# Patient Record
Sex: MALE | Race: WHITE | NOT HISPANIC OR LATINO | Employment: OTHER | URBAN - METROPOLITAN AREA
[De-identification: names, ages, dates, MRNs, and addresses within clinical notes are randomized per-mention and may not be internally consistent; named-entity substitution may affect disease eponyms.]

---

## 2017-01-03 ENCOUNTER — HOME CARE/HOSPICE - HEALTHEAST (OUTPATIENT)
Dept: HOME HEALTH SERVICES | Facility: HOME HEALTH | Age: 82
End: 2017-01-03

## 2017-01-04 ENCOUNTER — AMBULATORY - HEALTHEAST (OUTPATIENT)
Dept: FAMILY MEDICINE | Facility: CLINIC | Age: 82
End: 2017-01-04

## 2017-01-04 ENCOUNTER — COMMUNICATION - HEALTHEAST (OUTPATIENT)
Dept: FAMILY MEDICINE | Facility: CLINIC | Age: 82
End: 2017-01-04

## 2017-01-04 DIAGNOSIS — I48.0 PAROXYSMAL ATRIAL FIBRILLATION (H): ICD-10-CM

## 2017-01-05 ENCOUNTER — HOME CARE/HOSPICE - HEALTHEAST (OUTPATIENT)
Dept: HOME HEALTH SERVICES | Facility: HOME HEALTH | Age: 82
End: 2017-01-05

## 2017-01-10 ENCOUNTER — HOME CARE/HOSPICE - HEALTHEAST (OUTPATIENT)
Dept: HOME HEALTH SERVICES | Facility: HOME HEALTH | Age: 82
End: 2017-01-10

## 2017-01-12 ENCOUNTER — HOME CARE/HOSPICE - HEALTHEAST (OUTPATIENT)
Dept: HOME HEALTH SERVICES | Facility: HOME HEALTH | Age: 82
End: 2017-01-12

## 2017-01-20 ENCOUNTER — HOME CARE/HOSPICE - HEALTHEAST (OUTPATIENT)
Dept: HOME HEALTH SERVICES | Facility: HOME HEALTH | Age: 82
End: 2017-01-20

## 2017-01-20 ENCOUNTER — OFFICE VISIT - HEALTHEAST (OUTPATIENT)
Dept: CARDIOLOGY | Facility: CLINIC | Age: 82
End: 2017-01-20

## 2017-01-20 ENCOUNTER — COMMUNICATION - HEALTHEAST (OUTPATIENT)
Dept: TELEHEALTH | Facility: CLINIC | Age: 82
End: 2017-01-20

## 2017-01-20 DIAGNOSIS — I44.7 LBBB (LEFT BUNDLE BRANCH BLOCK): ICD-10-CM

## 2017-01-20 DIAGNOSIS — I44.30 AVB (ATRIOVENTRICULAR BLOCK): ICD-10-CM

## 2017-01-20 DIAGNOSIS — I10 ESSENTIAL HYPERTENSION WITH GOAL BLOOD PRESSURE LESS THAN 140/90: ICD-10-CM

## 2017-01-20 DIAGNOSIS — I48.0 PAROXYSMAL ATRIAL FIBRILLATION (H): ICD-10-CM

## 2017-01-20 DIAGNOSIS — I25.10 ATHEROSCLEROSIS OF NATIVE CORONARY ARTERY OF NATIVE HEART WITHOUT ANGINA PECTORIS: ICD-10-CM

## 2017-01-20 ASSESSMENT — MIFFLIN-ST. JEOR: SCORE: 1345.98

## 2017-02-07 ENCOUNTER — COMMUNICATION - HEALTHEAST (OUTPATIENT)
Dept: FAMILY MEDICINE | Facility: CLINIC | Age: 82
End: 2017-02-07

## 2017-02-07 DIAGNOSIS — I48.0 PAROXYSMAL ATRIAL FIBRILLATION (H): ICD-10-CM

## 2017-02-21 ENCOUNTER — AMBULATORY - HEALTHEAST (OUTPATIENT)
Dept: CARDIOLOGY | Facility: CLINIC | Age: 82
End: 2017-02-21

## 2017-02-21 ENCOUNTER — AMBULATORY - HEALTHEAST (OUTPATIENT)
Dept: FAMILY MEDICINE | Facility: CLINIC | Age: 82
End: 2017-02-21

## 2017-02-21 DIAGNOSIS — Z95.0 CARDIAC PACEMAKER IN SITU: ICD-10-CM

## 2017-02-21 ASSESSMENT — MIFFLIN-ST. JEOR: SCORE: 1343.2

## 2017-03-29 ENCOUNTER — COMMUNICATION - HEALTHEAST (OUTPATIENT)
Dept: FAMILY MEDICINE | Facility: CLINIC | Age: 82
End: 2017-03-29

## 2017-03-29 DIAGNOSIS — I48.0 PAROXYSMAL ATRIAL FIBRILLATION (H): ICD-10-CM

## 2017-05-23 ENCOUNTER — AMBULATORY - HEALTHEAST (OUTPATIENT)
Dept: CARDIOLOGY | Facility: CLINIC | Age: 82
End: 2017-05-23

## 2017-05-23 DIAGNOSIS — Z95.0 PACEMAKER: ICD-10-CM

## 2017-05-23 LAB — HCC DEVICE COMMENTS: NORMAL

## 2017-05-24 ENCOUNTER — AMBULATORY - HEALTHEAST (OUTPATIENT)
Dept: CARDIOLOGY | Facility: CLINIC | Age: 82
End: 2017-05-24

## 2017-06-05 ENCOUNTER — OFFICE VISIT (OUTPATIENT)
Dept: UROLOGY | Facility: CLINIC | Age: 82
End: 2017-06-05
Payer: MEDICARE

## 2017-06-05 ENCOUNTER — RECORDS - HEALTHEAST (OUTPATIENT)
Dept: ADMINISTRATIVE | Facility: OTHER | Age: 82
End: 2017-06-05

## 2017-06-05 VITALS
SYSTOLIC BLOOD PRESSURE: 130 MMHG | WEIGHT: 160 LBS | HEART RATE: 70 BPM | DIASTOLIC BLOOD PRESSURE: 80 MMHG | BODY MASS INDEX: 23.7 KG/M2 | HEIGHT: 69 IN

## 2017-06-05 DIAGNOSIS — C61 PROSTATE CANCER (H): Primary | ICD-10-CM

## 2017-06-05 LAB — PSA SERPL-MCNC: 1.9 NG/ML (ref 0–4)

## 2017-06-05 PROCEDURE — 84153 ASSAY OF PSA TOTAL: CPT | Performed by: UROLOGY

## 2017-06-05 PROCEDURE — 36415 COLL VENOUS BLD VENIPUNCTURE: CPT | Performed by: UROLOGY

## 2017-06-05 PROCEDURE — 99213 OFFICE O/P EST LOW 20 MIN: CPT | Performed by: UROLOGY

## 2017-06-05 RX ORDER — SOTALOL HYDROCHLORIDE 80 MG/1
TABLET ORAL
Refills: 1 | COMMUNITY
Start: 2017-05-20 | End: 2021-01-01

## 2017-06-05 ASSESSMENT — PAIN SCALES - GENERAL: PAINLEVEL: NO PAIN (0)

## 2017-06-05 NOTE — LETTER
6/5/2017       RE: Seng Deshpande  [address] [place] [address]     Dear Colleague,    Thank you for referring your patient, Seng Deshpande, to the [facility] [place] at [facility]. Please see a copy of my visit note below.    History: This very pleasant 89-year-old gentleman returns for PSA and reviewed today.We recall, that he was diagnosed with low volume low-grade prostate cancer after a transurethral resection of prostate over 20 years ago and has since then been on active surveillance.  2 years ago, while on Avodart his PSA had risen to 8.7.  He was given 1 Eligard injection at that time and Avodart was discontinued.  One year ago the PSA was 4.4 and 6 months ago he did risen to 11.4.  We did a T3 MRI of the prostate at that time which showed no worse than PIRADS 3,We were concerned, despite this quite encouraging MRI report about the PSA velocity and therefore repeated an Eligard 45 injection at that time.The PSA today is 1.9.He is voiding well.He has recently had a pacemaker implanted.    Past Medical History:   Diagnosis Date     Mumps      Prostate infection        Social History     Social History     Marital status:      Spouse name: N/A     Number of children: N/A     Years of education: N/A     Social History Main Topics     Smoking status: Never Smoker     Smokeless tobacco: None     Alcohol use No     Drug use: No     Sexual activity: Not Asked     Other Topics Concern     None     Social History Narrative       Past Surgical History:   Procedure Laterality Date     CYSTOSCOPY       PENIS SURGERY       PROSTATE SURGERY         No family history on file.      Current Outpatient Prescriptions:      sotalol (BETAPACE) 80 MG tablet, , Disp: , Rfl: 1     ascorbic acid (VITAMIN C) 1000 MG TABS, Take 1,000 mg by mouth, Disp: , Rfl:      aspirin 81 MG chewable tablet, Take 81 mg by mouth, Disp: , Rfl:      quinapril  "(ACCUPRIL) 20 MG tablet, Take 20 mg by mouth, Disp: , Rfl:      nitroglycerin (NITROSTAT) 0.4 MG SL tablet, Place 0.4 mg under the tongue, Disp: , Rfl:      Multiple Vitamins-Minerals (CENTRUM SILVER) per tablet, Take 1 tablet by mouth, Disp: , Rfl:      Glucosamine HCl (SM GLUCOSAMINE HCL) 1500 MG TABS, Take 1,500 mg by mouth, Disp: , Rfl:      clindamycin (CLEOCIN) 300 MG capsule, Take 600 mg by mouth, Disp: , Rfl:      cholecalciferol (VITAMIN D-1000 MAX ST) 1000 UNITS TABS, Take 1,000 Units by mouth, Disp: , Rfl:      Oyster Shell Calcium (CVS OYSTER SHELL CALCIUM) 500 MG tablet, Take 1 tablet by mouth, Disp: , Rfl:      atorvastatin (LIPITOR) 40 MG tablet, Take 40 mg by mouth, Disp: , Rfl:     10 point ROS of systems including Constitutional, Eyes, Respiratory, Cardiovascular, Gastroenterology, Genitourinary, Integumentary, Muscularskeletal, Psychiatric were all negative except for pertinent positives noted in my HPI.    Examination:   /80  Pulse 70  Ht 1.753 m (5' 9\")  Wt 72.6 kg (160 lb)  BMI 23.63 kg/m2  General Impression: Very pleasant gentleman in no acute distress, well-oriented in time place and person  Mental Status: Normal.HEENT.  There is no evidence of jaundice and mucous membranes are normal  Skin: Skin is normal to examination  Respiratory System: Respiratory cycle is normal  Lymph Nodes: Not examined  Back/Flank Tenderness: Not examined  Cardiovascular System: Not examined  Abdominal Examination: Not examined  Extremities: There is no significant peripheral edema  Genitial: Not examined  Rectal Examination: Not examined  Neurologic System: There are no focal abnormal clinical neurological signs and central, or peripheral nervous systems    Impression: We decided to defer any further Eligard for the time being, in view of the very significant positive reaction to the last injection, from a biochemical point review.I will plan to repeat the PSA in 6 months along with a physical " "examination at that time.  We will determine if further Eligard needs to be considered based on those results.    Plan.6 months for PSA and examination.    \"This dictation was performed with voice recognition software and may contain errors,  omissions and inadvertent word substitu      Again, thank you for allowing me to participate in the care of your patient.      Sincerely,    Kodi Carmen MD      "

## 2017-06-05 NOTE — NURSING NOTE
Chief Complaint   Patient presents with     Psa Screening     Patient is here for PSA      Sanford Cotter LPN 3:56 PM June 5, 2017

## 2017-06-05 NOTE — PROGRESS NOTES
History: This very pleasant 89-year-old gentleman returns for PSA and reviewed today.We recall, that he was diagnosed with low volume low-grade prostate cancer after a transurethral resection of prostate over 20 years ago and has since then been on active surveillance.  2 years ago, while on Avodart his PSA had risen to 8.7.  He was given 1 Eligard injection at that time and Avodart was discontinued.  One year ago the PSA was 4.4 and 6 months ago he did risen to 11.4.  We did a T3 MRI of the prostate at that time which showed no worse than PIRADS 3,We were concerned, despite this quite encouraging MRI report about the PSA velocity and therefore repeated an Eligard 45 injection at that time.The PSA today is 1.9.He is voiding well.He has recently had a pacemaker implanted.    Past Medical History:   Diagnosis Date     Mumps      Prostate infection        Social History     Social History     Marital status:      Spouse name: N/A     Number of children: N/A     Years of education: N/A     Social History Main Topics     Smoking status: Never Smoker     Smokeless tobacco: None     Alcohol use No     Drug use: No     Sexual activity: Not Asked     Other Topics Concern     None     Social History Narrative       Past Surgical History:   Procedure Laterality Date     CYSTOSCOPY       PENIS SURGERY       PROSTATE SURGERY         No family history on file.      Current Outpatient Prescriptions:      sotalol (BETAPACE) 80 MG tablet, , Disp: , Rfl: 1     ascorbic acid (VITAMIN C) 1000 MG TABS, Take 1,000 mg by mouth, Disp: , Rfl:      aspirin 81 MG chewable tablet, Take 81 mg by mouth, Disp: , Rfl:      quinapril (ACCUPRIL) 20 MG tablet, Take 20 mg by mouth, Disp: , Rfl:      nitroglycerin (NITROSTAT) 0.4 MG SL tablet, Place 0.4 mg under the tongue, Disp: , Rfl:      Multiple Vitamins-Minerals (CENTRUM SILVER) per tablet, Take 1 tablet by mouth, Disp: , Rfl:      Glucosamine HCl (SM GLUCOSAMINE HCL) 1500 MG TABS, Take  "1,500 mg by mouth, Disp: , Rfl:      clindamycin (CLEOCIN) 300 MG capsule, Take 600 mg by mouth, Disp: , Rfl:      cholecalciferol (VITAMIN D-1000 MAX ST) 1000 UNITS TABS, Take 1,000 Units by mouth, Disp: , Rfl:      Oyster Shell Calcium (CVS OYSTER SHELL CALCIUM) 500 MG tablet, Take 1 tablet by mouth, Disp: , Rfl:      atorvastatin (LIPITOR) 40 MG tablet, Take 40 mg by mouth, Disp: , Rfl:     10 point ROS of systems including Constitutional, Eyes, Respiratory, Cardiovascular, Gastroenterology, Genitourinary, Integumentary, Muscularskeletal, Psychiatric were all negative except for pertinent positives noted in my HPI.    Examination:   /80  Pulse 70  Ht 1.753 m (5' 9\")  Wt 72.6 kg (160 lb)  BMI 23.63 kg/m2  General Impression: Very pleasant gentleman in no acute distress, well-oriented in time place and person  Mental Status: Normal.HEENT.  There is no evidence of jaundice and mucous membranes are normal  Skin: Skin is normal to examination  Respiratory System: Respiratory cycle is normal  Lymph Nodes: Not examined  Back/Flank Tenderness: Not examined  Cardiovascular System: Not examined  Abdominal Examination: Not examined  Extremities: There is no significant peripheral edema  Genitial: Not examined  Rectal Examination: Not examined  Neurologic System: There are no focal abnormal clinical neurological signs and central, or peripheral nervous systems    Impression: We decided to defer any further Eligard for the time being, in view of the very significant positive reaction to the last injection, from a biochemical point review.I will plan to repeat the PSA in 6 months along with a physical examination at that time.  We will determine if further Eligard needs to be considered based on those results.    Plan.6 months for PSA and examination.    \"This dictation was performed with voice recognition software and may contain errors,  omissions and inadvertent word substitu    "

## 2017-06-05 NOTE — MR AVS SNAPSHOT
"              After Visit Summary   6/5/2017    Seng Deshpande    MRN: [id number]           Patient Information     Date Of Birth          5/2/1926        Visit Information        Provider Department      6/5/2017 3:40 PM Kodi Carmen MD [facility] [place]        Today's Diagnoses     Prostate cancer (H)    -  1       Follow-ups after your visit        Follow-up notes from your care team     Return in about 6 months (around 12/5/2017) for PSA, Physical Exam.      Your next 10 appointments already scheduled     Nov 08, 2017 11:00 AM CST   Return Visit with Kodi Carmen MD   [facility] ([facility])    [address]  [address]  [facility] [place] [facility] [address] [phone]              Who to contact     If you have questions or need follow up information about today's clinic visit or your schedule please contact [facility] directly at [phone].  Normal or non-critical lab and imaging results will be communicated to you by [email or web address], letter or phone within 4 business days after the clinic has received the results. If you do not hear from us within 7 days, please contact the clinic through JustBookt or phone. If you have a critical or abnormal lab result, we will notify you by phone as soon as possible.  Submit refill requests through Tengion or call your pharmacy and they will forward the refill request to us. Please allow 3 business days for your refill to be completed.          Additional Information About Your Visit        [email or web address] Information     Tengion lets you send messages to your doctor, view your test results, renew your prescriptions, schedule appointments and more. To sign up, go to [email or web address] . Click on \"Log in\" on the left side of the screen, which will take you to the Welcome page. Then click on \"Sign up Now\" on the right side of the page.     You will " "be asked to enter the access code listed below, as well as some personal information. Please follow the directions to create your username and password.     Your access code is: [email or web address]  Expires: 9/3/2017  4:10 PM     Your access code will  in 90 days. If you need help or a new code, please call your [facility] clinic or [phone].        Care EveryWhere ID     This is your Care EveryWhere ID. This could be used by other organizations to access your [facility] medical records  [id number]        Your Vitals Were     Pulse Height BMI (Body Mass Index)             70 1.753 m (5' 9\") 23.63 kg/m2          Blood Pressure from Last 3 Encounters:   17 130/80   16 122/72    Weight from Last 3 Encounters:   17 72.6 kg (160 lb)   16 77.1 kg (170 lb)              We Performed the Following     PSA Diag Urologic Phys          Today's Medication Changes          These changes are accurate as of: 17  4:10 PM.  If you have any questions, ask your nurse or doctor.               Stop taking these medicines if you haven't already. Please contact your care team if you have questions.     ELIQUIS 5 MG tablet   Generic drug:  apixaban ANTICOAGULANT   Stopped by:  Kodi Carmen MD           metoprolol 100 MG 24 hr tablet   Commonly known as:  TOPROL-XL   Stopped by:  Kodi Carmen MD                    Primary Care Provider Office Phone # Fax #    Taras Avila [phone]       [place] [id number] [facility] [address]        Thank you!     Thank you for choosing [facility] [place]  for your care. Our goal is always to provide you with excellent care. Hearing back from our patients is one way we can continue to improve our services. Please take a few minutes to complete the written survey that you may receive in the mail after your visit with us. Thank you!             Your Updated Medication List " - Protect others around you: Learn how to safely use, store and throw away your medicines at www.disposemymeds.org.          This list is accurate as of: 6/5/17  4:10 PM.  Always use your most recent med list.                   Brand Name Dispense Instructions for use    ascorbic acid 1000 MG Tabs    vitamin C     Take 1,000 mg by mouth       aspirin 81 MG chewable tablet      Take 81 mg by mouth       atorvastatin 40 MG tablet    LIPITOR     Take 40 mg by mouth       CENTRUM SILVER per tablet      Take 1 tablet by mouth       clindamycin 300 MG capsule    CLEOCIN     Take 600 mg by mouth       CVS OYSTER SHELL CALCIUM 500 MG tablet   Generic drug:  Oyster Shell Calcium      Take 1 tablet by mouth       nitroglycerin 0.4 MG sublingual tablet    NITROSTAT     Place 0.4 mg under the tongue       quinapril 20 MG tablet    ACCUPRIL     Take 20 mg by mouth       SM GLUCOSAMINE HCL 1500 MG Tabs   Generic drug:  Glucosamine HCl      Take 1,500 mg by mouth       sotalol 80 MG tablet    BETAPACE         VITAMIN D-1000 MAX ST 1000 UNITS Tabs   Generic drug:  cholecalciferol      Take 1,000 Units by mouth

## 2017-07-08 ENCOUNTER — COMMUNICATION - HEALTHEAST (OUTPATIENT)
Dept: FAMILY MEDICINE | Facility: CLINIC | Age: 82
End: 2017-07-08

## 2017-07-08 DIAGNOSIS — I48.0 PAROXYSMAL ATRIAL FIBRILLATION (H): ICD-10-CM

## 2017-08-24 ENCOUNTER — COMMUNICATION - HEALTHEAST (OUTPATIENT)
Dept: ADMINISTRATIVE | Facility: CLINIC | Age: 82
End: 2017-08-24

## 2017-08-29 ENCOUNTER — AMBULATORY - HEALTHEAST (OUTPATIENT)
Dept: CARDIOLOGY | Facility: CLINIC | Age: 82
End: 2017-08-29

## 2017-08-29 ENCOUNTER — COMMUNICATION - HEALTHEAST (OUTPATIENT)
Dept: CARDIOLOGY | Facility: CLINIC | Age: 82
End: 2017-08-29

## 2017-08-29 DIAGNOSIS — Z95.0 PACEMAKER: ICD-10-CM

## 2017-08-29 LAB — HCC DEVICE COMMENTS: NORMAL

## 2017-09-25 ENCOUNTER — OFFICE VISIT - HEALTHEAST (OUTPATIENT)
Dept: GERIATRICS | Facility: CLINIC | Age: 82
End: 2017-09-25

## 2017-09-25 ENCOUNTER — AMBULATORY - HEALTHEAST (OUTPATIENT)
Dept: ADMINISTRATIVE | Facility: CLINIC | Age: 82
End: 2017-09-25

## 2017-09-25 DIAGNOSIS — S06.9XAA TBI (TRAUMATIC BRAIN INJURY) (H): ICD-10-CM

## 2017-09-25 DIAGNOSIS — I48.0 PAROXYSMAL ATRIAL FIBRILLATION (H): ICD-10-CM

## 2017-09-25 DIAGNOSIS — T07.XXXA MULTIPLE FRACTURES: ICD-10-CM

## 2017-09-25 DIAGNOSIS — S70.10XA ILIOPSOAS MUSCLE HEMATOMA: ICD-10-CM

## 2017-09-25 DIAGNOSIS — S62.509A THUMB FRACTURE: ICD-10-CM

## 2017-09-25 DIAGNOSIS — S62.601A: ICD-10-CM

## 2017-09-28 ENCOUNTER — OFFICE VISIT - HEALTHEAST (OUTPATIENT)
Dept: GERIATRICS | Facility: CLINIC | Age: 82
End: 2017-09-28

## 2017-09-28 ENCOUNTER — RECORDS - HEALTHEAST (OUTPATIENT)
Dept: ADMINISTRATIVE | Facility: OTHER | Age: 82
End: 2017-09-28

## 2017-09-28 DIAGNOSIS — S32.009A LUMBAR VERTEBRAL FRACTURE (H): ICD-10-CM

## 2017-09-28 DIAGNOSIS — D50.0 BLOOD LOSS ANEMIA: ICD-10-CM

## 2017-09-28 DIAGNOSIS — S06.9X1S TBI (TRAUMATIC BRAIN INJURY), WITH LOSS OF CONSCIOUSNESS OF 30 MINUTES OR LESS, SEQUELA: ICD-10-CM

## 2017-09-28 DIAGNOSIS — T07.XXXA MULTIPLE CONTUSIONS: ICD-10-CM

## 2017-09-29 ENCOUNTER — AMBULATORY - HEALTHEAST (OUTPATIENT)
Dept: GERIATRICS | Facility: CLINIC | Age: 82
End: 2017-09-29

## 2017-09-29 ENCOUNTER — COMMUNICATION - HEALTHEAST (OUTPATIENT)
Dept: FAMILY MEDICINE | Facility: CLINIC | Age: 82
End: 2017-09-29

## 2017-09-30 ASSESSMENT — MIFFLIN-ST. JEOR: SCORE: 1416.9

## 2017-10-02 ENCOUNTER — OFFICE VISIT - HEALTHEAST (OUTPATIENT)
Dept: GERIATRICS | Facility: CLINIC | Age: 82
End: 2017-10-02

## 2017-10-02 DIAGNOSIS — S06.9XAA TBI (TRAUMATIC BRAIN INJURY) (H): ICD-10-CM

## 2017-10-02 DIAGNOSIS — I48.0 PAROXYSMAL ATRIAL FIBRILLATION (H): ICD-10-CM

## 2017-10-02 DIAGNOSIS — D50.0 BLOOD LOSS ANEMIA: ICD-10-CM

## 2017-10-02 DIAGNOSIS — S62.509A THUMB FRACTURE: ICD-10-CM

## 2017-10-02 DIAGNOSIS — T07.XXXA MULTIPLE FRACTURES: ICD-10-CM

## 2017-10-05 ENCOUNTER — OFFICE VISIT - HEALTHEAST (OUTPATIENT)
Dept: GERIATRICS | Facility: CLINIC | Age: 82
End: 2017-10-05

## 2017-10-05 DIAGNOSIS — T07.XXXA MULTIPLE CONTUSIONS: ICD-10-CM

## 2017-10-09 ENCOUNTER — OFFICE VISIT - HEALTHEAST (OUTPATIENT)
Dept: GERIATRICS | Facility: CLINIC | Age: 82
End: 2017-10-09

## 2017-10-09 DIAGNOSIS — T07.XXXA MULTIPLE FRACTURES: ICD-10-CM

## 2017-10-09 DIAGNOSIS — D50.0 BLOOD LOSS ANEMIA: ICD-10-CM

## 2017-10-09 DIAGNOSIS — T07.XXXA MULTIPLE CONTUSIONS: ICD-10-CM

## 2017-10-12 ENCOUNTER — OFFICE VISIT - HEALTHEAST (OUTPATIENT)
Dept: GERIATRICS | Facility: CLINIC | Age: 82
End: 2017-10-12

## 2017-10-12 DIAGNOSIS — Z95.0 S/P PLACEMENT OF CARDIAC PACEMAKER: ICD-10-CM

## 2017-10-12 DIAGNOSIS — S32.009A LUMBAR VERTEBRAL FRACTURE (H): ICD-10-CM

## 2017-10-12 DIAGNOSIS — K59.00 CONSTIPATION: ICD-10-CM

## 2017-10-12 DIAGNOSIS — E78.5 DYSLIPIDEMIA: ICD-10-CM

## 2017-10-12 DIAGNOSIS — I25.10 CAD (CORONARY ARTERY DISEASE): ICD-10-CM

## 2017-10-12 DIAGNOSIS — I48.0 PAROXYSMAL ATRIAL FIBRILLATION (H): ICD-10-CM

## 2017-10-16 ENCOUNTER — OFFICE VISIT - HEALTHEAST (OUTPATIENT)
Dept: GERIATRICS | Facility: CLINIC | Age: 82
End: 2017-10-16

## 2017-10-16 DIAGNOSIS — T07.XXXA MULTIPLE CONTUSIONS: ICD-10-CM

## 2017-10-16 DIAGNOSIS — R11.0 NAUSEA: ICD-10-CM

## 2017-10-16 DIAGNOSIS — S32.009D CLOSED FRACTURE OF LUMBAR VERTEBRA WITH ROUTINE HEALING, UNSPECIFIED FRACTURE MORPHOLOGY, UNSPECIFIED LUMBAR VERTEBRAL LEVEL, SUBSEQUENT ENCOUNTER: ICD-10-CM

## 2017-10-19 ENCOUNTER — OFFICE VISIT - HEALTHEAST (OUTPATIENT)
Dept: GERIATRICS | Facility: CLINIC | Age: 82
End: 2017-10-19

## 2017-10-19 DIAGNOSIS — S32.009D CLOSED FRACTURE OF LUMBAR VERTEBRA WITH ROUTINE HEALING, UNSPECIFIED FRACTURE MORPHOLOGY, UNSPECIFIED LUMBAR VERTEBRAL LEVEL, SUBSEQUENT ENCOUNTER: ICD-10-CM

## 2017-10-19 DIAGNOSIS — I10 ESSENTIAL HYPERTENSION WITH GOAL BLOOD PRESSURE LESS THAN 140/90: ICD-10-CM

## 2017-10-19 DIAGNOSIS — D50.0 BLOOD LOSS ANEMIA: ICD-10-CM

## 2017-10-19 DIAGNOSIS — I25.10 CAD (CORONARY ARTERY DISEASE): ICD-10-CM

## 2017-10-19 DIAGNOSIS — I48.0 PAROXYSMAL ATRIAL FIBRILLATION (H): ICD-10-CM

## 2017-10-23 ENCOUNTER — OFFICE VISIT - HEALTHEAST (OUTPATIENT)
Dept: GERIATRICS | Facility: CLINIC | Age: 82
End: 2017-10-23

## 2017-10-23 DIAGNOSIS — R53.1 WEAKNESS: ICD-10-CM

## 2017-10-23 DIAGNOSIS — D50.0 BLOOD LOSS ANEMIA: ICD-10-CM

## 2017-10-23 DIAGNOSIS — R42 LIGHTHEADEDNESS: ICD-10-CM

## 2017-10-26 ENCOUNTER — OFFICE VISIT - HEALTHEAST (OUTPATIENT)
Dept: GERIATRICS | Facility: CLINIC | Age: 82
End: 2017-10-26

## 2017-10-26 DIAGNOSIS — I10 ESSENTIAL HYPERTENSION WITH GOAL BLOOD PRESSURE LESS THAN 140/90: ICD-10-CM

## 2017-10-26 DIAGNOSIS — S32.009D CLOSED FRACTURE OF LUMBAR VERTEBRA WITH ROUTINE HEALING, UNSPECIFIED FRACTURE MORPHOLOGY, UNSPECIFIED LUMBAR VERTEBRAL LEVEL, SUBSEQUENT ENCOUNTER: ICD-10-CM

## 2017-10-26 DIAGNOSIS — Z95.0 S/P PLACEMENT OF CARDIAC PACEMAKER: ICD-10-CM

## 2017-10-26 DIAGNOSIS — C61 MALIGNANT NEOPLASM OF PROSTATE (H): ICD-10-CM

## 2017-10-26 DIAGNOSIS — D50.0 BLOOD LOSS ANEMIA: ICD-10-CM

## 2017-10-26 DIAGNOSIS — I25.10 CAD (CORONARY ARTERY DISEASE): ICD-10-CM

## 2017-10-26 DIAGNOSIS — T07.XXXA MULTIPLE CONTUSIONS: ICD-10-CM

## 2017-10-26 DIAGNOSIS — I48.0 PAROXYSMAL ATRIAL FIBRILLATION (H): ICD-10-CM

## 2017-10-30 ENCOUNTER — OFFICE VISIT - HEALTHEAST (OUTPATIENT)
Dept: GERIATRICS | Facility: CLINIC | Age: 82
End: 2017-10-30

## 2017-10-30 DIAGNOSIS — S32.009D CLOSED FRACTURE OF LUMBAR VERTEBRA WITH ROUTINE HEALING, UNSPECIFIED FRACTURE MORPHOLOGY, UNSPECIFIED LUMBAR VERTEBRAL LEVEL, SUBSEQUENT ENCOUNTER: ICD-10-CM

## 2017-10-30 DIAGNOSIS — B37.0 THRUSH, ORAL: ICD-10-CM

## 2017-10-30 DIAGNOSIS — I10 ESSENTIAL HYPERTENSION WITH GOAL BLOOD PRESSURE LESS THAN 140/90: ICD-10-CM

## 2017-10-30 DIAGNOSIS — C61 MALIGNANT NEOPLASM OF PROSTATE (H): ICD-10-CM

## 2017-10-30 DIAGNOSIS — Z95.0 S/P PLACEMENT OF CARDIAC PACEMAKER: ICD-10-CM

## 2017-10-30 DIAGNOSIS — I25.10 CAD (CORONARY ARTERY DISEASE): ICD-10-CM

## 2017-10-30 DIAGNOSIS — I48.0 PAROXYSMAL ATRIAL FIBRILLATION (H): ICD-10-CM

## 2017-11-02 ENCOUNTER — OFFICE VISIT - HEALTHEAST (OUTPATIENT)
Dept: GERIATRICS | Facility: CLINIC | Age: 82
End: 2017-11-02

## 2017-11-02 ENCOUNTER — RECORDS - HEALTHEAST (OUTPATIENT)
Dept: ADMINISTRATIVE | Facility: OTHER | Age: 82
End: 2017-11-02

## 2017-11-02 DIAGNOSIS — D50.0 BLOOD LOSS ANEMIA: ICD-10-CM

## 2017-11-02 DIAGNOSIS — R53.1 WEAKNESS: ICD-10-CM

## 2017-11-02 DIAGNOSIS — S32.009D CLOSED FRACTURE OF LUMBAR VERTEBRA WITH ROUTINE HEALING, UNSPECIFIED FRACTURE MORPHOLOGY, UNSPECIFIED LUMBAR VERTEBRAL LEVEL, SUBSEQUENT ENCOUNTER: ICD-10-CM

## 2017-11-02 DIAGNOSIS — T07.XXXA MULTIPLE CONTUSIONS: ICD-10-CM

## 2017-11-03 ENCOUNTER — HOME CARE/HOSPICE - HEALTHEAST (OUTPATIENT)
Dept: HOME HEALTH SERVICES | Facility: HOME HEALTH | Age: 82
End: 2017-11-03

## 2017-11-06 ENCOUNTER — OFFICE VISIT - HEALTHEAST (OUTPATIENT)
Dept: GERIATRICS | Facility: CLINIC | Age: 82
End: 2017-11-06

## 2017-11-06 DIAGNOSIS — Z95.0 S/P PLACEMENT OF CARDIAC PACEMAKER: ICD-10-CM

## 2017-11-06 DIAGNOSIS — I25.10 CAD (CORONARY ARTERY DISEASE): ICD-10-CM

## 2017-11-06 DIAGNOSIS — R53.1 WEAKNESS: ICD-10-CM

## 2017-11-06 DIAGNOSIS — T07.XXXA MULTIPLE CONTUSIONS: ICD-10-CM

## 2017-11-06 DIAGNOSIS — B37.0 THRUSH, ORAL: ICD-10-CM

## 2017-11-06 DIAGNOSIS — C61 MALIGNANT NEOPLASM OF PROSTATE (H): ICD-10-CM

## 2017-11-06 DIAGNOSIS — I21.4 NSTEMI (NON-ST ELEVATED MYOCARDIAL INFARCTION) (H): ICD-10-CM

## 2017-11-06 DIAGNOSIS — G45.9 TIA ON MEDICATION: ICD-10-CM

## 2017-11-06 DIAGNOSIS — S32.009D CLOSED FRACTURE OF LUMBAR VERTEBRA WITH ROUTINE HEALING, UNSPECIFIED FRACTURE MORPHOLOGY, UNSPECIFIED LUMBAR VERTEBRAL LEVEL, SUBSEQUENT ENCOUNTER: ICD-10-CM

## 2017-11-06 DIAGNOSIS — D50.0 BLOOD LOSS ANEMIA: ICD-10-CM

## 2017-11-06 DIAGNOSIS — I48.0 PAROXYSMAL ATRIAL FIBRILLATION (H): ICD-10-CM

## 2017-11-08 ENCOUNTER — COMMUNICATION - HEALTHEAST (OUTPATIENT)
Dept: HOME HEALTH SERVICES | Facility: HOME HEALTH | Age: 82
End: 2017-11-08

## 2017-11-08 ENCOUNTER — RECORDS - HEALTHEAST (OUTPATIENT)
Dept: ADMINISTRATIVE | Facility: OTHER | Age: 82
End: 2017-11-08

## 2017-11-09 ENCOUNTER — AMBULATORY - HEALTHEAST (OUTPATIENT)
Dept: CARDIOLOGY | Facility: CLINIC | Age: 82
End: 2017-11-09

## 2017-11-09 ENCOUNTER — AMBULATORY - HEALTHEAST (OUTPATIENT)
Dept: GERIATRICS | Facility: CLINIC | Age: 82
End: 2017-11-09

## 2017-11-10 ENCOUNTER — OFFICE VISIT - HEALTHEAST (OUTPATIENT)
Dept: FAMILY MEDICINE | Facility: CLINIC | Age: 82
End: 2017-11-10

## 2017-11-10 DIAGNOSIS — S32.009A LUMBAR VERTEBRAL FRACTURE (H): ICD-10-CM

## 2017-11-10 DIAGNOSIS — S32.10XA SACRAL FRACTURE (H): ICD-10-CM

## 2017-11-10 DIAGNOSIS — I95.9 HYPOTENSION: ICD-10-CM

## 2017-11-10 DIAGNOSIS — D64.9 ANEMIA: ICD-10-CM

## 2017-11-10 DIAGNOSIS — E78.5 DYSLIPIDEMIA: ICD-10-CM

## 2017-11-10 DIAGNOSIS — I48.91 ATRIAL FIBRILLATION, UNSPECIFIED TYPE (H): ICD-10-CM

## 2017-11-11 ENCOUNTER — HOME CARE/HOSPICE - HEALTHEAST (OUTPATIENT)
Dept: HOME HEALTH SERVICES | Facility: HOME HEALTH | Age: 82
End: 2017-11-11

## 2017-11-12 ENCOUNTER — HOME CARE/HOSPICE - HEALTHEAST (OUTPATIENT)
Dept: HOME HEALTH SERVICES | Facility: HOME HEALTH | Age: 82
End: 2017-11-12

## 2017-11-13 ENCOUNTER — COMMUNICATION - HEALTHEAST (OUTPATIENT)
Dept: GERIATRICS | Facility: CLINIC | Age: 82
End: 2017-11-13

## 2017-11-13 ENCOUNTER — COMMUNICATION - HEALTHEAST (OUTPATIENT)
Dept: FAMILY MEDICINE | Facility: CLINIC | Age: 82
End: 2017-11-13

## 2017-11-13 ENCOUNTER — COMMUNICATION - HEALTHEAST (OUTPATIENT)
Dept: HOME HEALTH SERVICES | Facility: HOME HEALTH | Age: 82
End: 2017-11-13

## 2017-11-13 ENCOUNTER — HOME CARE/HOSPICE - HEALTHEAST (OUTPATIENT)
Dept: HOME HEALTH SERVICES | Facility: HOME HEALTH | Age: 82
End: 2017-11-13

## 2017-11-14 ENCOUNTER — COMMUNICATION - HEALTHEAST (OUTPATIENT)
Dept: FAMILY MEDICINE | Facility: CLINIC | Age: 82
End: 2017-11-14

## 2017-11-14 ENCOUNTER — HOME CARE/HOSPICE - HEALTHEAST (OUTPATIENT)
Dept: HOME HEALTH SERVICES | Facility: HOME HEALTH | Age: 82
End: 2017-11-14

## 2017-11-16 ENCOUNTER — HOME CARE/HOSPICE - HEALTHEAST (OUTPATIENT)
Dept: HOME HEALTH SERVICES | Facility: HOME HEALTH | Age: 82
End: 2017-11-16

## 2017-11-16 ENCOUNTER — COMMUNICATION - HEALTHEAST (OUTPATIENT)
Dept: FAMILY MEDICINE | Facility: CLINIC | Age: 82
End: 2017-11-16

## 2017-11-16 DIAGNOSIS — E78.5 DYSLIPIDEMIA: ICD-10-CM

## 2017-11-17 ENCOUNTER — HOME CARE/HOSPICE - HEALTHEAST (OUTPATIENT)
Dept: HOME HEALTH SERVICES | Facility: HOME HEALTH | Age: 82
End: 2017-11-17

## 2017-11-20 ENCOUNTER — HOME CARE/HOSPICE - HEALTHEAST (OUTPATIENT)
Dept: HOME HEALTH SERVICES | Facility: HOME HEALTH | Age: 82
End: 2017-11-20

## 2017-11-20 ENCOUNTER — COMMUNICATION - HEALTHEAST (OUTPATIENT)
Dept: OTHER | Facility: CLINIC | Age: 82
End: 2017-11-20

## 2017-11-21 ENCOUNTER — HOME CARE/HOSPICE - HEALTHEAST (OUTPATIENT)
Dept: HOME HEALTH SERVICES | Facility: HOME HEALTH | Age: 82
End: 2017-11-21

## 2017-11-22 ENCOUNTER — HOME CARE/HOSPICE - HEALTHEAST (OUTPATIENT)
Dept: HOME HEALTH SERVICES | Facility: HOME HEALTH | Age: 82
End: 2017-11-22

## 2017-11-23 ENCOUNTER — HOME CARE/HOSPICE - HEALTHEAST (OUTPATIENT)
Dept: HOME HEALTH SERVICES | Facility: HOME HEALTH | Age: 82
End: 2017-11-23

## 2017-11-24 ENCOUNTER — HOME CARE/HOSPICE - HEALTHEAST (OUTPATIENT)
Dept: HOME HEALTH SERVICES | Facility: HOME HEALTH | Age: 82
End: 2017-11-24

## 2017-11-25 ENCOUNTER — COMMUNICATION - HEALTHEAST (OUTPATIENT)
Dept: FAMILY MEDICINE | Facility: CLINIC | Age: 82
End: 2017-11-25

## 2017-11-25 DIAGNOSIS — I48.0 PAROXYSMAL ATRIAL FIBRILLATION (H): ICD-10-CM

## 2017-11-27 ENCOUNTER — HOME CARE/HOSPICE - HEALTHEAST (OUTPATIENT)
Dept: HOME HEALTH SERVICES | Facility: HOME HEALTH | Age: 82
End: 2017-11-27

## 2017-11-27 ENCOUNTER — COMMUNICATION - HEALTHEAST (OUTPATIENT)
Dept: FAMILY MEDICINE | Facility: CLINIC | Age: 82
End: 2017-11-27

## 2017-11-27 DIAGNOSIS — I48.0 PAROXYSMAL ATRIAL FIBRILLATION (H): ICD-10-CM

## 2017-11-28 ENCOUNTER — HOME CARE/HOSPICE - HEALTHEAST (OUTPATIENT)
Dept: HOME HEALTH SERVICES | Facility: HOME HEALTH | Age: 82
End: 2017-11-28

## 2017-12-01 ENCOUNTER — HOME CARE/HOSPICE - HEALTHEAST (OUTPATIENT)
Dept: HOME HEALTH SERVICES | Facility: HOME HEALTH | Age: 82
End: 2017-12-01

## 2017-12-04 ENCOUNTER — HOME CARE/HOSPICE - HEALTHEAST (OUTPATIENT)
Dept: HOME HEALTH SERVICES | Facility: HOME HEALTH | Age: 82
End: 2017-12-04

## 2017-12-05 ENCOUNTER — AMBULATORY - HEALTHEAST (OUTPATIENT)
Dept: CARDIOLOGY | Facility: CLINIC | Age: 82
End: 2017-12-05

## 2017-12-05 ENCOUNTER — COMMUNICATION - HEALTHEAST (OUTPATIENT)
Dept: CARDIOLOGY | Facility: CLINIC | Age: 82
End: 2017-12-05

## 2017-12-05 DIAGNOSIS — Z95.0 PACEMAKER: ICD-10-CM

## 2017-12-05 LAB — HCC DEVICE COMMENTS: NORMAL

## 2017-12-06 ENCOUNTER — COMMUNICATION - HEALTHEAST (OUTPATIENT)
Dept: CARDIOLOGY | Facility: CLINIC | Age: 82
End: 2017-12-06

## 2017-12-06 DIAGNOSIS — I48.0 PAROXYSMAL ATRIAL FIBRILLATION (H): ICD-10-CM

## 2017-12-22 ENCOUNTER — COMMUNICATION - HEALTHEAST (OUTPATIENT)
Dept: FAMILY MEDICINE | Facility: CLINIC | Age: 82
End: 2017-12-22

## 2017-12-22 DIAGNOSIS — I48.0 PAROXYSMAL ATRIAL FIBRILLATION (H): ICD-10-CM

## 2017-12-26 ENCOUNTER — OFFICE VISIT - HEALTHEAST (OUTPATIENT)
Dept: CARDIOLOGY | Facility: CLINIC | Age: 82
End: 2017-12-26

## 2017-12-26 DIAGNOSIS — I25.10 ATHEROSCLEROSIS OF NATIVE CORONARY ARTERY OF NATIVE HEART WITHOUT ANGINA PECTORIS: ICD-10-CM

## 2017-12-26 DIAGNOSIS — I48.0 PAROXYSMAL ATRIAL FIBRILLATION (H): ICD-10-CM

## 2017-12-26 DIAGNOSIS — I44.7 LBBB (LEFT BUNDLE BRANCH BLOCK): ICD-10-CM

## 2017-12-26 DIAGNOSIS — Z95.0 CARDIAC PACEMAKER IN SITU: ICD-10-CM

## 2017-12-26 DIAGNOSIS — I10 ESSENTIAL HYPERTENSION: ICD-10-CM

## 2017-12-26 DIAGNOSIS — I48.19 PERSISTENT ATRIAL FIBRILLATION (H): ICD-10-CM

## 2017-12-26 ASSESSMENT — MIFFLIN-ST. JEOR: SCORE: 1389.69

## 2018-03-19 ENCOUNTER — COMMUNICATION - HEALTHEAST (OUTPATIENT)
Dept: FAMILY MEDICINE | Facility: CLINIC | Age: 83
End: 2018-03-19

## 2018-03-19 DIAGNOSIS — I48.0 PAROXYSMAL ATRIAL FIBRILLATION (H): ICD-10-CM

## 2018-03-30 ENCOUNTER — RECORDS - HEALTHEAST (OUTPATIENT)
Dept: ADMINISTRATIVE | Facility: OTHER | Age: 83
End: 2018-03-30

## 2018-03-30 ENCOUNTER — TRANSFERRED RECORDS (OUTPATIENT)
Dept: HEALTH INFORMATION MANAGEMENT | Facility: CLINIC | Age: 83
End: 2018-03-30

## 2018-04-05 ENCOUNTER — TRANSFERRED RECORDS (OUTPATIENT)
Dept: HEALTH INFORMATION MANAGEMENT | Facility: CLINIC | Age: 83
End: 2018-04-05

## 2018-04-26 ENCOUNTER — MEDICAL CORRESPONDENCE (OUTPATIENT)
Dept: HEALTH INFORMATION MANAGEMENT | Facility: CLINIC | Age: 83
End: 2018-04-26

## 2018-04-26 ENCOUNTER — RECORDS - HEALTHEAST (OUTPATIENT)
Dept: ADMINISTRATIVE | Facility: OTHER | Age: 83
End: 2018-04-26

## 2018-05-02 ENCOUNTER — OFFICE VISIT - HEALTHEAST (OUTPATIENT)
Dept: FAMILY MEDICINE | Facility: CLINIC | Age: 83
End: 2018-05-02

## 2018-05-02 DIAGNOSIS — S32.000A COMPRESSION FRACTURE OF LUMBAR VERTEBRA (H): ICD-10-CM

## 2018-05-02 DIAGNOSIS — E78.2 MIXED HYPERLIPIDEMIA: ICD-10-CM

## 2018-05-02 DIAGNOSIS — Z85.46 HISTORY OF PROSTATE CANCER: ICD-10-CM

## 2018-05-02 DIAGNOSIS — M54.9 BACK PAIN: ICD-10-CM

## 2018-05-02 DIAGNOSIS — E03.9 HYPOTHYROIDISM: ICD-10-CM

## 2018-05-02 DIAGNOSIS — I48.91 ATRIAL FIBRILLATION (H): ICD-10-CM

## 2018-05-16 ENCOUNTER — AMBULATORY - HEALTHEAST (OUTPATIENT)
Dept: LAB | Facility: CLINIC | Age: 83
End: 2018-05-16

## 2018-05-16 DIAGNOSIS — E03.9 HYPOTHYROIDISM: ICD-10-CM

## 2018-05-16 LAB
T4 FREE SERPL-MCNC: 1 NG/DL (ref 0.7–1.8)
TSH SERPL DL<=0.005 MIU/L-ACNC: 6.41 UIU/ML (ref 0.3–5)

## 2018-05-18 ENCOUNTER — COMMUNICATION - HEALTHEAST (OUTPATIENT)
Dept: FAMILY MEDICINE | Facility: CLINIC | Age: 83
End: 2018-05-18

## 2018-05-21 ENCOUNTER — OFFICE VISIT - HEALTHEAST (OUTPATIENT)
Dept: CARDIOLOGY | Facility: CLINIC | Age: 83
End: 2018-05-21

## 2018-05-21 ENCOUNTER — AMBULATORY - HEALTHEAST (OUTPATIENT)
Dept: CARDIOLOGY | Facility: CLINIC | Age: 83
End: 2018-05-21

## 2018-05-21 DIAGNOSIS — E78.5 DYSLIPIDEMIA: ICD-10-CM

## 2018-05-21 DIAGNOSIS — Z95.0 CARDIAC PACEMAKER IN SITU: ICD-10-CM

## 2018-05-21 DIAGNOSIS — I25.10 ATHEROSCLEROSIS OF NATIVE CORONARY ARTERY OF NATIVE HEART WITHOUT ANGINA PECTORIS: ICD-10-CM

## 2018-05-21 DIAGNOSIS — I48.0 PAROXYSMAL ATRIAL FIBRILLATION (H): ICD-10-CM

## 2018-05-21 DIAGNOSIS — Z51.81 ENCOUNTER FOR MONITORING SOTALOL THERAPY: ICD-10-CM

## 2018-05-21 DIAGNOSIS — I44.7 LBBB (LEFT BUNDLE BRANCH BLOCK): ICD-10-CM

## 2018-05-21 DIAGNOSIS — Z79.899 ENCOUNTER FOR MONITORING SOTALOL THERAPY: ICD-10-CM

## 2018-05-21 LAB
HCC DEVICE COMMENTS: NORMAL
HCC DEVICE IMPLANTING PROVIDER: NORMAL
HCC DEVICE MANUFACTURE: NORMAL
HCC DEVICE MODEL: NORMAL
HCC DEVICE SERIAL NUMBER: NORMAL
HCC DEVICE TYPE: NORMAL

## 2018-05-21 ASSESSMENT — MIFFLIN-ST. JEOR: SCORE: 1416.9

## 2018-05-23 ENCOUNTER — COMMUNICATION - HEALTHEAST (OUTPATIENT)
Dept: FAMILY MEDICINE | Facility: CLINIC | Age: 83
End: 2018-05-23

## 2018-05-23 LAB
ATRIAL RATE - MUSE: 62 BPM
DIASTOLIC BLOOD PRESSURE - MUSE: NORMAL MMHG
INTERPRETATION ECG - MUSE: NORMAL
P AXIS - MUSE: NORMAL DEGREES
PR INTERVAL - MUSE: NORMAL MS
QRS DURATION - MUSE: 150 MS
QT - MUSE: 452 MS
QTC - MUSE: 473 MS
R AXIS - MUSE: -31 DEGREES
SYSTOLIC BLOOD PRESSURE - MUSE: NORMAL MMHG
T AXIS - MUSE: 108 DEGREES
VENTRICULAR RATE- MUSE: 66 BPM

## 2018-05-24 ENCOUNTER — COMMUNICATION - HEALTHEAST (OUTPATIENT)
Dept: CARDIOLOGY | Facility: CLINIC | Age: 83
End: 2018-05-24

## 2018-05-24 ENCOUNTER — AMBULATORY - HEALTHEAST (OUTPATIENT)
Dept: CARDIOLOGY | Facility: CLINIC | Age: 83
End: 2018-05-24

## 2018-05-25 ENCOUNTER — COMMUNICATION - HEALTHEAST (OUTPATIENT)
Dept: FAMILY MEDICINE | Facility: CLINIC | Age: 83
End: 2018-05-25

## 2018-05-25 ENCOUNTER — TRANSFERRED RECORDS (OUTPATIENT)
Dept: HEALTH INFORMATION MANAGEMENT | Facility: CLINIC | Age: 83
End: 2018-05-25

## 2018-05-29 ENCOUNTER — AMBULATORY - HEALTHEAST (OUTPATIENT)
Dept: CARDIOLOGY | Facility: CLINIC | Age: 83
End: 2018-05-29

## 2018-05-29 ENCOUNTER — RECORDS - HEALTHEAST (OUTPATIENT)
Dept: ADMINISTRATIVE | Facility: OTHER | Age: 83
End: 2018-05-29

## 2018-05-31 ENCOUNTER — RECORDS - HEALTHEAST (OUTPATIENT)
Dept: ADMINISTRATIVE | Facility: OTHER | Age: 83
End: 2018-05-31

## 2018-06-07 DIAGNOSIS — C61 PROSTATE CANCER (H): Primary | ICD-10-CM

## 2018-06-11 ENCOUNTER — RECORDS - HEALTHEAST (OUTPATIENT)
Dept: ADMINISTRATIVE | Facility: OTHER | Age: 83
End: 2018-06-11

## 2018-06-11 ENCOUNTER — OFFICE VISIT (OUTPATIENT)
Dept: UROLOGY | Facility: CLINIC | Age: 83
End: 2018-06-11
Payer: MEDICARE

## 2018-06-11 VITALS
WEIGHT: 174 LBS | HEART RATE: 68 BPM | HEIGHT: 67 IN | DIASTOLIC BLOOD PRESSURE: 88 MMHG | OXYGEN SATURATION: 96 % | SYSTOLIC BLOOD PRESSURE: 142 MMHG | BODY MASS INDEX: 27.31 KG/M2

## 2018-06-11 DIAGNOSIS — C61 PROSTATE CANCER (H): ICD-10-CM

## 2018-06-11 LAB — PSA SERPL-MCNC: 3.1 NG/ML (ref 0–4)

## 2018-06-11 PROCEDURE — 99213 OFFICE O/P EST LOW 20 MIN: CPT | Performed by: UROLOGY

## 2018-06-11 PROCEDURE — 84153 ASSAY OF PSA TOTAL: CPT | Performed by: UROLOGY

## 2018-06-11 PROCEDURE — 36415 COLL VENOUS BLD VENIPUNCTURE: CPT | Performed by: UROLOGY

## 2018-06-11 RX ORDER — INFLUENZA A VIRUS A/VICTORIA/4897/2022 IVR-238 (H1N1) ANTIGEN (FORMALDEHYDE INACTIVATED), INFLUENZA A VIRUS A/CALIFORNIA/122/2022 SAN-022 (H3N2) ANTIGEN (FORMALDEHYDE INACTIVATED), AND INFLUENZA B VIRUS B/MICHIGAN/01/2021 ANTIGEN (FORMALDEHYDE INACTIVATED) 60; 60; 60 UG/.5ML; UG/.5ML; UG/.5ML
INJECTION, SUSPENSION INTRAMUSCULAR
Refills: 0 | COMMUNITY
Start: 2017-09-05 | End: 2021-01-01

## 2018-06-11 RX ORDER — APIXABAN 5 MG/1
TABLET, FILM COATED ORAL
COMMUNITY
Start: 2018-03-19 | End: 2021-01-01

## 2018-06-11 RX ORDER — POLYETHYLENE GLYCOL 3350 17 G/17G
17 POWDER, FOR SOLUTION ORAL DAILY PRN
COMMUNITY

## 2018-06-11 RX ORDER — SENNOSIDES A AND B 8.6 MG/1
2 TABLET, FILM COATED ORAL
COMMUNITY
End: 2022-01-01

## 2018-06-11 RX ORDER — LEVOTHYROXINE SODIUM 50 UG/1
TABLET ORAL
COMMUNITY
Start: 2018-04-05 | End: 2021-01-01

## 2018-06-11 ASSESSMENT — PAIN SCALES - GENERAL: PAINLEVEL: SEVERE PAIN (6)
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
## 2018-06-11 NOTE — NURSING NOTE
Chief Complaint   Patient presents with     Clinic Care Coordination - Follow-up     Pt here for same day follow-up     Sierra Mendoza CMA

## 2018-06-11 NOTE — MR AVS SNAPSHOT
"              After Visit Summary   6/11/2018    Seng Deshpande    MRN: [id number]           Patient Information     Date Of Birth          5/2/1926        Visit Information        Provider Department      6/11/2018 11:10 AM Kodi Carmen MD [facility]rology [facility]        Today's Diagnoses     Prostate cancer (H)           Follow-ups after your visit        Follow-up notes from your care team     Return in about 6 months (around 12/11/2018) for Physical Exam, PSA.      Your next 10 appointments already scheduled     Nov 12, 2018 11:00 AM CST   Return Visit with Kodi Carmen MD   [facility] ([facility] [place])    [address]  [address]  [facility] [address] [phone]              Who to contact     If you have questions or need follow up information about today's clinic visit or your schedule please contact [facility]OLOG[facility] directly at [phone].  Normal or non-critical lab and imaging results will be communicated to you by [employer], letter or phone within 4 business days after the clinic has received the results. If you do not hear from us within 7 days, please contact the clinic through [employer] or phone. If you have a critical or abnormal lab result, we will notify you by phone as soon as possible.  Submit refill requests through [employer] or call your pharmacy and they will forward the refill request to us. Please allow 3 business days for your refill to be completed.          Additional Information About Your Visit        [facility] [employer] Information     [employer] lets you send messages to your doctor, view your test results, renew your prescriptions, schedule appointments and more. To sign up, go to [email or web address] . Click on \"Log in\" on the left side of the screen, which will take you to the Welcome page. Then click on \"Sign up Now\" on the right side of the page.     You will " "be asked to enter the access code listed below, as well as some personal information. Please follow the directions to create your username and password.     Your access code is: [email or web address]  Expires: 2018 11:56 AM     Your access code will  in 90 days. If you need help or a new code, please call your [place] clinic or [phone].        Care EveryWhere ID     This is your Care EveryWhere ID. This could be used by other organizations to access your [place] medical records  [id number]        Your Vitals Were     Pulse Height Pulse Oximetry BMI (Body Mass Index)          68 1.702 m (5' 7\") 96% 27.25 kg/m2         Blood Pressure from Last 3 Encounters:   18 142/88   17 130/80   16 122/72    Weight from Last 3 Encounters:   18 78.9 kg (174 lb)   17 72.6 kg (160 lb)   16 77.1 kg (170 lb)              We Performed the Following     PSA Diag Urologic Phys        Primary Care Provider Office Phone # Fax #    Taras CHRISTIANSON [place] [phone]       [address] [address]        Equal Access to Services     ANDREA BEAVERS : Hadii aad ku hadasho Soomaali, waaxda luqadaha, qaybta kaalmada adeegyada, waxay idiin hayshyannn brian godoy. So [facility] [phone].    ATENCIÓN: Si habla español, tiene a ambrose disposición servicios gratuitos de asistencia lingüística. Llame al [phone].    We comply with applicable federal civil rights laws and [place] laws. We do not discriminate on the basis of race, color, national origin, age, disability, sex, sexual orientation, or gender identity.            Thank you!     Thank you for choosing [facility] [place]  for your care. Our goal is always to provide you with excellent care. Hearing back from our patients is one way we can continue to improve our services. Please take a few minutes to complete the written survey that you may receive in " the mail after your visit with us. Thank you!             Your Updated Medication List - Protect others around you: Learn how to safely use, store and throw away your medicines at www.disposemymeds.org.          This list is accurate as of 6/11/18 11:56 AM.  Always use your most recent med list.                   Brand Name Dispense Instructions for use Diagnosis    ascorbic acid 1000 MG Tabs    vitamin C     Take 1,000 mg by mouth        aspirin 81 MG chewable tablet      Take 81 mg by mouth        atorvastatin 40 MG tablet    LIPITOR     Take 40 mg by mouth        Calcium carb-Vitamin D 500 mg [place]-200 units 500-200 MG-UNIT per tablet    OSCAL with D;Oyster Shell Calcium          CENTRUM SILVER per tablet      Take 1 tablet by mouth        clindamycin 300 MG capsule    CLEOCIN     Take 600 mg by mouth        CVS OYSTER SHELL CALCIUM 500 MG tablet   Generic drug:  oyster shell calcium      Take 1 tablet by mouth        ELIQUIS 5 MG tablet   Generic drug:  apixaban ANTICOAGULANT           FLUZONE HIGH-DOSE 0.5 ML injection   Generic drug:  influenza Vac Split High-Dose      ADM 0.5ML IM UTD        GLUCOSAMINE CHONDROIT-COLLAGEN PO           levothyroxine 50 MCG tablet    SYNTHROID/LEVOTHROID          nitroGLYcerin 0.4 MG sublingual tablet    NITROSTAT     Place 0.4 mg under the tongue        PAIN RELIEVER EXTRA STRENGTH 500 MG tablet   Generic drug:  acetaminophen           polyethylene glycol Packet    MIRALAX/GLYCOLAX     Take 17 g by mouth        quinapril 20 MG tablet    ACCUPRIL     Take 20 mg by mouth        senna 8.6 MG tablet    SENOKOT     Take 2 tablets by mouth        SM GLUCOSAMINE HCL 1500 MG Tabs   Generic drug:  Glucosamine HCl      Take 1,500 mg by mouth        sotalol 80 MG tablet    BETAPACE          VITAMIN D-1000 MAX ST 1000 units Tabs   Generic drug:  cholecalciferol      Take 1,000 Units by mouth

## 2018-06-12 ENCOUNTER — COMMUNICATION - HEALTHEAST (OUTPATIENT)
Dept: FAMILY MEDICINE | Facility: CLINIC | Age: 83
End: 2018-06-12

## 2018-06-12 DIAGNOSIS — I48.0 PAROXYSMAL ATRIAL FIBRILLATION (H): ICD-10-CM

## 2018-06-13 ENCOUNTER — COMMUNICATION - HEALTHEAST (OUTPATIENT)
Dept: FAMILY MEDICINE | Facility: CLINIC | Age: 83
End: 2018-06-13

## 2018-06-13 DIAGNOSIS — I48.0 PAROXYSMAL ATRIAL FIBRILLATION (H): ICD-10-CM

## 2018-08-22 ENCOUNTER — AMBULATORY - HEALTHEAST (OUTPATIENT)
Dept: CARDIOLOGY | Facility: CLINIC | Age: 83
End: 2018-08-22

## 2018-08-22 DIAGNOSIS — Z95.0 PACEMAKER: ICD-10-CM

## 2018-10-25 ENCOUNTER — OFFICE VISIT - HEALTHEAST (OUTPATIENT)
Dept: FAMILY MEDICINE | Facility: CLINIC | Age: 83
End: 2018-10-25

## 2018-10-25 DIAGNOSIS — I48.0 PAROXYSMAL ATRIAL FIBRILLATION (H): ICD-10-CM

## 2018-10-25 DIAGNOSIS — E03.9 HYPOTHYROIDISM: ICD-10-CM

## 2018-10-25 DIAGNOSIS — E78.5 DYSLIPIDEMIA: ICD-10-CM

## 2018-10-25 DIAGNOSIS — S06.9XAA TBI (TRAUMATIC BRAIN INJURY) (H): ICD-10-CM

## 2018-10-25 DIAGNOSIS — Z00.00 HEALTH CARE MAINTENANCE: ICD-10-CM

## 2018-10-25 DIAGNOSIS — Z00.00 ROUTINE GENERAL MEDICAL EXAMINATION AT A HEALTH CARE FACILITY: ICD-10-CM

## 2018-10-25 LAB
ALBUMIN SERPL-MCNC: 3.8 G/DL (ref 3.5–5)
ALP SERPL-CCNC: 92 U/L (ref 45–120)
ALT SERPL W P-5'-P-CCNC: 20 U/L (ref 0–45)
ANION GAP SERPL CALCULATED.3IONS-SCNC: 8 MMOL/L (ref 5–18)
AST SERPL W P-5'-P-CCNC: 25 U/L (ref 0–40)
BILIRUB SERPL-MCNC: 0.8 MG/DL (ref 0–1)
BUN SERPL-MCNC: 20 MG/DL (ref 8–28)
CALCIUM SERPL-MCNC: 9.7 MG/DL (ref 8.5–10.5)
CHLORIDE BLD-SCNC: 106 MMOL/L (ref 98–107)
CHOLEST SERPL-MCNC: 133 MG/DL
CO2 SERPL-SCNC: 26 MMOL/L (ref 22–31)
CREAT SERPL-MCNC: 0.78 MG/DL (ref 0.7–1.3)
ERYTHROCYTE [DISTWIDTH] IN BLOOD BY AUTOMATED COUNT: 13.4 % (ref 11–14.5)
FASTING STATUS PATIENT QL REPORTED: YES
GFR SERPL CREATININE-BSD FRML MDRD: >60 ML/MIN/1.73M2
GLUCOSE BLD-MCNC: 103 MG/DL (ref 70–125)
HCT VFR BLD AUTO: 34 % (ref 40–54)
HDLC SERPL-MCNC: 48 MG/DL
HGB BLD-MCNC: 11.8 G/DL (ref 14–18)
LDLC SERPL CALC-MCNC: 70 MG/DL
MCH RBC QN AUTO: 34 PG (ref 27–34)
MCHC RBC AUTO-ENTMCNC: 34.6 G/DL (ref 32–36)
MCV RBC AUTO: 98 FL (ref 80–100)
PLATELET # BLD AUTO: 239 THOU/UL (ref 140–440)
PMV BLD AUTO: 6.8 FL (ref 7–10)
POTASSIUM BLD-SCNC: 5 MMOL/L (ref 3.5–5)
PROT SERPL-MCNC: 7.1 G/DL (ref 6–8)
RBC # BLD AUTO: 3.46 MILL/UL (ref 4.4–6.2)
SODIUM SERPL-SCNC: 140 MMOL/L (ref 136–145)
T4 FREE SERPL-MCNC: 1.1 NG/DL (ref 0.7–1.8)
TRIGL SERPL-MCNC: 74 MG/DL
TSH SERPL DL<=0.005 MIU/L-ACNC: 4.11 UIU/ML (ref 0.3–5)
WBC: 5.1 THOU/UL (ref 4–11)

## 2018-10-25 ASSESSMENT — MIFFLIN-ST. JEOR: SCORE: 1389.03

## 2018-10-30 ENCOUNTER — COMMUNICATION - HEALTHEAST (OUTPATIENT)
Dept: FAMILY MEDICINE | Facility: CLINIC | Age: 83
End: 2018-10-30

## 2018-11-04 ENCOUNTER — COMMUNICATION - HEALTHEAST (OUTPATIENT)
Dept: FAMILY MEDICINE | Facility: CLINIC | Age: 83
End: 2018-11-04

## 2018-11-04 DIAGNOSIS — E78.5 DYSLIPIDEMIA: ICD-10-CM

## 2018-11-06 DIAGNOSIS — C61 PROSTATE CANCER (H): Primary | ICD-10-CM

## 2018-11-12 ENCOUNTER — OFFICE VISIT (OUTPATIENT)
Dept: UROLOGY | Facility: CLINIC | Age: 83
End: 2018-11-12
Payer: MEDICARE

## 2018-11-12 ENCOUNTER — RECORDS - HEALTHEAST (OUTPATIENT)
Dept: ADMINISTRATIVE | Facility: OTHER | Age: 83
End: 2018-11-12

## 2018-11-12 VITALS
WEIGHT: 174 LBS | HEIGHT: 67 IN | DIASTOLIC BLOOD PRESSURE: 78 MMHG | BODY MASS INDEX: 27.31 KG/M2 | SYSTOLIC BLOOD PRESSURE: 142 MMHG

## 2018-11-12 DIAGNOSIS — C61 PROSTATE CANCER (H): Primary | ICD-10-CM

## 2018-11-12 LAB
ALBUMIN UR-MCNC: NEGATIVE MG/DL
APPEARANCE UR: CLEAR
BILIRUB UR QL STRIP: NEGATIVE
COLOR UR AUTO: YELLOW
GLUCOSE UR STRIP-MCNC: NEGATIVE MG/DL
HGB UR QL STRIP: NEGATIVE
KETONES UR STRIP-MCNC: NEGATIVE MG/DL
LEUKOCYTE ESTERASE UR QL STRIP: NEGATIVE
NITRATE UR QL: NEGATIVE
PH UR STRIP: 5.5 PH (ref 5–7)
PSA SERPL-MCNC: 14.5 NG/ML (ref 0–4)
SOURCE: NORMAL
SP GR UR STRIP: 1.01 (ref 1–1.03)
UROBILINOGEN UR STRIP-ACNC: 0.2 EU/DL (ref 0.2–1)

## 2018-11-12 PROCEDURE — 84153 ASSAY OF PSA TOTAL: CPT | Performed by: UROLOGY

## 2018-11-12 PROCEDURE — 96402 CHEMO HORMON ANTINEOPL SQ/IM: CPT | Performed by: UROLOGY

## 2018-11-12 PROCEDURE — 99213 OFFICE O/P EST LOW 20 MIN: CPT | Mod: 25 | Performed by: UROLOGY

## 2018-11-12 PROCEDURE — 81003 URINALYSIS AUTO W/O SCOPE: CPT | Performed by: UROLOGY

## 2018-11-12 PROCEDURE — 36415 COLL VENOUS BLD VENIPUNCTURE: CPT | Performed by: UROLOGY

## 2018-11-12 ASSESSMENT — PAIN SCALES - GENERAL: PAINLEVEL: NO PAIN (0)

## 2018-11-12 NOTE — NURSING NOTE
The following medication was given:     MEDICATION: Eligard 45 mg  ROUTE: SQ  SITE: RLQ - Abd.  DOSE: 45 m  LOT #: [id number]  :  Jose Carlos  EXPIRATION DATE:  06/2020  NDC#: 12690-389-54   Tania Ruth MA

## 2018-11-12 NOTE — LETTER
11/12/2018       RE: Seng Deshpande  [address] [place] [address]     Dear Colleague,    Thank you for referring your patient, Seng Deshpande, to the [facility] [place] at [facility]. Please see a copy of my visit note below.    This very pleasant 89-year-old gentleman returns today.  He was diagnosed with a low-volume, low-grade prostate cancer after transurethral resection of the prostate 20 years ago and has since been on active surveillance.  He has been on Avodart but his PSA then joanna to 8.7, he was given 1 Eligard injection at that time and the Avodart subsequently discontinued.  24 months ago the PSA joanna to 11.4 and a further Eligard 45 injection was given at that time, he has not had another injection since, 6 months ago the PSA joanna to 1.9 up to 3.1 and we continue to just observe this but today it is gone up to 14.5.  Past Medical History:   Diagnosis Date     Mumps      Prostate infection      Past Surgical History:   Procedure Laterality Date     CYSTOSCOPY       PENIS SURGERY       PROSTATE SURGERY         Current Outpatient Prescriptions:      Ascorbic Acid (VITAMIN C PO), Take 1,000 mg by mouth, Disp: , Rfl:      ascorbic acid (VITAMIN C) 1000 MG TABS, Take 1,000 mg by mouth, Disp: , Rfl:      aspirin 81 MG chewable tablet, Take 81 mg by mouth, Disp: , Rfl:      atorvastatin (LIPITOR) 40 MG tablet, Take 40 mg by mouth, Disp: , Rfl:      Calcium-Magnesium-Vitamin D (CALCIUM 500 PO), Take 500 mg by mouth, Disp: , Rfl:      cholecalciferol (VITAMIN D-1000 MAX ST) 1000 UNITS TABS, Take 1,000 Units by mouth, Disp: , Rfl:      ELIQUIS 5 MG tablet, , Disp: , Rfl:      Glucosamine HCl (SM GLUCOSAMINE HCL) 1500 MG TABS, Take 1,500 mg by mouth, Disp: , Rfl:      levothyroxine (SYNTHROID/LEVOTHROID) 50 MCG tablet, , Disp: , Rfl:      Multiple Vitamins-Minerals (CENTRUM SILVER) per tablet, Take 1 tablet by mouth, Disp: , Rfl:       "quinapril (ACCUPRIL) 20 MG tablet, Take 20 mg by mouth, Disp: , Rfl:      senna (SENOKOT) 8.6 MG tablet, Take 2 tablets by mouth, Disp: , Rfl:      sotalol (BETAPACE) 80 MG tablet, , Disp: , Rfl: 1     clindamycin (CLEOCIN) 300 MG capsule, Take 600 mg by mouth, Disp: , Rfl:      FLUZONE HIGH-DOSE 0.5 ML injection, ADM 0.5ML IM UTD, Disp: , Rfl: 0     Glucos-Chondroit-Collag-Hyal (GLUCOSAMINE CHONDROIT-COLLAGEN PO), , Disp: , Rfl:      nitroglycerin (NITROSTAT) 0.4 MG SL tablet, Place 0.4 mg under the tongue, Disp: , Rfl:      Oyster Shell Calcium (CVS OYSTER SHELL CALCIUM) 500 MG tablet, Take 1 tablet by mouth, Disp: , Rfl:      PAIN RELIEVER EXTRA STRENGTH 500 MG tablet, , Disp: , Rfl: 1     polyethylene glycol (MIRALAX/GLYCOLAX) Packet, Take 17 g by mouth, Disp: , Rfl:      /78  Ht 1.702 m (5' 7\")  Wt 78.9 kg (174 lb)  BMI 27.25 kg/m2     Very pleasant elderly gentleman in no acute distress, well oriented in time place and person.    Mental status.  Normal.  HEENT.  There is no clinical evidence of jaundice and the mucous membranes are normal.  Skin.  Skin is otherwise normal to examination.  Respiratory system.  The respiratory cycle normal.  Cardiovascular system.  There is no significant peripheral edema.  Extremities unremarkable.  There are no focal abnormal clinical neurological signs in the central, or peripheral nervous systems.    Discussion.  The PSA has risen quite sharply over the last 6 months and therefore I would like to repeat Eligard 45 today.  I do not think we need to consider any other treatment at this time.  I will repeat see him again in 6 months for PSA and examination and a determination with a further Eligard is needed at that time.    Plan.  PSA and examination in 6 months.  He will be given an Eligard 45 today    Time.  515minutes.  Greater than 50% of discussion consultation    \"This dictation was performed with voice recognition software and may contain errors,  omissions and " "inadvertent word substitution.\"    Again, thank you for allowing me to participate in the care of your patient.      Sincerely,    Kodi Carmen MD      "

## 2018-11-12 NOTE — PROGRESS NOTES
This very pleasant 89-year-old gentleman returns today.  He was diagnosed with a low-volume, low-grade prostate cancer after transurethral resection of the prostate 20 years ago and has since been on active surveillance.  He has been on Avodart but his PSA then joanna to 8.7, he was given 1 Eligard injection at that time and the Avodart subsequently discontinued.  24 months ago the PSA joanna to 11.4 and a further Eligard 45 injection was given at that time, he has not had another injection since, 6 months ago the PSA joanna to 1.9 up to 3.1 and we continue to just observe this but today it is gone up to 14.5.  Past Medical History:   Diagnosis Date     Mumps      Prostate infection      Past Surgical History:   Procedure Laterality Date     CYSTOSCOPY       PENIS SURGERY       PROSTATE SURGERY         Current Outpatient Prescriptions:      Ascorbic Acid (VITAMIN C PO), Take 1,000 mg by mouth, Disp: , Rfl:      ascorbic acid (VITAMIN C) 1000 MG TABS, Take 1,000 mg by mouth, Disp: , Rfl:      aspirin 81 MG chewable tablet, Take 81 mg by mouth, Disp: , Rfl:      atorvastatin (LIPITOR) 40 MG tablet, Take 40 mg by mouth, Disp: , Rfl:      Calcium-Magnesium-Vitamin D (CALCIUM 500 PO), Take 500 mg by mouth, Disp: , Rfl:      cholecalciferol (VITAMIN D-1000 MAX ST) 1000 UNITS TABS, Take 1,000 Units by mouth, Disp: , Rfl:      ELIQUIS 5 MG tablet, , Disp: , Rfl:      Glucosamine HCl (SM GLUCOSAMINE HCL) 1500 MG TABS, Take 1,500 mg by mouth, Disp: , Rfl:      levothyroxine (SYNTHROID/LEVOTHROID) 50 MCG tablet, , Disp: , Rfl:      Multiple Vitamins-Minerals (CENTRUM SILVER) per tablet, Take 1 tablet by mouth, Disp: , Rfl:      quinapril (ACCUPRIL) 20 MG tablet, Take 20 mg by mouth, Disp: , Rfl:      senna (SENOKOT) 8.6 MG tablet, Take 2 tablets by mouth, Disp: , Rfl:      sotalol (BETAPACE) 80 MG tablet, , Disp: , Rfl: 1     clindamycin (CLEOCIN) 300 MG capsule, Take 600 mg by mouth, Disp: , Rfl:      FLUZONE HIGH-DOSE 0.5 ML  "injection, ADM 0.5ML IM UTD, Disp: , Rfl: 0     Glucos-Chondroit-Collag-Hyal (GLUCOSAMINE CHONDROIT-COLLAGEN PO), , Disp: , Rfl:      nitroglycerin (NITROSTAT) 0.4 MG SL tablet, Place 0.4 mg under the tongue, Disp: , Rfl:      Oyster Shell Calcium (CVS OYSTER SHELL CALCIUM) 500 MG tablet, Take 1 tablet by mouth, Disp: , Rfl:      PAIN RELIEVER EXTRA STRENGTH 500 MG tablet, , Disp: , Rfl: 1     polyethylene glycol (MIRALAX/GLYCOLAX) Packet, Take 17 g by mouth, Disp: , Rfl:       ROS: 10 point ROS neg other than the symptoms noted above in the HPI.    /78  Ht 1.702 m (5' 7\")  Wt 78.9 kg (174 lb)  BMI 27.25 kg/m2     Very pleasant elderly gentleman in no acute distress, well oriented in time place and person.    Mental status.  Normal.  HEENT.  There is no clinical evidence of jaundice and the mucous membranes are normal.  Skin.  Skin is otherwise normal to examination.  Respiratory system.  The respiratory cycle normal.  Cardiovascular system.  There is no significant peripheral edema.  Extremities unremarkable.  There are no focal abnormal clinical neurological signs in the central, or peripheral nervous systems.    Discussion.  The PSA has risen quite sharply over the last 6 months and therefore I would like to repeat Eligard 45 today.  I do not think we need to consider any other treatment at this time.  I will repeat see him again in 6 months for PSA and examination and a determination with a further Eligard is needed at that time.    Plan.  PSA and examination in 6 months.  He will be given an Eligard 45 today    Time.  515minutes.  Greater than 50% of discussion consultation    \"This dictation was performed with voice recognition software and may contain errors,  omissions and inadvertent word substitution.\"  "

## 2018-11-12 NOTE — MR AVS SNAPSHOT
"              After Visit Summary   11/12/2018    Seng Deshpande    MRN: [id number]           Patient Information     Date Of Birth          5/2/1926        Visit Information        Provider Department      11/12/2018 11:00 AM Kodi Carmen MD [facility] [place]        Today's Diagnoses     Prostate cancer (H)    -  1       Follow-ups after your visit        Follow-up notes from your care team     Return in about 6 months (around 5/12/2019) for PSA, Physical Exam.      Your next 10 appointments already scheduled     May 13, 2019 11:00 AM CDT   (Arrive by 10:45 AM)   Return Visit with Kodi Carmen MD   [facility] ([facility] [place])    [address]  [address]  [facility] [address] [phone]              Who to contact     If you have questions or need follow up information about today's clinic visit or your schedule please contact [facility] directly at [phone].  Normal or non-critical lab and imaging results will be communicated to you by UMicIthart, letter or phone within 4 business days after the clinic has received the results. If you do not hear from us within 7 days, please contact the clinic through UMicIthart or phone. If you have a critical or abnormal lab result, we will notify you by phone as soon as possible.  Submit refill requests through Fileboard or call your pharmacy and they will forward the refill request to us. Please allow 3 business days for your refill to be completed.          Additional Information About Your Visit        UMicItharMaytech Information     Fileboard lets you send messages to your doctor, view your test results, renew your prescriptions, schedule appointments and more. To sign up, go to [email or web address] . Click on \"Log in\" on the left side of the screen, which will take you to the Welcome page. Then click on \"Sign up Now\" on the right " "side of the page.     You will be asked to enter the access code listed below, as well as some personal information. Please follow the directions to create your username and password.     Your access code is: [email or web address]  Expires: 2/10/2019 11:53 AM     Your access code will  in 90 days. If you need help or a new code, please call your [place] clinic or [phone].        Care EveryWhere ID     This is your Care EveryWhere ID. This could be used by other organizations to access your [place] medical records  [id number]        Your Vitals Were     Height BMI (Body Mass Index)                1.702 m (5' 7\") 27.25 kg/m2           Blood Pressure from Last 3 Encounters:   18 142/78   18 142/88   17 130/80    Weight from Last 3 Encounters:   18 78.9 kg (174 lb)   18 78.9 kg (174 lb)   17 72.6 kg (160 lb)              We Performed the Following     PSA Diag Urologic Phys [[id number]]     UA without Microscopic        Primary Care Provider Office Phone # Fax #    Taras Avila [phone]       [address] [address]        Equal Access to Services     ANDREA BEAVERS : Hadii aad ku hadasho Soomaali, waaxda luqadaha, qaybta kaalmada adeegyada, waxay idiin hayaan brian art . So [facility] [phone].    ATENCIÓN: Si habla español, tiene a ambrose disposición servicios gratuitos de asistencia lingüística. Llame al [phone].    We comply with applicable federal civil rights laws and [place] laws. We do not discriminate on the basis of race, color, national origin, age, disability, sex, sexual orientation, or gender identity.            Thank you!     Thank you for choosing [facility] [place]  for your care. Our goal is always to provide you with excellent care. Hearing back from our patients is one way we can continue to improve our services. Please take a few minutes to " complete the written survey that you may receive in the mail after your visit with us. Thank you!             Your Updated Medication List - Protect others around you: Learn how to safely use, store and throw away your medicines at www.disposemymeds.org.          This list is accurate as of 11/12/18 11:53 AM.  Always use your most recent med list.                   Brand Name Dispense Instructions for use Diagnosis    ascorbic acid 1000 MG Tabs    vitamin C     Take 1,000 mg by mouth        aspirin 81 MG chewable tablet      Take 81 mg by mouth        atorvastatin 40 MG tablet    LIPITOR     Take 40 mg by mouth        CALCIUM 500 PO      Take 500 mg by mouth        CENTRUM SILVER per tablet      Take 1 tablet by mouth        clindamycin 300 MG capsule    CLEOCIN     Take 600 mg by mouth        [employer] OYSTER SHELL CALCIUM 500 MG tablet   Generic drug:  calcium carbonate 500 mg (elemental)      Take 1 tablet by mouth        ELIQUIS 5 MG tablet   Generic drug:  apixaban ANTICOAGULANT           FLUZONE HIGH-DOSE 0.5 ML injection   Generic drug:  influenza Vac Split High-Dose      ADM 0.5ML IM UTD        GLUCOSAMINE CHONDROIT-COLLAGEN PO           levothyroxine 50 MCG tablet    SYNTHROID/LEVOTHROID          nitroGLYcerin 0.4 MG sublingual tablet    NITROSTAT     Place 0.4 mg under the tongue        PAIN RELIEVER EXTRA STRENGTH 500 MG tablet   Generic drug:  acetaminophen           polyethylene glycol Packet    MIRALAX/GLYCOLAX     Take 17 g by mouth        quinapril 20 MG tablet    ACCUPRIL     Take 20 mg by mouth        senna 8.6 MG tablet    SENOKOT     Take 2 tablets by mouth        SM GLUCOSAMINE HCL 1500 MG Tabs   Generic drug:  Glucosamine HCl      Take 1,500 mg by mouth        sotalol 80 MG tablet    BETAPACE          VITAMIN C PO      Take 1,000 mg by mouth        VITAMIN D-1000 MAX ST 1000 units Tabs   Generic drug:  cholecalciferol      Take 1,000 Units by mouth

## 2018-11-16 ENCOUNTER — OFFICE VISIT - HEALTHEAST (OUTPATIENT)
Dept: CARDIOLOGY | Facility: CLINIC | Age: 83
End: 2018-11-16

## 2018-11-16 ENCOUNTER — AMBULATORY - HEALTHEAST (OUTPATIENT)
Dept: CARDIOLOGY | Facility: CLINIC | Age: 83
End: 2018-11-16

## 2018-11-16 DIAGNOSIS — Z51.81 ENCOUNTER FOR MONITORING SOTALOL THERAPY: ICD-10-CM

## 2018-11-16 DIAGNOSIS — E78.5 DYSLIPIDEMIA: ICD-10-CM

## 2018-11-16 DIAGNOSIS — I48.0 PAROXYSMAL ATRIAL FIBRILLATION (H): ICD-10-CM

## 2018-11-16 DIAGNOSIS — I25.10 ATHEROSCLEROSIS OF NATIVE CORONARY ARTERY OF NATIVE HEART WITHOUT ANGINA PECTORIS: ICD-10-CM

## 2018-11-16 DIAGNOSIS — Z87.898 HISTORY OF SYNCOPE: ICD-10-CM

## 2018-11-16 DIAGNOSIS — Z79.899 ENCOUNTER FOR MONITORING SOTALOL THERAPY: ICD-10-CM

## 2018-11-16 DIAGNOSIS — I44.7 LBBB (LEFT BUNDLE BRANCH BLOCK): ICD-10-CM

## 2018-11-16 DIAGNOSIS — Z95.0 CARDIAC PACEMAKER IN SITU: ICD-10-CM

## 2018-11-16 LAB
ATRIAL RATE - MUSE: 69 BPM
DIASTOLIC BLOOD PRESSURE - MUSE: NORMAL MMHG
HCC DEVICE COMMENTS: NORMAL
HCC DEVICE IMPLANTING PROVIDER: NORMAL
HCC DEVICE MANUFACTURE: NORMAL
HCC DEVICE MODEL: NORMAL
HCC DEVICE SERIAL NUMBER: NORMAL
HCC DEVICE TYPE: NORMAL
INTERPRETATION ECG - MUSE: NORMAL
P AXIS - MUSE: NORMAL DEGREES
PR INTERVAL - MUSE: 256 MS
QRS DURATION - MUSE: 148 MS
QT - MUSE: 444 MS
QTC - MUSE: 475 MS
R AXIS - MUSE: -33 DEGREES
SYSTOLIC BLOOD PRESSURE - MUSE: NORMAL MMHG
T AXIS - MUSE: 119 DEGREES
VENTRICULAR RATE- MUSE: 69 BPM

## 2018-11-16 ASSESSMENT — MIFFLIN-ST. JEOR: SCORE: 1398.19

## 2018-11-19 ENCOUNTER — COMMUNICATION - HEALTHEAST (OUTPATIENT)
Dept: CARDIOLOGY | Facility: CLINIC | Age: 83
End: 2018-11-19

## 2018-11-21 ENCOUNTER — COMMUNICATION - HEALTHEAST (OUTPATIENT)
Dept: FAMILY MEDICINE | Facility: CLINIC | Age: 83
End: 2018-11-21

## 2018-11-23 ENCOUNTER — COMMUNICATION - HEALTHEAST (OUTPATIENT)
Dept: FAMILY MEDICINE | Facility: CLINIC | Age: 83
End: 2018-11-23

## 2018-11-23 DIAGNOSIS — I48.0 PAROXYSMAL ATRIAL FIBRILLATION (H): ICD-10-CM

## 2018-12-17 ENCOUNTER — COMMUNICATION - HEALTHEAST (OUTPATIENT)
Dept: CARDIOLOGY | Facility: CLINIC | Age: 83
End: 2018-12-17

## 2018-12-17 DIAGNOSIS — I48.0 PAROXYSMAL ATRIAL FIBRILLATION (H): ICD-10-CM

## 2019-02-13 ENCOUNTER — AMBULATORY - HEALTHEAST (OUTPATIENT)
Dept: CARDIOLOGY | Facility: CLINIC | Age: 84
End: 2019-02-13

## 2019-02-13 ENCOUNTER — TRANSFERRED RECORDS (OUTPATIENT)
Dept: HEALTH INFORMATION MANAGEMENT | Facility: CLINIC | Age: 84
End: 2019-02-13

## 2019-02-13 DIAGNOSIS — Z95.0 CARDIAC PACEMAKER IN SITU: ICD-10-CM

## 2019-03-05 ENCOUNTER — OFFICE VISIT (OUTPATIENT)
Dept: URBAN - METROPOLITAN AREA CLINIC 62 | Facility: CLINIC | Age: 84
End: 2019-03-05
Payer: MEDICARE

## 2019-03-05 DIAGNOSIS — H04.123 DRY EYE SYNDROME OF BILATERAL LACRIMAL GLANDS: Primary | ICD-10-CM

## 2019-03-05 DIAGNOSIS — Z96.1 PRESENCE OF PSEUDOPHAKIA: ICD-10-CM

## 2019-03-05 PROCEDURE — 92014 COMPRE OPH EXAM EST PT 1/>: CPT | Performed by: OPTOMETRIST

## 2019-03-05 ASSESSMENT — INTRAOCULAR PRESSURE
OD: 14
OS: 16

## 2019-03-05 NOTE — IMPRESSION/PLAN
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 Plan: Discussed diagnosis in detail with patient. No treatment is required at this time. Will continue to observe condition and or symptoms. Call if [address] worsens. Recommend AREDS 2 vitamins.

## 2019-04-16 ENCOUNTER — COMMUNICATION - HEALTHEAST (OUTPATIENT)
Dept: ADMINISTRATIVE | Facility: CLINIC | Age: 84
End: 2019-04-16

## 2019-04-18 ENCOUNTER — TRANSFERRED RECORDS (OUTPATIENT)
Dept: HEALTH INFORMATION MANAGEMENT | Facility: CLINIC | Age: 84
End: 2019-04-18

## 2019-05-13 DIAGNOSIS — C61 PROSTATE CANCER (H): Primary | ICD-10-CM

## 2019-05-15 ENCOUNTER — AMBULATORY - HEALTHEAST (OUTPATIENT)
Dept: CARDIOLOGY | Facility: CLINIC | Age: 84
End: 2019-05-15

## 2019-05-15 DIAGNOSIS — Z95.0 CARDIAC PACEMAKER IN SITU: ICD-10-CM

## 2019-06-07 ENCOUNTER — RECORDS - HEALTHEAST (OUTPATIENT)
Dept: ADMINISTRATIVE | Facility: OTHER | Age: 84
End: 2019-06-07

## 2019-06-07 ENCOUNTER — OFFICE VISIT - HEALTHEAST (OUTPATIENT)
Dept: CARDIOLOGY | Facility: CLINIC | Age: 84
End: 2019-06-07

## 2019-06-07 DIAGNOSIS — I48.0 PAROXYSMAL ATRIAL FIBRILLATION (H): ICD-10-CM

## 2019-06-07 DIAGNOSIS — I25.10 ATHEROSCLEROSIS OF NATIVE CORONARY ARTERY OF NATIVE HEART WITHOUT ANGINA PECTORIS: ICD-10-CM

## 2019-06-07 DIAGNOSIS — I44.7 LBBB (LEFT BUNDLE BRANCH BLOCK): ICD-10-CM

## 2019-06-07 DIAGNOSIS — I10 ESSENTIAL HYPERTENSION: ICD-10-CM

## 2019-06-07 ASSESSMENT — MIFFLIN-ST. JEOR: SCORE: 1398.19

## 2019-07-01 ENCOUNTER — OFFICE VISIT (OUTPATIENT)
Dept: UROLOGY | Facility: CLINIC | Age: 84
End: 2019-07-01
Payer: MEDICARE

## 2019-07-01 VITALS
HEART RATE: 70 BPM | BODY MASS INDEX: 23.8 KG/M2 | HEIGHT: 71 IN | WEIGHT: 170 LBS | OXYGEN SATURATION: 98 % | SYSTOLIC BLOOD PRESSURE: 140 MMHG | DIASTOLIC BLOOD PRESSURE: 70 MMHG

## 2019-07-01 DIAGNOSIS — C61 PROSTATE CANCER (H): ICD-10-CM

## 2019-07-01 LAB — PSA SERPL-MCNC: 4 NG/ML (ref 0–4)

## 2019-07-01 PROCEDURE — 36415 COLL VENOUS BLD VENIPUNCTURE: CPT | Performed by: UROLOGY

## 2019-07-01 PROCEDURE — 99213 OFFICE O/P EST LOW 20 MIN: CPT | Performed by: UROLOGY

## 2019-07-01 PROCEDURE — 84153 ASSAY OF PSA TOTAL: CPT | Performed by: UROLOGY

## 2019-07-01 ASSESSMENT — PAIN SCALES - GENERAL: PAINLEVEL: NO PAIN (0)

## 2019-07-01 ASSESSMENT — MIFFLIN-ST. JEOR: SCORE: 1438.24
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
## 2019-07-05 ENCOUNTER — MEDICAL CORRESPONDENCE (OUTPATIENT)
Dept: HEALTH INFORMATION MANAGEMENT | Facility: CLINIC | Age: 84
End: 2019-07-05

## 2019-07-16 ENCOUNTER — COMMUNICATION - HEALTHEAST (OUTPATIENT)
Dept: CARDIOLOGY | Facility: CLINIC | Age: 84
End: 2019-07-16

## 2019-07-16 DIAGNOSIS — I48.0 PAROXYSMAL ATRIAL FIBRILLATION (H): ICD-10-CM

## 2019-08-02 ENCOUNTER — COMMUNICATION - HEALTHEAST (OUTPATIENT)
Dept: FAMILY MEDICINE | Facility: CLINIC | Age: 84
End: 2019-08-02

## 2019-08-02 DIAGNOSIS — E03.9 HYPOTHYROIDISM: ICD-10-CM

## 2019-08-19 ENCOUNTER — AMBULATORY - HEALTHEAST (OUTPATIENT)
Dept: CARDIOLOGY | Facility: CLINIC | Age: 84
End: 2019-08-19

## 2019-08-19 DIAGNOSIS — Z95.0 CARDIAC PACEMAKER IN SITU: ICD-10-CM

## 2019-10-11 ENCOUNTER — COMMUNICATION - HEALTHEAST (OUTPATIENT)
Dept: FAMILY MEDICINE | Facility: CLINIC | Age: 84
End: 2019-10-11

## 2019-10-11 DIAGNOSIS — I48.0 PAROXYSMAL ATRIAL FIBRILLATION (H): ICD-10-CM

## 2019-10-11 DIAGNOSIS — E78.5 DYSLIPIDEMIA: ICD-10-CM

## 2019-10-13 ENCOUNTER — COMMUNICATION - HEALTHEAST (OUTPATIENT)
Dept: FAMILY MEDICINE | Facility: CLINIC | Age: 84
End: 2019-10-13

## 2019-10-13 DIAGNOSIS — I48.0 PAROXYSMAL ATRIAL FIBRILLATION (H): ICD-10-CM

## 2019-11-01 ENCOUNTER — OFFICE VISIT - HEALTHEAST (OUTPATIENT)
Dept: FAMILY MEDICINE | Facility: CLINIC | Age: 84
End: 2019-11-01

## 2019-11-01 DIAGNOSIS — I48.0 PAROXYSMAL ATRIAL FIBRILLATION (H): ICD-10-CM

## 2019-11-01 DIAGNOSIS — S06.9X9D TRAUMATIC BRAIN INJURY WITH LOSS OF CONSCIOUSNESS, SUBSEQUENT ENCOUNTER: ICD-10-CM

## 2019-11-01 DIAGNOSIS — I25.10 ATHEROSCLEROSIS OF NATIVE CORONARY ARTERY OF NATIVE HEART WITHOUT ANGINA PECTORIS: ICD-10-CM

## 2019-11-01 DIAGNOSIS — C61 PROSTATE CANCER (H): Primary | ICD-10-CM

## 2019-11-01 DIAGNOSIS — C61 MALIGNANT NEOPLASM OF PROSTATE (H): ICD-10-CM

## 2019-11-01 DIAGNOSIS — E78.5 DYSLIPIDEMIA: ICD-10-CM

## 2019-11-01 DIAGNOSIS — Z00.00 HEALTH CARE MAINTENANCE: ICD-10-CM

## 2019-11-01 DIAGNOSIS — E03.9 HYPOTHYROIDISM, UNSPECIFIED TYPE: ICD-10-CM

## 2019-11-01 LAB
ALBUMIN SERPL-MCNC: 3.8 G/DL (ref 3.5–5)
ALP SERPL-CCNC: 80 U/L (ref 45–120)
ALT SERPL W P-5'-P-CCNC: 15 U/L (ref 0–45)
ANION GAP SERPL CALCULATED.3IONS-SCNC: 7 MMOL/L (ref 5–18)
AST SERPL W P-5'-P-CCNC: 25 U/L (ref 0–40)
BILIRUB SERPL-MCNC: 0.7 MG/DL (ref 0–1)
BUN SERPL-MCNC: 18 MG/DL (ref 8–28)
CALCIUM SERPL-MCNC: 9.4 MG/DL (ref 8.5–10.5)
CHLORIDE BLD-SCNC: 109 MMOL/L (ref 98–107)
CHOLEST SERPL-MCNC: 135 MG/DL
CO2 SERPL-SCNC: 25 MMOL/L (ref 22–31)
CREAT SERPL-MCNC: 0.81 MG/DL (ref 0.7–1.3)
ERYTHROCYTE [DISTWIDTH] IN BLOOD BY AUTOMATED COUNT: 13 % (ref 11–14.5)
FASTING STATUS PATIENT QL REPORTED: NORMAL
GFR SERPL CREATININE-BSD FRML MDRD: >60 ML/MIN/1.73M2
GLUCOSE BLD-MCNC: 97 MG/DL (ref 70–125)
HCT VFR BLD AUTO: 34.1 % (ref 40–54)
HDLC SERPL-MCNC: 48 MG/DL
HGB BLD-MCNC: 11.5 G/DL (ref 14–18)
LDLC SERPL CALC-MCNC: 71 MG/DL
MCH RBC QN AUTO: 33.7 PG (ref 27–34)
MCHC RBC AUTO-ENTMCNC: 33.7 G/DL (ref 32–36)
MCV RBC AUTO: 100 FL (ref 80–100)
PLATELET # BLD AUTO: 196 THOU/UL (ref 140–440)
PMV BLD AUTO: 6.8 FL (ref 7–10)
POTASSIUM BLD-SCNC: 4.3 MMOL/L (ref 3.5–5)
PROT SERPL-MCNC: 7.1 G/DL (ref 6–8)
RBC # BLD AUTO: 3.4 MILL/UL (ref 4.4–6.2)
SODIUM SERPL-SCNC: 141 MMOL/L (ref 136–145)
TRIGL SERPL-MCNC: 81 MG/DL
TSH SERPL DL<=0.005 MIU/L-ACNC: 1.98 UIU/ML (ref 0.3–5)
WBC: 5.2 THOU/UL (ref 4–11)

## 2019-11-01 ASSESSMENT — MIFFLIN-ST. JEOR: SCORE: 1374.73

## 2019-11-04 ENCOUNTER — COMMUNICATION - HEALTHEAST (OUTPATIENT)
Dept: FAMILY MEDICINE | Facility: CLINIC | Age: 84
End: 2019-11-04

## 2019-11-12 ENCOUNTER — RECORDS - HEALTHEAST (OUTPATIENT)
Dept: ADMINISTRATIVE | Facility: OTHER | Age: 84
End: 2019-11-12

## 2019-11-12 ENCOUNTER — OFFICE VISIT (OUTPATIENT)
Dept: UROLOGY | Facility: CLINIC | Age: 84
End: 2019-11-12
Payer: MEDICARE

## 2019-11-12 VITALS
HEART RATE: 62 BPM | DIASTOLIC BLOOD PRESSURE: 78 MMHG | WEIGHT: 169 LBS | BODY MASS INDEX: 27.16 KG/M2 | HEIGHT: 66 IN | OXYGEN SATURATION: 95 % | SYSTOLIC BLOOD PRESSURE: 150 MMHG

## 2019-11-12 DIAGNOSIS — C61 PROSTATE CANCER (H): ICD-10-CM

## 2019-11-12 LAB — PSA SERPL-MCNC: 6.5 NG/ML (ref 0–4)

## 2019-11-12 PROCEDURE — 84153 ASSAY OF PSA TOTAL: CPT | Performed by: UROLOGY

## 2019-11-12 PROCEDURE — 99213 OFFICE O/P EST LOW 20 MIN: CPT | Performed by: UROLOGY

## 2019-11-12 PROCEDURE — 36415 COLL VENOUS BLD VENIPUNCTURE: CPT | Performed by: UROLOGY

## 2019-11-12 ASSESSMENT — MIFFLIN-ST. JEOR: SCORE: 1354.33

## 2019-11-12 ASSESSMENT — PAIN SCALES - GENERAL: PAINLEVEL: NO PAIN (0)

## 2019-11-12 NOTE — PROGRESS NOTES
History: This very pleasant 89-year-old gentleman is seen in follow-up consultation today  He was found to have low-volume, low-grade prostate cancer after transurethral resection of the prostate 21 years ago and was initially on active surveillance.  He had been on Avodart but his PSA joanna to 8.7 and we decided therefore to give him one 6-month leuprolide injection and then discontinue the Avodart.  30 months ago the PSA was 11.4 and we gave a second leuprolide, 6-month injection and then 2 years later had risen to 14.5 which was 6 months ago we gave an additional leuprolide 6-month injection.  The PSA was 4.3 in April of this year and on July 1 it is 4.0.  Today, over 6 months later, the PSA is 6.5.  The patient has no other major complaints at the present time and his general health is very satisfactory  Past Medical History:   Diagnosis Date     Mumps      Prostate infection        Social History     Socioeconomic History     Marital status:      Spouse name: None     Number of children: None     Years of education: None     Highest education level: None   Occupational History     None   Social Needs     Financial resource strain: None     Food insecurity:     Worry: None     Inability: None     Transportation needs:     Medical: None     Non-medical: None   Tobacco Use     Smoking status: Never Smoker     Smokeless tobacco: Never Used   Substance and Sexual Activity     Alcohol use: No     Drug use: No     Sexual activity: None   Lifestyle     Physical activity:     Days per week: None     Minutes per session: None     Stress: None   Relationships     Social connections:     Talks on phone: None     Gets together: None     Attends [employer] service: None     Active member of club or organization: None     Attends meetings of clubs or organizations: None     Relationship status: None     Intimate partner violence:     Fear of current or ex partner: None     Emotionally abused: None     Physically abused:  None     Forced sexual activity: None   Other Topics Concern     Parent/sibling w/ CABG, MI or angioplasty before 65F 55M? Not Asked   Social History Narrative     None       Past Surgical History:   Procedure Laterality Date     CYSTOSCOPY       PENIS SURGERY       PROSTATE SURGERY         History reviewed. No pertinent family history.      Current Outpatient Medications:      Ascorbic Acid (VITAMIN C PO), Take 1,000 mg by mouth, Disp: , Rfl:      ascorbic acid (VITAMIN C) 1000 MG TABS, Take 1,000 mg by mouth, Disp: , Rfl:      aspirin 81 MG chewable tablet, Take 81 mg by mouth, Disp: , Rfl:      atorvastatin (LIPITOR) 40 MG tablet, Take 40 mg by mouth, Disp: , Rfl:      Calcium-Magnesium-Vitamin D (CALCIUM 500 PO), Take 500 mg by mouth, Disp: , Rfl:      cholecalciferol (VITAMIN D-1000 MAX ST) 1000 UNITS TABS, Take 1,000 Units by mouth, Disp: , Rfl:      ELIQUIS 5 MG tablet, , Disp: , Rfl:      Glucos-Chondroit-Collag-Hyal (GLUCOSAMINE CHONDROIT-COLLAGEN PO), , Disp: , Rfl:      Glucosamine HCl (SM GLUCOSAMINE HCL) 1500 MG TABS, Take 1,500 mg by mouth, Disp: , Rfl:      levothyroxine (SYNTHROID/LEVOTHROID) 50 MCG tablet, , Disp: , Rfl:      Multiple Vitamins-Minerals (CENTRUM SILVER) per tablet, Take 1 tablet by mouth, Disp: , Rfl:      Oyster Shell Calcium (CVS OYSTER SHELL CALCIUM) 500 MG tablet, Take 1 tablet by mouth, Disp: , Rfl:      polyethylene glycol (MIRALAX/GLYCOLAX) Packet, Take 17 g by mouth, Disp: , Rfl:      quinapril (ACCUPRIL) 20 MG tablet, Take 20 mg by mouth, Disp: , Rfl:      senna (SENOKOT) 8.6 MG tablet, Take 2 tablets by mouth, Disp: , Rfl:      sotalol (BETAPACE) 80 MG tablet, , Disp: , Rfl: 1     clindamycin (CLEOCIN) 300 MG capsule, Take 600 mg by mouth, Disp: , Rfl:      FLUZONE HIGH-DOSE 0.5 ML injection, ADM 0.5ML IM UTD, Disp: , Rfl: 0     nitroglycerin (NITROSTAT) 0.4 MG SL tablet, Place 0.4 mg under the tongue, Disp: , Rfl:      PAIN RELIEVER EXTRA STRENGTH 500 MG tablet, , Disp: ,  "Rfl: 1    10 point ROS of systems including Constitutional, Eyes, Respiratory, Cardiovascular, Gastroenterology, Genitourinary, Integumentary, Muscularskeletal, Psychiatric and Neurologic were all negative except for pertinent positives noted in my HPI.    Examination:   BP (!) 150/78   Pulse 62   Ht 1.676 m (5' 6\")   Wt 76.7 kg (169 lb)   SpO2 95%   BMI 27.28 kg/m    General Impression: Very pleasant patient in no acute distress, well-oriented in time place and person and quite conversational  Mental Status: Normal  HEENT: Extraocular movements intact.  No clinical evidence of jaundice on examination of eyes.  Mucous membranes are unremarkable  Skin: Warm.  No other abnormalities  Respiratory System: Unlabored on room air.  Respiratory cycle normal  Lymph Nodes: Negative  Back/Flank Tenderness: There is mild thoracic kyphosis  Cardiovascular System: No significant peripheral pitting edema  Abdominal Examination: Not examined  Extremities: Extremities otherwise unremarkable  Genitial: Not examined  Rectal Examination: Not examined  Neurologic System: There are no significant acute abnormal neurological signs in the central or peripheral nervous systems    Impression: The situation is stable the PSA has risen a little but is still well below the level 1 year ago when it was over 14.  At that time, we had given him a leuprolide injection.  I did discuss the situation with him in detail today.  I do not see a reason to give a further leuprolide injection at the present time.  We will therefore repeat the PSA and examination in 6 months and make a decision at that time based on the PSA level if he needs a further leuprolide injection.  I did go over the entire situation with the patient in detail today.  I answered all his questions    Plan: 6 months for PSA and examination    Time: 15 minutes with greater than 50% in discussion and consultation    \"This dictation was performed with voice recognition software and " "may contain errors,  omissions and inadvertent word substitution.\"      "

## 2019-11-12 NOTE — LETTER
11/12/2019       RE: Seng Deshpande  [address] [place] [address]     Dear Colleague,    Thank you for referring your patient, Seng Deshpande, to the [facility] [place] at [facility]. Please see a copy of my visit note below.    History: This very pleasant 89-year-old gentleman is seen in follow-up consultation today  He was found to have low-volume, low-grade prostate cancer after transurethral resection of the prostate 21 years ago and was initially on active surveillance.  He had been on Avodart but his PSA joanna to 8.7 and we decided therefore to give him one 6-month leuprolide injection and then discontinue the Avodart.  30 months ago the PSA was 11.4 and we gave a second leuprolide, 6-month injection and then 2 years later had risen to 14.5 which was 6 months ago we gave an additional leuprolide 6-month injection.  The PSA was 4.3 in April of this year and on July 1 it is 4.0.  Today, over 6 months later, the PSA is 6.5.  The patient has no other major complaints at the present time and his general health is very satisfactory  Past Medical History:   Diagnosis Date     Mumps      Prostate infection        Social History     Socioeconomic History     Marital status:      Spouse name: None     Number of children: None     Years of education: None     Highest education level: None   Occupational History     None   Social Needs     Financial resource strain: None     Food insecurity:     Worry: None     Inability: None     Transportation needs:     Medical: None     Non-medical: None   Tobacco Use     Smoking status: Never Smoker     Smokeless tobacco: Never Used   Substance and Sexual Activity     Alcohol use: No     Drug use: No     Sexual activity: None   Lifestyle     Physical activity:     Days per week: None     Minutes per session: None     Stress: None   Relationships     Social connections:     Talks on phone: None     Gets  together: None     Attends Taoism service: None     Active member of club or organization: None     Attends meetings of clubs or organizations: None     Relationship status: None     Intimate partner violence:     Fear of current or ex partner: None     Emotionally abused: None     Physically abused: None     Forced sexual activity: None   Other Topics Concern     Parent/sibling w/ CABG, MI or angioplasty before 65F 55M? Not Asked   Social History Narrative     None       Past Surgical History:   Procedure Laterality Date     CYSTOSCOPY       PENIS SURGERY       PROSTATE SURGERY         History reviewed. No pertinent family history.      Current Outpatient Medications:      Ascorbic Acid (VITAMIN C PO), Take 1,000 mg by mouth, Disp: , Rfl:      ascorbic acid (VITAMIN C) 1000 MG TABS, Take 1,000 mg by mouth, Disp: , Rfl:      aspirin 81 MG chewable tablet, Take 81 mg by mouth, Disp: , Rfl:      atorvastatin (LIPITOR) 40 MG tablet, Take 40 mg by mouth, Disp: , Rfl:      Calcium-Magnesium-Vitamin D (CALCIUM 500 PO), Take 500 mg by mouth, Disp: , Rfl:      cholecalciferol (VITAMIN D-1000 MAX ST) 1000 UNITS TABS, Take 1,000 Units by mouth, Disp: , Rfl:      ELIQUIS 5 MG tablet, , Disp: , Rfl:      Glucos-Chondroit-Collag-Hyal (GLUCOSAMINE CHONDROIT-COLLAGEN PO), , Disp: , Rfl:      Glucosamine HCl (SM GLUCOSAMINE HCL) 1500 MG TABS, Take 1,500 mg by mouth, Disp: , Rfl:      levothyroxine (SYNTHROID/LEVOTHROID) 50 MCG tablet, , Disp: , Rfl:      Multiple Vitamins-Minerals (CENTRUM SILVER) per tablet, Take 1 tablet by mouth, Disp: , Rfl:      Oyster Shell Calcium ([employer] OYSTER SHELL CALCIUM) 500 MG tablet, Take 1 tablet by mouth, Disp: , Rfl:      polyethylene glycol (MIRALAX/GLYCOLAX) Packet, Take 17 g by mouth, Disp: , Rfl:      quinapril (ACCUPRIL) 20 MG tablet, Take 20 mg by mouth, Disp: , Rfl:      senna (SENOKOT) 8.6 MG tablet, Take 2 tablets by mouth, Disp: , Rfl:      sotalol (BETAPACE) 80 MG tablet, , Disp: , Rfl:  "1     clindamycin (CLEOCIN) 300 MG capsule, Take 600 mg by mouth, Disp: , Rfl:      FLUZONE HIGH-DOSE 0.5 ML injection, ADM 0.5ML IM UTD, Disp: , Rfl: 0     nitroglycerin (NITROSTAT) 0.4 MG SL tablet, Place 0.4 mg under the tongue, Disp: , Rfl:      PAIN RELIEVER EXTRA STRENGTH 500 MG tablet, , Disp: , Rfl: 1    10 point ROS of systems including Constitutional, Eyes, Respiratory, Cardiovascular, Gastroenterology, Genitourinary, Integumentary, Muscularskeletal, Psychiatric and Neurologic were all negative except for pertinent positives noted in my HPI.    Examination:   BP (!) 150/78   Pulse 62   Ht 1.676 m (5' 6\")   Wt 76.7 kg (169 lb)   SpO2 95%   BMI 27.28 kg/m     General Impression: Very pleasant patient in no acute distress, well-oriented in time place and person and quite conversational  Mental Status: Normal  HEENT: Extraocular movements intact.  No clinical evidence of jaundice on examination of eyes.  Mucous membranes are unremarkable  Skin: Warm.  No other abnormalities  Respiratory System: Unlabored on room air.  Respiratory cycle normal  Lymph Nodes: Negative  Back/Flank Tenderness: There is mild thoracic kyphosis  Cardiovascular System: No significant peripheral pitting edema  Abdominal Examination: Not examined  Extremities: Extremities otherwise unremarkable  Genitial: Not examined  Rectal Examination: Not examined  Neurologic System: There are no significant acute abnormal neurological signs in the central or peripheral nervous systems    Impression: The situation is stable the PSA has risen a little but is still well below the level 1 year ago when it was over 14.  At that time, we had given him a leuprolide injection.  I did discuss the situation with him in detail today.  I do not see a reason to give a further leuprolide injection at the present time.  We will therefore repeat the PSA and examination in 6 months and make a decision at that time based on the PSA level if he needs a further " "leuprolide injection.  I did go over the entire situation with the patient in detail today.  I answered all his questions    Plan: 6 months for PSA and examination    Time: 15 minutes with greater than 50% in discussion and consultation    \"This dictation was performed with voice recognition software and may contain errors,  omissions and inadvertent word substitution.\"        Again, thank you for allowing me to participate in the care of your patient.      Sincerely,    Kodi Carmen MD      "

## 2019-11-12 NOTE — NURSING NOTE
Chief Complaint   Patient presents with     Clinic Care Coordination - Follow-up     Pt here for same day PSA     Sierra Mendoza, BILL

## 2019-11-20 ENCOUNTER — AMBULATORY - HEALTHEAST (OUTPATIENT)
Dept: CARDIOLOGY | Facility: CLINIC | Age: 84
End: 2019-11-20

## 2019-11-20 DIAGNOSIS — Z95.0 CARDIAC PACEMAKER IN SITU: ICD-10-CM

## 2019-11-20 DIAGNOSIS — I48.0 PAROXYSMAL ATRIAL FIBRILLATION (H): ICD-10-CM

## 2020-01-06 ENCOUNTER — COMMUNICATION - HEALTHEAST (OUTPATIENT)
Dept: CARDIOLOGY | Facility: CLINIC | Age: 85
End: 2020-01-06

## 2020-01-06 DIAGNOSIS — I48.0 PAROXYSMAL ATRIAL FIBRILLATION (H): ICD-10-CM

## 2020-01-07 ENCOUNTER — COMMUNICATION - HEALTHEAST (OUTPATIENT)
Dept: CARDIOLOGY | Facility: CLINIC | Age: 85
End: 2020-01-07

## 2020-01-07 DIAGNOSIS — I48.0 PAROXYSMAL ATRIAL FIBRILLATION (H): ICD-10-CM

## 2020-02-24 ENCOUNTER — AMBULATORY - HEALTHEAST (OUTPATIENT)
Dept: CARDIOLOGY | Facility: CLINIC | Age: 85
End: 2020-02-24

## 2020-02-24 DIAGNOSIS — I44.7 LBBB (LEFT BUNDLE BRANCH BLOCK): ICD-10-CM

## 2020-02-24 DIAGNOSIS — Z95.0 CARDIAC PACEMAKER IN SITU: ICD-10-CM

## 2020-03-16 ENCOUNTER — OFFICE VISIT (OUTPATIENT)
Dept: URBAN - METROPOLITAN AREA CLINIC 62 | Facility: CLINIC | Age: 85
End: 2020-03-16
Payer: MEDICARE

## 2020-03-16 DIAGNOSIS — H35.3131 NONEXUDATIVE AGE-RELATED MACULAR DEGENERATION, BILATERAL, EARLY DRY STAGE: Primary | ICD-10-CM

## 2020-03-16 PROCEDURE — 92014 COMPRE OPH EXAM EST PT 1/>: CPT | Performed by: OPTOMETRIST

## 2020-03-16 PROCEDURE — 92134 CPTRZ OPH DX IMG PST SGM RTA: CPT | Performed by: OPTOMETRIST

## 2020-03-16 ASSESSMENT — INTRAOCULAR PRESSURE
OS: 15
OD: 14

## 2020-03-16 ASSESSMENT — KERATOMETRY
OD: 43.50
OS: 44.00

## 2020-03-16 NOTE — IMPRESSION/PLAN
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 Plan: Discussed diagnosis in detail with patient. No treatment is required at this time. Will continue to observe condition and or symptoms. Call if [address] worsens. Recommend AREDS 2 vitamins.

## 2020-05-26 ENCOUNTER — AMBULATORY - HEALTHEAST (OUTPATIENT)
Dept: CARDIOLOGY | Facility: CLINIC | Age: 85
End: 2020-05-26

## 2020-05-26 DIAGNOSIS — Z95.0 CARDIAC PACEMAKER IN SITU: ICD-10-CM

## 2020-05-26 DIAGNOSIS — Z87.898 HISTORY OF SYNCOPE: ICD-10-CM

## 2020-06-27 ENCOUNTER — COMMUNICATION - HEALTHEAST (OUTPATIENT)
Dept: FAMILY MEDICINE | Facility: CLINIC | Age: 85
End: 2020-06-27

## 2020-06-27 DIAGNOSIS — I48.0 PAROXYSMAL ATRIAL FIBRILLATION (H): ICD-10-CM

## 2020-06-27 DIAGNOSIS — E78.5 DYSLIPIDEMIA: ICD-10-CM

## 2020-06-29 ENCOUNTER — COMMUNICATION - HEALTHEAST (OUTPATIENT)
Dept: CARDIOLOGY | Facility: CLINIC | Age: 85
End: 2020-06-29

## 2020-06-29 DIAGNOSIS — I48.0 PAROXYSMAL ATRIAL FIBRILLATION (H): ICD-10-CM

## 2020-07-06 ENCOUNTER — COMMUNICATION - HEALTHEAST (OUTPATIENT)
Dept: FAMILY MEDICINE | Facility: CLINIC | Age: 85
End: 2020-07-06

## 2020-07-06 DIAGNOSIS — E03.9 HYPOTHYROIDISM: ICD-10-CM

## 2020-08-25 ENCOUNTER — AMBULATORY - HEALTHEAST (OUTPATIENT)
Dept: CARDIOLOGY | Facility: CLINIC | Age: 85
End: 2020-08-25

## 2020-08-25 DIAGNOSIS — Z95.0 CARDIAC PACEMAKER IN SITU: ICD-10-CM

## 2020-08-25 DIAGNOSIS — I44.7 LBBB (LEFT BUNDLE BRANCH BLOCK): ICD-10-CM

## 2020-09-10 ENCOUNTER — COMMUNICATION - HEALTHEAST (OUTPATIENT)
Dept: CARDIOLOGY | Facility: CLINIC | Age: 85
End: 2020-09-10

## 2020-09-11 ENCOUNTER — OFFICE VISIT - HEALTHEAST (OUTPATIENT)
Dept: CARDIOLOGY | Facility: CLINIC | Age: 85
End: 2020-09-11

## 2020-09-11 DIAGNOSIS — I48.0 PAROXYSMAL ATRIAL FIBRILLATION (H): ICD-10-CM

## 2020-09-11 DIAGNOSIS — E78.5 DYSLIPIDEMIA: ICD-10-CM

## 2020-09-11 DIAGNOSIS — Z95.0 PACEMAKER: ICD-10-CM

## 2020-09-11 DIAGNOSIS — Z51.81 ENCOUNTER FOR MONITORING SOTALOL THERAPY: ICD-10-CM

## 2020-09-11 DIAGNOSIS — Z79.899 ENCOUNTER FOR MONITORING SOTALOL THERAPY: ICD-10-CM

## 2020-09-11 DIAGNOSIS — I49.5 SINUS NODE DYSFUNCTION (H): ICD-10-CM

## 2020-09-11 DIAGNOSIS — I25.10 CORONARY ARTERY DISEASE INVOLVING NATIVE CORONARY ARTERY OF NATIVE HEART WITHOUT ANGINA PECTORIS: ICD-10-CM

## 2020-09-11 ASSESSMENT — MIFFLIN-ST. JEOR: SCORE: 1360.21

## 2020-09-14 LAB
ATRIAL RATE - MUSE: 72 BPM
DIASTOLIC BLOOD PRESSURE - MUSE: NORMAL
INTERPRETATION ECG - MUSE: NORMAL
P AXIS - MUSE: NORMAL
PR INTERVAL - MUSE: 272 MS
QRS DURATION - MUSE: 148 MS
QT - MUSE: 452 MS
QTC - MUSE: 494 MS
R AXIS - MUSE: -24 DEGREES
SYSTOLIC BLOOD PRESSURE - MUSE: NORMAL
T AXIS - MUSE: 127 DEGREES
VENTRICULAR RATE- MUSE: 72 BPM

## 2020-09-28 ENCOUNTER — COMMUNICATION - HEALTHEAST (OUTPATIENT)
Dept: CARDIOLOGY | Facility: CLINIC | Age: 85
End: 2020-09-28

## 2020-09-28 DIAGNOSIS — I48.0 PAROXYSMAL ATRIAL FIBRILLATION (H): ICD-10-CM

## 2020-11-05 ENCOUNTER — COMMUNICATION - HEALTHEAST (OUTPATIENT)
Dept: LAB | Facility: CLINIC | Age: 85
End: 2020-11-05

## 2020-11-05 DIAGNOSIS — C61 PROSTATE CANCER (H): Primary | ICD-10-CM

## 2020-11-05 DIAGNOSIS — Z00.00 HEALTH CARE MAINTENANCE: ICD-10-CM

## 2020-11-05 DIAGNOSIS — E03.9 HYPOTHYROIDISM, UNSPECIFIED TYPE: ICD-10-CM

## 2020-11-13 ENCOUNTER — AMBULATORY - HEALTHEAST (OUTPATIENT)
Dept: LAB | Facility: CLINIC | Age: 85
End: 2020-11-13

## 2020-11-13 DIAGNOSIS — E03.9 HYPOTHYROIDISM, UNSPECIFIED TYPE: ICD-10-CM

## 2020-11-13 DIAGNOSIS — Z00.00 HEALTH CARE MAINTENANCE: ICD-10-CM

## 2020-11-13 LAB
ALBUMIN SERPL-MCNC: 3.7 G/DL (ref 3.5–5)
ALP SERPL-CCNC: 81 U/L (ref 45–120)
ALT SERPL W P-5'-P-CCNC: 14 U/L (ref 0–45)
ANION GAP SERPL CALCULATED.3IONS-SCNC: 6 MMOL/L (ref 5–18)
AST SERPL W P-5'-P-CCNC: 21 U/L (ref 0–40)
BILIRUB SERPL-MCNC: 0.5 MG/DL (ref 0–1)
BUN SERPL-MCNC: 25 MG/DL (ref 8–28)
CALCIUM SERPL-MCNC: 9.3 MG/DL (ref 8.5–10.5)
CHLORIDE BLD-SCNC: 107 MMOL/L (ref 98–107)
CHOLEST SERPL-MCNC: 119 MG/DL
CO2 SERPL-SCNC: 26 MMOL/L (ref 22–31)
CREAT SERPL-MCNC: 0.95 MG/DL (ref 0.7–1.3)
ERYTHROCYTE [DISTWIDTH] IN BLOOD BY AUTOMATED COUNT: 13.4 % (ref 11–14.5)
FASTING STATUS PATIENT QL REPORTED: YES
GFR SERPL CREATININE-BSD FRML MDRD: >60 ML/MIN/1.73M2
GLUCOSE BLD-MCNC: 106 MG/DL (ref 70–125)
HCT VFR BLD AUTO: 31.4 % (ref 40–54)
HDLC SERPL-MCNC: 41 MG/DL
HGB BLD-MCNC: 10.4 G/DL (ref 14–18)
LDLC SERPL CALC-MCNC: 53 MG/DL
MCH RBC QN AUTO: 32.8 PG (ref 27–34)
MCHC RBC AUTO-ENTMCNC: 33.1 G/DL (ref 32–36)
MCV RBC AUTO: 99 FL (ref 80–100)
PLATELET # BLD AUTO: 180 THOU/UL (ref 140–440)
PMV BLD AUTO: 7.4 FL (ref 7–10)
POTASSIUM BLD-SCNC: 4.9 MMOL/L (ref 3.5–5)
PROT SERPL-MCNC: 7 G/DL (ref 6–8)
RBC # BLD AUTO: 3.17 MILL/UL (ref 4.4–6.2)
SODIUM SERPL-SCNC: 139 MMOL/L (ref 136–145)
T4 FREE SERPL-MCNC: 1 NG/DL (ref 0.7–1.8)
TRIGL SERPL-MCNC: 127 MG/DL
TSH SERPL DL<=0.005 MIU/L-ACNC: 5.29 UIU/ML (ref 0.3–5)
WBC: 5.4 THOU/UL (ref 4–11)

## 2020-11-20 ENCOUNTER — OFFICE VISIT - HEALTHEAST (OUTPATIENT)
Dept: FAMILY MEDICINE | Facility: CLINIC | Age: 85
End: 2020-11-20

## 2020-11-20 ENCOUNTER — COMMUNICATION - HEALTHEAST (OUTPATIENT)
Dept: FAMILY MEDICINE | Facility: CLINIC | Age: 85
End: 2020-11-20

## 2020-11-20 DIAGNOSIS — Z00.00 HEALTH CARE MAINTENANCE: ICD-10-CM

## 2020-11-20 DIAGNOSIS — E03.9 HYPOTHYROIDISM, UNSPECIFIED TYPE: ICD-10-CM

## 2020-11-20 DIAGNOSIS — I48.0 PAROXYSMAL ATRIAL FIBRILLATION (H): ICD-10-CM

## 2020-11-20 DIAGNOSIS — E78.2 MIXED HYPERLIPIDEMIA: ICD-10-CM

## 2020-11-20 DIAGNOSIS — D64.9 ANEMIA, UNSPECIFIED TYPE: ICD-10-CM

## 2020-11-20 ASSESSMENT — MIFFLIN-ST. JEOR: SCORE: 1342.06

## 2020-11-24 ENCOUNTER — AMBULATORY - HEALTHEAST (OUTPATIENT)
Dept: CARDIOLOGY | Facility: CLINIC | Age: 85
End: 2020-11-24

## 2020-11-24 DIAGNOSIS — I48.0 PAROXYSMAL ATRIAL FIBRILLATION (H): ICD-10-CM

## 2020-11-24 DIAGNOSIS — Z95.0 CARDIAC PACEMAKER IN SITU: ICD-10-CM

## 2021-01-01 ENCOUNTER — TELEPHONE (OUTPATIENT)
Dept: CARDIOLOGY | Facility: CLINIC | Age: 86
End: 2021-01-01
Payer: MEDICARE

## 2021-01-01 ENCOUNTER — OFFICE VISIT (OUTPATIENT)
Dept: CARDIOLOGY | Facility: CLINIC | Age: 86
End: 2021-01-01
Payer: MEDICARE

## 2021-01-01 ENCOUNTER — OFFICE VISIT (OUTPATIENT)
Dept: UROLOGY | Facility: CLINIC | Age: 86
End: 2021-01-01
Payer: MEDICARE

## 2021-01-01 ENCOUNTER — TELEPHONE (OUTPATIENT)
Dept: FAMILY MEDICINE | Facility: CLINIC | Age: 86
End: 2021-01-01
Payer: MEDICARE

## 2021-01-01 ENCOUNTER — DOCUMENTATION ONLY (OUTPATIENT)
Dept: CARDIOLOGY | Facility: CLINIC | Age: 86
End: 2021-01-01

## 2021-01-01 ENCOUNTER — OFFICE VISIT (OUTPATIENT)
Dept: FAMILY MEDICINE | Facility: CLINIC | Age: 86
End: 2021-01-01
Payer: MEDICARE

## 2021-01-01 ENCOUNTER — ANCILLARY PROCEDURE (OUTPATIENT)
Dept: CARDIOLOGY | Facility: CLINIC | Age: 86
End: 2021-01-01
Attending: INTERNAL MEDICINE
Payer: MEDICARE

## 2021-01-01 VITALS
HEART RATE: 59 BPM | DIASTOLIC BLOOD PRESSURE: 76 MMHG | BODY MASS INDEX: 26.47 KG/M2 | WEIGHT: 169 LBS | RESPIRATION RATE: 20 BRPM | SYSTOLIC BLOOD PRESSURE: 137 MMHG

## 2021-01-01 VITALS
BODY MASS INDEX: 26.94 KG/M2 | SYSTOLIC BLOOD PRESSURE: 136 MMHG | WEIGHT: 172 LBS | RESPIRATION RATE: 16 BRPM | HEART RATE: 64 BPM | DIASTOLIC BLOOD PRESSURE: 76 MMHG

## 2021-01-01 VITALS
BODY MASS INDEX: 26.75 KG/M2 | OXYGEN SATURATION: 95 % | HEART RATE: 78 BPM | HEIGHT: 67 IN | DIASTOLIC BLOOD PRESSURE: 70 MMHG | SYSTOLIC BLOOD PRESSURE: 130 MMHG

## 2021-01-01 DIAGNOSIS — I48.20 CHRONIC ATRIAL FIBRILLATION (H): Primary | ICD-10-CM

## 2021-01-01 DIAGNOSIS — I48.0 PAROXYSMAL ATRIAL FIBRILLATION (H): Primary | ICD-10-CM

## 2021-01-01 DIAGNOSIS — Z95.0 PACEMAKER: ICD-10-CM

## 2021-01-01 DIAGNOSIS — I49.5 SICK SINUS SYNDROME (H): ICD-10-CM

## 2021-01-01 DIAGNOSIS — I25.10 CORONARY ARTERY DISEASE INVOLVING NATIVE CORONARY ARTERY OF NATIVE HEART WITHOUT ANGINA PECTORIS: ICD-10-CM

## 2021-01-01 DIAGNOSIS — Z00.00 ENCOUNTER FOR MEDICARE ANNUAL WELLNESS EXAM: ICD-10-CM

## 2021-01-01 DIAGNOSIS — I44.7 LBBB (LEFT BUNDLE BRANCH BLOCK): ICD-10-CM

## 2021-01-01 DIAGNOSIS — Z00.00 HEALTH CARE MAINTENANCE: Primary | ICD-10-CM

## 2021-01-01 DIAGNOSIS — I48.20 CHRONIC ATRIAL FIBRILLATION (H): ICD-10-CM

## 2021-01-01 DIAGNOSIS — L30.9 DERMATITIS: ICD-10-CM

## 2021-01-01 DIAGNOSIS — E78.5 HYPERLIPIDEMIA LDL GOAL <70: ICD-10-CM

## 2021-01-01 DIAGNOSIS — E03.9 HYPOTHYROIDISM, UNSPECIFIED TYPE: Primary | ICD-10-CM

## 2021-01-01 DIAGNOSIS — L02.214 ABSCESS OF GROIN, LEFT: ICD-10-CM

## 2021-01-01 DIAGNOSIS — Z79.899 ENCOUNTER FOR MONITORING SOTALOL THERAPY: ICD-10-CM

## 2021-01-01 DIAGNOSIS — L03.90 CELLULITIS, UNSPECIFIED CELLULITIS SITE: ICD-10-CM

## 2021-01-01 DIAGNOSIS — E78.5 DYSLIPIDEMIA: ICD-10-CM

## 2021-01-01 DIAGNOSIS — C61 PROSTATE CANCER (H): Primary | ICD-10-CM

## 2021-01-01 DIAGNOSIS — Z51.81 ENCOUNTER FOR MONITORING SOTALOL THERAPY: ICD-10-CM

## 2021-01-01 DIAGNOSIS — I10 ESSENTIAL HYPERTENSION: ICD-10-CM

## 2021-01-01 DIAGNOSIS — Z86.73 HISTORY OF STROKE: ICD-10-CM

## 2021-01-01 DIAGNOSIS — E03.9 HYPOTHYROIDISM, UNSPECIFIED TYPE: ICD-10-CM

## 2021-01-01 DIAGNOSIS — Z95.0 CARDIAC PACEMAKER IN SITU: ICD-10-CM

## 2021-01-01 DIAGNOSIS — Z95.0 CARDIAC PACEMAKER IN SITU: Primary | ICD-10-CM

## 2021-01-01 DIAGNOSIS — I49.5 SINUS NODE DYSFUNCTION (H): ICD-10-CM

## 2021-01-01 LAB
ALBUMIN SERPL-MCNC: 3.6 G/DL (ref 3.5–5)
ALP SERPL-CCNC: 83 U/L (ref 45–120)
ALT SERPL W P-5'-P-CCNC: 14 U/L (ref 0–45)
ANION GAP SERPL CALCULATED.3IONS-SCNC: 12 MMOL/L (ref 5–18)
AST SERPL W P-5'-P-CCNC: 22 U/L (ref 0–40)
ATRIAL RATE - MUSE: 69 BPM
BILIRUB SERPL-MCNC: 0.5 MG/DL (ref 0–1)
BUN SERPL-MCNC: 26 MG/DL (ref 8–28)
CALCIUM SERPL-MCNC: 9.8 MG/DL (ref 8.5–10.5)
CHLORIDE BLD-SCNC: 109 MMOL/L (ref 98–107)
CHOLEST SERPL-MCNC: 114 MG/DL
CO2 SERPL-SCNC: 23 MMOL/L (ref 22–31)
CREAT SERPL-MCNC: 0.9 MG/DL (ref 0.7–1.3)
DIASTOLIC BLOOD PRESSURE - MUSE: NORMAL MMHG
ERYTHROCYTE [DISTWIDTH] IN BLOOD BY AUTOMATED COUNT: 16.3 % (ref 10–15)
FASTING STATUS PATIENT QL REPORTED: NO
GFR SERPL CREATININE-BSD FRML MDRD: 72 ML/MIN/1.73M2
GLUCOSE BLD-MCNC: 97 MG/DL (ref 70–125)
HCT VFR BLD AUTO: 30.9 % (ref 40–53)
HDLC SERPL-MCNC: 39 MG/DL
HGB BLD-MCNC: 9.4 G/DL (ref 13.3–17.7)
INTERPRETATION ECG - MUSE: NORMAL
LDLC SERPL CALC-MCNC: 53 MG/DL
MCH RBC QN AUTO: 33 PG (ref 26.5–33)
MCHC RBC AUTO-ENTMCNC: 30.4 G/DL (ref 31.5–36.5)
MCV RBC AUTO: 108 FL (ref 78–100)
MDC_IDC_EPISODE_DTM: NORMAL
MDC_IDC_EPISODE_DURATION: 12 S
MDC_IDC_EPISODE_DURATION: 13 S
MDC_IDC_EPISODE_DURATION: 14 S
MDC_IDC_EPISODE_DURATION: 2 S
MDC_IDC_EPISODE_DURATION: 20 S
MDC_IDC_EPISODE_DURATION: 23 S
MDC_IDC_EPISODE_DURATION: 28 S
MDC_IDC_EPISODE_DURATION: 6 S
MDC_IDC_EPISODE_DURATION: 8 S
MDC_IDC_EPISODE_ID: NORMAL
MDC_IDC_EPISODE_TYPE: NORMAL
MDC_IDC_LEAD_IMPLANT_DT: NORMAL
MDC_IDC_LEAD_LOCATION: NORMAL
MDC_IDC_LEAD_LOCATION_DETAIL_1: NORMAL
MDC_IDC_LEAD_MFG: NORMAL
MDC_IDC_LEAD_MODEL: NORMAL
MDC_IDC_LEAD_POLARITY_TYPE: NORMAL
MDC_IDC_LEAD_SERIAL: NORMAL
MDC_IDC_LEAD_SPECIAL_FUNCTION: NORMAL
MDC_IDC_MSMT_BATTERY_DTM: NORMAL
MDC_IDC_MSMT_BATTERY_DTM: NORMAL
MDC_IDC_MSMT_BATTERY_REMAINING_LONGEVITY: 114 MO
MDC_IDC_MSMT_BATTERY_REMAINING_LONGEVITY: 126 MO
MDC_IDC_MSMT_BATTERY_REMAINING_PERCENTAGE: 100 %
MDC_IDC_MSMT_BATTERY_REMAINING_PERCENTAGE: 100 %
MDC_IDC_MSMT_BATTERY_STATUS: NORMAL
MDC_IDC_MSMT_BATTERY_STATUS: NORMAL
MDC_IDC_MSMT_LEADCHNL_RA_IMPEDANCE_VALUE: 592 OHM
MDC_IDC_MSMT_LEADCHNL_RA_IMPEDANCE_VALUE: 657 OHM
MDC_IDC_MSMT_LEADCHNL_RA_PACING_THRESHOLD_AMPLITUDE: 0.7 V
MDC_IDC_MSMT_LEADCHNL_RA_PACING_THRESHOLD_AMPLITUDE: 0.9 V
MDC_IDC_MSMT_LEADCHNL_RA_PACING_THRESHOLD_PULSEWIDTH: 0.4 MS
MDC_IDC_MSMT_LEADCHNL_RA_PACING_THRESHOLD_PULSEWIDTH: 0.4 MS
MDC_IDC_MSMT_LEADCHNL_RV_IMPEDANCE_VALUE: 562 OHM
MDC_IDC_MSMT_LEADCHNL_RV_IMPEDANCE_VALUE: 578 OHM
MDC_IDC_MSMT_LEADCHNL_RV_LEAD_CHANNEL_STATUS: NORMAL
MDC_IDC_MSMT_LEADCHNL_RV_PACING_THRESHOLD_AMPLITUDE: 0.2 V
MDC_IDC_MSMT_LEADCHNL_RV_PACING_THRESHOLD_AMPLITUDE: 1.7 V
MDC_IDC_MSMT_LEADCHNL_RV_PACING_THRESHOLD_PULSEWIDTH: 0.4 MS
MDC_IDC_MSMT_LEADCHNL_RV_PACING_THRESHOLD_PULSEWIDTH: 0.4 MS
MDC_IDC_PG_IMPLANT_DTM: NORMAL
MDC_IDC_PG_IMPLANT_DTM: NORMAL
MDC_IDC_PG_MFG: NORMAL
MDC_IDC_PG_MFG: NORMAL
MDC_IDC_PG_MODEL: NORMAL
MDC_IDC_PG_MODEL: NORMAL
MDC_IDC_PG_SERIAL: NORMAL
MDC_IDC_PG_SERIAL: NORMAL
MDC_IDC_PG_TYPE: NORMAL
MDC_IDC_PG_TYPE: NORMAL
MDC_IDC_SESS_CLINIC_NAME: NORMAL
MDC_IDC_SESS_CLINIC_NAME: NORMAL
MDC_IDC_SESS_DTM: NORMAL
MDC_IDC_SESS_DTM: NORMAL
MDC_IDC_SESS_TYPE: NORMAL
MDC_IDC_SESS_TYPE: NORMAL
MDC_IDC_SET_BRADY_AT_MODE_SWITCH_MODE: NORMAL
MDC_IDC_SET_BRADY_AT_MODE_SWITCH_MODE: NORMAL
MDC_IDC_SET_BRADY_AT_MODE_SWITCH_RATE: 160 {BEATS}/MIN
MDC_IDC_SET_BRADY_AT_MODE_SWITCH_RATE: 160 {BEATS}/MIN
MDC_IDC_SET_BRADY_LOWRATE: 60 {BEATS}/MIN
MDC_IDC_SET_BRADY_LOWRATE: 60 {BEATS}/MIN
MDC_IDC_SET_BRADY_MAX_SENSOR_RATE: 130 {BEATS}/MIN
MDC_IDC_SET_BRADY_MAX_SENSOR_RATE: 130 {BEATS}/MIN
MDC_IDC_SET_BRADY_MAX_TRACKING_RATE: 130 {BEATS}/MIN
MDC_IDC_SET_BRADY_MAX_TRACKING_RATE: 130 {BEATS}/MIN
MDC_IDC_SET_BRADY_MODE: NORMAL
MDC_IDC_SET_BRADY_MODE: NORMAL
MDC_IDC_SET_BRADY_PAV_DELAY_LOW: 200 MS
MDC_IDC_SET_BRADY_PAV_DELAY_LOW: 200 MS
MDC_IDC_SET_BRADY_SAV_DELAY_LOW: 150 MS
MDC_IDC_SET_BRADY_SAV_DELAY_LOW: 150 MS
MDC_IDC_SET_LEADCHNL_RA_PACING_AMPLITUDE: 1.5 V
MDC_IDC_SET_LEADCHNL_RA_PACING_AMPLITUDE: 1.5 V
MDC_IDC_SET_LEADCHNL_RA_PACING_CAPTURE_MODE: NORMAL
MDC_IDC_SET_LEADCHNL_RA_PACING_CAPTURE_MODE: NORMAL
MDC_IDC_SET_LEADCHNL_RA_PACING_POLARITY: NORMAL
MDC_IDC_SET_LEADCHNL_RA_PACING_POLARITY: NORMAL
MDC_IDC_SET_LEADCHNL_RA_PACING_PULSEWIDTH: 0.4 MS
MDC_IDC_SET_LEADCHNL_RA_PACING_PULSEWIDTH: 0.4 MS
MDC_IDC_SET_LEADCHNL_RA_SENSING_ADAPTATION_MODE: NORMAL
MDC_IDC_SET_LEADCHNL_RA_SENSING_ADAPTATION_MODE: NORMAL
MDC_IDC_SET_LEADCHNL_RA_SENSING_POLARITY: NORMAL
MDC_IDC_SET_LEADCHNL_RA_SENSING_POLARITY: NORMAL
MDC_IDC_SET_LEADCHNL_RA_SENSING_SENSITIVITY: 0.25 MV
MDC_IDC_SET_LEADCHNL_RA_SENSING_SENSITIVITY: 0.25 MV
MDC_IDC_SET_LEADCHNL_RV_PACING_AMPLITUDE: 3.5 V
MDC_IDC_SET_LEADCHNL_RV_PACING_AMPLITUDE: NORMAL
MDC_IDC_SET_LEADCHNL_RV_PACING_CAPTURE_MODE: NORMAL
MDC_IDC_SET_LEADCHNL_RV_PACING_CAPTURE_MODE: NORMAL
MDC_IDC_SET_LEADCHNL_RV_PACING_POLARITY: NORMAL
MDC_IDC_SET_LEADCHNL_RV_PACING_POLARITY: NORMAL
MDC_IDC_SET_LEADCHNL_RV_PACING_PULSEWIDTH: 0.4 MS
MDC_IDC_SET_LEADCHNL_RV_PACING_PULSEWIDTH: 0.4 MS
MDC_IDC_SET_LEADCHNL_RV_SENSING_ADAPTATION_MODE: NORMAL
MDC_IDC_SET_LEADCHNL_RV_SENSING_ADAPTATION_MODE: NORMAL
MDC_IDC_SET_LEADCHNL_RV_SENSING_POLARITY: NORMAL
MDC_IDC_SET_LEADCHNL_RV_SENSING_POLARITY: NORMAL
MDC_IDC_SET_LEADCHNL_RV_SENSING_SENSITIVITY: 0.6 MV
MDC_IDC_SET_LEADCHNL_RV_SENSING_SENSITIVITY: 0.6 MV
MDC_IDC_SET_ZONE_DETECTION_INTERVAL: 353 MS
MDC_IDC_SET_ZONE_DETECTION_INTERVAL: 353 MS
MDC_IDC_SET_ZONE_TYPE: NORMAL
MDC_IDC_SET_ZONE_TYPE: NORMAL
MDC_IDC_SET_ZONE_VENDOR_TYPE: NORMAL
MDC_IDC_SET_ZONE_VENDOR_TYPE: NORMAL
MDC_IDC_STAT_AT_BURDEN_PERCENT: 1 %
MDC_IDC_STAT_AT_BURDEN_PERCENT: 5 %
MDC_IDC_STAT_AT_DTM_END: NORMAL
MDC_IDC_STAT_AT_DTM_END: NORMAL
MDC_IDC_STAT_AT_DTM_START: NORMAL
MDC_IDC_STAT_AT_DTM_START: NORMAL
MDC_IDC_STAT_AT_MODE_SW_COUNT: 1680
MDC_IDC_STAT_BRADY_DTM_END: NORMAL
MDC_IDC_STAT_BRADY_DTM_END: NORMAL
MDC_IDC_STAT_BRADY_DTM_START: NORMAL
MDC_IDC_STAT_BRADY_DTM_START: NORMAL
MDC_IDC_STAT_BRADY_RA_PERCENT_PACED: 11 %
MDC_IDC_STAT_BRADY_RA_PERCENT_PACED: 15 %
MDC_IDC_STAT_BRADY_RV_PERCENT_PACED: 1 %
MDC_IDC_STAT_BRADY_RV_PERCENT_PACED: 8 %
MDC_IDC_STAT_EPISODE_RECENT_COUNT: 0
MDC_IDC_STAT_EPISODE_RECENT_COUNT: 1680
MDC_IDC_STAT_EPISODE_RECENT_COUNT: 19
MDC_IDC_STAT_EPISODE_RECENT_COUNT: 21
MDC_IDC_STAT_EPISODE_RECENT_COUNT: 324
MDC_IDC_STAT_EPISODE_RECENT_COUNT: 676
MDC_IDC_STAT_EPISODE_RECENT_COUNT_DTM_END: NORMAL
MDC_IDC_STAT_EPISODE_RECENT_COUNT_DTM_START: NORMAL
MDC_IDC_STAT_EPISODE_TYPE: NORMAL
MDC_IDC_STAT_EPISODE_VENDOR_TYPE: NORMAL
P AXIS - MUSE: 82 DEGREES
PLATELET # BLD AUTO: 190 10E3/UL (ref 150–450)
POTASSIUM BLD-SCNC: 4.9 MMOL/L (ref 3.5–5)
PR INTERVAL - MUSE: 240 MS
PROT SERPL-MCNC: 7.4 G/DL (ref 6–8)
PSA SERPL-MCNC: 14.8 UG/L (ref 0–4)
QRS DURATION - MUSE: 150 MS
QT - MUSE: 450 MS
QTC - MUSE: 482 MS
R AXIS - MUSE: -17 DEGREES
RBC # BLD AUTO: 2.85 10E6/UL (ref 4.4–5.9)
SODIUM SERPL-SCNC: 144 MMOL/L (ref 136–145)
SYSTOLIC BLOOD PRESSURE - MUSE: NORMAL MMHG
T AXIS - MUSE: 104 DEGREES
TRIGL SERPL-MCNC: 108 MG/DL
TSH SERPL DL<=0.005 MIU/L-ACNC: 3.8 UIU/ML (ref 0.3–5)
VENTRICULAR RATE- MUSE: 69 BPM
WBC # BLD AUTO: 6.7 10E3/UL (ref 4–11)

## 2021-01-01 PROCEDURE — 99213 OFFICE O/P EST LOW 20 MIN: CPT | Mod: 25 | Performed by: FAMILY MEDICINE

## 2021-01-01 PROCEDURE — 36415 COLL VENOUS BLD VENIPUNCTURE: CPT | Performed by: UROLOGY

## 2021-01-01 PROCEDURE — 93280 PM DEVICE PROGR EVAL DUAL: CPT | Performed by: INTERNAL MEDICINE

## 2021-01-01 PROCEDURE — 93294 REM INTERROG EVL PM/LDLS PM: CPT | Performed by: INTERNAL MEDICINE

## 2021-01-01 PROCEDURE — 80053 COMPREHEN METABOLIC PANEL: CPT | Performed by: FAMILY MEDICINE

## 2021-01-01 PROCEDURE — 84443 ASSAY THYROID STIM HORMONE: CPT | Performed by: FAMILY MEDICINE

## 2021-01-01 PROCEDURE — 84153 ASSAY OF PSA TOTAL: CPT | Performed by: UROLOGY

## 2021-01-01 PROCEDURE — 93000 ELECTROCARDIOGRAM COMPLETE: CPT | Performed by: INTERNAL MEDICINE

## 2021-01-01 PROCEDURE — 99214 OFFICE O/P EST MOD 30 MIN: CPT | Mod: 25 | Performed by: INTERNAL MEDICINE

## 2021-01-01 PROCEDURE — 93296 REM INTERROG EVL PM/IDS: CPT | Performed by: INTERNAL MEDICINE

## 2021-01-01 PROCEDURE — 80061 LIPID PANEL: CPT | Performed by: FAMILY MEDICINE

## 2021-01-01 PROCEDURE — 99214 OFFICE O/P EST MOD 30 MIN: CPT | Performed by: UROLOGY

## 2021-01-01 PROCEDURE — 36415 COLL VENOUS BLD VENIPUNCTURE: CPT | Performed by: FAMILY MEDICINE

## 2021-01-01 PROCEDURE — G0439 PPPS, SUBSEQ VISIT: HCPCS | Performed by: FAMILY MEDICINE

## 2021-01-01 PROCEDURE — 85027 COMPLETE CBC AUTOMATED: CPT | Performed by: FAMILY MEDICINE

## 2021-01-01 RX ORDER — CEPHALEXIN 500 MG/1
500 CAPSULE ORAL 2 TIMES DAILY
Qty: 20 CAPSULE | Refills: 0 | Status: SHIPPED | OUTPATIENT
Start: 2021-01-01 | End: 2022-01-01

## 2021-01-01 RX ORDER — DUTASTERIDE 0.5 MG/1
0.5 CAPSULE, LIQUID FILLED ORAL DAILY
Qty: 90 CAPSULE | Refills: 4 | Status: SHIPPED | OUTPATIENT
Start: 2021-01-01

## 2021-01-01 RX ORDER — SOTALOL HYDROCHLORIDE 80 MG/1
TABLET ORAL
Qty: 180 TABLET | Refills: 2 | Status: SHIPPED | OUTPATIENT
Start: 2021-01-01 | End: 2022-01-01

## 2021-01-01 RX ORDER — ATORVASTATIN CALCIUM 40 MG/1
40 TABLET, FILM COATED ORAL AT BEDTIME
Qty: 90 TABLET | Refills: 0 | Status: SHIPPED | OUTPATIENT
Start: 2021-01-01

## 2021-01-01 RX ORDER — VIT C/E/ZN/COPPR/LUTEIN/ZEAXAN 60 MG-6 MG
1 CAPSULE ORAL DAILY
COMMUNITY

## 2021-01-01 RX ORDER — METOPROLOL SUCCINATE 25 MG/1
25 TABLET, EXTENDED RELEASE ORAL DAILY
Qty: 30 TABLET | Refills: 1 | Status: SHIPPED | OUTPATIENT
Start: 2021-01-01 | End: 2021-01-01

## 2021-01-01 RX ORDER — METOPROLOL SUCCINATE 25 MG/1
TABLET, EXTENDED RELEASE ORAL
Qty: 90 TABLET | Refills: 1 | Status: SHIPPED | OUTPATIENT
Start: 2021-01-01 | End: 2022-01-01

## 2021-01-01 RX ORDER — APIXABAN 5 MG/1
5 TABLET, FILM COATED ORAL 2 TIMES DAILY
Qty: 180 TABLET | Refills: 0 | Status: SHIPPED | OUTPATIENT
Start: 2021-01-01 | End: 2022-01-01

## 2021-01-01 RX ORDER — LEVOTHYROXINE SODIUM 50 UG/1
TABLET ORAL
Qty: 90 TABLET | Refills: 1 | Status: SHIPPED | OUTPATIENT
Start: 2021-01-01 | End: 2022-01-01

## 2021-01-01 RX ORDER — ATORVASTATIN CALCIUM 40 MG/1
40 TABLET, FILM COATED ORAL AT BEDTIME
Qty: 90 TABLET | Refills: 2 | Status: SHIPPED | OUTPATIENT
Start: 2021-01-01 | End: 2021-01-01

## 2021-01-01 RX ORDER — PERMETHRIN 50 MG/G
CREAM TOPICAL
Qty: 60 G | Refills: 1 | Status: SHIPPED | OUTPATIENT
Start: 2021-01-01 | End: 2022-01-01

## 2021-01-01 ASSESSMENT — PAIN SCALES - GENERAL: PAINLEVEL: NO PAIN (0)

## 2021-01-01 ASSESSMENT — ACTIVITIES OF DAILY LIVING (ADL)
CURRENT_FUNCTION: NO ASSISTANCE NEEDED
CURRENT_FUNCTION: NO ASSISTANCE NEEDED

## 2021-01-04 ENCOUNTER — COMMUNICATION - HEALTHEAST (OUTPATIENT)
Dept: CARDIOLOGY | Facility: CLINIC | Age: 86
End: 2021-01-04

## 2021-01-04 DIAGNOSIS — I48.0 PAROXYSMAL ATRIAL FIBRILLATION (H): ICD-10-CM

## 2021-01-05 ENCOUNTER — COMMUNICATION - HEALTHEAST (OUTPATIENT)
Dept: FAMILY MEDICINE | Facility: CLINIC | Age: 86
End: 2021-01-05

## 2021-01-05 DIAGNOSIS — I48.0 PAROXYSMAL ATRIAL FIBRILLATION (H): ICD-10-CM

## 2021-01-05 DIAGNOSIS — E78.5 DYSLIPIDEMIA: ICD-10-CM

## 2021-01-11 ENCOUNTER — COMMUNICATION - HEALTHEAST (OUTPATIENT)
Dept: FAMILY MEDICINE | Facility: CLINIC | Age: 86
End: 2021-01-11

## 2021-01-11 DIAGNOSIS — I48.0 PAROXYSMAL ATRIAL FIBRILLATION (H): ICD-10-CM

## 2021-04-05 ENCOUNTER — COMMUNICATION - HEALTHEAST (OUTPATIENT)
Dept: FAMILY MEDICINE | Facility: CLINIC | Age: 86
End: 2021-04-05

## 2021-04-05 DIAGNOSIS — E03.9 HYPOTHYROIDISM: ICD-10-CM

## 2021-04-19 ENCOUNTER — COMMUNICATION - HEALTHEAST (OUTPATIENT)
Dept: FAMILY MEDICINE | Facility: CLINIC | Age: 86
End: 2021-04-19

## 2021-04-19 DIAGNOSIS — I48.0 PAROXYSMAL ATRIAL FIBRILLATION (H): ICD-10-CM

## 2021-05-18 ENCOUNTER — OFFICE VISIT - HEALTHEAST (OUTPATIENT)
Dept: FAMILY MEDICINE | Facility: CLINIC | Age: 86
End: 2021-05-18

## 2021-05-18 DIAGNOSIS — R21 RASH AND NONSPECIFIC SKIN ERUPTION: ICD-10-CM

## 2021-05-18 ASSESSMENT — MIFFLIN-ST. JEOR: SCORE: 1340.58

## 2021-05-27 VITALS — HEIGHT: 65 IN | RESPIRATION RATE: 16 BRPM | WEIGHT: 170.8 LBS | BODY MASS INDEX: 28.46 KG/M2

## 2021-05-28 ENCOUNTER — RECORDS - HEALTHEAST (OUTPATIENT)
Dept: ADMINISTRATIVE | Facility: CLINIC | Age: 86
End: 2021-05-28

## 2021-05-29 ENCOUNTER — RECORDS - HEALTHEAST (OUTPATIENT)
Dept: ADMINISTRATIVE | Facility: CLINIC | Age: 86
End: 2021-05-29

## 2021-05-30 VITALS — WEIGHT: 160.6 LBS | HEIGHT: 69 IN | BODY MASS INDEX: 23.79 KG/M2

## 2021-05-30 VITALS — WEIGHT: 160 LBS | BODY MASS INDEX: 23.7 KG/M2 | HEIGHT: 69 IN

## 2021-05-31 VITALS — BODY MASS INDEX: 24.25 KG/M2 | WEIGHT: 159.5 LBS

## 2021-05-31 VITALS — BODY MASS INDEX: 25.01 KG/M2 | WEIGHT: 164.5 LBS

## 2021-05-31 VITALS — WEIGHT: 159.5 LBS | BODY MASS INDEX: 24.25 KG/M2

## 2021-05-31 VITALS — BODY MASS INDEX: 27.06 KG/M2 | WEIGHT: 178 LBS

## 2021-05-31 VITALS — WEIGHT: 178 LBS | BODY MASS INDEX: 26.98 KG/M2 | HEIGHT: 68 IN

## 2021-05-31 VITALS — WEIGHT: 178 LBS | BODY MASS INDEX: 27.06 KG/M2

## 2021-05-31 VITALS — WEIGHT: 172 LBS | HEIGHT: 68 IN | BODY MASS INDEX: 26.07 KG/M2

## 2021-05-31 VITALS — WEIGHT: 165 LBS | BODY MASS INDEX: 25.09 KG/M2

## 2021-05-31 NOTE — TELEPHONE ENCOUNTER
Refill Approved    Rx renewed per Medication Renewal Policy. Medication was last renewed on 10/25/18.    Bernice Aguilar, [facility] Triage/Med Refill 8/2/2019     Requested Prescriptions   Pending Prescriptions Disp Refills     levothyroxine (SYNTHROID, LEVOTHROID) 50 MCG tablet [Pharmacy Med Name: LEVOTHYROXINE 0.05MG (50MCG) TAB] 90 tablet 0     Sig: TAKE 1 TABLET BY MOUTH DAILY       Thyroid Hormones Protocol Passed - 8/2/2019  8:16 PM        Passed - Provider visit in past 12 months or next 3 months     Last office visit with prescriber/PCP: 5/2/2018 Taras Avila MD OR same dept: Visit date not found OR same specialty: 5/2/2018 Taras Avila MD  Last physical: 10/25/2018 Last MTM visit: Visit date not found   Next visit within 3 mo: Visit date not found  Next physical within 3 mo: Visit date not found  Prescriber OR PCP: Taras Avila MD  Last diagnosis associated with med order: There are no diagnoses linked to this encounter.  If protocol passes may refill for 12 months if within 3 months of last provider visit (or a total of 15 months).             Passed - TSH on file in past 12 months for patient age 12 & older     TSH   Date Value Ref Range Status   10/25/2018 4.11 0.30 - 5.00 uIU/mL Final

## 2021-06-01 ENCOUNTER — RECORDS - HEALTHEAST (OUTPATIENT)
Dept: ADMINISTRATIVE | Facility: CLINIC | Age: 86
End: 2021-06-01

## 2021-06-01 VITALS — WEIGHT: 174 LBS | BODY MASS INDEX: 26.46 KG/M2

## 2021-06-01 VITALS — WEIGHT: 178 LBS | BODY MASS INDEX: 26.98 KG/M2 | HEIGHT: 68 IN

## 2021-06-02 ENCOUNTER — RECORDS - HEALTHEAST (OUTPATIENT)
Dept: ADMINISTRATIVE | Facility: CLINIC | Age: 86
End: 2021-06-02

## 2021-06-02 VITALS — BODY MASS INDEX: 28.87 KG/M2 | HEIGHT: 66 IN | WEIGHT: 179.6 LBS

## 2021-06-02 VITALS — BODY MASS INDEX: 28.93 KG/M2 | HEIGHT: 66 IN | WEIGHT: 180 LBS

## 2021-06-02 NOTE — TELEPHONE ENCOUNTER
RN cannot approve Refill Request    RN can NOT refill this medication PCP messaged that patient is overdue for Labs and Office Visit. Last office visit: 5/2/2018 Taras Avila MD Last Physical: 10/25/2018 Last MTM visit: Visit date not found Last visit same specialty: 5/2/2018 Taras Avila MD.  Next visit within 3 mo: Visit date not found  Next physical within 3 mo: Visit date not found      Jerome Waters, [facility] Triage/Med Refill 10/14/2019    Requested Prescriptions   Pending Prescriptions Disp Refills     sotalol (BETAPACE) 80 MG tablet [Pharmacy Med Name: SOTALOL 80MG TABLETS] 180 tablet 0     Sig: TAKE 1 TABLET BY MOUTH EVERY 12 HOURS       Sotalol Refill Protocol Failed - 10/13/2019  6:51 AM        Failed - PCP or prescribing provider visit in past 6 months or next 3 months     Last office visit with prescriber/PCP: Visit date not found OR same dept: Visit date not found OR same specialty: 5/2/2018 Taras Avila MD Last physical: Visit date not found Last MTM visit: Visit date not found     Next appt within 3 mo: Visit date not found  Next physical within 3 mo: Visit date not found  Prescriber OR PCP: Taras Avila MD  Last diagnosis associated with med order: 1. Paroxysmal atrial fibrillation (H)  - sotalol (BETAPACE) 80 MG tablet [Pharmacy Med Name: SOTALOL 80MG TABLETS]; TAKE 1 TABLET BY MOUTH EVERY 12 HOURS  Dispense: 180 tablet; Refill: 0    If protocol passes may refill for 6 months if within 3 months of last provider visit (or a total of 9 months).              Failed - Magnesium in last 12 months     Magnesium   Date Value Ref Range Status   11/21/2016 2.0 1.8 - 2.6 mg/dL Final              Passed - LFT or AST or ALT on file in last 12 months     Albumin   Date Value Ref Range Status   10/25/2018 3.8 3.5 - 5.0 g/dL Final     Bilirubin, Total   Date Value Ref Range Status   10/25/2018 0.8 0.0 - 1.0 mg/dL Final     Bilirubin, Direct   Date Value Ref Range  Status   08/17/2011 0.2 <0.6 mg/dL Final     Alkaline Phosphatase   Date Value Ref Range Status   10/25/2018 92 45 - 120 U/L Final     AST   Date Value Ref Range Status   10/25/2018 25 0 - 40 U/L Final     ALT   Date Value Ref Range Status   10/25/2018 20 0 - 45 U/L Final     Protein, Total   Date Value Ref Range Status   10/25/2018 7.1 6.0 - 8.0 g/dL Final                Passed - BMP on file in last 12 months     Sodium   Date Value Ref Range Status   10/25/2018 140 136 - 145 mmol/L Final     Potassium   Date Value Ref Range Status   10/25/2018 5.0 3.5 - 5.0 mmol/L Final     Chloride   Date Value Ref Range Status   10/25/2018 106 98 - 107 mmol/L Final     CO2   Date Value Ref Range Status   10/25/2018 26 22 - 31 mmol/L Final     BUN   Date Value Ref Range Status   10/25/2018 20 8 - 28 mg/dL Final     Creatinine   Date Value Ref Range Status   10/25/2018 0.78 0.70 - 1.30 mg/dL Final             Passed - CBC w/plts (hm2) on file in last 12 months     WBC   Date Value Ref Range Status   10/25/2018 5.1 4.0 - 11.0 thou/uL Final     Hemoglobin   Date Value Ref Range Status   10/25/2018 11.8 (L) 14.0 - 18.0 g/dL Final     Hematocrit   Date Value Ref Range Status   10/25/2018 34.0 (L) 40.0 - 54.0 % Final     Platelets   Date Value Ref Range Status   10/25/2018 239 140 - 440 thou/uL Final             Passed - ECG in last 12 months     ECG rhythm strip: No results found for this or any previous visit. ECG 12 lead MUSE:   Results for orders placed or performed in visit on 11/16/18   ECG Clinic - Today   Result Value Ref Range    SYSTOLIC BLOOD PRESSURE  mmHg    DIASTOLIC BLOOD PRESSURE  mmHg    VENTRICULAR RATE 69 BPM    ATRIAL RATE 69 BPM    P-R INTERVAL 256 ms    QRS DURATION 148 ms    Q-T INTERVAL 444 ms    QTC CALCULATION (BEZET) 475 ms    P Axis  degrees    R AXIS -33 degrees    T AXIS 119 degrees    MUSE DIAGNOSIS       Sinus rhythm with 1st degree A-V block with occasional Premature ventricular complexes and Premature  atrial complexes  Left axis deviation  Left bundle branch block  Abnormal ECG  When compared with ECG of 21-MAY-2018 17:05,  Premature ventricular complexes are now Present  GA interval has increased  Confirmed by LIZZ LAMBERT MD LOC:JN ([id number]) on 11/16/2018 5:17:04 PM      ECG 12 lead nursing unit:   Results for orders placed or performed during the hospital encounter of 11/17/16   ECG 12 lead nursing unit performed   Result Value Ref Range    SYSTOLIC BLOOD PRESSURE  mmHg    DIASTOLIC BLOOD PRESSURE  mmHg    VENTRICULAR RATE 80 BPM    ATRIAL RATE 66 BPM    P-R INTERVAL 192 ms    QRS DURATION 144 ms    Q-T INTERVAL 440 ms    QTC CALCULATION (BEZET) 507 ms    P Axis  degrees    R AXIS -11 degrees    T AXIS 127 degrees    MUSE DIAGNOSIS       Sinus rhythm with Atrial tachycardia  Left bundle branch block  Abnormal ECG  When compared with ECG of 27-MAY-2015 18:11,  Atrial tachycardia is now Present  Confirmed by ANNE GARCIA MD LOC:SJ ([id number]) on 11/17/2016 2:46:24 PM               Passed - Serum creatinine in last 12 months     Creatinine   Date Value Ref Range Status   10/25/2018 0.78 0.70 - 1.30 mg/dL Final

## 2021-06-02 NOTE — TELEPHONE ENCOUNTER
Refill Approved  Sotalol      Rx renewed per Medication Renewal Policy. Medication was last renewed on 11/26/2018 for 180/3  Last OV 10/25/2018 PE     Has an AWV on 11/1/2019  I will give him enough to last through PE on 11/1/2019  Christina Santos, [facility] Triage/Med Refill 10/13/2019     Requested Prescriptions   Pending Prescriptions Disp Refills     sotalol (BETAPACE) 80 MG tablet [Pharmacy Med Name: SOTALOL 80MG TABLETS] 180 tablet 0     Sig: TAKE 1 TABLET BY MOUTH EVERY 12 HOURS       Sotalol Refill Protocol Failed - 10/11/2019 10:12 AM        Failed - PCP or prescribing provider visit in past 6 months or next 3 months     Last office visit with prescriber/PCP: Visit date not found OR same dept: Visit date not found OR same specialty: 5/2/2018 Taras Avila MD Last physical: Visit date not found Last MTM visit: Visit date not found     Next appt within 3 mo: Visit date not found  Next physical within 3 mo: Visit date not found  Prescriber OR PCP: Taras Avila MD  Last diagnosis associated with med order: 1. Dyslipidemia  - atorvastatin (LIPITOR) 40 MG tablet; Take 1 tablet (40 mg total) by mouth at bedtime. Ask for refills at your 11/1 visit  Dispense: 90 tablet; Refill: 0    2. Paroxysmal atrial fibrillation (H)  - sotalol (BETAPACE) 80 MG tablet [Pharmacy Med Name: SOTALOL 80MG TABLETS]; TAKE 1 TABLET BY MOUTH EVERY 12 HOURS  Dispense: 180 tablet; Refill: 0    If protocol passes may refill for 6 months if within 3 months of last provider visit (or a total of 9 months).              Failed - Magnesium in last 12 months     Magnesium   Date Value Ref Range Status   11/21/2016 2.0 1.8 - 2.6 mg/dL Final              Passed - LFT or AST or ALT on file in last 12 months     Albumin   Date Value Ref Range Status   10/25/2018 3.8 3.5 - 5.0 g/dL Final     Bilirubin, Total   Date Value Ref Range Status   10/25/2018 0.8 0.0 - 1.0 mg/dL Final     Bilirubin, Direct   Date Value Ref Range Status    08/17/2011 0.2 <0.6 mg/dL Final     Alkaline Phosphatase   Date Value Ref Range Status   10/25/2018 92 45 - 120 U/L Final     AST   Date Value Ref Range Status   10/25/2018 25 0 - 40 U/L Final     ALT   Date Value Ref Range Status   10/25/2018 20 0 - 45 U/L Final     Protein, Total   Date Value Ref Range Status   10/25/2018 7.1 6.0 - 8.0 g/dL Final                Passed - BMP on file in last 12 months     Sodium   Date Value Ref Range Status   10/25/2018 140 136 - 145 mmol/L Final     Potassium   Date Value Ref Range Status   10/25/2018 5.0 3.5 - 5.0 mmol/L Final     Chloride   Date Value Ref Range Status   10/25/2018 106 98 - 107 mmol/L Final     CO2   Date Value Ref Range Status   10/25/2018 26 22 - 31 mmol/L Final     BUN   Date Value Ref Range Status   10/25/2018 20 8 - 28 mg/dL Final     Creatinine   Date Value Ref Range Status   10/25/2018 0.78 0.70 - 1.30 mg/dL Final             Passed - CBC w/plts (hm2) on file in last 12 months     WBC   Date Value Ref Range Status   10/25/2018 5.1 4.0 - 11.0 thou/uL Final     Hemoglobin   Date Value Ref Range Status   10/25/2018 11.8 (L) 14.0 - 18.0 g/dL Final     Hematocrit   Date Value Ref Range Status   10/25/2018 34.0 (L) 40.0 - 54.0 % Final     Platelets   Date Value Ref Range Status   10/25/2018 239 140 - 440 thou/uL Final             Passed - ECG in last 12 months     ECG rhythm strip: No results found for this or any previous visit. ECG 12 lead MUSE:   Results for orders placed or performed in visit on 11/16/18   ECG Clinic - Today   Result Value Ref Range    SYSTOLIC BLOOD PRESSURE  mmHg    DIASTOLIC BLOOD PRESSURE  mmHg    VENTRICULAR RATE 69 BPM    ATRIAL RATE 69 BPM    P-R INTERVAL 256 ms    QRS DURATION 148 ms    Q-T INTERVAL 444 ms    QTC CALCULATION (BEZET) 475 ms    P Axis  degrees    R AXIS -33 degrees    T AXIS 119 degrees    MUSE DIAGNOSIS       Sinus rhythm with 1st degree A-V block with occasional Premature ventricular complexes and Premature atrial  complexes  Left axis deviation  Left bundle branch block  Abnormal ECG  When compared with ECG of 21-MAY-2018 17:05,  Premature ventricular complexes are now Present  NM interval has increased  Confirmed by LIZZ LAMBERT MD LOC:JN ([id number]) on 11/16/2018 5:17:04 PM      ECG 12 lead nursing unit:   Results for orders placed or performed during the hospital encounter of 11/17/16   ECG 12 lead nursing unit performed   Result Value Ref Range    SYSTOLIC BLOOD PRESSURE  mmHg    DIASTOLIC BLOOD PRESSURE  mmHg    VENTRICULAR RATE 80 BPM    ATRIAL RATE 66 BPM    P-R INTERVAL 192 ms    QRS DURATION 144 ms    Q-T INTERVAL 440 ms    QTC CALCULATION (BEZET) 507 ms    P Axis  degrees    R AXIS -11 degrees    T AXIS 127 degrees    MUSE DIAGNOSIS       Sinus rhythm with Atrial tachycardia  Left bundle branch block  Abnormal ECG  When compared with ECG of 27-MAY-2015 18:11,  Atrial tachycardia is now Present  Confirmed by ANNE GARCIA MD LOC:SJ ([id number]) on 11/17/2016 2:46:24 PM               Passed - Serum creatinine in last 12 months     Creatinine   Date Value Ref Range Status   10/25/2018 0.78 0.70 - 1.30 mg/dL Final           Signed Prescriptions Disp Refills    atorvastatin (LIPITOR) 40 MG tablet 90 tablet 0     Sig: Take 1 tablet (40 mg total) by mouth at bedtime. Ask for refills at your 11/1 visit       Statins Refill Protocol (Hmg CoA Reductase Inhibitors) Passed - 10/11/2019 10:12 AM        Passed - PCP or prescribing provider visit in past 12 months      Last office visit with prescriber/PCP: 5/2/2018 Taras Avila MD OR same dept: Visit date not found OR same specialty: 5/2/2018 Taras Avila MD  Last physical: 10/25/2018 Last MTM visit: Visit date not found   Next visit within 3 mo: Visit date not found  Next physical within 3 mo: Visit date not found  Prescriber OR PCP: Taras Avila MD  Last diagnosis associated with med order: 1. Dyslipidemia  - atorvastatin (LIPITOR) 40 MG tablet; Take  1 tablet (40 mg total) by mouth at bedtime. Ask for refills at your 11/1 visit  Dispense: 90 tablet; Refill: 0    2. Paroxysmal atrial fibrillation (H)  - sotalol (BETAPACE) 80 MG tablet [Pharmacy Med Name: SOTALOL 80MG TABLETS]; TAKE 1 TABLET BY MOUTH EVERY 12 HOURS  Dispense: 180 tablet; Refill: 0    If protocol passes may refill for 12 months if within 3 months of last provider visit (or a total of 15 months).

## 2021-06-02 NOTE — TELEPHONE ENCOUNTER
Refill Approved   Atorvastatin Only    Rx renewed per Medication Renewal Policy. Medication was last renewed on 10/25/2018 for 90/3  Last OV?PE on 10/25/2018  Christina Santos, [facility] Triage/Med Refill 10/13/2019     Requested Prescriptions   Pending Prescriptions Disp Refills     atorvastatin (LIPITOR) 40 MG tablet [Pharmacy Med Name: ATORVASTATIN 40MG TABLETS] 90 tablet 0     Sig: TAKE 1 TABLET BY MOUTH EVERY NIGHT AT BEDTIME       Statins Refill Protocol (Hmg CoA Reductase Inhibitors) Passed - 10/11/2019 10:12 AM        Passed - PCP or prescribing provider visit in past 12 months      Last office visit with prescriber/PCP: 5/2/2018 Taras Avila MD OR same dept: Visit date not found OR same specialty: 5/2/2018 Taars Avila MD  Last physical: 10/25/2018 Last MTM visit: Visit date not found   Next visit within 3 mo: Visit date not found  Next physical within 3 mo: Visit date not found  Prescriber OR PCP: Taras Avila MD  Last diagnosis associated with med order: 1. Dyslipidemia  - atorvastatin (LIPITOR) 40 MG tablet [Pharmacy Med Name: ATORVASTATIN 40MG TABLETS]; TAKE 1 TABLET BY MOUTH EVERY NIGHT AT BEDTIME  Dispense: 90 tablet; Refill: 0    2. Paroxysmal atrial fibrillation (H)  - sotalol (BETAPACE) 80 MG tablet [Pharmacy Med Name: SOTALOL 80MG TABLETS]; TAKE 1 TABLET BY MOUTH EVERY 12 HOURS  Dispense: 180 tablet; Refill: 0    If protocol passes may refill for 12 months if within 3 months of last provider visit (or a total of 15 months).             sotalol (BETAPACE) 80 MG tablet [Pharmacy Med Name: SOTALOL 80MG TABLETS] 180 tablet 0     Sig: TAKE 1 TABLET BY MOUTH EVERY 12 HOURS       Sotalol Refill Protocol Failed - 10/11/2019 10:12 AM        Failed - PCP or prescribing provider visit in past 6 months or next 3 months     Last office visit with prescriber/PCP: Visit date not found OR same dept: Visit date not found OR same specialty: 5/2/2018 Taras Avila MD Last  physical: Visit date not found Last MTM visit: Visit date not found     Next appt within 3 mo: Visit date not found  Next physical within 3 mo: Visit date not found  Prescriber OR PCP: Taras Avila MD  Last diagnosis associated with med order: 1. Dyslipidemia  - atorvastatin (LIPITOR) 40 MG tablet [Pharmacy Med Name: ATORVASTATIN 40MG TABLETS]; TAKE 1 TABLET BY MOUTH EVERY NIGHT AT BEDTIME  Dispense: 90 tablet; Refill: 0    2. Paroxysmal atrial fibrillation (H)  - sotalol (BETAPACE) 80 MG tablet [Pharmacy Med Name: SOTALOL 80MG TABLETS]; TAKE 1 TABLET BY MOUTH EVERY 12 HOURS  Dispense: 180 tablet; Refill: 0    If protocol passes may refill for 6 months if within 3 months of last provider visit (or a total of 9 months).              Failed - Magnesium in last 12 months     Magnesium   Date Value Ref Range Status   11/21/2016 2.0 1.8 - 2.6 mg/dL Final              Passed - LFT or AST or ALT on file in last 12 months     Albumin   Date Value Ref Range Status   10/25/2018 3.8 3.5 - 5.0 g/dL Final     Bilirubin, Total   Date Value Ref Range Status   10/25/2018 0.8 0.0 - 1.0 mg/dL Final     Bilirubin, Direct   Date Value Ref Range Status   08/17/2011 0.2 <0.6 mg/dL Final     Alkaline Phosphatase   Date Value Ref Range Status   10/25/2018 92 45 - 120 U/L Final     AST   Date Value Ref Range Status   10/25/2018 25 0 - 40 U/L Final     ALT   Date Value Ref Range Status   10/25/2018 20 0 - 45 U/L Final     Protein, Total   Date Value Ref Range Status   10/25/2018 7.1 6.0 - 8.0 g/dL Final                Passed - BMP on file in last 12 months     Sodium   Date Value Ref Range Status   10/25/2018 140 136 - 145 mmol/L Final     Potassium   Date Value Ref Range Status   10/25/2018 5.0 3.5 - 5.0 mmol/L Final     Chloride   Date Value Ref Range Status   10/25/2018 106 98 - 107 mmol/L Final     CO2   Date Value Ref Range Status   10/25/2018 26 22 - 31 mmol/L Final     BUN   Date Value Ref Range Status   10/25/2018 20 8 - 28  mg/dL Final     Creatinine   Date Value Ref Range Status   10/25/2018 0.78 0.70 - 1.30 mg/dL Final             Passed - CBC w/plts (hm2) on file in last 12 months     WBC   Date Value Ref Range Status   10/25/2018 5.1 4.0 - 11.0 thou/uL Final     Hemoglobin   Date Value Ref Range Status   10/25/2018 11.8 (L) 14.0 - 18.0 g/dL Final     Hematocrit   Date Value Ref Range Status   10/25/2018 34.0 (L) 40.0 - 54.0 % Final     Platelets   Date Value Ref Range Status   10/25/2018 239 140 - 440 thou/uL Final             Passed - ECG in last 12 months     ECG rhythm strip: No results found for this or any previous visit. ECG 12 lead MUSE:   Results for orders placed or performed in visit on 11/16/18   ECG Clinic - Today   Result Value Ref Range    SYSTOLIC BLOOD PRESSURE  mmHg    DIASTOLIC BLOOD PRESSURE  mmHg    VENTRICULAR RATE 69 BPM    ATRIAL RATE 69 BPM    P-R INTERVAL 256 ms    QRS DURATION 148 ms    Q-T INTERVAL 444 ms    QTC CALCULATION (BEZET) 475 ms    P Axis  degrees    R AXIS -33 degrees    T AXIS 119 degrees    MUSE DIAGNOSIS       Sinus rhythm with 1st degree A-V block with occasional Premature ventricular complexes and Premature atrial complexes  Left axis deviation  Left bundle branch block  Abnormal ECG  When compared with ECG of 21-MAY-2018 17:05,  Premature ventricular complexes are now Present  VA interval has increased  Confirmed by LIZZ LAMBERT MD LOC:JN ([id number]) on 11/16/2018 5:17:04 PM      ECG 12 lead nursing unit:   Results for orders placed or performed during the hospital encounter of 11/17/16   ECG 12 lead nursing unit performed   Result Value Ref Range    SYSTOLIC BLOOD PRESSURE  mmHg    DIASTOLIC BLOOD PRESSURE  mmHg    VENTRICULAR RATE 80 BPM    ATRIAL RATE 66 BPM    P-R INTERVAL 192 ms    QRS DURATION 144 ms    Q-T INTERVAL 440 ms    QTC CALCULATION (BEZET) 507 ms    P Axis  degrees    R AXIS -11 degrees    T AXIS 127 degrees    MUSE DIAGNOSIS       Sinus rhythm with Atrial  tachycardia  Left bundle branch block  Abnormal ECG  When compared with ECG of 27-MAY-2015 18:11,  Atrial tachycardia is now Present  Confirmed by ANNE GARCIA MD LOC: ([id number]) on 11/17/2016 2:46:24 PM               Passed - Serum creatinine in last 12 months     Creatinine   Date Value Ref Range Status   10/25/2018 0.78 0.70 - 1.30 mg/dL Final

## 2021-06-03 VITALS
SYSTOLIC BLOOD PRESSURE: 153 MMHG | DIASTOLIC BLOOD PRESSURE: 73 MMHG | RESPIRATION RATE: 16 BRPM | WEIGHT: 179.2 LBS | BODY MASS INDEX: 29.85 KG/M2 | HEART RATE: 57 BPM | HEIGHT: 65 IN | TEMPERATURE: 97.9 F

## 2021-06-03 VITALS — HEIGHT: 66 IN | BODY MASS INDEX: 28.93 KG/M2 | WEIGHT: 180 LBS
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
## 2021-06-03 NOTE — TELEPHONE ENCOUNTER
----- Message from Taras Avila MD sent at 11/3/2019  7:19 PM CST -----  Please inform patient that kidney and liver function are normal.  No signs of diabetes.  Thyroid levels are normal.  Cholesterol levels are at goal range.  No other concerns on blood work.

## 2021-06-05 VITALS
HEART RATE: 69 BPM | SYSTOLIC BLOOD PRESSURE: 140 MMHG | RESPIRATION RATE: 16 BRPM | TEMPERATURE: 96.6 F | WEIGHT: 171.13 LBS | BODY MASS INDEX: 28.51 KG/M2 | HEIGHT: 65 IN | DIASTOLIC BLOOD PRESSURE: 78 MMHG

## 2021-06-05 VITALS
DIASTOLIC BLOOD PRESSURE: 70 MMHG | HEIGHT: 65 IN | RESPIRATION RATE: 14 BRPM | HEART RATE: 75 BPM | SYSTOLIC BLOOD PRESSURE: 140 MMHG | WEIGHT: 176 LBS | BODY MASS INDEX: 29.32 KG/M2

## 2021-06-09 NOTE — TELEPHONE ENCOUNTER
RN cannot approve Refill Request    RN can NOT refill this medication PCP messaged that patient is overdue for Labs and Office Visit. Last office visit: 5/2/2018 Taras Avila MD Last Physical: 11/1/2019 Last MTM visit: Visit date not found Last visit same specialty: 5/2/2018 Taras Avila MD.  Next visit within 3 mo: Visit date not found  Next physical within 3 mo: Visit date not found      Kailyn Howard, [facility] Triage/Med Refill 6/28/2020    Requested Prescriptions   Pending Prescriptions Disp Refills     sotaloL (BETAPACE) 80 MG tablet [Pharmacy Med Name: SOTALOL 80MG TABLETS] 180 tablet 1     Sig: TAKE 1 TABLET BY MOUTH EVERY 12 HOURS       Sotalol Refill Protocol Failed - 6/27/2020  3:03 PM        Failed - PCP or prescribing provider visit in past 6 months or next 3 months     Last office visit with prescriber/PCP: Visit date not found OR same dept: Visit date not found OR same specialty: 5/2/2018 Taras Avila MD Last physical: Visit date not found Last MTM visit: Visit date not found     Next appt within 3 mo: Visit date not found  Next physical within 3 mo: Visit date not found  Prescriber OR PCP: Taras Avila MD  Last diagnosis associated with med order: 1. Paroxysmal atrial fibrillation (H)  - sotaloL (BETAPACE) 80 MG tablet [Pharmacy Med Name: SOTALOL 80MG TABLETS]; TAKE 1 TABLET BY MOUTH EVERY 12 HOURS  Dispense: 180 tablet; Refill: 1    2. Dyslipidemia  - atorvastatin (LIPITOR) 40 MG tablet [Pharmacy Med Name: ATORVASTATIN 40MG TABLETS]; TAKE 1 TABLET BY MOUTH EVERY NIGHT AT BEDTIME  Dispense: 90 tablet; Refill: 1    If protocol passes may refill for 6 months if within 3 months of last provider visit (or a total of 9 months).              Failed - ECG in last 12 months     ECG rhythm strip: No results found for this or any previous visit. ECG 12 lead MUSE:   Results for orders placed or performed in visit on 11/16/18   ECG Clinic - Today   Result Value Ref  Range    SYSTOLIC BLOOD PRESSURE  mmHg    DIASTOLIC BLOOD PRESSURE  mmHg    VENTRICULAR RATE 69 BPM    ATRIAL RATE 69 BPM    P-R INTERVAL 256 ms    QRS DURATION 148 ms    Q-T INTERVAL 444 ms    QTC CALCULATION (BEZET) 475 ms    P Axis  degrees    R AXIS -33 degrees    T AXIS 119 degrees    MUSE DIAGNOSIS       Sinus rhythm with 1st degree A-V block with occasional Premature ventricular complexes and Premature atrial complexes  Left axis deviation  Left bundle branch block  Abnormal ECG  When compared with ECG of 21-MAY-2018 17:05,  Premature ventricular complexes are now Present  NE interval has increased  Confirmed by LIZZ LAMBERT MD LOC:JN ([id number]) on 11/16/2018 5:17:04 PM      ECG 12 lead nursing unit:   Results for orders placed or performed during the hospital encounter of 11/17/16   ECG 12 lead nursing unit performed   Result Value Ref Range    SYSTOLIC BLOOD PRESSURE  mmHg    DIASTOLIC BLOOD PRESSURE  mmHg    VENTRICULAR RATE 80 BPM    ATRIAL RATE 66 BPM    P-R INTERVAL 192 ms    QRS DURATION 144 ms    Q-T INTERVAL 440 ms    QTC CALCULATION (BEZET) 507 ms    P Axis  degrees    R AXIS -11 degrees    T AXIS 127 degrees    MUSE DIAGNOSIS       Sinus rhythm with Atrial tachycardia  Left bundle branch block  Abnormal ECG  When compared with ECG of 27-MAY-2015 18:11,  Atrial tachycardia is now Present  Confirmed by ANNE GARCIA MD LOC:SJ ([id number]) on 11/17/2016 2:46:24 PM               Failed - Magnesium in last 12 months     Magnesium   Date Value Ref Range Status   11/21/2016 2.0 1.8 - 2.6 mg/dL Final              Passed - LFT or AST or ALT on file in last 12 months     Albumin   Date Value Ref Range Status   11/01/2019 3.8 3.5 - 5.0 g/dL Final     Bilirubin, Total   Date Value Ref Range Status   11/01/2019 0.7 0.0 - 1.0 mg/dL Final     Bilirubin, Direct   Date Value Ref Range Status   08/17/2011 0.2 <0.6 mg/dL Final     Alkaline Phosphatase   Date Value Ref Range Status   11/01/2019 80 45 - 120 U/L Final      AST   Date Value Ref Range Status   11/01/2019 25 0 - 40 U/L Final     ALT   Date Value Ref Range Status   11/01/2019 15 0 - 45 U/L Final     Protein, Total   Date Value Ref Range Status   11/01/2019 7.1 6.0 - 8.0 g/dL Final                Passed - BMP on file in last 12 months     Sodium   Date Value Ref Range Status   11/01/2019 141 136 - 145 mmol/L Final     Potassium   Date Value Ref Range Status   11/01/2019 4.3 3.5 - 5.0 mmol/L Final     Chloride   Date Value Ref Range Status   11/01/2019 109 (H) 98 - 107 mmol/L Final     CO2   Date Value Ref Range Status   11/01/2019 25 22 - 31 mmol/L Final     BUN   Date Value Ref Range Status   11/01/2019 18 8 - 28 mg/dL Final     Creatinine   Date Value Ref Range Status   11/01/2019 0.81 0.70 - 1.30 mg/dL Final             Passed - CBC w/plts (hm2) on file in last 12 months     WBC   Date Value Ref Range Status   11/01/2019 5.2 4.0 - 11.0 thou/uL Final     Hemoglobin   Date Value Ref Range Status   11/01/2019 11.5 (L) 14.0 - 18.0 g/dL Final     Hematocrit   Date Value Ref Range Status   11/01/2019 34.1 (L) 40.0 - 54.0 % Final     Platelets   Date Value Ref Range Status   11/01/2019 196 140 - 440 thou/uL Final             Passed - Serum creatinine in last 12 months     Creatinine   Date Value Ref Range Status   11/01/2019 0.81 0.70 - 1.30 mg/dL Final                atorvastatin (LIPITOR) 40 MG tablet [Pharmacy Med Name: ATORVASTATIN 40MG TABLETS] 90 tablet 1     Sig: TAKE 1 TABLET BY MOUTH EVERY NIGHT AT BEDTIME       Statins Refill Protocol (Hmg CoA Reductase Inhibitors) Passed - 6/27/2020  3:03 PM        Passed - PCP or prescribing provider visit in past 12 months      Last office visit with prescriber/PCP: 5/2/2018 Taras Avila MD OR same dept: Visit date not found OR same specialty: 5/2/2018 Taras Avila MD  Last physical: 11/1/2019 Last MTM visit: Visit date not found   Next visit within 3 mo: Visit date not found  Next physical within 3 mo:  Visit date not found  Prescriber OR PCP: Taras Avila MD  Last diagnosis associated with med order: 1. Paroxysmal atrial fibrillation (H)  - sotaloL (BETAPACE) 80 MG tablet [Pharmacy Med Name: SOTALOL 80MG TABLETS]; TAKE 1 TABLET BY MOUTH EVERY 12 HOURS  Dispense: 180 tablet; Refill: 1    2. Dyslipidemia  - atorvastatin (LIPITOR) 40 MG tablet [Pharmacy Med Name: ATORVASTATIN 40MG TABLETS]; TAKE 1 TABLET BY MOUTH EVERY NIGHT AT BEDTIME  Dispense: 90 tablet; Refill: 1    If protocol passes may refill for 12 months if within 3 months of last provider visit (or a total of 15 months).

## 2021-06-09 NOTE — TELEPHONE ENCOUNTER
Refill Approved    Rx renewed per Medication Renewal Policy. Medication was last renewed on 8/2/19  Last OV:  11/1/19    Lu Lindsey, [facility] Triage/Med Refill 7/9/2020     Requested Prescriptions   Pending Prescriptions Disp Refills     levothyroxine (SYNTHROID, LEVOTHROID) 50 MCG tablet [Pharmacy Med Name: LEVOTHYROXINE 0.05MG (50MCG) TAB] 90 tablet 1     Sig: Take 1 tablet (50 mcg total) by mouth daily.       Thyroid Hormones Protocol Passed - 7/6/2020 11:09 AM        Passed - Provider visit in past 12 months or next 3 months     Last office visit with prescriber/PCP: 5/2/2018 Taras Avila MD OR same dept: Visit date not found OR same specialty: 5/2/2018 Taras Avila MD  Last physical: 11/1/2019 Last MTM visit: Visit date not found   Next visit within 3 mo: Visit date not found  Next physical within 3 mo: Visit date not found  Prescriber OR PCP: Taras Avila MD  Last diagnosis associated with med order: 1. Hypothyroidism  - levothyroxine (SYNTHROID, LEVOTHROID) 50 MCG tablet [Pharmacy Med Name: LEVOTHYROXINE 0.05MG (50MCG) TAB]; TAKE 1 TABLET BY MOUTH DAILY  Dispense: 90 tablet; Refill: 0    If protocol passes may refill for 12 months if within 3 months of last provider visit (or a total of 15 months).             Passed - TSH on file in past 12 months for patient age 12 & older     TSH   Date Value Ref Range Status   11/01/2019 1.98 0.30 - 5.00 uIU/mL Final

## 2021-06-10 NOTE — PROGRESS NOTES
An episode of atrial fibrillation lasted 30 minutes.  During atrial fibrillation generally heart rate is greater than 1 20 bpm not clearly symptomatic  Patient has previously been on Eliquis and there has been concern about ongoing anticoagulation

## 2021-06-12 NOTE — TELEPHONE ENCOUNTER
Need lab orders for 11/13 - pt is coming for lab only apt and wants to get fasting labs done.  Please put lab orders in.  Thx

## 2021-06-12 NOTE — TELEPHONE ENCOUNTER
Please verify with patient- I have ordered labs but I think he gets PSA checked outside lab- is that correct?
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
## 2021-06-13 NOTE — TELEPHONE ENCOUNTER
Left message to call back for: results  Information to relay to patient:  See note from provider below  Letter sent
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
## 2021-06-13 NOTE — TELEPHONE ENCOUNTER
----- Message from Taras Avila MD sent at 11/20/2020  9:57 AM CST -----  TSH level is slightly elevated but T4 level still within the normal range.  We will continue with current thyroid dosing.  Cholesterol levels are at goal range.  Kidney function and liver function are normal, no signs of diabetes.  Electrolytes are normal.  Hemoglobin is slightly low and we can discuss at your office visit.
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
## 2021-06-14 NOTE — TELEPHONE ENCOUNTER
Refill Approved    Rx renewed per Medication Renewal Policy. Medication was last renewed on 6/29/20.    Bernice Aguilar, [facility] Triage/Med Refill 1/6/2021     Requested Prescriptions   Pending Prescriptions Disp Refills     sotaloL (BETAPACE) 80 MG tablet 180 tablet 3     Sig: Take 1 tablet (80 mg total) by mouth every 12 (twelve) hours.       Sotalol Refill Protocol Failed - 1/5/2021  1:53 PM        Failed - Magnesium in last 12 months     Magnesium   Date Value Ref Range Status   11/21/2016 2.0 1.8 - 2.6 mg/dL Final              Passed - LFT or AST or ALT on file in last 12 months     Albumin   Date Value Ref Range Status   11/13/2020 3.7 3.5 - 5.0 g/dL Final     Bilirubin, Total   Date Value Ref Range Status   11/13/2020 0.5 0.0 - 1.0 mg/dL Final     Bilirubin, Direct   Date Value Ref Range Status   08/17/2011 0.2 <0.6 mg/dL Final     Alkaline Phosphatase   Date Value Ref Range Status   11/13/2020 81 45 - 120 U/L Final     AST   Date Value Ref Range Status   11/13/2020 21 0 - 40 U/L Final     ALT   Date Value Ref Range Status   11/13/2020 14 0 - 45 U/L Final     Protein, Total   Date Value Ref Range Status   11/13/2020 7.0 6.0 - 8.0 g/dL Final                Passed - PCP or prescribing provider visit in past 6 months or next 3 months     Last office visit with prescriber/PCP: Visit date not found OR same dept: Visit date not found OR same specialty: 5/2/2018 Taras Avila MD Last physical: 11/20/2020 Last MTM visit: Visit date not found     Next appt within 3 mo: Visit date not found  Next physical within 3 mo: Visit date not found  Prescriber OR PCP: Taras Avila MD  Last diagnosis associated with med order: 1. Paroxysmal atrial fibrillation (H)  - sotaloL (BETAPACE) 80 MG tablet; Take 1 tablet (80 mg total) by mouth every 12 (twelve) hours.  Dispense: 180 tablet; Refill: 3    2. Dyslipidemia  - atorvastatin (LIPITOR) 40 MG tablet; Take 1 tablet (40 mg total) by mouth at bedtime.   Dispense: 90 tablet; Refill: 3    If protocol passes may refill for 6 months if within 3 months of last provider visit (or a total of 9 months).              Passed - BMP on file in last 12 months     Sodium   Date Value Ref Range Status   11/13/2020 139 136 - 145 mmol/L Final     Potassium   Date Value Ref Range Status   11/13/2020 4.9 3.5 - 5.0 mmol/L Final     Chloride   Date Value Ref Range Status   11/13/2020 107 98 - 107 mmol/L Final     CO2   Date Value Ref Range Status   11/13/2020 26 22 - 31 mmol/L Final     BUN   Date Value Ref Range Status   11/13/2020 25 8 - 28 mg/dL Final     Creatinine   Date Value Ref Range Status   11/13/2020 0.95 0.70 - 1.30 mg/dL Final             Passed - CBC w/plts (hm2) on file in last 12 months     WBC   Date Value Ref Range Status   11/13/2020 5.4 4.0 - 11.0 thou/uL Final     Hemoglobin   Date Value Ref Range Status   11/13/2020 10.4 (L) 14.0 - 18.0 g/dL Final     Hematocrit   Date Value Ref Range Status   11/13/2020 31.4 (L) 40.0 - 54.0 % Final     Platelets   Date Value Ref Range Status   11/13/2020 180 140 - 440 thou/uL Final             Passed - ECG in last 12 months     ECG rhythm strip: No results found for this or any previous visit. ECG 12 lead MUSE:   Results for orders placed or performed in visit on 09/11/20   ECG Clinic - Today   Result Value Ref Range    SYSTOLIC BLOOD PRESSURE      DIASTOLIC BLOOD PRESSURE      VENTRICULAR RATE 72 BPM    ATRIAL RATE 72 BPM    P-R INTERVAL 272 ms    QRS DURATION 148 ms    Q-T INTERVAL 452 ms    QTC CALCULATION (BEZET) 494 ms    P Axis      R AXIS -24 degrees    T AXIS 127 degrees    MUSE DIAGNOSIS       Electronic atrial pacemaker  Left bundle branch block  Abnormal ECG  When compared with ECG of 16-NOV-2018 12:18,  Electronic atrial pacemaker has replaced Sinus rhythm  Confirmed by GAYLE TIDWELL, LES LOC:JN ([id number]) on 9/14/2020 3:11:20 PM      ECG 12 lead nursing unit:   Results for orders placed or performed during the hospital  encounter of 11/17/16   ECG 12 lead nursing unit performed   Result Value Ref Range    SYSTOLIC BLOOD PRESSURE  mmHg    DIASTOLIC BLOOD PRESSURE  mmHg    VENTRICULAR RATE 80 BPM    ATRIAL RATE 66 BPM    P-R INTERVAL 192 ms    QRS DURATION 144 ms    Q-T INTERVAL 440 ms    QTC CALCULATION (BEZET) 507 ms    P Axis  degrees    R AXIS -11 degrees    T AXIS 127 degrees    MUSE DIAGNOSIS       Sinus rhythm with Atrial tachycardia  Left bundle branch block  Abnormal ECG  When compared with ECG of 27-MAY-2015 18:11,  Atrial tachycardia is now Present  Confirmed by ANNE GARCIA MD LOC: ([id number]) on 11/17/2016 2:46:24 PM               Passed - Serum creatinine in last 12 months     Creatinine   Date Value Ref Range Status   11/13/2020 0.95 0.70 - 1.30 mg/dL Final                atorvastatin (LIPITOR) 40 MG tablet 90 tablet 3     Sig: Take 1 tablet (40 mg total) by mouth at bedtime.       Statins Refill Protocol (Hmg CoA Reductase Inhibitors) Passed - 1/5/2021  1:53 PM        Passed - PCP or prescribing provider visit in past 12 months      Last office visit with prescriber/PCP: 5/2/2018 Taras Aivla MD OR same dept: Visit date not found OR same specialty: 5/2/2018 Taras Avila MD  Last physical: 11/20/2020 Last MTM visit: Visit date not found   Next visit within 3 mo: Visit date not found  Next physical within 3 mo: Visit date not found  Prescriber OR PCP: Taras Avila MD  Last diagnosis associated with med order: 1. Paroxysmal atrial fibrillation (H)  - sotaloL (BETAPACE) 80 MG tablet; Take 1 tablet (80 mg total) by mouth every 12 (twelve) hours.  Dispense: 180 tablet; Refill: 3    2. Dyslipidemia  - atorvastatin (LIPITOR) 40 MG tablet; Take 1 tablet (40 mg total) by mouth at bedtime.  Dispense: 90 tablet; Refill: 3    If protocol passes may refill for 12 months if within 3 months of last provider visit (or a total of 15 months).

## 2021-06-14 NOTE — TELEPHONE ENCOUNTER
RN cannot approve Refill Request    RN can NOT refill this medication Protocol failed and NO refill given. Last office visit: 5/2/2018 Taras Avila MD Last Physical: 11/20/2020 Last MTM visit: Visit date not found Last visit same specialty: 5/2/2018 aTras Avila MD.  Next visit within 3 mo: Visit date not found  Next physical within 3 mo: Visit date not found      Bernice Aguilar, [facility] Triage/Med Refill 1/6/2021    Requested Prescriptions   Pending Prescriptions Disp Refills     sotaloL (BETAPACE) 80 MG tablet 180 tablet 3     Sig: Take 1 tablet (80 mg total) by mouth every 12 (twelve) hours.       Sotalol Refill Protocol Failed - 1/5/2021  1:53 PM        Failed - Magnesium in last 12 months     Magnesium   Date Value Ref Range Status   11/21/2016 2.0 1.8 - 2.6 mg/dL Final              Passed - LFT or AST or ALT on file in last 12 months     Albumin   Date Value Ref Range Status   11/13/2020 3.7 3.5 - 5.0 g/dL Final     Bilirubin, Total   Date Value Ref Range Status   11/13/2020 0.5 0.0 - 1.0 mg/dL Final     Bilirubin, Direct   Date Value Ref Range Status   08/17/2011 0.2 <0.6 mg/dL Final     Alkaline Phosphatase   Date Value Ref Range Status   11/13/2020 81 45 - 120 U/L Final     AST   Date Value Ref Range Status   11/13/2020 21 0 - 40 U/L Final     ALT   Date Value Ref Range Status   11/13/2020 14 0 - 45 U/L Final     Protein, Total   Date Value Ref Range Status   11/13/2020 7.0 6.0 - 8.0 g/dL Final                Passed - PCP or prescribing provider visit in past 6 months or next 3 months     Last office visit with prescriber/PCP: Visit date not found OR same dept: Visit date not found OR same specialty: 5/2/2018 Taras Avila MD Last physical: 11/20/2020 Last MTM visit: Visit date not found     Next appt within 3 mo: Visit date not found  Next physical within 3 mo: Visit date not found  Prescriber OR PCP: Taras Avila MD  Last diagnosis associated with med order: 1.  Paroxysmal atrial fibrillation (H)  - sotaloL (BETAPACE) 80 MG tablet; Take 1 tablet (80 mg total) by mouth every 12 (twelve) hours.  Dispense: 180 tablet; Refill: 3    2. Dyslipidemia  - atorvastatin (LIPITOR) 40 MG tablet; Take 1 tablet (40 mg total) by mouth at bedtime.  Dispense: 90 tablet; Refill: 2    If protocol passes may refill for 6 months if within 3 months of last provider visit (or a total of 9 months).              Passed - BMP on file in last 12 months     Sodium   Date Value Ref Range Status   11/13/2020 139 136 - 145 mmol/L Final     Potassium   Date Value Ref Range Status   11/13/2020 4.9 3.5 - 5.0 mmol/L Final     Chloride   Date Value Ref Range Status   11/13/2020 107 98 - 107 mmol/L Final     CO2   Date Value Ref Range Status   11/13/2020 26 22 - 31 mmol/L Final     BUN   Date Value Ref Range Status   11/13/2020 25 8 - 28 mg/dL Final     Creatinine   Date Value Ref Range Status   11/13/2020 0.95 0.70 - 1.30 mg/dL Final             Passed - CBC w/plts (hm2) on file in last 12 months     WBC   Date Value Ref Range Status   11/13/2020 5.4 4.0 - 11.0 thou/uL Final     Hemoglobin   Date Value Ref Range Status   11/13/2020 10.4 (L) 14.0 - 18.0 g/dL Final     Hematocrit   Date Value Ref Range Status   11/13/2020 31.4 (L) 40.0 - 54.0 % Final     Platelets   Date Value Ref Range Status   11/13/2020 180 140 - 440 thou/uL Final             Passed - ECG in last 12 months     ECG rhythm strip: No results found for this or any previous visit. ECG 12 lead MUSE:   Results for orders placed or performed in visit on 09/11/20   ECG Clinic - Today   Result Value Ref Range    SYSTOLIC BLOOD PRESSURE      DIASTOLIC BLOOD PRESSURE      VENTRICULAR RATE 72 BPM    ATRIAL RATE 72 BPM    P-R INTERVAL 272 ms    QRS DURATION 148 ms    Q-T INTERVAL 452 ms    QTC CALCULATION (BEZET) 494 ms    P Axis      R AXIS -24 degrees    T AXIS 127 degrees    MUSE DIAGNOSIS       Electronic atrial pacemaker  Left bundle branch  block  Abnormal ECG  When compared with ECG of 16-NOV-2018 12:18,  Electronic atrial pacemaker has replaced Sinus rhythm  Confirmed by MODESTA ARAUJO MD LOC:[facility] [id number]) on 9/14/2020 3:11:20 PM      ECG 12 lead nursing unit:   Results for orders placed or performed during the hospital encounter of 11/17/16   ECG 12 lead nursing unit performed   Result Value Ref Range    SYSTOLIC BLOOD PRESSURE  mmHg    DIASTOLIC BLOOD PRESSURE  mmHg    VENTRICULAR RATE 80 BPM    ATRIAL RATE 66 BPM    P-R INTERVAL 192 ms    QRS DURATION 144 ms    Q-T INTERVAL 440 ms    QTC CALCULATION (BEZET) 507 ms    P Axis  degrees    R AXIS -11 degrees    T AXIS 127 degrees    MUSE DIAGNOSIS       Sinus rhythm with Atrial tachycardia  Left bundle branch block  Abnormal ECG  When compared with ECG of 27-MAY-2015 18:11,  Atrial tachycardia is now Present  Confirmed by ANNE GARCIA MD LOC:[facility] ([id number]) on 11/17/2016 2:46:24 PM               Passed - Serum creatinine in last 12 months     Creatinine   Date Value Ref Range Status   11/13/2020 0.95 0.70 - 1.30 mg/dL Final              Signed Prescriptions Disp Refills    atorvastatin (LIPITOR) 40 MG tablet 90 tablet 2     Sig: Take 1 tablet (40 mg total) by mouth at bedtime.       Statins Refill Protocol (Hmg CoA Reductase Inhibitors) Passed - 1/5/2021  1:53 PM        Passed - PCP or prescribing provider visit in past 12 months      Last office visit with prescriber/PCP: 5/2/2018 Taras Avila MD OR same dept: Visit date not found OR same specialty: 5/2/2018 Taras Avila MD  Last physical: 11/20/2020 Last MTM visit: Visit date not found   Next visit within 3 mo: Visit date not found  Next physical within 3 mo: Visit date not found  Prescriber OR PCP: Taras Avila MD  Last diagnosis associated with med order: 1. Paroxysmal atrial fibrillation (H)  - sotaloL (BETAPACE) 80 MG tablet; Take 1 tablet (80 mg total) by mouth every 12 (twelve) hours.  Dispense: 180 tablet; Refill:  3    2. Dyslipidemia  - atorvastatin (LIPITOR) 40 MG tablet; Take 1 tablet (40 mg total) by mouth at bedtime.  Dispense: 90 tablet; Refill: 2    If protocol passes may refill for 12 months if within 3 months of last provider visit (or a total of 15 months).

## 2021-06-14 NOTE — PROGRESS NOTES
[facility] Patient Outreach:     Discharge Date::  11/8/17      Reason for TCU stay (discharge diagnosis)::  Lumbar fx's, contusions, anemia      Are you feeling better, the same or worse since your discharge?:  Patient is feeling better          As part of your discharge plan, did they discuss home care with you?: Yes        Have your seen them yet, or are they scheduled to visit?: Yes                Do you have any follow up visits scheduled with your PCP or Specialist?:  Yes, with PCP      (RN) Is it scheduled soon enough (3-5 days)?: Yes

## 2021-06-14 NOTE — TELEPHONE ENCOUNTER
Requested Prescriptions     Pending Prescriptions Disp Refills     sotaloL (BETAPACE) 80 MG tablet 180 tablet 3     Sig: Take 1 tablet (80 mg total) by mouth every 12 (twelve) hours.     atorvastatin (LIPITOR) 40 MG tablet 90 tablet 3     Sig: Take 1 tablet (40 mg total) by mouth at bedtime.
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
## 2021-06-14 NOTE — TELEPHONE ENCOUNTER
Paper refill request from [facility] in [place], [place].  Approval was sent to [facility] in [place] on 1/5/2021.

## 2021-06-15 ENCOUNTER — COMMUNICATION - HEALTHEAST (OUTPATIENT)
Dept: CARDIOLOGY | Facility: CLINIC | Age: 86
End: 2021-06-15

## 2021-06-15 ENCOUNTER — AMBULATORY - HEALTHEAST (OUTPATIENT)
Dept: CARDIOLOGY | Facility: CLINIC | Age: 86
End: 2021-06-15

## 2021-06-15 DIAGNOSIS — Z95.0 CARDIAC PACEMAKER IN SITU: ICD-10-CM

## 2021-06-15 DIAGNOSIS — I48.0 PAROXYSMAL ATRIAL FIBRILLATION (H): ICD-10-CM

## 2021-06-16 NOTE — TELEPHONE ENCOUNTER
Refill Approved    Rx renewed per Medication Renewal Policy. Medication was last renewed on 7/9/20, last OV 11/20/20.    Kailyn Howard, [facility] Triage/Med Refill 4/6/2021     Requested Prescriptions   Pending Prescriptions Disp Refills     levothyroxine (SYNTHROID, LEVOTHROID) 50 MCG tablet 90 tablet 1     Sig: Take 1 tablet (50 mcg total) by mouth daily.       Thyroid Hormones Protocol Passed - 4/5/2021  2:12 PM        Passed - Provider visit in past 12 months or next 3 months     Last office visit with prescriber/PCP: 5/2/2018 Taras Avila MD OR same dept: Visit date not found OR same specialty: 5/2/2018 Taras Avila MD  Last physical: 11/20/2020 Last MTM visit: Visit date not found   Next visit within 3 mo: Visit date not found  Next physical within 3 mo: Visit date not found  Prescriber OR PCP: Taras Avila MD  Last diagnosis associated with med order: 1. Hypothyroidism  - levothyroxine (SYNTHROID, LEVOTHROID) 50 MCG tablet; Take 1 tablet (50 mcg total) by mouth daily.  Dispense: 90 tablet; Refill: 1    If protocol passes may refill for 12 months if within 3 months of last provider visit (or a total of 15 months).             Passed - TSH on file in past 12 months for patient age 12 & older     TSH   Date Value Ref Range Status   11/13/2020 5.29 (H) 0.30 - 5.00 uIU/mL Final

## 2021-06-16 NOTE — TELEPHONE ENCOUNTER
RN cannot approve Refill Request    RN can NOT refill this medication Protocol failed and NO refill given. Last office visit: 5/2/2018 Taras Avila MD Last Physical: 11/20/2020 Last MTM visit: Visit date not found Last visit same specialty: 5/2/2018 Taras Avila MD.  Next visit within 3 mo: Visit date not found  Next physical within 3 mo: Visit date not found      Jose Angel Walker, [facility] Triage/Med Refill 4/20/2021    Requested Prescriptions   Pending Prescriptions Disp Refills     sotaloL (BETAPACE) 80 MG tablet [Pharmacy Med Name: SOTALOL 80MG TABLETS] 180 tablet 0     Sig: TAKE 1 TABLET(80 MG) BY MOUTH EVERY 12 HOURS       Sotalol Refill Protocol Failed - 4/19/2021  4:46 PM        Failed - Magnesium in last 12 months     Magnesium   Date Value Ref Range Status   11/21/2016 2.0 1.8 - 2.6 mg/dL Final              Passed - LFT or AST or ALT on file in last 12 months     Albumin   Date Value Ref Range Status   11/13/2020 3.7 3.5 - 5.0 g/dL Final     Bilirubin, Total   Date Value Ref Range Status   11/13/2020 0.5 0.0 - 1.0 mg/dL Final     Bilirubin, Direct   Date Value Ref Range Status   08/17/2011 0.2 <0.6 mg/dL Final     Alkaline Phosphatase   Date Value Ref Range Status   11/13/2020 81 45 - 120 U/L Final     AST   Date Value Ref Range Status   11/13/2020 21 0 - 40 U/L Final     ALT   Date Value Ref Range Status   11/13/2020 14 0 - 45 U/L Final     Protein, Total   Date Value Ref Range Status   11/13/2020 7.0 6.0 - 8.0 g/dL Final                Passed - PCP or prescribing provider visit in past 6 months or next 3 months     Last office visit with prescriber/PCP: Visit date not found OR same dept: Visit date not found OR same specialty: 5/2/2018 Taras Avila MD Last physical: 11/20/2020 Last MTM visit: Visit date not found     Next appt within 3 mo: Visit date not found  Next physical within 3 mo: Visit date not found  Prescriber OR PCP: Taras Avila MD  Last diagnosis  associated with med order: 1. Paroxysmal atrial fibrillation (H)  - sotaloL (BETAPACE) 80 MG tablet [Pharmacy Med Name: SOTALOL 80MG TABLETS]; TAKE 1 TABLET(80 MG) BY MOUTH EVERY 12 HOURS  Dispense: 180 tablet; Refill: 0    If protocol passes may refill for 6 months if within 3 months of last provider visit (or a total of 9 months).              Passed - BMP on file in last 12 months     Sodium   Date Value Ref Range Status   11/13/2020 139 136 - 145 mmol/L Final     Potassium   Date Value Ref Range Status   11/13/2020 4.9 3.5 - 5.0 mmol/L Final     Chloride   Date Value Ref Range Status   11/13/2020 107 98 - 107 mmol/L Final     CO2   Date Value Ref Range Status   11/13/2020 26 22 - 31 mmol/L Final     BUN   Date Value Ref Range Status   11/13/2020 25 8 - 28 mg/dL Final     Creatinine   Date Value Ref Range Status   11/13/2020 0.95 0.70 - 1.30 mg/dL Final             Passed - CBC w/plts (hm2) on file in last 12 months     WBC   Date Value Ref Range Status   11/13/2020 5.4 4.0 - 11.0 thou/uL Final     Hemoglobin   Date Value Ref Range Status   11/13/2020 10.4 (L) 14.0 - 18.0 g/dL Final     Hematocrit   Date Value Ref Range Status   11/13/2020 31.4 (L) 40.0 - 54.0 % Final     Platelets   Date Value Ref Range Status   11/13/2020 180 140 - 440 thou/uL Final             Passed - ECG in last 12 months     ECG rhythm strip: No results found for this or any previous visit. ECG 12 lead MUSE:   Results for orders placed or performed in visit on 09/11/20   ECG Clinic - Today   Result Value Ref Range    SYSTOLIC BLOOD PRESSURE      DIASTOLIC BLOOD PRESSURE      VENTRICULAR RATE 72 BPM    ATRIAL RATE 72 BPM    P-R INTERVAL 272 ms    QRS DURATION 148 ms    Q-T INTERVAL 452 ms    QTC CALCULATION (BEZET) 494 ms    P Axis      R AXIS -24 degrees    T AXIS 127 degrees    MUSE DIAGNOSIS       Electronic atrial pacemaker  Left bundle branch block  Abnormal ECG  When compared with ECG of 16-NOV-2018 12:18,  Electronic atrial pacemaker  has replaced Sinus rhythm  Confirmed by MODESTA ARAUJO MD LOC:[facility] ([id number]) on 9/14/2020 3:11:20 PM      ECG 12 lead nursing unit:   Results for orders placed or performed during the hospital encounter of 11/17/16   ECG 12 lead nursing unit performed   Result Value Ref Range    SYSTOLIC BLOOD PRESSURE  mmHg    DIASTOLIC BLOOD PRESSURE  mmHg    VENTRICULAR RATE 80 BPM    ATRIAL RATE 66 BPM    P-R INTERVAL 192 ms    QRS DURATION 144 ms    Q-T INTERVAL 440 ms    QTC CALCULATION (BEZET) 507 ms    P Axis  degrees    R AXIS -11 degrees    T AXIS 127 degrees    MUSE DIAGNOSIS       Sinus rhythm with Atrial tachycardia  Left bundle branch block  Abnormal ECG  When compared with ECG of 27-MAY-2015 18:11,  Atrial tachycardia is now Present  Confirmed by ANNE GARCIA MD LOC:[facility] ([id number]) on 11/17/2016 2:46:24 PM               Passed - Serum creatinine in last 12 months     Creatinine   Date Value Ref Range Status   11/13/2020 0.95 0.70 - 1.30 mg/dL Final

## 2021-06-17 NOTE — PATIENT INSTRUCTIONS - HE
Take the antibiotic three times daily for 10 days  Use the cream twice daily, you can put moisturizer on over it  If not better, then keep appointment with Dr Walters.
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
## 2021-06-17 NOTE — PROGRESS NOTES
Assessment/ Plan     1. Rash and nonspecific skin eruption  Seng presents for discussion of a rash that he has had intermittently for approximately 6 years.  He thinks it has gotten worse over the last several months.  He has seen dermatology in the past but I cannot find notes in the chart.  They just gave him some Eucerin cream so I suspect they thought it was possibly eczema.  On exam, he has diffuse excoriations with what appears to be secondary cellulitis at this point.  We will put him on some antibiotics to treat the skin infection and have him use some 2.5% hydrocortisone cream twice daily along with his moisturizer.  He has an appointment with a dermatologist in about 2 weeks and they can assess at that time if there is been improvement.  - cephalexin (KEFLEX) 500 MG capsule; Take 1 capsule (500 mg total) by mouth 3 (three) times a day for 10 days.  Dispense: 30 capsule; Refill: 0  - hydrocortisone 2.5 % cream; Apply topically 2 (two) times a day.  Dispense: 30 g; Refill: 0      Subjective:       Seng Deshpande is a 89 y.o. male who presents for discussion of rash.  This patient is new to me today, normally sees Dr. Avila.  He states he has had this rash for approximately 6 years.  He states it started after he had a shingles vaccine.  Initially, it was just on his back but now it is spread to his chest, arms, and now he is got a couple lesions on his head.  He does admit that he scratches it quite frequently.  He states he is seen dermatology in the past but I cannot find records of that.  He brings in a bag of lotions, mainly Eucerin and CeraVe, that he states the dermatologist gave him.  He does not remember the diagnosis that they gave him for it.  He is overall been feeling okay, has not had a fever or any systemic symptoms.  The lesion on top of his head he states somewhat tender.  He thinks maybe the rash started about the time he started Eliquis, but he is not entirely sure.    Relevant past  medical, family, surgical, and social history reviewed with patient, unless noted in HPI, not pertinent for this visit.  Medications were discussed and reconciled.   Review of Systems   A 12 point comprehensive review of systems was negative except as noted.      Current Outpatient Medications   Medication Sig Dispense Refill     acetaminophen (TYLENOL) 500 MG tablet Take 1 tablet (500 mg total) by mouth every 6 (six) hours as needed for pain or fever. (Patient taking differently: Take 1,000 mg by mouth 3 (three) times a day. max 4000mg in 24hrs) 30 tablet 0     acetaminophen (TYLENOL) 500 MG tablet Take 2 tablets (1,000 mg total) by mouth 2 (two) times a day. 360 tablet 1     apixaban ANTICOAGULANT (ELIQUIS) 5 mg Tab tablet Take 1 tablet (5 mg total) by mouth 2 (two) times a day. 180 tablet 1     ascorbic acid, vitamin C, (VITAMIN C) 1000 MG tablet Take 1,000 mg by mouth daily.       aspirin 81 MG EC tablet Take 1 tablet (81 mg total) by mouth daily. 30 tablet 0     atorvastatin (LIPITOR) 40 MG tablet Take 1 tablet (40 mg total) by mouth at bedtime. 90 tablet 2     calcium carbonate (CALCIUM 500 ORAL) Take 1 tablet by mouth daily.       cephalexin (KEFLEX) 500 MG capsule Take 1 capsule (500 mg total) by mouth 3 (three) times a day for 10 days. 30 capsule 0     cholecalciferol, vitamin D3, 1,000 unit (25 mcg) tablet Take 1,000 Units by mouth daily.       clindamycin (CLEOCIN) 300 MG capsule Take 600 mg by mouth as needed. Before dental appts       gluc ambrose/chondro ambrose A/vit C/Mn (GLUCOSAMINE 1500 COMPLEX ORAL) Take by mouth.       hydrocortisone 2.5 % cream Apply topically 2 (two) times a day. 30 g 0     levothyroxine (SYNTHROID, LEVOTHROID) 50 MCG tablet Take 1 tablet (50 mcg total) by mouth daily. 90 tablet 1     multivitamin with minerals (VITAMINS AND MINERALS) tablet Take 1 tablet by mouth daily.       nitroglycerin (NITROSTAT) 0.4 MG SL tablet Place 1 tablet (0.4 mg total) under the tongue every 5 (five) minutes  "as needed for chest pain. 1 Bottle 3     polyethylene glycol (MIRALAX) 17 gram packet Take 17 g by mouth daily.       senna (SENNA) 8.6 mg tablet Take 2 tablets by mouth 2 (two) times a day as needed for constipation.       sotaloL (BETAPACE) 80 MG tablet TAKE 1 TABLET(80 MG) BY MOUTH EVERY 12 HOURS 180 tablet 0     No current facility-administered medications for this visit.        Objective:      Resp 16   Ht 5' 5.25\" (1.657 m)   Wt 170 lb 12.8 oz (77.5 kg)   BMI 28.21 kg/m        General appearance: alert, appears stated age and cooperative  Lungs: clear to auscultation bilaterally  Heart: regular rate and rhythm, S1, S2 normal, no murmur, click, rub or gallop  Skin exam: Multiple excoriated lesions arm, chest, and upper back.  Several of these have quite a bit of surrounding erythema.  The one on his head is quite tender and has some swelling around the erythema.  No area of fluctuance.    No results found for this or any previous visit (from the past 168 hour(s)).       This note has been dictated using voice recognition software. Any grammatical or context distortions are unintentional and inherent to the software  "

## 2021-06-17 NOTE — PATIENT INSTRUCTIONS - HE
Patient Instructions by Emeka Schilling DO at 6/7/2019  9:30 AM     Author: Emeka Schilling DO Service: -- Author Type: Physician    Filed: 6/7/2019  9:50 AM Encounter Date: 6/7/2019 Status: Signed    : Emeka Schilling DO (Physician)       Below is a list of instructions we discussed today in clinic:   1. Continue current medications   2. Follow-up in 12 months.       It was a pleasure to meet with you today in clinic.  Please do not hesitate to call the [facility] clinic with any questions or concerns at [phone].    Sincerely,

## 2021-06-17 NOTE — PATIENT INSTRUCTIONS - HE
Patient Instructions by Taras Avila MD at 11/1/2019 12:30 PM     Author: Taras Avila MD Service: -- Author Type: Physician    Filed: 11/4/2019  1:42 PM Encounter Date: 11/1/2019 Status: Signed    : Taras Avila MD (Physician)         Patient Education     Exercise for a Healthier Heart  You may wonder how you can improve the health of your heart. If youre thinking about exercise, youre on the right track. You dont need to become an athlete, but you do need a certain amount of brisk exercise to help strengthen your heart. If you have been diagnosed with a heart condition, your doctor may recommend exercise to help stabilize your condition. To help make exercise a habit, choose safe, fun activities.       Be sure to check with your health care provider before starting an exercise program.    Why exercise?  Exercising regularly offers many healthy rewards. It can help you do all of the following:    Improve your blood cholesterol levels to help prevent further heart trouble    Lower your blood pressure to help prevent a stroke or heart attack    Control diabetes, or reduce your risk of getting this disease    Improve your heart and lung function    Reach and maintain a healthy weight    Make your muscles stronger and more limber so you can stay active    Prevent falls and fractures by slowing the loss of bone mass (osteoporosis)    Manage stress better  Exercise tips  Ease into your routine. Set small goals. Then build on them.  Exercise on most days. Aim for a total of 150 or more minutes of moderate to  vigorous intensity activity each week. Consider 40 minutes, 3 to 4 times a week. For best results, activity should last for 40 minutes on average. It is OK to work up to the 40 minute period over time. Examples of moderate-intensity activity is walking one mile in 15 minutes or 30 to 45 minutes of yard work.  Step up your daily activity level. Along with your exercise program, try  being more active throughout the day. Walk instead of drive. Do more household tasks or yard work.  Choose one or more activities you enjoy. Walking is one of the easiest things you can do. You can also try swimming, riding a bike, or taking an exercise class.  Stop exercising and call your doctor if you:    Have chest pain or feel dizzy or lightheaded    Feel burning, tightness, pressure, or heaviness in your chest, neck, shoulders, back, or arms    Have unusual shortness of breath    Have increased joint or muscle pain    Have palpitations or an irregular heartbeat      1378-5439 [employer]. [address] [place] [id number]. All rights reserved. This information is not intended as a substitute for professional medical care. Always follow your healthcare professional's instructions.         Patient Education   Understanding [place] MyPlate  The USDA ([place] Department of Agriculture) has guidelines to help you make healthy food choices. These are called MyPlate. MyPlate shows the food groups that make up healthy meals using the image of a place setting. Before you eat, think about the healthiest choices for what to put onto your plate or into your cup or bowl. To learn more about building a healthy plate, visit www.choosemyplate.gov.       The Food Groups    Fruits: Any fruit or 100% fruit juice counts as part of the Fruit Group. Fruits may be fresh, canned, frozen, or dried, and may be whole, cut-up, or pureed. Make half your plate fruits and vegetables.    Vegetables: Any vegetable or 100% vegetable juice counts as a member of the Vegetable Group. Vegetables may be fresh, frozen, canned, or dried. They can be served raw or cooked and may be whole, cut-up, or mashed. Make half your plate fruits and vegetables.     Grains: All foods made from grains are part of the Grains Group. These include wheat, rice, oats, cornmeal, and barley such as bread, pasta, oatmeal, cereal, tortillas, and grits. Grains  should be no more than a quarter of your plate. At least half of your grains should be whole grains.    Protein: This group includes meat, poultry, seafood, beans and peas, eggs, processed soy products (like tofu), nuts (including nut butters), and seeds. Make protein choices no more than a quarter of your plate. Meat and poultry choices should be lean or low fat.    Dairy: All fluid milk products and foods made from milk that contain calcium, like yogurt and cheese are part of the Dairy Group. (Foods that have little calcium, such as cream, butter, and cream cheese, are not part of the group.) Most dairy choices should be low-fat or fat-free.    Oils: These are fats that are liquid at room temperature. They include canola, corn, olive, soybean, and sunflower oil. Foods that are mainly oil include mayonnaise, certain salad dressings, and soft margarines. You should have only 5 to 7 teaspoons of oils a day. You probably already get this much from the food you eat.  Use [employer]Me to Help Build Your Meals  The [employer] can help you plan and track your meals and activity. You can look up individual foods to see or compare their nutritional value. You can get guidelines for what and how much you should eat. You can compare your food choices. And you can assess personal physical activities and see ways you can improve. Go to www.[place].gov/supertracker/.    7974-9781 [employer]. [address] [place] [address]. All rights reserved. This information is not intended as a substitute for professional medical care. Always follow your healthcare professional's instructions.           Patient Education   Preventing Falls in the Home  As you get older, falls are more likely. Thats because your reaction time slows. Your muscles and joints may also get stiffer, making them less flexible. Illness, medications, and vision changes can also affect your balance. A fall could leave you unable to  live on your own. To make your home safer, follow these tips:    Floors    Put nonskid pads under area rugs.    Remove throw rugs.    Replace worn floor coverings.    Tack carpets firmly to each step on carpeted stairs. Put nonskid strips on the edges of uncarpeted stairs.    Keep floors and stairs free of clutter and cords.    Arrange furniture so there are clear pathways.    Clean up any spills right away.    Bathrooms    Install grab bars in the tub or shower.    Apply nonskid strips or put a nonskid rubber mat in the tub or shower.    Sit on a bath chair to bathe.    Use bathmats with nonskid backing.    Lighting    Keep a flashlight in each room.    Put a nightlight along the pathway between the bedroom and the bathroom.    8270-1424 [employer]. [address] [place] [address]. All rights reserved. This information is not intended as a substitute for professional medical care. Always follow your healthcare professional's instructions.           Advance Directive  Patients advance directive was discussed and I am comfortable with the patients wishes.  Patient Education   Personalized Prevention Plan  You are due for the preventive services outlined below.  Your care team is available to assist you in scheduling these services.  If you have already completed any of these items, please share that information with your care team to update in your medical record.  Health Maintenance   Topic Date Due   ? ZOSTER VACCINES (2 of 3) 08/26/2009   ? MEDICARE ANNUAL WELLNESS VISIT  10/25/2019   ? TD 18+ HE  10/22/2020   ? FALL RISK ASSESSMENT  11/01/2020   ? ADVANCE CARE PLANNING  10/25/2023   ? PNEUMOCOCCAL IMMUNIZATION 65+ LOW/MEDIUM RISK  Completed   ? INFLUENZA VACCINE RULE BASED  Completed

## 2021-06-18 NOTE — PATIENT INSTRUCTIONS - HE
Patient Instructions by Taras Avila MD at 11/20/2020  1:30 PM     Author: Taras Avila MD Service: -- Author Type: Physician    Filed: 11/24/2020 12:54 PM Encounter Date: 11/20/2020 Status: Addendum    : Taras Avila MD (Physician)    Related Notes: Original Note by Taras Avila MD (Physician) filed at 11/20/2020  1:54 PM       -Increase miralax a little each day to help with regular bowel movements or consider adding metamucil to the daily regimen  -Stop aspirin  -Start multi-vitamin instead of all the supplements  Patient Education     Exercise for a Healthier Heart  You may wonder how you can improve the health of your heart. If youre thinking about exercise, youre on the right track. You dont need to become an athlete, but you do need a certain amount of brisk exercise to help strengthen your heart. If you have been diagnosed with a heart condition, your doctor may recommend exercise to help stabilize your condition. To help make exercise a habit, choose safe, fun activities.       Be sure to check with your health care provider before starting an exercise program.    Why exercise?  Exercising regularly offers many healthy rewards. It can help you do all of the following:    Improve your blood cholesterol levels to help prevent further heart trouble    Lower your blood pressure to help prevent a stroke or heart attack    Control diabetes, or reduce your risk of getting this disease    Improve your heart and lung function    Reach and maintain a healthy weight    Make your muscles stronger and more limber so you can stay active    Prevent falls and fractures by slowing the loss of bone mass (osteoporosis)    Manage stress better  Exercise tips  Ease into your routine. Set small goals. Then build on them.  Exercise on most days. Aim for a total of 150 or more minutes of moderate to  vigorous intensity activity each week. Consider 40 minutes, 3 to 4 times a week. For  best results, activity should last for 40 minutes on average. It is OK to work up to the 40 minute period over time. Examples of moderate-intensity activity is walking one mile in 15 minutes or 30 to 45 minutes of yard work.  Step up your daily activity level. Along with your exercise program, try being more active throughout the day. Walk instead of drive. Do more household tasks or yard work.  Choose one or more activities you enjoy. Walking is one of the easiest things you can do. You can also try swimming, riding a bike, or taking an exercise class.  Stop exercising and call your doctor if you:    Have chest pain or feel dizzy or lightheaded    Feel burning, tightness, pressure, or heaviness in your chest, neck, shoulders, back, or arms    Have unusual shortness of breath    Have increased joint or muscle pain    Have palpitations or an irregular heartbeat      6198-1606 [employer]. [address] [place] [address]. All rights reserved. This information is not intended as a substitute for professional medical care. Always follow your healthcare professional's instructions.         Patient Education   Understanding USDA MyPlate  The USDA (US Department of Agriculture) has guidelines to help you make healthy food choices. These are called MyPlate. MyPlate shows the food groups that make up healthy meals using the image of a place setting. Before you eat, think about the healthiest choices for what to put onto your plate or into your cup or bowl. To learn more about building a healthy plate, visit www.choosemyplate.gov.       The Food Groups    Fruits: Any fruit or 100% fruit juice counts as part of the Fruit Group. Fruits may be fresh, canned, frozen, or dried, and may be whole, cut-up, or pureed. Make half your plate fruits and vegetables.    Vegetables: Any vegetable or 100% vegetable juice counts as a member of the Vegetable Group. Vegetables may be fresh, frozen, canned, or dried. They  can be served raw or cooked and may be whole, cut-up, or mashed. Make half your plate fruits and vegetables.     Grains: All foods made from grains are part of the Grains Group. These include wheat, rice, oats, cornmeal, and barley such as bread, pasta, oatmeal, cereal, tortillas, and grits. Grains should be no more than a quarter of your plate. At least half of your grains should be whole grains.    Protein: This group includes meat, poultry, seafood, beans and peas, eggs, processed soy products (like tofu), nuts (including nut butters), and seeds. Make protein choices no more than a quarter of your plate. Meat and poultry choices should be lean or low fat.    Dairy: All fluid milk products and foods made from milk that contain calcium, like yogurt and cheese are part of the Dairy Group. (Foods that have little calcium, such as cream, butter, and cream cheese, are not part of the group.) Most dairy choices should be low-fat or fat-free.    Oils: These are fats that are liquid at room temperature. They include canola, corn, olive, soybean, and sunflower oil. Foods that are mainly oil include mayonnaise, certain salad dressings, and soft margarines. You should have only 5 to 7 teaspoons of oils a day. You probably already get this much from the food you eat.  Use eCerter to Help Build Your Meals  The SuperTracker can help you plan and track your meals and activity. You can look up individual foods to see or compare their nutritional value. You can get guidelines for what and how much you should eat. You can compare your food choices. And you can assess personal physical activities and see ways you can improve. Go to www.choosemyplate.gov/supertracker/.    3965-9407 [employer]. [address] [place] [address], [place] [address]. All rights reserved. This information is not intended as a substitute for professional medical care. Always follow your healthcare professional's instructions.           Patient  Education   Urinary Incontinence (Male)    Urinary incontinence means not being able to control the release of urine from the bladder.  Causes  Common causes of urinary incontinence in men include:    Infection    Certain medicines    Aging    Poor pelvic muscle tone    Bladder spasms    Obesity    Urinary retention  Nervous system diseases, diabetes, sleep apnea, urinary tract infections, prostate surgery, and pelvic trauma can also cause incontinence. Constipation and smoking have also been identified as risk factors.  Symptoms    Urge incontinence (also called overactive bladder) is a sudden urge to urinate even though there may not be much urine in the bladder. The need to urinate often during the night is common. It is due to bladder spasms.    Stress incontinence is involuntary urine leakage that can occur with sneezing, coughing, and other actions that put stress on the bladder.  Treatment  Treatment of urinary incontinence depends on the cause. Infections of the bladder are treated with antibiotics. Urinary retention is treated with a bladder catheter.  Home care  Follow these guidelines when caring for yourself at home:    Don't consume foods and drinks that may irritate the bladder. These include drinks containing alcohol, caffeinate, or carbonation; chocolate; and acidic fruits and juices.    Limit fluid intake to 6 to 8 cups a day.    Lose weight if you are overweight. This will reduce your symptoms.    If needed, wear absorbent pads to catch urine. Change pads frequently to maintain hygiene and prevent skin and bladder infections.    Bathe daily to maintain good hygiene.    If an antibiotic was prescribed to treat a bladder infection, be sure to take it until finished, even if you are feeling better before then. This is to make sure your infection has cleared.    If a catheter was left in place, it is important to keep bacteria from getting into the collection bag. Don't disconnect the catheter from the  collection bag.    Use a leg band to secure the catheter drainage tube, so it does not pull on the catheter. Drain the collection bag when it becomes full using the drain spout at the bottom of the bag. Don't disconnect the bag from the catheter.    Don't pull on or try to remove a catheter. The catheter must be removed by a healthcare provider.  Follow-up care  Follow up with your healthcare provider, or as advised.  When to seek medical advice  Call your healthcare provider right away if any of these occur:    Fever over 100.4 F (38 C), or as directed by your healthcare provider    Bladder pain or fullness    Abdominal swelling, nausea, or vomiting    Back pain    Weakness, dizziness, or fainting    If a catheter was left in place, return if:  ? Catheter falls out  ? Catheter stops draining for 6 hours  Date Last Reviewed: 10/1/2017    8040-3929 [employer] [address] [place] [address]. All rights reserved. This information is not intended as a substitute for professional medical care. Always follow your healthcare professional's instructions.     Patient Education   Preventing Falls in the Home  As you get older, falls are more likely. Thats because your reaction time slows. Your muscles and joints may also get stiffer, making them less flexible. Illness, medications, and vision changes can also affect your balance. A fall could leave you unable to live on your own. To make your home safer, follow these tips:    Floors    Put nonskid pads under area rugs.    Remove throw rugs.    Replace worn floor coverings.    Tack carpets firmly to each step on carpeted stairs. Put nonskid strips on the edges of uncarpeted stairs.    Keep floors and stairs free of clutter and cords.    Arrange furniture so there are clear pathways.    Clean up any spills right away.    Bathrooms    Install grab bars in the tub or shower.    Apply nonskid strips or put a nonskid rubber mat in the tub or  shower.    Sit on a bath chair to bathe.    Use bathmats with nonskid backing.    Lighting    Keep a flashlight in each room.    Put a nightlight along the pathway between the bedroom and the bathroom.    0516-1639 [employer]. [address] [place] [address]. All rights reserved. This information is not intended as a substitute for professional medical care. Always follow your healthcare professional's instructions.           Advance Directive  Patients advance directive was discussed and I am comfortable with the patients wishes.  Patient Education   Personalized Prevention Plan  You are due for the preventive services outlined below.  Your care team is available to assist you in scheduling these services.  If you have already completed any of these items, please share that information with your care team to update in your medical record.  Health Maintenance   Topic Date Due   ? ZOSTER VACCINES (2 of 3) 08/26/2009   ? MEDICARE ANNUAL WELLNESS VISIT  11/20/2021   ? FALL RISK ASSESSMENT  11/20/2021   ? ADVANCE CARE PLANNING  11/20/2025   ? TD 18+ HE  09/12/2027   ? Pneumococcal Vaccine: 65+ Years  Completed   ? INFLUENZA VACCINE RULE BASED  Completed   ? Pneumococcal Vaccine: Pediatrics (0 to 5 Years) and At-Risk Patients (6 to 64 Years)  Aged Out   ? HEPATITIS B VACCINES  Aged Out

## 2021-06-18 NOTE — LETTER
Letter by Joy Black RDCS at      Author: Joy Black RDCS Service: -- Author Type: --    Filed:  Encounter Date: 2/13/2019 Status: (Other)       Seng Deshpande  [address] [place] [address]      February 13, 2019      Dear Mr. Deshpande,    RE: Remote Results    We are writing to you regarding your recent Remote Pacemaker check from home. Your transmission was received successfully. Battery status is satisfactory at this time.     Your results are within normal limits.    Your next device appointment will be a remote check on May 15, 2019; this will occur automatically.    To schedule or reschedule, please call [phone] and press 1.    NOTE: If you would like to do an extra transmission, please call [phone] and press 3 to speak to a nurse BEFORE transmitting. This ensures that the Device Clinic staff is aware of the reason you are sending a transmission, and can follow-up with you after it has been reviewed.    We will be checking your implanted device from home (remotely) every three months unless otherwise instructed. We will need to see you in the clinic at least once a year. You may need to be seen in the clinic sooner depending on the results of your check.    Please be aware:    The follow-up schedule is like a Physician prescription.    Your remote monitor is paired to your specific implanted device.      Sincerely,    [facility]

## 2021-06-18 NOTE — PROGRESS NOTES
In clinic device check with Device RN and follow-up with Dr. Schilling..  Please see link for full device report.  Patient was informed of results and next follow up during today's visit.

## 2021-06-19 NOTE — LETTER
Letter by Zara Hickey at      Author: Zraa Hickey Service: -- Author Type: --    Filed:  Encounter Date: 11/20/2019 Status: Signed         Seng Deshpande  [address] [place] [address]      November 21, 2019      Dear Mr. Deshpande,    RE: Remote Results    We are writing to you regarding your recent Remote Pacemaker check from home. Your transmission was received successfully. Battery status is satisfactory at this time.     Your results are showing no significant changes.    Your next device appointment will be a remote check on February 24, 2020; this will occur automatically.    To schedule or reschedule, please call [phone] and press 1.    NOTE: If you would like to do an extra transmission, please call [phone] and press 3 to speak to a nurse BEFORE transmitting. This ensures that the Device Clinic staff is aware of the reason you are sending a transmission, and can follow-up with you after it has been reviewed.    We will be checking your implanted device from home (remotely) every three months unless otherwise instructed. We will need to see you in the clinic at least once a year. You may need to be seen in the clinic sooner depending on the results of your check.    Please be aware:    The follow-up schedule is like a Physician prescription.    Your remote monitor is paired to your specific implanted device.      Sincerely,    [facility]

## 2021-06-19 NOTE — LETTER
Letter by Zara Hickey at      Author: Zara Hickey Service: -- Author Type: --    Filed:  Encounter Date: 5/15/2019 Status: (Other)         Seng Deshpande  [address] [place] [address]      May 15, 2019      Dear  Mayisma,    RE: Remote Results    We are writing to you regarding your recent Remote Pacemaker check from home. Your transmission was received successfully. Battery status is satisfactory at this time.     Your results are within normal limits.    Your next device appointment will be a remote check on August 19, 2019; this will occur automatically.    To schedule or reschedule, please call [phone] and press 1.    NOTE: If you would like to do an extra transmission, please call [phone] and press 3 to speak to a nurse BEFORE transmitting. This ensures that the Device Clinic staff is aware of the reason you are sending a transmission, and can follow-up with you after it has been reviewed.    We will be checking your implanted device from home (remotely) every three months unless otherwise instructed. We will need to see you in the clinic at least once a year. You may need to be seen in the clinic sooner depending on the results of your check.    Please be aware:    The follow-up schedule is like a Physician prescription.    Your remote monitor is paired to your specific implanted device.      Sincerely,    [facility]

## 2021-06-19 NOTE — LETTER
Letter by Joy Black RDCS at      Author: Joy Black RDCS Service: -- Author Type: --    Filed:  Encounter Date: 8/19/2019 Status: (Other)         Seng Deshpande  [address] [place] [address]      August 19, 2019      Dear Mr. Deshpande,    RE: Remote Results    We are writing to you regarding your recent Remote Pacemaker check from home. Your transmission was received successfully. Battery status is satisfactory at this time.     Your results are showing no significant changes.    Your next device appointment will be a clinic visit.  Please call in September to schedule.      To schedule or reschedule, please call [phone] and press 1.    NOTE: If you would like to do an extra transmission, please call [phone] and press 3 to speak to a nurse BEFORE transmitting. This ensures that the Device Clinic staff is aware of the reason you are sending a transmission, and can follow-up with you after it has been reviewed.    We will be checking your implanted device from home (remotely) every three months unless otherwise instructed. We will need to see you in the clinic at least once a year. You may need to be seen in the clinic sooner depending on the results of your check.    Please be aware:    The follow-up schedule is like a Physician prescription.    Your remote monitor is paired to your specific implanted device.      Sincerely,    [facility]

## 2021-06-19 NOTE — LETTER
Letter by Taras Avila MD at      Author: Taras Avila MD Service: -- Author Type: --    Filed:  Encounter Date: 11/4/2019 Status: Signed         Seng Deshpande  [address] [place] [address]             November 4, 2019         Dear Mr. Deshpande,    Below are the results from your recent visit:    Resulted Orders   Comprehensive Metabolic Panel   Result Value Ref Range    Sodium 141 136 - 145 mmol/L    Potassium 4.3 3.5 - 5.0 mmol/L    Chloride 109 (H) 98 - 107 mmol/L    CO2 25 22 - 31 mmol/L    Anion Gap, Calculation 7 5 - 18 mmol/L    Glucose 97 70 - 125 mg/dL    BUN 18 8 - 28 mg/dL    Creatinine 0.81 0.70 - 1.30 mg/dL    GFR MDRD Af Amer >60 >60 mL/min/1.73m2    GFR MDRD Non Af Amer >60 >60 mL/min/1.73m2    Bilirubin, Total 0.7 0.0 - 1.0 mg/dL    Calcium 9.4 8.5 - 10.5 mg/dL    Protein, Total 7.1 6.0 - 8.0 g/dL    Albumin 3.8 3.5 - 5.0 g/dL    Alkaline Phosphatase 80 45 - 120 U/L    AST 25 0 - 40 U/L    ALT 15 0 - 45 U/L    Narrative    Fasting Glucose reference range is 70-99 mg/dL per  American Diabetes Association (ADA) guidelines.   HM2(CBC w/o Differential)   Result Value Ref Range    WBC 5.2 4.0 - 11.0 thou/uL    RBC 3.40 (L) 4.40 - 6.20 mill/uL    Hemoglobin 11.5 (L) 14.0 - 18.0 g/dL    Hematocrit 34.1 (L) 40.0 - 54.0 %     80 - 100 fL    MCH 33.7 27.0 - 34.0 pg    MCHC 33.7 32.0 - 36.0 g/dL    RDW 13.0 11.0 - 14.5 %    Platelets 196 140 - 440 thou/uL    MPV 6.8 (L) 7.0 - 10.0 fL   Lipid Cascade   Result Value Ref Range    Cholesterol 135 <=199 mg/dL    Triglycerides 81 <=149 mg/dL    HDL Cholesterol 48 >=40 mg/dL    LDL Calculated 71 <=129 mg/dL    Patient Fasting > 8hrs? Unknown    Thyroid Cascade   Result Value Ref Range    TSH 1.98 0.30 - 5.00 uIU/mL        Please inform patient that kidney and liver function are normal.    No signs of diabetes.    Thyroid levels are normal.    Cholesterol levels are at goal range.    No other concerns on blood work.     Please call with  questions or contact us using [employer].    Sincerely,        Electronically signed by Taras Avila MD

## 2021-06-19 NOTE — LETTER
Letter by Emeka Schilling DO at      Author: Emeka Schilling DO Service: -- Author Type: --    Filed:  Encounter Date: 4/16/2019 Status: (Other)         Seng Deshpande  [address]  [facility] [address]      April 16, 2019      Dear Seng,    This letter is to remind you that you will be due for your follow up appointment with Dr. Emeka Schilling . To help ensure you are in the best health possible, a regular follow-up with your cardiologist is essential.     Please call our Patient Scheduling Line at [phone] to schedule your appointment at your earliest convenience.  If you have recently scheduled an appointment, please disregard this letter.    We look forward to seeing you again. As always, we are available at the number  above for any questions or concerns you may have.      Sincerely,     The Phys[facility] and Staff of [facility]

## 2021-06-20 NOTE — LETTER
Letter by Zara Hickey at      Author: Zara Hickey Service: -- Author Type: --    Filed:  Encounter Date: 2/24/2020 Status: (Other)         Seng Deshpande  [address] [place] [address]      February 24, 2020      Dear  Mayisma,    RE: Remote Results    We are writing to you regarding your recent Remote Pacemaker check from home. Your transmission was received successfully. Battery status is satisfactory at this time.     Your results are showing no significant changes.    Your next device appointment will be a remote check on May 26, 2020; this will occur automatically.    To schedule or reschedule, please call [phone] and press 1.    NOTE: If you would like to do an extra transmission, please call [phone] and press 3 to speak to a nurse BEFORE transmitting. This ensures that the Device Clinic staff is aware of the reason you are sending a transmission, and can follow-up with you after it has been reviewed.    We will be checking your implanted device from home (remotely) every three months unless otherwise instructed. We will need to see you in the clinic at least once a year. You may need to be seen in the clinic sooner depending on the results of your check.    Please be aware:    The follow-up schedule is like a Physician prescription.    Your remote monitor is paired to your specific implanted device.      Sincerely,    [facility]

## 2021-06-20 NOTE — LETTER
Letter by Joy Black RDCS at      Author: Joy Black RDCS Service: -- Author Type: --    Filed:  Encounter Date: 5/26/2020 Status: (Other)         Seng Deshpande  [address] [place] [address]      May 26, 2020      Dear  Mayisma,    RE: Remote Results    We are writing to you regarding your recent Remote Pacemaker check from home. Your transmission was received successfully. Battery status is satisfactory at this time.     Your results are showing no significant changes.    Your next device appointment will be a clinic visit.  Please call in June to schedule.      To schedule or reschedule, please call [phone] and press 1.    NOTE: If you would like to do an extra transmission, please call [phone] and press 3 to speak to a nurse BEFORE transmitting. This ensures that the Device Clinic staff is aware of the reason you are sending a transmission, and can follow-up with you after it has been reviewed.    We will be checking your implanted device from home (remotely) every three months unless otherwise instructed. We will need to see you in the clinic at least once a year. You may need to be seen in the clinic sooner depending on the results of your check.    Please be aware:    The follow-up schedule is like a Physician prescription.    Your remote monitor is paired to your specific implanted device.      Sincerely,    [facility]

## 2021-06-20 NOTE — LETTER
Letter by Zara Hickey at      Author: Zara Hickey Service: -- Author Type: --    Filed:  Encounter Date: 8/25/2020 Status: (Other)         Seng Deshpande  [address] [place] [id number]      August 25, 2020      Dear  Mayisma,    RE: Remote Results    We are writing to you regarding your recent Remote Pacemaker check from home. Your transmission was received successfully. Battery status is satisfactory at this time.     Your results are showing no significant changes.    Your next device appointment will be a remote check on November 24, 2020; this will occur automatically.    To schedule or reschedule, please call [phone] and press 1.    NOTE: If you would like to do an extra transmission, please call [phone] and press 3 to speak to a nurse BEFORE transmitting. This ensures that the Device Clinic staff is aware of the reason you are sending a transmission, and can follow-up with you after it has been reviewed.    We will be checking your implanted device from home (remotely) every three months unless otherwise instructed. We will need to see you in the clinic at least once a year. You may need to be seen in the clinic sooner depending on the results of your check.    Please be aware:    The follow-up schedule is like a Physician prescription.    Your remote monitor is paired to your specific implanted device.      Sincerely,    [facility]

## 2021-06-21 NOTE — LETTER
Letter by Zara Hickey at      Author: Zara Hickey Service: -- Author Type: --    Filed:  Encounter Date: 11/24/2020 Status: (Other)         Seng Deshpande  [address] [place] [id number]      November 24, 2020      Dear  Mayisma,    RE: Remote Results    We are writing to you regarding your recent Remote Pacemaker check from home. Your transmission was received successfully. Battery status is satisfactory at this time.     Your results are showing no significant changes.    You are overdue for your clinic visit.  Please call now to schedule.    To schedule or reschedule, please call [phone] and press 1.    NOTE: If you would like to do an extra transmission, please call [phone] and press 3 to speak to a nurse BEFORE transmitting. This ensures that the Device Clinic staff is aware of the reason you are sending a transmission, and can follow-up with you after it has been reviewed.    We will be checking your implanted device from home (remotely) every three months unless otherwise instructed. We will need to see you in the clinic at least once a year. You may need to be seen in the clinic sooner depending on the results of your check.    Please be aware:    The follow-up schedule is like a Physician prescription.    Your remote monitor is paired to your specific implanted device.      Sincerely,    [facility]

## 2021-06-21 NOTE — PROGRESS NOTES
In clinic device check with Device RN followed by office visit with Dr. Schilling.  Please see link for full device report.  Patient was informed of results and next follow up during today's visit.

## 2021-06-21 NOTE — LETTER
Letter by Taras Avila MD at      Author: Taras Avila MD Service: -- Author Type: --    Filed:  Encounter Date: 11/20/2020 Status: (Other)         Seng Deshpande  [address] [facility] [address]             November 20, 2020         Dear Mr. Deshpande,    Below are the results from your recent visit:    Resulted Orders   Comprehensive Metabolic Panel   Result Value Ref Range    Sodium 139 136 - 145 mmol/L    Potassium 4.9 3.5 - 5.0 mmol/L    Chloride 107 98 - 107 mmol/L    CO2 26 22 - 31 mmol/L    Anion Gap, Calculation 6 5 - 18 mmol/L    Glucose 106 70 - 125 mg/dL    BUN 25 8 - 28 mg/dL    Creatinine 0.95 0.70 - 1.30 mg/dL    GFR MDRD Af Amer >60 >60 mL/min/1.73m2    GFR MDRD Non Af Amer >60 >60 mL/min/1.73m2    Bilirubin, Total 0.5 0.0 - 1.0 mg/dL    Calcium 9.3 8.5 - 10.5 mg/dL    Protein, Total 7.0 6.0 - 8.0 g/dL    Albumin 3.7 3.5 - 5.0 g/dL    Alkaline Phosphatase 81 45 - 120 U/L    AST 21 0 - 40 U/L    ALT 14 0 - 45 U/L    Narrative    Fasting Glucose reference range is 70-99 mg/dL per  American Diabetes Association (ADA) guidelines.   HM2(CBC w/o Differential)   Result Value Ref Range    WBC 5.4 4.0 - 11.0 thou/uL    RBC 3.17 (L) 4.40 - 6.20 mill/uL    Hemoglobin 10.4 (L) 14.0 - 18.0 g/dL    Hematocrit 31.4 (L) 40.0 - 54.0 %    MCV 99 80 - 100 fL    MCH 32.8 27.0 - 34.0 pg    MCHC 33.1 32.0 - 36.0 g/dL    RDW 13.4 11.0 - 14.5 %    Platelets 180 140 - 440 thou/uL    MPV 7.4 7.0 - 10.0 fL   Lipid Cascade FASTING   Result Value Ref Range    Cholesterol 119 <=199 mg/dL    Triglycerides 127 <=149 mg/dL    HDL Cholesterol 41 >=40 mg/dL    LDL Calculated 53 <=129 mg/dL    Patient Fasting > 8hrs? Yes    Thyroid Cascade   Result Value Ref Range    TSH 5.29 (H) 0.30 - 5.00 uIU/mL   T4, Free   Result Value Ref Range    Free T4 1.0 0.7 - 1.8 ng/dL       TSH level is slightly elevated but T4 level still within the normal range.  We will continue with current thyroid dosing.   Cholesterol levels are at  goal range.  Kidney function and liver function are normal, no signs of diabetes.  Electrolytes are normal.  Hemoglobin is slightly low and we can discuss at your office visit.     Please call with questions or contact us using [employer].    Sincerely,        Electronically signed by Taras Avila MD
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
## 2021-06-25 NOTE — TELEPHONE ENCOUNTER
----- Message from Zara Hickey sent at 6/15/2021  5:53 AM CDT -----  Regarding: device RN review  Type: alert pacemaker remote transmission for AT/AF burden for 6/24hrs.   presenting rhythm: sinus and AP-VS 60-90 bpm.  Battery/lead status: stable.  Arrhythmias: since 11/24/20; 35 mode switch episodes, EGMs confirm AT/AF, total of 6.4hrs noted on 6/14/21, AF burden <1%, v-rates >/=120bpm ~80%. 77 ventricular high rate episodes and 7 SVT episodes, available EGMs show brief RVR during AF.  Anticoagulant: apixaban.  Comments: Normal magnet and pacemaker function. JOHN    Patient is also quite overdue for a clinic check  ------------------------------------------------------------      Transmission reviewed, alert for AF received, ~ 6 hours, with some RVR noted. Overall burden is very low. Per [employer] list remains on Eliquis. Call placed to patient to review findings and discuss clinic device check follow up as patient has not been in for a clinic check since 2018. LVM for call back. He has been compliant with remotes in the meantime.    Message sent to scheduling as well to arrange device check and Dr. Schilling follow up (both overdue).    Krystin Mcnulty RN

## 2021-06-25 NOTE — PROGRESS NOTES
Progress Notes by Emeka Schilling DO at 1/20/2017  9:10 AM     Author: Emeka Schilling DO Service: -- Author Type: Physician    Filed: 1/20/2017  4:56 PM Encounter Date: 1/20/2017 Status: Signed    : Emeka Schilling DO (Physician)           Click to link to [facility]     [facility] NOTE      Assessment/Recommendations   Assessment:    1. 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  No recurrent syncope sinus PPM. Interrogation demonstrated no sustained episode of atrial fib since device placement.  Device interrogation was personally reviewed  2. Coronary artery disease s/p CABG 1995.  No active angina.   3. Atrial fibrillation.  Patient was placed on sotalol therapy 80 mg twice daily by EP.    4. Essential HTN  5. Hyperlipidemia.      Plan:  1.  Long informant discussion with patient regarding anticoagulation with Eliquis.  Last spring patient's Eliquis was discontinued.  This was after a 30 day monitor did not demonstrate any episodes of atrial fibrillation.  Recurrent episode of atrial fibrillation was noted during admission in November 2016.  Eliquis was not started at that time given the pacemaker was placed.  Today had a long discussion with patient regarding the benefits of Eliquis including stroke prevention given history of atrial fibrillation now requiring sotalol therapy.  Patient has had no sustained episodes of atrial fibrillation since his device in November 2016.  Patient is concerned about bleeding risk and cost of anticoagulation.  Given that he is aware that his stroke risk is between 9-10% per year off of anticoagulation and only ASA.  Options would be restarting Eliquis at this time to minimize risk of stroke (3.3% stroke risk with Eliquis).  An alternative option would be to continue to monitor patient's device interrogation and if any further events of atrial fibrillation on sotalol would strongly recommend starting Eliquis at that time.   Patient is aware if we proceed with monitoring he will be at risk for asymptomatic episodes of atrial fibrillation which could result in embolic stroke.  At this time the patient would prefer to monitor for further episodes of atrial fibrillation if he has another recurrence we will start lifelong anticoagulation therapy with Eliquis.    2.  Sotalol 80 mg daily.  QTc 478 ms on 12/28 with LBBB (personnally reviewed)  3.  ASA daily  4.  Quinapril   5.  Atorvastatin 40 mg daily.        History of Present Illness    Mr. Seng Deshpande is a 89 y.o. male with coronary artery disease, hypertension, atrial fibrillation, history of TIA who presents in cardiology clinic for cardiovascular disease.  Patient is establishing care with myself for the first time today.    In 1995 patient underwent coronary bypass surgery for advanced coronary artery disease.  He has been followed long-term by cardiology at [facility] and [facility] in the past.  Recently he was hospitalized in October 2016 with a syncopal episode found to have intermittent complete heart block.  Had an episode of atrial fibrillation with rapid ventricular response (spontaneously converted to sinus).   Given episodes of intermittent complete heart block and syncope patient underwent dual-chamber pacemaker placement by Dr. Whitmore on 11/18/2016.  After placement of dual-chamber pacemaker patient was started on sotalol 80 mg twice daily.  His Eliquis had been stopped in the spring 2016 despite very high embolic stroke risk (CHADVASc:6) and was not restarted prior to discharge.  Today he presents with a question of whether or not he should be restarted on his Eliquis therapy.      After long discussion (see above) patient remains concerned about the bleeding risk related to anticoagulation along with cost.  In the past patient anticoagulation was stopped given he had no ongoing events with 30 day monitor.  Since hospitalization in November 2016 he has not had any  sustained events of atrial fibrillation by device monitoring.   After long discussion patient's preference would be to continue to monitor for atrial fibrillation if any recurrence to start lifelong anticoagulation at that time.  Currently on sotalol therapy.  Discussed in detail the side effects of this medication and significant drug interactions.      Patient remains active and plans to travel to [facility] for  2 months in early February.      Patient denies recent anginal chest pain, dyspnea with exertion, palpitations, syncope, edema, orthopnea, PND or cough.  Patient denies any active or recent bleeding issues or disorders.       ECHO: 11/18/2016  Summary  Moderate concentric left ventricular hypertrophy.  Irregular rhythm at 155 beats per minute.  Mild pulmonary hypertension is suggested.  Left ventricular ejection fraction is visually estimated to be 55 %.     Physical Examination Review of Systems   Vitals:    01/20/17 0932   BP: 148/72   Pulse: 68     Body mass index is 24.06 kg/(m^2).  Wt Readings from Last 3 Encounters:   01/20/17 160 lb 9.6 oz (72.8 kg)   12/28/16 166 lb (75.3 kg)   12/13/16 165 lb 9.6 oz (75.1 kg)       General Appearance:   no distress, normal body habitus, pleasant.    ENT/Mouth: membranes moist, no oral lesions or bleeding gums.      EYES:  no scleral icterus, normal conjunctivae   Neck: no carotid bruits or thyromegaly   Chest/Lungs:   lungs are clear to auscultation, no rales or wheezing, noted sternal scar, equal chest wall expansion    Cardiovascular:   Regular. Normal first and second heart sounds with no murmurs, rubs, or gallops; the carotid, radial and posterior tibial pulses are intact, Jugular venous pressure normal, no edema bilaterally    Abdomen:  no organomegaly, masses, bruits, or tenderness; bowel sounds are present   Extremities: no cyanosis or clubbing   Skin: no xanthelasma, warm.    Neurologic: normal gait, normal  bilateral, no tremors     Psychiatric:  alert and oriented x3, calm     General: WNL  Eyes: Visual Distubance  Ears/Nose/Throat: WNL  Lungs: WNL  Heart: WNL  Stomach: WNL  Bladder: WNL  Muscle/Joints: WNL  Skin: WNL  Nervous System: Daytime Sleepiness  Mental Health: WNL     Blood: Easy Bruising     Medical History  Surgical History Family History Social History   Past Medical History   Diagnosis Date   ? CAD (coronary artery disease)    ? Dyslipidemia    ? HTN (hypertension)    ? Left bundle branch block    ? MI (myocardial infarction)    ? Osteoporosis    ? Overweight    ? Paroxysmal atrial fibrillation 5/27/2015     CHADS-VASC is 5   ? Prostate cancer    ? Rubella    ? Stroke syndrome 1993     left cerebral hemisphere with right facial and arm weakness   ? TIA (transient ischemic attack)    ? Varicella     Past Surgical History   Procedure Laterality Date   ? Pr remove tonsils/adenoids,<11 y/o       Description: Tonsillectomy With Adenoidectomy;  Recorded: 09/24/2008;   ? Pr appendectomy       Description: Appendectomy;  Recorded: 09/24/2008;   ? Pr abdomen surgery proc unlisted       Description: Hernia Repair;  Recorded: 09/24/2008;   ? Coronary artery bypass graft  1995     Comments: X3 VESSELS by Dr. Kevin Quinn   ? Replacement total knee     ? Prostate surgery      Family History   Problem Relation Age of Onset   ? Intracerebral hemorrhage Mother 32   ? Sudden death Father 82    Social History     Social History   ? Marital status:      Spouse name: Kamala   ? Number of children: 0   ? Years of education: N/A     Occupational History   ? [employer]     retired in 1991     Social History Main Topics   ? Smoking status: Never Smoker   ? Smokeless tobacco: Never Used   ? Alcohol use No   ? Drug use: No   ? Sexual activity: No     Other Topics Concern   ? Not on file     Social History Narrative    [employer] in [place], 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        Wife was a teacher at [employer]           Medications  Allergies   Current Outpatient Prescriptions   Medication Sig Dispense Refill   ? acetaminophen (TYLENOL) 500 MG tablet Take 1 tablet (500 mg total) by mouth every 6 (six) hours as needed for pain or fever. (Patient taking differently: Take 1,000 mg by mouth 3 (three) times a day. ) 30 tablet 0   ? ascorbic acid, vitamin C, (VITAMIN C) 1000 MG tablet Take 1 tablet (1,000 mg total) by mouth daily. 30 tablet 0   ? aspirin 81 MG EC tablet Take 1 tablet (81 mg total) by mouth daily. 30 tablet 0   ? atorvastatin (LIPITOR) 40 MG tablet Take 1 tablet (40 mg total) by mouth bedtime. 30 tablet 0   ? calcium, as carbonate, (CALCIUM 500) 500 mg calcium (1,250 mg) tablet Take 1 tablet (500 mg total) by mouth daily. 30 tablet 0   ? cholecalciferol, vitamin D3, 1,000 unit tablet Take 1 tablet (1,000 Units total) by mouth daily. 30 tablet 0   ? clindamycin (CLEOCIN) 300 MG capsule Take 2 capsules (600 mg total) by mouth once as needed (before dental work). 2 capsule 0   ? glucosamine HCl 1,500 mg Tab Take 1,500 mg by mouth every morning. 30 each 0   ? multivit-min-FA-lycopen-lutein (CENTRUM SILVER) 0.4-300-250 mg-mcg-mcg tablet Take 1 tablet by mouth daily. 30 tablet 0   ? nitroglycerin (NITROSTAT) 0.4 MG SL tablet Place 1 tablet (0.4 mg total) under the tongue every 5 (five) minutes as needed for chest pain. 90 tablet 0   ? quinapril (ACCUPRIL) 20 MG tablet Take 1 tablet (20 mg total) by mouth daily. 90 tablet 0   ? senna-docusate (PERICOLACE) 8.6-50 mg tablet Take 1 tablet by mouth 2 (two) times a day as needed for constipation. 30 tablet 0   ? sotalol (BETAPACE) 80 MG tablet Take 1 tablet (80 mg total) by mouth every 12 (twelve) hours. 60 tablet 0     No current facility-administered medications for this visit.       Allergies   Allergen Reactions   ? Tramadol Other (See Comments)     Confusion   ? Hydrocortisone Other (See Comments)     Passed out after injection in knee   ? Penicillins Unknown   ? Sulfa  (Sulfonamide Antibiotics) Unknown         Lab Results    Chemistry/lipid CBC Cardiac Enzymes/BNP/TSH/INR   Lab Results   Component Value Date    CHOL 152 09/27/2016    HDL 60 09/27/2016    LDLCALC 75 09/27/2016    TRIG 85 09/27/2016    CREATININE 0.64 (L) 11/21/2016    BUN 17 11/21/2016    K 4.1 11/21/2016     11/21/2016     (H) 11/21/2016    CO2 25 11/21/2016    Lab Results   Component Value Date    WBC 6.4 11/21/2016    HGB 11.3 (L) 11/21/2016    HCT 32.9 (L) 11/21/2016    MCV 97 11/21/2016     11/21/2016    Lab Results   Component Value Date    CKTOTAL 187 11/17/2016    TROPONINI 1.22 (HH) 11/18/2016     (H) 05/27/2015    TSH 5.10 (H) 05/27/2015    INR 1.15 (H) 11/17/2016
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
## 2021-07-03 NOTE — ADDENDUM NOTE
Addendum Note by Taras Avila MD at 11/5/2020 10:16 AM     Author: Taras Avila MD Service: -- Author Type: Physician    Filed: 11/5/2020 10:16 AM Encounter Date: 11/5/2020 Status: Signed    : Taras Avila MD (Physician)    Addended by: TARAS AVILA on: 11/5/2020 10:16 AM        Modules accepted: Orders
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
## 2021-08-24 NOTE — PROGRESS NOTES
In clinic device check with Dr. Schilling.  Please see link for full device report.  Patient was informed of results and next follow up during today's visit.
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
## 2021-10-09 NOTE — TELEPHONE ENCOUNTER
"Routing refill request to provider for review/approval because:  Labs not current:  TSH      Last office visit provider:  5/18/21     Requested Prescriptions   Pending Prescriptions Disp Refills     levothyroxine (SYNTHROID/LEVOTHROID) 50 MCG tablet [Pharmacy Med Name: LEVOTHYROXINE 0.05MG (50MCG) TAB] 90 tablet      Sig: TAKE 1 TABLET(50 MCG) BY MOUTH DAILY       Thyroid Protocol Failed - 10/8/2021 10:18 AM        Failed - Normal TSH on file in past 12 months     Recent Labs   Lab Test 11/13/20  1002   TSH 5.29*              Passed - Patient is 12 years or older        Passed - Recent (12 mo) or future (30 days) visit within the authorizing provider's specialty     Patient has had an office visit with the authorizing provider or a provider within the authorizing providers department within the previous 12 mos or has a future within next 30 days. See \"Patient Info\" tab in inbasket, or \"Choose Columns\" in Meds & Orders section of the refill encounter.              Passed - Medication is active on med list             guicho sanford RN 10/09/21 6:32 PM  "

## 2021-10-11 NOTE — TELEPHONE ENCOUNTER
No charge. No Epic interface required.  Type: alert remote pacemaker transmission for atrial arrhythmia burden >12 of 24 hours.  Presenting rhythm: AP-VS and normal sinus, rate 70 bpm. Frequent PACs.  Battery/lead status: stable  Arrhythmias: since 8/24/21, 188 mode switches, total of 14 hrs 10/8/21 - 10/9/21, burden 6% (since 8/24/21), V-rates >/=120 bpm 40%. 13 nonsustained ventricular high rate episodes, EGMs suggest RVR.  Anticoagulant: apixaban  Comments; normal magnet and pacemaker function. Routed to device RN.  TIM Black, Device Specialist       Transmission reviewed agree with above. Since last clinic device check with Dr. Schilling, AF burden has increased. At least 5 episodes of 8+ hours noted, most recently 14.4 hours seen on check 10/9 into 10/10. Average V-rates with AF are 113-120 bpm with max 170-200s per logbook below. AF v-rates histograms also copied below.     Takes Sotalol 80 mg twice daily and on Eliquis.   Call placed to patient to review findings/assess. LVM for callback.  Will review reports with Dr. Tonie Mcnulty, RN

## 2021-10-12 NOTE — TELEPHONE ENCOUNTER
Patient called back this afternoon. States he has no really been aware of any A.fib episodes or troublesome symptoms recently. States he has been under quite a bit of stress with his wife recently falling and getting admitted to hospital. Does not think he has missed any of his medications during that time.     Advised to call with any troublesome symptoms. Verbalized understanding.   Krystin Mcnulty RN

## 2021-10-25 NOTE — TELEPHONE ENCOUNTER
"  Disp Refills Start End ZOYA    sotaloL (BETAPACE) 80 MG tablet 180 tablet 0 4/20/2021  No   Sig: TAKE 1 TABLET(80 MG) BY MOUTH EVERY 12 HOURS   Sent to pharmacy as: sotaloL 80 mg tablet (BETAPACE)   E-Prescribing Status: Receipt confirmed by pharmacy (4/20/2021 11:13 AM CDT)       Disp Refills Start End ZOYA    atorvastatin (LIPITOR) 40 MG tablet 90 tablet 2 1/6/2021  No   Sig - Route: Take 1 tablet (40 mg total) by mouth at bedtime. - Oral   Sent to pharmacy as: atorvastatin 40 mg tablet (LIPITOR)   E-Prescribing Status: Receipt confirmed by pharmacy (1/6/2021 12:45 PM CST)        Last office visit provider:  5/18/21     Requested Prescriptions   Pending Prescriptions Disp Refills     sotalol (BETAPACE) 80 MG tablet [Pharmacy Med Name: SOTALOL 80MG TABLETS] 180 tablet      Sig: TAKE 1 TABLET(80 MG) BY MOUTH EVERY 12 HOURS       Beta-Blockers Protocol Passed - 10/23/2021 10:15 AM        Passed - Blood pressure under 140/90 in past 12 months     BP Readings from Last 3 Encounters:   08/24/21 136/76   08/03/21 130/70   11/20/20 (!) 140/78                 Passed - Patient is age 6 or older        Passed - Recent (12 mo) or future (30 days) visit within the authorizing provider's specialty     Patient has had an office visit with the authorizing provider or a provider within the authorizing providers department within the previous 12 mos or has a future within next 30 days. See \"Patient Info\" tab in inbasket, or \"Choose Columns\" in Meds & Orders section of the refill encounter.              Passed - Medication is active on med list           atorvastatin (LIPITOR) 40 MG tablet [Pharmacy Med Name: ATORVASTATIN 40MG TABLETS] 90 tablet      Sig: TAKE 1 TABLET(40 MG) BY MOUTH AT BEDTIME       Statins Protocol Passed - 10/23/2021 10:15 AM        Passed - LDL on file in past 12 months     Recent Labs   Lab Test 11/13/20  1002   LDL 53             Passed - No abnormal creatine kinase in past 12 months     No lab results found. " "            Passed - Recent (12 mo) or future (30 days) visit within the authorizing provider's specialty     Patient has had an office visit with the authorizing provider or a provider within the authorizing providers department within the previous 12 mos or has a future within next 30 days. See \"Patient Info\" tab in inbasket, or \"Choose Columns\" in Meds & Orders section of the refill encounter.              Passed - Medication is active on med list        Passed - Patient is age 18 or older             Jose Angel Walker RN 10/25/21 10:01 AM  "

## 2021-11-22 NOTE — PATIENT INSTRUCTIONS
If you have not heard from Dermatology or General surgery in 5 days contact Dr. Avila  Patient Education   Personalized Prevention Plan  You are due for the preventive services outlined below.  Your care team is available to assist you in scheduling these services.  If you have already completed any of these items, please share that information with your care team to update in your medical record.  Health Maintenance Due   Topic Date Due     ANNUAL REVIEW OF  ORDERS  Never done     Zoster (Shingles) Vaccine (2 of 3) 08/26/2009       Preventing Falls at Home  A person can fall for many reasons. Older adults may fall because reaction time slows down as we age. Your muscles and joints may get stiff, weak, or less flexible because of illness, medicines, or a physical condition.   Other health problems that make falls more likely include:     Arthritis    Dizziness or lightheadedness when you stand up (orthostatic hypotension)    History of a stroke    Dizziness    Anemia    Certain medicines taken for mental illness or to control blood pressure.    Problems with balance or gait    Bladder or urinary problems    History of falling    Changes in vision (vision impairment)    Changes in thinking skills and memory (cognitive impairment)  Injuries from a fall can include serious injuries such as broken bones, dislocated joints, internal bleeding and cuts. Injuries like these can limit your independence.   Prevention tips  To help prevent falls and fall-related injuries, follow the tips below.    Floors  To make floors safer:     Put nonskid pads under area rugs.    Remove small rugs.    Replace worn floor coverings.    Tack carpets firmly to each step on carpeted stairs. Put nonskid strips on the edges of uncarpeted stairs.    Keep floors and stairs free of clutter and cords.    Arrange furniture so there are clear pathways.    Clean up any spills right away.  Bathrooms    To make bathrooms safer:     Install grab bars  in the tub or shower.    Apply nonskid strips or put a nonskid rubber mat in the tub or shower.    Sit on a bath chair to bathe.    Use bathmats with nonskid backing.  Lighting  To improve visibility in your home:      Keep a flashlight in each room. Or put a lamp next to the bed within easy reach.    Put nightlights in the bedrooms, hallways, kitchen, and bathrooms.    Make sure all stairways have good lighting.    Take your time when going up and down stairs.    Put handrails on both sides of stairs and in walkways for more support. To prevent injury to your wrist or arm, don t use handrails to pull yourself up.    Install grab bars to pull yourself up.    Move or rearrange items that you use often. This will make them easier to find or reach.    Look at your home to find any safety hazards. Especially look at doorways, walkways, and the driveway. Remove or repair any safety problems that you find.  Other changes to make    Look around to find any safety hazards. Look closely at doorways, walkways, and the driveway. Remove or repair any safety problems that you find.    Wear shoes that fit well.    Take your time when going up and down stairs.    Put handrails on both sides of stairs and in walkways for more support. To prevent injury to your wrist or arm, don t use handrails to pull yourself up.    Install grab bars wherever needed to pull yourself up.    Arrange items that you use often. This will make them easier to find or reach.    [employer] last reviewed this educational content on 3/1/2020    2290-1998 [employer]. All rights reserved. This information is not intended as a substitute for professional medical care. Always follow your healthcare professional's instructions.
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
## 2021-11-30 NOTE — PROGRESS NOTES
Type: routine remote pacemaker transmission.   Presenting rhythm: sinus and AP-VS 60-90 bpm. Frequent PACs. Occasion atrial tachy arrhythmias noted.  Battery/lead status: stable.  Arrhythmias: since 10/10/21; 676 mode switch episodes, total of 10.1hrs noted on 11/23/21, available EGMs confirm AF, v-rates >/=120bpm ~65%, AF burden 5%. 19 ventricular high rate episodes, available EGMs suggest RVR during AF.   Anticoagulant: apixaban.  Comments: Normal magnet and pacemaker function. Routed to device RN for review. ELemuel Hickey, Device Specialist    Transmission reviewed, agree with specialists findings. History of AF, burden appears increased recently with elevated v-rates.     Patient is prescribed sotalol 80mg q12h and Eliquis.     VM left with patient to review findings. Historical documentation shows he hasn't been aware of AF.    - - - -    Seng called back and we reviewed his transmission. He has not had any symptoms of AF lately and states he's been feeling well. He confirmed his Sotalol & Eliquis doses. Routing to Dr Schilling for update given increased burden & rates.         Tiny Hedrick, BSN, RN, CV-BC  Device Nurse  [facility]

## 2021-12-06 NOTE — TELEPHONE ENCOUNTER
Can we have the patient add metoprolol XL 25 mg daily to current sotalol regiment.       Thanks,     Mehul    Message text      Tiny Hedrick, Emeka Acuña, DO 6 days ago     [facility] Seng Uribe has been asymptomatic with AF but we've observed his burden go up the last few months- 5% burden since 8/24/21 (was <1%)  and ~65% v-rates >/= 120bpm, RVR recordings too. He's on sotalol 80 BID & Eliquis. Please advise, thanks!     YORDY EricksonN, RN, CV-BC   Device Nurse   [facility]      Spoke with pt about fast HR and need for new medication. Pt will  med from local pharm and call back with any issues. Warned him about dizziness with standing for laying or sitting.

## 2021-12-27 NOTE — TELEPHONE ENCOUNTER
12/27/2021: Called patient and inquired if he had any symptoms with Afib on 12/23/2021. He denied feeling any different on 12/23/2021.    .No charge. No Epic interface required.  Type: alert remote pacemaker transmission for AT/AF >12/24 hours.  Presenting rhythm: normal sinus rate 73 bpm.  Battery/lead status: stable  Arrhythmias; since 11/30/21, 594 mode switches, per trend total of 15.4 hrs 12/23/21 - 12/24/21, burden 7% (up from 5%), V-rates >/=120 bpm 60%. Thirteen nonsustained ventricular high rate episodes, EGMs suggest RVR.  Anticoagulant: apixaban  Comments: normal magnet and pacemaker function. Routed to device RN.  TIM Black, Device Specialist  Addendum: Called patient and checked if any symptoms- he denied feeling any different. He is out of AFib with his 12/25/2021. Chey Hatfield, Device RN.

## 2022-01-01 ENCOUNTER — APPOINTMENT (OUTPATIENT)
Dept: MRI IMAGING | Facility: HOSPITAL | Age: 87
DRG: 199 | End: 2022-01-01
Attending: NURSE PRACTITIONER
Payer: MEDICARE

## 2022-01-01 ENCOUNTER — APPOINTMENT (OUTPATIENT)
Dept: CT IMAGING | Facility: HOSPITAL | Age: 87
DRG: 199 | End: 2022-01-01
Attending: EMERGENCY MEDICINE
Payer: MEDICARE

## 2022-01-01 ENCOUNTER — APPOINTMENT (OUTPATIENT)
Dept: SPEECH THERAPY | Facility: HOSPITAL | Age: 87
DRG: 199 | End: 2022-01-01
Payer: MEDICARE

## 2022-01-01 ENCOUNTER — APPOINTMENT (OUTPATIENT)
Dept: PHYSICAL THERAPY | Facility: HOSPITAL | Age: 87
DRG: 309 | End: 2022-01-01
Attending: HOSPITALIST
Payer: MEDICARE

## 2022-01-01 ENCOUNTER — DOCUMENTATION ONLY (OUTPATIENT)
Dept: OTHER | Facility: CLINIC | Age: 87
End: 2022-01-01

## 2022-01-01 ENCOUNTER — TELEPHONE (OUTPATIENT)
Dept: FAMILY MEDICINE | Facility: CLINIC | Age: 87
End: 2022-01-01

## 2022-01-01 ENCOUNTER — APPOINTMENT (OUTPATIENT)
Dept: SPEECH THERAPY | Facility: HOSPITAL | Age: 87
DRG: 199 | End: 2022-01-01
Attending: INTERNAL MEDICINE
Payer: MEDICARE

## 2022-01-01 ENCOUNTER — DOCUMENTATION ONLY (OUTPATIENT)
Dept: CARDIOLOGY | Facility: CLINIC | Age: 87
End: 2022-01-01
Payer: MEDICARE

## 2022-01-01 ENCOUNTER — ANCILLARY PROCEDURE (OUTPATIENT)
Dept: CARDIOLOGY | Facility: CLINIC | Age: 87
End: 2022-01-01
Attending: INTERNAL MEDICINE
Payer: MEDICARE

## 2022-01-01 ENCOUNTER — APPOINTMENT (OUTPATIENT)
Dept: CT IMAGING | Facility: HOSPITAL | Age: 87
DRG: 309 | End: 2022-01-01
Attending: INTERNAL MEDICINE
Payer: MEDICARE

## 2022-01-01 ENCOUNTER — DOCUMENTATION ONLY (OUTPATIENT)
Dept: ORTHOPEDICS | Facility: CLINIC | Age: 87
End: 2022-01-01

## 2022-01-01 ENCOUNTER — HOSPITAL ENCOUNTER (INPATIENT)
Facility: HOSPITAL | Age: 87
LOS: 1 days | Discharge: HOME HEALTH | DRG: 309 | End: 2022-06-25
Attending: EMERGENCY MEDICINE | Admitting: INTERNAL MEDICINE
Payer: MEDICARE

## 2022-01-01 ENCOUNTER — DOCUMENTATION ONLY (OUTPATIENT)
Dept: CARDIOLOGY | Facility: CLINIC | Age: 87
End: 2022-01-01

## 2022-01-01 ENCOUNTER — APPOINTMENT (OUTPATIENT)
Dept: RADIOLOGY | Facility: HOSPITAL | Age: 87
End: 2022-01-01
Attending: EMERGENCY MEDICINE
Payer: MEDICARE

## 2022-01-01 ENCOUNTER — TELEPHONE (OUTPATIENT)
Dept: CARDIOLOGY | Facility: CLINIC | Age: 87
End: 2022-01-01
Payer: MEDICARE

## 2022-01-01 ENCOUNTER — ANCILLARY PROCEDURE (OUTPATIENT)
Dept: CARDIOLOGY | Facility: CLINIC | Age: 87
End: 2022-01-01
Payer: MEDICARE

## 2022-01-01 ENCOUNTER — APPOINTMENT (OUTPATIENT)
Dept: PHYSICAL THERAPY | Facility: HOSPITAL | Age: 87
DRG: 309 | End: 2022-01-01
Payer: MEDICARE

## 2022-01-01 ENCOUNTER — OFFICE VISIT (OUTPATIENT)
Dept: FAMILY MEDICINE | Facility: CLINIC | Age: 87
End: 2022-01-01
Payer: MEDICARE

## 2022-01-01 ENCOUNTER — HOSPITAL ENCOUNTER (EMERGENCY)
Facility: HOSPITAL | Age: 87
Discharge: HOME | End: 2022-03-01
Attending: EMERGENCY MEDICINE | Admitting: EMERGENCY MEDICINE
Payer: MEDICARE

## 2022-01-01 ENCOUNTER — TELEPHONE (OUTPATIENT)
Dept: FAMILY MEDICINE | Facility: CLINIC | Age: 87
End: 2022-01-01
Payer: MEDICARE

## 2022-01-01 ENCOUNTER — NURSE TRIAGE (OUTPATIENT)
Dept: NURSING | Facility: CLINIC | Age: 87
End: 2022-01-01
Payer: MEDICARE

## 2022-01-01 ENCOUNTER — APPOINTMENT (OUTPATIENT)
Dept: RADIOLOGY | Facility: HOSPITAL | Age: 87
DRG: 309 | End: 2022-01-01
Attending: INTERNAL MEDICINE
Payer: MEDICARE

## 2022-01-01 ENCOUNTER — APPOINTMENT (OUTPATIENT)
Dept: OCCUPATIONAL THERAPY | Facility: HOSPITAL | Age: 87
DRG: 309 | End: 2022-01-01
Payer: MEDICARE

## 2022-01-01 ENCOUNTER — APPOINTMENT (OUTPATIENT)
Dept: RADIOLOGY | Facility: HOSPITAL | Age: 87
DRG: 199 | End: 2022-01-01
Attending: EMERGENCY MEDICINE
Payer: MEDICARE

## 2022-01-01 ENCOUNTER — VIRTUAL VISIT (OUTPATIENT)
Dept: CARDIOLOGY | Facility: CLINIC | Age: 87
End: 2022-01-01
Payer: MEDICARE

## 2022-01-01 ENCOUNTER — MEDICAL CORRESPONDENCE (OUTPATIENT)
Dept: HEALTH INFORMATION MANAGEMENT | Facility: CLINIC | Age: 87
End: 2022-01-01

## 2022-01-01 ENCOUNTER — APPOINTMENT (OUTPATIENT)
Dept: SPEECH THERAPY | Facility: HOSPITAL | Age: 87
DRG: 199 | End: 2022-01-01
Attending: NURSE PRACTITIONER
Payer: MEDICARE

## 2022-01-01 ENCOUNTER — HOSPITAL ENCOUNTER (INPATIENT)
Facility: HOSPITAL | Age: 87
LOS: 9 days | DRG: 199 | End: 2022-07-18
Attending: EMERGENCY MEDICINE | Admitting: INTERNAL MEDICINE
Payer: MEDICARE

## 2022-01-01 ENCOUNTER — APPOINTMENT (OUTPATIENT)
Dept: CT IMAGING | Facility: HOSPITAL | Age: 87
End: 2022-01-01
Attending: EMERGENCY MEDICINE
Payer: MEDICARE

## 2022-01-01 ENCOUNTER — APPOINTMENT (OUTPATIENT)
Dept: CARDIOLOGY | Facility: HOSPITAL | Age: 87
DRG: 309 | End: 2022-01-01
Attending: INTERNAL MEDICINE
Payer: MEDICARE

## 2022-01-01 ENCOUNTER — APPOINTMENT (OUTPATIENT)
Dept: OCCUPATIONAL THERAPY | Facility: HOSPITAL | Age: 87
DRG: 309 | End: 2022-01-01
Attending: HOSPITALIST
Payer: MEDICARE

## 2022-01-01 VITALS
WEIGHT: 156 LBS | HEART RATE: 56 BPM | BODY MASS INDEX: 24.43 KG/M2 | SYSTOLIC BLOOD PRESSURE: 122 MMHG | DIASTOLIC BLOOD PRESSURE: 75 MMHG | RESPIRATION RATE: 24 BRPM

## 2022-01-01 VITALS
BODY MASS INDEX: 26.2 KG/M2 | DIASTOLIC BLOOD PRESSURE: 79 MMHG | WEIGHT: 167.25 LBS | RESPIRATION RATE: 16 BRPM | HEART RATE: 53 BPM | TEMPERATURE: 97.4 F | SYSTOLIC BLOOD PRESSURE: 155 MMHG

## 2022-01-01 VITALS
OXYGEN SATURATION: 92 % | WEIGHT: 155 LBS | RESPIRATION RATE: 20 BRPM | SYSTOLIC BLOOD PRESSURE: 145 MMHG | BODY MASS INDEX: 24.28 KG/M2 | TEMPERATURE: 97.9 F | HEART RATE: 75 BPM | DIASTOLIC BLOOD PRESSURE: 71 MMHG

## 2022-01-01 VITALS
DIASTOLIC BLOOD PRESSURE: 82 MMHG | TEMPERATURE: 96.3 F | HEART RATE: 75 BPM | BODY MASS INDEX: 24.75 KG/M2 | SYSTOLIC BLOOD PRESSURE: 140 MMHG | WEIGHT: 158 LBS | RESPIRATION RATE: 16 BRPM

## 2022-01-01 VITALS
OXYGEN SATURATION: 94 % | DIASTOLIC BLOOD PRESSURE: 64 MMHG | SYSTOLIC BLOOD PRESSURE: 142 MMHG | HEART RATE: 93 BPM | RESPIRATION RATE: 23 BRPM | TEMPERATURE: 97.1 F

## 2022-01-01 VITALS
WEIGHT: 166.5 LBS | OXYGEN SATURATION: 97 % | RESPIRATION RATE: 20 BRPM | HEART RATE: 136 BPM | HEIGHT: 69 IN | DIASTOLIC BLOOD PRESSURE: 68 MMHG | SYSTOLIC BLOOD PRESSURE: 110 MMHG | BODY MASS INDEX: 24.66 KG/M2 | TEMPERATURE: 96.8 F

## 2022-01-01 DIAGNOSIS — L02.811 SCALP ABSCESS: Primary | ICD-10-CM
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
## 2022-01-01 DIAGNOSIS — L02.91 ABSCESS: Primary | ICD-10-CM

## 2022-01-01 DIAGNOSIS — I48.20 CHRONIC ATRIAL FIBRILLATION (H): ICD-10-CM

## 2022-01-01 DIAGNOSIS — I49.5 SA NODE DYSFUNCTION (H): ICD-10-CM

## 2022-01-01 DIAGNOSIS — I49.5 SICK SINUS SYNDROME (H): ICD-10-CM

## 2022-01-01 DIAGNOSIS — I44.7 LBBB (LEFT BUNDLE BRANCH BLOCK): ICD-10-CM

## 2022-01-01 DIAGNOSIS — S22.080A COMPRESSION FRACTURE OF T12 VERTEBRA, INITIAL ENCOUNTER (H): ICD-10-CM

## 2022-01-01 DIAGNOSIS — Z95.0 CARDIAC PACEMAKER IN SITU: ICD-10-CM

## 2022-01-01 DIAGNOSIS — I48.0 PAROXYSMAL ATRIAL FIBRILLATION (H): ICD-10-CM

## 2022-01-01 DIAGNOSIS — I25.10 CORONARY ARTERY DISEASE INVOLVING NATIVE CORONARY ARTERY OF NATIVE HEART WITHOUT ANGINA PECTORIS: ICD-10-CM

## 2022-01-01 DIAGNOSIS — J93.9 PNEUMOTHORAX ON RIGHT: ICD-10-CM

## 2022-01-01 DIAGNOSIS — S22.41XA MULTIPLE FRACTURES OF RIBS, RIGHT SIDE, INITIAL ENCOUNTER FOR CLOSED FRACTURE: ICD-10-CM

## 2022-01-01 DIAGNOSIS — I48.0 PAROXYSMAL ATRIAL FIBRILLATION WITH RVR (H): ICD-10-CM

## 2022-01-01 DIAGNOSIS — Z95.0 PACEMAKER: ICD-10-CM

## 2022-01-01 DIAGNOSIS — Z79.01 ANTICOAGULATED BY ANTICOAGULATION TREATMENT: ICD-10-CM

## 2022-01-01 DIAGNOSIS — S12.101A CLOSED NONDISPLACED FRACTURE OF SECOND CERVICAL VERTEBRA, UNSPECIFIED FRACTURE MORPHOLOGY, INITIAL ENCOUNTER (H): ICD-10-CM

## 2022-01-01 DIAGNOSIS — E03.9 HYPOTHYROIDISM, UNSPECIFIED TYPE: ICD-10-CM

## 2022-01-01 DIAGNOSIS — R42 LIGHTHEADEDNESS: ICD-10-CM

## 2022-01-01 DIAGNOSIS — L02.91 ABSCESS: ICD-10-CM

## 2022-01-01 DIAGNOSIS — I47.29 NSVT (NONSUSTAINED VENTRICULAR TACHYCARDIA) (H): ICD-10-CM

## 2022-01-01 LAB
ACANTHOCYTES BLD QL SMEAR: SLIGHT
ALBUMIN SERPL-MCNC: 2.9 G/DL (ref 3.5–5)
ALBUMIN SERPL-MCNC: 3.2 G/DL (ref 3.5–5)
ALBUMIN SERPL-MCNC: 3.4 G/DL (ref 3.5–5)
ALBUMIN UR-MCNC: 50 MG/DL
ALP SERPL-CCNC: 63 U/L (ref 45–120)
ALP SERPL-CCNC: 69 U/L (ref 45–120)
ALP SERPL-CCNC: 87 U/L (ref 45–120)
ALT SERPL W P-5'-P-CCNC: 10 U/L (ref 0–45)
ALT SERPL W P-5'-P-CCNC: 13 U/L (ref 0–45)
ALT SERPL W P-5'-P-CCNC: 15 U/L (ref 0–45)
ANION GAP SERPL CALCULATED.3IONS-SCNC: 12 MMOL/L (ref 5–18)
ANION GAP SERPL CALCULATED.3IONS-SCNC: 12 MMOL/L (ref 5–18)
ANION GAP SERPL CALCULATED.3IONS-SCNC: 4 MMOL/L (ref 5–18)
ANION GAP SERPL CALCULATED.3IONS-SCNC: 5 MMOL/L (ref 5–18)
ANION GAP SERPL CALCULATED.3IONS-SCNC: 6 MMOL/L (ref 5–18)
ANION GAP SERPL CALCULATED.3IONS-SCNC: 7 MMOL/L (ref 5–18)
ANION GAP SERPL CALCULATED.3IONS-SCNC: 8 MMOL/L (ref 5–18)
ANION GAP SERPL CALCULATED.3IONS-SCNC: 8 MMOL/L (ref 5–18)
ANION GAP SERPL CALCULATED.3IONS-SCNC: 9 MMOL/L (ref 5–18)
APPEARANCE UR: CLEAR
APTT PPP: 28 SECONDS (ref 22–38)
AST SERPL W P-5'-P-CCNC: 22 U/L (ref 0–40)
AST SERPL W P-5'-P-CCNC: 28 U/L (ref 0–40)
AST SERPL W P-5'-P-CCNC: 32 U/L (ref 0–40)
ATRIAL RATE - MUSE: 122 BPM
ATRIAL RATE - MUSE: 122 BPM
ATRIAL RATE - MUSE: 133 BPM
ATRIAL RATE - MUSE: 166 BPM
ATRIAL RATE - MUSE: 55 BPM
ATRIAL RATE - MUSE: 70 BPM
ATRIAL RATE - MUSE: 85 BPM
ATRIAL RATE - MUSE: 97 BPM
BACTERIA ABSC ANAEROBE+AEROBE CULT: ABNORMAL
BASOPHILS # BLD AUTO: 0 10E3/UL (ref 0–0.2)
BASOPHILS # BLD MANUAL: 0 10E3/UL (ref 0–0.2)
BASOPHILS # BLD MANUAL: 0.1 10E3/UL (ref 0–0.2)
BASOPHILS NFR BLD AUTO: 0 %
BASOPHILS NFR BLD MANUAL: 0 %
BASOPHILS NFR BLD MANUAL: 1 %
BILIRUB SERPL-MCNC: 0.4 MG/DL (ref 0–1)
BILIRUB SERPL-MCNC: 0.8 MG/DL (ref 0–1)
BILIRUB SERPL-MCNC: 0.8 MG/DL (ref 0–1)
BILIRUB UR QL STRIP: NEGATIVE
BNP SERPL-MCNC: 509 PG/ML (ref 0–93)
BUN SERPL-MCNC: 20 MG/DL (ref 8–28)
BUN SERPL-MCNC: 25 MG/DL (ref 8–28)
BUN SERPL-MCNC: 26 MG/DL (ref 8–28)
BUN SERPL-MCNC: 27 MG/DL (ref 8–28)
BUN SERPL-MCNC: 28 MG/DL (ref 8–28)
BUN SERPL-MCNC: 29 MG/DL (ref 8–28)
BUN SERPL-MCNC: 29 MG/DL (ref 8–28)
BUN SERPL-MCNC: 30 MG/DL (ref 8–28)
BUN SERPL-MCNC: 31 MG/DL (ref 8–28)
BUN SERPL-MCNC: 32 MG/DL (ref 8–28)
BUN SERPL-MCNC: 35 MG/DL (ref 8–28)
BUN SERPL-MCNC: 36 MG/DL (ref 8–28)
BUN SERPL-MCNC: 39 MG/DL (ref 8–28)
BURR CELLS BLD QL SMEAR: SLIGHT
BURR CELLS BLD QL SMEAR: SLIGHT
C REACTIVE PROTEIN LHE: 2.8 MG/DL (ref 0–0.8)
CALCIUM SERPL-MCNC: 8.3 MG/DL (ref 8.5–10.5)
CALCIUM SERPL-MCNC: 8.4 MG/DL (ref 8.5–10.5)
CALCIUM SERPL-MCNC: 8.4 MG/DL (ref 8.5–10.5)
CALCIUM SERPL-MCNC: 8.5 MG/DL (ref 8.5–10.5)
CALCIUM SERPL-MCNC: 8.5 MG/DL (ref 8.5–10.5)
CALCIUM SERPL-MCNC: 8.6 MG/DL (ref 8.5–10.5)
CALCIUM SERPL-MCNC: 8.7 MG/DL (ref 8.5–10.5)
CALCIUM SERPL-MCNC: 9.3 MG/DL (ref 8.5–10.5)
CALCIUM SERPL-MCNC: 9.7 MG/DL (ref 8.5–10.5)
CALCIUM SERPL-MCNC: 9.8 MG/DL (ref 8.5–10.5)
CALCIUM SERPL-MCNC: 9.8 MG/DL (ref 8.5–10.5)
CHLORIDE BLD-SCNC: 105 MMOL/L (ref 98–107)
CHLORIDE BLD-SCNC: 108 MMOL/L (ref 98–107)
CHLORIDE BLD-SCNC: 108 MMOL/L (ref 98–107)
CHLORIDE BLD-SCNC: 109 MMOL/L (ref 98–107)
CHLORIDE BLD-SCNC: 109 MMOL/L (ref 98–107)
CHLORIDE BLD-SCNC: 110 MMOL/L (ref 98–107)
CHLORIDE BLD-SCNC: 111 MMOL/L (ref 98–107)
CHLORIDE BLD-SCNC: 112 MMOL/L (ref 98–107)
CHLORIDE BLD-SCNC: 114 MMOL/L (ref 98–107)
CHLORIDE BLD-SCNC: 114 MMOL/L (ref 98–107)
CHLORIDE BLD-SCNC: 115 MMOL/L (ref 98–107)
CHLORIDE BLD-SCNC: 117 MMOL/L (ref 98–107)
CHLORIDE BLD-SCNC: 121 MMOL/L (ref 98–107)
CK SERPL-CCNC: 206 U/L (ref 30–190)
CO2 SERPL-SCNC: 20 MMOL/L (ref 22–31)
CO2 SERPL-SCNC: 21 MMOL/L (ref 22–31)
CO2 SERPL-SCNC: 22 MMOL/L (ref 22–31)
CO2 SERPL-SCNC: 23 MMOL/L (ref 22–31)
CO2 SERPL-SCNC: 24 MMOL/L (ref 22–31)
CO2 SERPL-SCNC: 24 MMOL/L (ref 22–31)
CO2 SERPL-SCNC: 26 MMOL/L (ref 22–31)
CO2 SERPL-SCNC: 26 MMOL/L (ref 22–31)
COLOR UR AUTO: YELLOW
CREAT BLD-MCNC: 0.7 MG/DL (ref 0.7–1.3)
CREAT SERPL-MCNC: 0.68 MG/DL (ref 0.7–1.3)
CREAT SERPL-MCNC: 0.69 MG/DL (ref 0.7–1.3)
CREAT SERPL-MCNC: 0.69 MG/DL (ref 0.7–1.3)
CREAT SERPL-MCNC: 0.71 MG/DL (ref 0.7–1.3)
CREAT SERPL-MCNC: 0.72 MG/DL (ref 0.7–1.3)
CREAT SERPL-MCNC: 0.73 MG/DL (ref 0.7–1.3)
CREAT SERPL-MCNC: 0.74 MG/DL (ref 0.7–1.3)
CREAT SERPL-MCNC: 0.74 MG/DL (ref 0.7–1.3)
CREAT SERPL-MCNC: 0.75 MG/DL (ref 0.7–1.3)
CREAT SERPL-MCNC: 0.81 MG/DL (ref 0.7–1.3)
CREAT SERPL-MCNC: 0.82 MG/DL (ref 0.7–1.3)
CREAT SERPL-MCNC: 0.91 MG/DL (ref 0.7–1.3)
CREAT SERPL-MCNC: 0.95 MG/DL (ref 0.7–1.3)
DIASTOLIC BLOOD PRESSURE - MUSE: 59 MMHG
DIASTOLIC BLOOD PRESSURE - MUSE: 73 MMHG
DIASTOLIC BLOOD PRESSURE - MUSE: NORMAL MMHG
ELLIPTOCYTES BLD QL SMEAR: SLIGHT
EOSINOPHIL # BLD AUTO: 0 10E3/UL (ref 0–0.7)
EOSINOPHIL # BLD MANUAL: 0 10E3/UL (ref 0–0.7)
EOSINOPHIL # BLD MANUAL: 0 10E3/UL (ref 0–0.7)
EOSINOPHIL # BLD MANUAL: 0.1 10E3/UL (ref 0–0.7)
EOSINOPHIL # BLD MANUAL: 0.2 10E3/UL (ref 0–0.7)
EOSINOPHIL # BLD MANUAL: 0.3 10E3/UL (ref 0–0.7)
EOSINOPHIL # BLD MANUAL: 0.3 10E3/UL (ref 0–0.7)
EOSINOPHIL # BLD MANUAL: 0.4 10E3/UL (ref 0–0.7)
EOSINOPHIL NFR BLD AUTO: 0 %
EOSINOPHIL NFR BLD MANUAL: 0 %
EOSINOPHIL NFR BLD MANUAL: 0 %
EOSINOPHIL NFR BLD MANUAL: 1 %
EOSINOPHIL NFR BLD MANUAL: 1 %
EOSINOPHIL NFR BLD MANUAL: 2 %
EOSINOPHIL NFR BLD MANUAL: 4 %
EOSINOPHIL NFR BLD MANUAL: 6 %
EOSINOPHIL NFR BLD MANUAL: 7 %
ERYTHROCYTE [DISTWIDTH] IN BLOOD BY AUTOMATED COUNT: 15.4 % (ref 10–15)
ERYTHROCYTE [DISTWIDTH] IN BLOOD BY AUTOMATED COUNT: 15.4 % (ref 10–15)
ERYTHROCYTE [DISTWIDTH] IN BLOOD BY AUTOMATED COUNT: 15.5 % (ref 10–15)
ERYTHROCYTE [DISTWIDTH] IN BLOOD BY AUTOMATED COUNT: 15.9 % (ref 10–15)
ERYTHROCYTE [DISTWIDTH] IN BLOOD BY AUTOMATED COUNT: 16.1 % (ref 10–15)
ERYTHROCYTE [DISTWIDTH] IN BLOOD BY AUTOMATED COUNT: 16.2 % (ref 10–15)
ERYTHROCYTE [DISTWIDTH] IN BLOOD BY AUTOMATED COUNT: 16.3 % (ref 10–15)
ERYTHROCYTE [DISTWIDTH] IN BLOOD BY AUTOMATED COUNT: 16.5 % (ref 10–15)
ERYTHROCYTE [DISTWIDTH] IN BLOOD BY AUTOMATED COUNT: 16.7 % (ref 10–15)
ERYTHROCYTE [DISTWIDTH] IN BLOOD BY AUTOMATED COUNT: 16.7 % (ref 10–15)
ERYTHROCYTE [DISTWIDTH] IN BLOOD BY AUTOMATED COUNT: 16.9 % (ref 10–15)
FERRITIN SERPL-MCNC: 526 NG/ML (ref 31–409)
FRAGMENTS BLD QL SMEAR: SLIGHT
FRAGMENTS BLD QL SMEAR: SLIGHT
GFR SERPL CREATININE-BSD FRML MDRD: 74 ML/MIN/1.73M2
GFR SERPL CREATININE-BSD FRML MDRD: 77 ML/MIN/1.73M2
GFR SERPL CREATININE-BSD FRML MDRD: 80 ML/MIN/1.73M2
GFR SERPL CREATININE-BSD FRML MDRD: 81 ML/MIN/1.73M2
GFR SERPL CREATININE-BSD FRML MDRD: 83 ML/MIN/1.73M2
GFR SERPL CREATININE-BSD FRML MDRD: 84 ML/MIN/1.73M2
GFR SERPL CREATININE-BSD FRML MDRD: 84 ML/MIN/1.73M2
GFR SERPL CREATININE-BSD FRML MDRD: 85 ML/MIN/1.73M2
GFR SERPL CREATININE-BSD FRML MDRD: >60 ML/MIN/1.73M2
GLUCOSE BLD-MCNC: 104 MG/DL (ref 70–125)
GLUCOSE BLD-MCNC: 108 MG/DL (ref 70–125)
GLUCOSE BLD-MCNC: 113 MG/DL (ref 70–125)
GLUCOSE BLD-MCNC: 114 MG/DL (ref 70–125)
GLUCOSE BLD-MCNC: 114 MG/DL (ref 70–125)
GLUCOSE BLD-MCNC: 115 MG/DL (ref 70–125)
GLUCOSE BLD-MCNC: 125 MG/DL (ref 70–125)
GLUCOSE BLD-MCNC: 126 MG/DL (ref 70–125)
GLUCOSE BLD-MCNC: 126 MG/DL (ref 70–125)
GLUCOSE BLD-MCNC: 132 MG/DL (ref 70–125)
GLUCOSE BLD-MCNC: 151 MG/DL (ref 70–125)
GLUCOSE BLD-MCNC: 159 MG/DL (ref 70–125)
GLUCOSE BLD-MCNC: 92 MG/DL (ref 70–125)
GLUCOSE BLDC GLUCOMTR-MCNC: 113 MG/DL (ref 70–99)
GLUCOSE UR STRIP-MCNC: NEGATIVE MG/DL
HCT VFR BLD AUTO: 24.9 % (ref 40–53)
HCT VFR BLD AUTO: 25.8 % (ref 40–53)
HCT VFR BLD AUTO: 25.8 % (ref 40–53)
HCT VFR BLD AUTO: 26 % (ref 40–53)
HCT VFR BLD AUTO: 26.2 % (ref 40–53)
HCT VFR BLD AUTO: 27 % (ref 40–53)
HCT VFR BLD AUTO: 27.1 % (ref 40–53)
HCT VFR BLD AUTO: 27.2 % (ref 40–53)
HCT VFR BLD AUTO: 27.4 % (ref 40–53)
HCT VFR BLD AUTO: 28.2 % (ref 40–53)
HCT VFR BLD AUTO: 29.1 % (ref 40–53)
HCT VFR BLD AUTO: 30.5 % (ref 40–53)
HCT VFR BLD AUTO: 31.3 % (ref 40–53)
HGB BLD-MCNC: 7.7 G/DL (ref 13.3–17.7)
HGB BLD-MCNC: 7.8 G/DL (ref 13.3–17.7)
HGB BLD-MCNC: 7.8 G/DL (ref 13.3–17.7)
HGB BLD-MCNC: 8.1 G/DL (ref 13.3–17.7)
HGB BLD-MCNC: 8.1 G/DL (ref 13.3–17.7)
HGB BLD-MCNC: 8.3 G/DL (ref 13.3–17.7)
HGB BLD-MCNC: 8.4 G/DL (ref 13.3–17.7)
HGB BLD-MCNC: 8.4 G/DL (ref 13.3–17.7)
HGB BLD-MCNC: 8.7 G/DL (ref 13.3–17.7)
HGB BLD-MCNC: 8.8 G/DL (ref 13.3–17.7)
HGB BLD-MCNC: 9 G/DL (ref 13.3–17.7)
HGB BLD-MCNC: 9.3 G/DL (ref 13.3–17.7)
HGB BLD-MCNC: 9.4 G/DL (ref 13.3–17.7)
HGB BLD-MCNC: 9.5 G/DL (ref 13.3–17.7)
HGB UR QL STRIP: ABNORMAL
HOLD SPECIMEN: NORMAL
IMM GRANULOCYTES # BLD: 0.1 10E3/UL
IMM GRANULOCYTES NFR BLD: 1 %
INR PPP: 1.2 (ref 0.85–1.15)
INTERPRETATION ECG - MUSE: NORMAL
IRON BINDING CAPACITY (ROCHE): 223 UG/DL (ref 240–430)
IRON SATN MFR SERPL: 15 % (ref 15–46)
IRON SERPL-MCNC: 33 UG/DL (ref 61–157)
KETONES UR STRIP-MCNC: NEGATIVE MG/DL
LACTATE SERPL-SCNC: 1.3 MMOL/L (ref 0.7–2)
LACTATE SERPL-SCNC: 1.5 MMOL/L (ref 0.7–2)
LACTATE SERPL-SCNC: 1.7 MMOL/L (ref 0.7–2)
LEUKOCYTE ESTERASE UR QL STRIP: ABNORMAL
LVEF ECHO: NORMAL
LYMPHOCYTES # BLD AUTO: 0.7 10E3/UL (ref 0.8–5.3)
LYMPHOCYTES # BLD MANUAL: 0.3 10E3/UL (ref 0.8–5.3)
LYMPHOCYTES # BLD MANUAL: 0.5 10E3/UL (ref 0.8–5.3)
LYMPHOCYTES # BLD MANUAL: 0.6 10E3/UL (ref 0.8–5.3)
LYMPHOCYTES # BLD MANUAL: 0.7 10E3/UL (ref 0.8–5.3)
LYMPHOCYTES # BLD MANUAL: 0.8 10E3/UL (ref 0.8–5.3)
LYMPHOCYTES # BLD MANUAL: 0.9 10E3/UL (ref 0.8–5.3)
LYMPHOCYTES # BLD MANUAL: 1.2 10E3/UL (ref 0.8–5.3)
LYMPHOCYTES # BLD MANUAL: 1.5 10E3/UL (ref 0.8–5.3)
LYMPHOCYTES # BLD MANUAL: 3.3 10E3/UL (ref 0.8–5.3)
LYMPHOCYTES NFR BLD AUTO: 7 %
LYMPHOCYTES NFR BLD MANUAL: 12 %
LYMPHOCYTES NFR BLD MANUAL: 14 %
LYMPHOCYTES NFR BLD MANUAL: 19 %
LYMPHOCYTES NFR BLD MANUAL: 21 %
LYMPHOCYTES NFR BLD MANUAL: 24 %
LYMPHOCYTES NFR BLD MANUAL: 3 %
LYMPHOCYTES NFR BLD MANUAL: 5 %
LYMPHOCYTES NFR BLD MANUAL: 6 %
LYMPHOCYTES NFR BLD MANUAL: 6 %
LYMPHOCYTES NFR BLD MANUAL: 8 %
LYMPHOCYTES NFR BLD MANUAL: 9 %
MAGNESIUM SERPL-MCNC: 2 MG/DL (ref 1.8–2.6)
MAGNESIUM SERPL-MCNC: 2 MG/DL (ref 1.8–2.6)
MAGNESIUM SERPL-MCNC: 2.1 MG/DL (ref 1.8–2.6)
MAGNESIUM SERPL-MCNC: 2.2 MG/DL (ref 1.8–2.6)
MAGNESIUM SERPL-MCNC: 2.3 MG/DL (ref 1.8–2.6)
MCH RBC QN AUTO: 32.4 PG (ref 26.5–33)
MCH RBC QN AUTO: 32.9 PG (ref 26.5–33)
MCH RBC QN AUTO: 33.1 PG (ref 26.5–33)
MCH RBC QN AUTO: 33.5 PG (ref 26.5–33)
MCH RBC QN AUTO: 33.6 PG (ref 26.5–33)
MCH RBC QN AUTO: 33.9 PG (ref 26.5–33)
MCH RBC QN AUTO: 33.9 PG (ref 26.5–33)
MCH RBC QN AUTO: 34 PG (ref 26.5–33)
MCH RBC QN AUTO: 34.2 PG (ref 26.5–33)
MCHC RBC AUTO-ENTMCNC: 29.7 G/DL (ref 31.5–36.5)
MCHC RBC AUTO-ENTMCNC: 30.2 G/DL (ref 31.5–36.5)
MCHC RBC AUTO-ENTMCNC: 30.2 G/DL (ref 31.5–36.5)
MCHC RBC AUTO-ENTMCNC: 30.7 G/DL (ref 31.5–36.5)
MCHC RBC AUTO-ENTMCNC: 30.8 G/DL (ref 31.5–36.5)
MCHC RBC AUTO-ENTMCNC: 30.9 G/DL (ref 31.5–36.5)
MCHC RBC AUTO-ENTMCNC: 31 G/DL (ref 31.5–36.5)
MCHC RBC AUTO-ENTMCNC: 31.2 G/DL (ref 31.5–36.5)
MCHC RBC AUTO-ENTMCNC: 31.2 G/DL (ref 31.5–36.5)
MCHC RBC AUTO-ENTMCNC: 31.8 G/DL (ref 31.5–36.5)
MCHC RBC AUTO-ENTMCNC: 32.6 G/DL (ref 31.5–36.5)
MCV RBC AUTO: 104 FL (ref 78–100)
MCV RBC AUTO: 106 FL (ref 78–100)
MCV RBC AUTO: 107 FL (ref 78–100)
MCV RBC AUTO: 108 FL (ref 78–100)
MCV RBC AUTO: 108 FL (ref 78–100)
MCV RBC AUTO: 109 FL (ref 78–100)
MCV RBC AUTO: 111 FL (ref 78–100)
MCV RBC AUTO: 111 FL (ref 78–100)
MDC_IDC_EPISODE_DTM: NORMAL
MDC_IDC_EPISODE_DURATION: 1 S
MDC_IDC_EPISODE_DURATION: 11 S
MDC_IDC_EPISODE_DURATION: 11 S
MDC_IDC_EPISODE_DURATION: 12 S
MDC_IDC_EPISODE_DURATION: 13 S
MDC_IDC_EPISODE_DURATION: 1321 S
MDC_IDC_EPISODE_DURATION: 2 S
MDC_IDC_EPISODE_DURATION: 20 S
MDC_IDC_EPISODE_DURATION: 3 S
MDC_IDC_EPISODE_DURATION: 39 S
MDC_IDC_EPISODE_DURATION: 4 S
MDC_IDC_EPISODE_DURATION: 58 S
MDC_IDC_EPISODE_DURATION: 71 S
MDC_IDC_EPISODE_DURATION: NORMAL S
MDC_IDC_EPISODE_ID: NORMAL
MDC_IDC_EPISODE_TYPE: NORMAL
MDC_IDC_EPISODE_VENDOR_TYPE: NORMAL
MDC_IDC_LEAD_IMPLANT_DT: NORMAL
MDC_IDC_LEAD_LOCATION: NORMAL
MDC_IDC_LEAD_LOCATION_DETAIL_1: NORMAL
MDC_IDC_LEAD_MFG: NORMAL
MDC_IDC_LEAD_MODEL: NORMAL
MDC_IDC_LEAD_POLARITY_TYPE: NORMAL
MDC_IDC_LEAD_SERIAL: NORMAL
MDC_IDC_LEAD_SPECIAL_FUNCTION: NORMAL
MDC_IDC_MSMT_BATTERY_DTM: NORMAL
MDC_IDC_MSMT_BATTERY_DTM: NORMAL
MDC_IDC_MSMT_BATTERY_REMAINING_LONGEVITY: 114 MO
MDC_IDC_MSMT_BATTERY_REMAINING_LONGEVITY: 114 MO
MDC_IDC_MSMT_BATTERY_REMAINING_PERCENTAGE: 100 %
MDC_IDC_MSMT_BATTERY_REMAINING_PERCENTAGE: 100 %
MDC_IDC_MSMT_BATTERY_STATUS: NORMAL
MDC_IDC_MSMT_BATTERY_STATUS: NORMAL
MDC_IDC_MSMT_LEADCHNL_RA_IMPEDANCE_VALUE: 646 OHM
MDC_IDC_MSMT_LEADCHNL_RA_IMPEDANCE_VALUE: 660 OHM
MDC_IDC_MSMT_LEADCHNL_RA_PACING_THRESHOLD_AMPLITUDE: 0.7 V
MDC_IDC_MSMT_LEADCHNL_RA_PACING_THRESHOLD_PULSEWIDTH: 0.4 MS
MDC_IDC_MSMT_LEADCHNL_RV_IMPEDANCE_VALUE: 554 OHM
MDC_IDC_MSMT_LEADCHNL_RV_IMPEDANCE_VALUE: 587 OHM
MDC_IDC_MSMT_LEADCHNL_RV_PACING_THRESHOLD_AMPLITUDE: 1.6 V
MDC_IDC_MSMT_LEADCHNL_RV_PACING_THRESHOLD_AMPLITUDE: 1.9 V
MDC_IDC_MSMT_LEADCHNL_RV_PACING_THRESHOLD_PULSEWIDTH: 0.4 MS
MDC_IDC_MSMT_LEADCHNL_RV_PACING_THRESHOLD_PULSEWIDTH: 0.4 MS
MDC_IDC_PG_IMPLANT_DTM: NORMAL
MDC_IDC_PG_IMPLANT_DTM: NORMAL
MDC_IDC_PG_MFG: NORMAL
MDC_IDC_PG_MFG: NORMAL
MDC_IDC_PG_MODEL: NORMAL
MDC_IDC_PG_MODEL: NORMAL
MDC_IDC_PG_SERIAL: NORMAL
MDC_IDC_PG_SERIAL: NORMAL
MDC_IDC_PG_TYPE: NORMAL
MDC_IDC_PG_TYPE: NORMAL
MDC_IDC_SESS_CLINIC_NAME: NORMAL
MDC_IDC_SESS_CLINIC_NAME: NORMAL
MDC_IDC_SESS_DTM: NORMAL
MDC_IDC_SESS_DTM: NORMAL
MDC_IDC_SESS_TYPE: NORMAL
MDC_IDC_SESS_TYPE: NORMAL
MDC_IDC_SET_BRADY_AT_MODE_SWITCH_MODE: NORMAL
MDC_IDC_SET_BRADY_AT_MODE_SWITCH_MODE: NORMAL
MDC_IDC_SET_BRADY_AT_MODE_SWITCH_RATE: 160 {BEATS}/MIN
MDC_IDC_SET_BRADY_AT_MODE_SWITCH_RATE: 160 {BEATS}/MIN
MDC_IDC_SET_BRADY_LOWRATE: 60 {BEATS}/MIN
MDC_IDC_SET_BRADY_LOWRATE: 60 {BEATS}/MIN
MDC_IDC_SET_BRADY_MAX_SENSOR_RATE: 130 {BEATS}/MIN
MDC_IDC_SET_BRADY_MAX_SENSOR_RATE: 130 {BEATS}/MIN
MDC_IDC_SET_BRADY_MAX_TRACKING_RATE: 130 {BEATS}/MIN
MDC_IDC_SET_BRADY_MAX_TRACKING_RATE: 130 {BEATS}/MIN
MDC_IDC_SET_BRADY_MODE: NORMAL
MDC_IDC_SET_BRADY_MODE: NORMAL
MDC_IDC_SET_BRADY_PAV_DELAY_LOW: 200 MS
MDC_IDC_SET_BRADY_PAV_DELAY_LOW: 200 MS
MDC_IDC_SET_BRADY_SAV_DELAY_LOW: 150 MS
MDC_IDC_SET_BRADY_SAV_DELAY_LOW: 150 MS
MDC_IDC_SET_LEADCHNL_RA_PACING_AMPLITUDE: 1.5 V
MDC_IDC_SET_LEADCHNL_RA_PACING_AMPLITUDE: 1.5 V
MDC_IDC_SET_LEADCHNL_RA_PACING_CAPTURE_MODE: NORMAL
MDC_IDC_SET_LEADCHNL_RA_PACING_CAPTURE_MODE: NORMAL
MDC_IDC_SET_LEADCHNL_RA_PACING_POLARITY: NORMAL
MDC_IDC_SET_LEADCHNL_RA_PACING_POLARITY: NORMAL
MDC_IDC_SET_LEADCHNL_RA_PACING_PULSEWIDTH: 0.4 MS
MDC_IDC_SET_LEADCHNL_RA_PACING_PULSEWIDTH: 0.4 MS
MDC_IDC_SET_LEADCHNL_RA_SENSING_ADAPTATION_MODE: NORMAL
MDC_IDC_SET_LEADCHNL_RA_SENSING_ADAPTATION_MODE: NORMAL
MDC_IDC_SET_LEADCHNL_RA_SENSING_POLARITY: NORMAL
MDC_IDC_SET_LEADCHNL_RA_SENSING_POLARITY: NORMAL
MDC_IDC_SET_LEADCHNL_RA_SENSING_SENSITIVITY: 0.25 MV
MDC_IDC_SET_LEADCHNL_RA_SENSING_SENSITIVITY: 0.25 MV
MDC_IDC_SET_LEADCHNL_RV_PACING_AMPLITUDE: 2.2 V
MDC_IDC_SET_LEADCHNL_RV_PACING_AMPLITUDE: 2.3 V
MDC_IDC_SET_LEADCHNL_RV_PACING_CAPTURE_MODE: NORMAL
MDC_IDC_SET_LEADCHNL_RV_PACING_CAPTURE_MODE: NORMAL
MDC_IDC_SET_LEADCHNL_RV_PACING_POLARITY: NORMAL
MDC_IDC_SET_LEADCHNL_RV_PACING_POLARITY: NORMAL
MDC_IDC_SET_LEADCHNL_RV_PACING_PULSEWIDTH: 0.4 MS
MDC_IDC_SET_LEADCHNL_RV_PACING_PULSEWIDTH: 0.4 MS
MDC_IDC_SET_LEADCHNL_RV_SENSING_ADAPTATION_MODE: NORMAL
MDC_IDC_SET_LEADCHNL_RV_SENSING_ADAPTATION_MODE: NORMAL
MDC_IDC_SET_LEADCHNL_RV_SENSING_POLARITY: NORMAL
MDC_IDC_SET_LEADCHNL_RV_SENSING_POLARITY: NORMAL
MDC_IDC_SET_LEADCHNL_RV_SENSING_SENSITIVITY: 0.6 MV
MDC_IDC_SET_LEADCHNL_RV_SENSING_SENSITIVITY: 0.6 MV
MDC_IDC_SET_ZONE_DETECTION_INTERVAL: 353 MS
MDC_IDC_SET_ZONE_DETECTION_INTERVAL: 353 MS
MDC_IDC_SET_ZONE_TYPE: NORMAL
MDC_IDC_SET_ZONE_TYPE: NORMAL
MDC_IDC_SET_ZONE_VENDOR_TYPE: NORMAL
MDC_IDC_SET_ZONE_VENDOR_TYPE: NORMAL
MDC_IDC_STAT_AT_BURDEN_PERCENT: 11 %
MDC_IDC_STAT_AT_BURDEN_PERCENT: 9 %
MDC_IDC_STAT_AT_DTM_END: NORMAL
MDC_IDC_STAT_AT_DTM_END: NORMAL
MDC_IDC_STAT_AT_DTM_START: NORMAL
MDC_IDC_STAT_AT_DTM_START: NORMAL
MDC_IDC_STAT_BRADY_DTM_END: NORMAL
MDC_IDC_STAT_BRADY_DTM_END: NORMAL
MDC_IDC_STAT_BRADY_DTM_START: NORMAL
MDC_IDC_STAT_BRADY_DTM_START: NORMAL
MDC_IDC_STAT_BRADY_RA_PERCENT_PACED: 7 %
MDC_IDC_STAT_BRADY_RA_PERCENT_PACED: 8 %
MDC_IDC_STAT_BRADY_RV_PERCENT_PACED: 6 %
MDC_IDC_STAT_BRADY_RV_PERCENT_PACED: 7 %
MDC_IDC_STAT_EPISODE_RECENT_COUNT: 0
MDC_IDC_STAT_EPISODE_RECENT_COUNT: 1
MDC_IDC_STAT_EPISODE_RECENT_COUNT: 10
MDC_IDC_STAT_EPISODE_RECENT_COUNT: 10
MDC_IDC_STAT_EPISODE_RECENT_COUNT: 2
MDC_IDC_STAT_EPISODE_RECENT_COUNT: 3
MDC_IDC_STAT_EPISODE_RECENT_COUNT_DTM_END: NORMAL
MDC_IDC_STAT_EPISODE_RECENT_COUNT_DTM_START: NORMAL
MDC_IDC_STAT_EPISODE_TYPE: NORMAL
MDC_IDC_STAT_EPISODE_VENDOR_TYPE: NORMAL
MONOCYTES # BLD AUTO: 1.7 10E3/UL (ref 0–1.3)
MONOCYTES # BLD MANUAL: 0.1 10E3/UL (ref 0–1.3)
MONOCYTES # BLD MANUAL: 0.4 10E3/UL (ref 0–1.3)
MONOCYTES # BLD MANUAL: 0.7 10E3/UL (ref 0–1.3)
MONOCYTES # BLD MANUAL: 1 10E3/UL (ref 0–1.3)
MONOCYTES # BLD MANUAL: 1 10E3/UL (ref 0–1.3)
MONOCYTES # BLD MANUAL: 1.5 10E3/UL (ref 0–1.3)
MONOCYTES # BLD MANUAL: 1.7 10E3/UL (ref 0–1.3)
MONOCYTES # BLD MANUAL: 1.8 10E3/UL (ref 0–1.3)
MONOCYTES # BLD MANUAL: 2.1 10E3/UL (ref 0–1.3)
MONOCYTES # BLD MANUAL: 2.2 10E3/UL (ref 0–1.3)
MONOCYTES # BLD MANUAL: 2.2 10E3/UL (ref 0–1.3)
MONOCYTES NFR BLD AUTO: 16 %
MONOCYTES NFR BLD MANUAL: 1 %
MONOCYTES NFR BLD MANUAL: 15 %
MONOCYTES NFR BLD MANUAL: 15 %
MONOCYTES NFR BLD MANUAL: 18 %
MONOCYTES NFR BLD MANUAL: 19 %
MONOCYTES NFR BLD MANUAL: 19 %
MONOCYTES NFR BLD MANUAL: 21 %
MONOCYTES NFR BLD MANUAL: 23 %
MONOCYTES NFR BLD MANUAL: 23 %
MONOCYTES NFR BLD MANUAL: 24 %
MONOCYTES NFR BLD MANUAL: 4 %
MUCOUS THREADS #/AREA URNS LPF: PRESENT /LPF
NEUTROPHILS # BLD AUTO: 8.3 10E3/UL (ref 1.6–8.3)
NEUTROPHILS # BLD MANUAL: 10.3 10E3/UL (ref 1.6–8.3)
NEUTROPHILS # BLD MANUAL: 2.2 10E3/UL (ref 1.6–8.3)
NEUTROPHILS # BLD MANUAL: 3 10E3/UL (ref 1.6–8.3)
NEUTROPHILS # BLD MANUAL: 3.4 10E3/UL (ref 1.6–8.3)
NEUTROPHILS # BLD MANUAL: 6.5 10E3/UL (ref 1.6–8.3)
NEUTROPHILS # BLD MANUAL: 6.6 10E3/UL (ref 1.6–8.3)
NEUTROPHILS # BLD MANUAL: 6.7 10E3/UL (ref 1.6–8.3)
NEUTROPHILS # BLD MANUAL: 7 10E3/UL (ref 1.6–8.3)
NEUTROPHILS # BLD MANUAL: 7.3 10E3/UL (ref 1.6–8.3)
NEUTROPHILS # BLD MANUAL: 7.5 10E3/UL (ref 1.6–8.3)
NEUTROPHILS # BLD MANUAL: 8.6 10E3/UL (ref 1.6–8.3)
NEUTROPHILS NFR BLD AUTO: 76 %
NEUTROPHILS NFR BLD MANUAL: 50 %
NEUTROPHILS NFR BLD MANUAL: 54 %
NEUTROPHILS NFR BLD MANUAL: 69 %
NEUTROPHILS NFR BLD MANUAL: 69 %
NEUTROPHILS NFR BLD MANUAL: 71 %
NEUTROPHILS NFR BLD MANUAL: 73 %
NEUTROPHILS NFR BLD MANUAL: 74 %
NEUTROPHILS NFR BLD MANUAL: 81 %
NITRATE UR QL: NEGATIVE
NRBC # BLD AUTO: 0 10E3/UL
NRBC # BLD AUTO: 0.1 10E3/UL
NRBC # BLD AUTO: 0.2 10E3/UL
NRBC BLD AUTO-RTO: 0 /100
NRBC BLD MANUAL-RTO: 1 %
NRBC BLD MANUAL-RTO: 2 %
P AXIS - MUSE: 93 DEGREES
P AXIS - MUSE: NORMAL DEGREES
PH UR STRIP: 5.5 [PH] (ref 5–7)
PLAT MORPH BLD: ABNORMAL
PLAT MORPH BLD: NORMAL
PLATELET # BLD AUTO: 106 10E3/UL (ref 150–450)
PLATELET # BLD AUTO: 106 10E3/UL (ref 150–450)
PLATELET # BLD AUTO: 112 10E3/UL (ref 150–450)
PLATELET # BLD AUTO: 117 10E3/UL (ref 150–450)
PLATELET # BLD AUTO: 128 10E3/UL (ref 150–450)
PLATELET # BLD AUTO: 139 10E3/UL (ref 150–450)
PLATELET # BLD AUTO: 140 10E3/UL (ref 150–450)
PLATELET # BLD AUTO: 146 10E3/UL (ref 150–450)
PLATELET # BLD AUTO: 154 10E3/UL (ref 150–450)
PLATELET # BLD AUTO: 166 10E3/UL (ref 150–450)
PLATELET # BLD AUTO: 168 10E3/UL (ref 150–450)
PLATELET # BLD AUTO: 205 10E3/UL (ref 150–450)
PLATELET # BLD AUTO: 210 10E3/UL (ref 150–450)
POLYCHROMASIA BLD QL SMEAR: SLIGHT
POTASSIUM BLD-SCNC: 3.3 MMOL/L (ref 3.5–5)
POTASSIUM BLD-SCNC: 3.4 MMOL/L (ref 3.5–5)
POTASSIUM BLD-SCNC: 3.5 MMOL/L (ref 3.5–5)
POTASSIUM BLD-SCNC: 3.7 MMOL/L (ref 3.5–5)
POTASSIUM BLD-SCNC: 3.8 MMOL/L (ref 3.5–5)
POTASSIUM BLD-SCNC: 4.1 MMOL/L (ref 3.5–5)
POTASSIUM BLD-SCNC: 4.3 MMOL/L (ref 3.5–5)
POTASSIUM BLD-SCNC: 4.3 MMOL/L (ref 3.5–5)
POTASSIUM BLD-SCNC: 4.6 MMOL/L (ref 3.5–5)
PR INTERVAL - MUSE: 272 MS
PR INTERVAL - MUSE: NORMAL MS
PROCALCITONIN SERPL-MCNC: 0.09 NG/ML (ref 0–0.49)
PROT SERPL-MCNC: 6.5 G/DL (ref 6–8)
PROT SERPL-MCNC: 7 G/DL (ref 6–8)
PROT SERPL-MCNC: 8 G/DL (ref 6–8)
QRS DURATION - MUSE: 124 MS
QRS DURATION - MUSE: 134 MS
QRS DURATION - MUSE: 134 MS
QRS DURATION - MUSE: 138 MS
QRS DURATION - MUSE: 142 MS
QRS DURATION - MUSE: 142 MS
QRS DURATION - MUSE: 144 MS
QRS DURATION - MUSE: 144 MS
QT - MUSE: 332 MS
QT - MUSE: 352 MS
QT - MUSE: 352 MS
QT - MUSE: 396 MS
QT - MUSE: 404 MS
QT - MUSE: 430 MS
QT - MUSE: 474 MS
QT - MUSE: 480 MS
QTC - MUSE: 473 MS
QTC - MUSE: 486 MS
QTC - MUSE: 510 MS
QTC - MUSE: 511 MS
QTC - MUSE: 513 MS
QTC - MUSE: 521 MS
QTC - MUSE: 525 MS
QTC - MUSE: 526 MS
R AXIS - MUSE: -16 DEGREES
R AXIS - MUSE: -18 DEGREES
R AXIS - MUSE: -22 DEGREES
R AXIS - MUSE: -28 DEGREES
R AXIS - MUSE: -30 DEGREES
R AXIS - MUSE: -31 DEGREES
R AXIS - MUSE: -33 DEGREES
R AXIS - MUSE: -43 DEGREES
RBC # BLD AUTO: 2.3 10E6/UL (ref 4.4–5.9)
RBC # BLD AUTO: 2.33 10E6/UL (ref 4.4–5.9)
RBC # BLD AUTO: 2.37 10E6/UL (ref 4.4–5.9)
RBC # BLD AUTO: 2.37 10E6/UL (ref 4.4–5.9)
RBC # BLD AUTO: 2.39 10E6/UL (ref 4.4–5.9)
RBC # BLD AUTO: 2.48 10E6/UL (ref 4.4–5.9)
RBC # BLD AUTO: 2.5 10E6/UL (ref 4.4–5.9)
RBC # BLD AUTO: 2.51 10E6/UL (ref 4.4–5.9)
RBC # BLD AUTO: 2.56 10E6/UL (ref 4.4–5.9)
RBC # BLD AUTO: 2.66 10E6/UL (ref 4.4–5.9)
RBC # BLD AUTO: 2.8 10E6/UL (ref 4.4–5.9)
RBC # BLD AUTO: 2.81 10E6/UL (ref 4.4–5.9)
RBC # BLD AUTO: 2.87 10E6/UL (ref 4.4–5.9)
RBC MORPH BLD: ABNORMAL
RBC MORPH BLD: NORMAL
RBC URINE: 20 /HPF
SARS-COV-2 RNA RESP QL NAA+PROBE: NEGATIVE
SARS-COV-2 RNA RESP QL NAA+PROBE: NEGATIVE
SODIUM SERPL-SCNC: 138 MMOL/L (ref 136–145)
SODIUM SERPL-SCNC: 138 MMOL/L (ref 136–145)
SODIUM SERPL-SCNC: 139 MMOL/L (ref 136–145)
SODIUM SERPL-SCNC: 140 MMOL/L (ref 136–145)
SODIUM SERPL-SCNC: 142 MMOL/L (ref 136–145)
SODIUM SERPL-SCNC: 142 MMOL/L (ref 136–145)
SODIUM SERPL-SCNC: 144 MMOL/L (ref 136–145)
SODIUM SERPL-SCNC: 147 MMOL/L (ref 136–145)
SODIUM SERPL-SCNC: 149 MMOL/L (ref 136–145)
SP GR UR STRIP: 1.03 (ref 1–1.03)
SQUAMOUS EPITHELIAL: <1 /HPF
SYSTOLIC BLOOD PRESSURE - MUSE: 115 MMHG
SYSTOLIC BLOOD PRESSURE - MUSE: 133 MMHG
SYSTOLIC BLOOD PRESSURE - MUSE: NORMAL MMHG
T AXIS - MUSE: 109 DEGREES
T AXIS - MUSE: 119 DEGREES
T AXIS - MUSE: 120 DEGREES
T AXIS - MUSE: 133 DEGREES
T AXIS - MUSE: 137 DEGREES
T AXIS - MUSE: 138 DEGREES
T AXIS - MUSE: 140 DEGREES
T AXIS - MUSE: 148 DEGREES
TRANSITIONAL EPI: <1 /HPF
TROPONIN I SERPL-MCNC: 0.02 NG/ML (ref 0–0.29)
TROPONIN I SERPL-MCNC: 0.02 NG/ML (ref 0–0.29)
TROPONIN I SERPL-MCNC: 0.03 NG/ML (ref 0–0.29)
TROPONIN I SERPL-MCNC: 0.04 NG/ML (ref 0–0.29)
TSH SERPL DL<=0.005 MIU/L-ACNC: 2.11 UIU/ML (ref 0.3–5)
UROBILINOGEN UR STRIP-MCNC: <2 MG/DL
VENTRICULAR RATE- MUSE: 100 BPM
VENTRICULAR RATE- MUSE: 115 BPM
VENTRICULAR RATE- MUSE: 122 BPM
VENTRICULAR RATE- MUSE: 134 BPM
VENTRICULAR RATE- MUSE: 70 BPM
VENTRICULAR RATE- MUSE: 71 BPM
VENTRICULAR RATE- MUSE: 90 BPM
VENTRICULAR RATE- MUSE: 97 BPM
WBC # BLD AUTO: 10.2 10E3/UL (ref 4–11)
WBC # BLD AUTO: 10.6 10E3/UL (ref 4–11)
WBC # BLD AUTO: 10.8 10E3/UL (ref 4–11)
WBC # BLD AUTO: 13.9 10E3/UL (ref 4–11)
WBC # BLD AUTO: 4.3 10E3/UL (ref 4–11)
WBC # BLD AUTO: 4.8 10E3/UL (ref 4–11)
WBC # BLD AUTO: 5.5 10E3/UL (ref 4–11)
WBC # BLD AUTO: 8 10E3/UL (ref 4–11)
WBC # BLD AUTO: 9.2 10E3/UL (ref 4–11)
WBC # BLD AUTO: 9.4 10E3/UL (ref 4–11)
WBC # BLD AUTO: 9.4 10E3/UL (ref 4–11)
WBC # BLD AUTO: 9.6 10E3/UL (ref 4–11)
WBC # BLD AUTO: 9.9 10E3/UL (ref 4–11)
WBC URINE: 7 /HPF

## 2022-01-01 PROCEDURE — 84145 PROCALCITONIN (PCT): CPT | Performed by: EMERGENCY MEDICINE

## 2022-01-01 PROCEDURE — G0378 HOSPITAL OBSERVATION PER HR: HCPCS

## 2022-01-01 PROCEDURE — 83880 ASSAY OF NATRIURETIC PEPTIDE: CPT | Performed by: INTERNAL MEDICINE

## 2022-01-01 PROCEDURE — 120N000001 HC R&B MED SURG/OB

## 2022-01-01 PROCEDURE — 93005 ELECTROCARDIOGRAM TRACING: CPT | Performed by: EMERGENCY MEDICINE

## 2022-01-01 PROCEDURE — 36415 COLL VENOUS BLD VENIPUNCTURE: CPT | Performed by: INTERNAL MEDICINE

## 2022-01-01 PROCEDURE — 93294 REM INTERROG EVL PM/LDLS PM: CPT | Performed by: INTERNAL MEDICINE

## 2022-01-01 PROCEDURE — 85007 BL SMEAR W/DIFF WBC COUNT: CPT | Performed by: HOSPITALIST

## 2022-01-01 PROCEDURE — 250N000013 HC RX MED GY IP 250 OP 250 PS 637: Performed by: INTERNAL MEDICINE

## 2022-01-01 PROCEDURE — 250N000013 HC RX MED GY IP 250 OP 250 PS 637: Performed by: HOSPITALIST

## 2022-01-01 PROCEDURE — 83735 ASSAY OF MAGNESIUM: CPT | Performed by: EMERGENCY MEDICINE

## 2022-01-01 PROCEDURE — 99213 OFFICE O/P EST LOW 20 MIN: CPT | Mod: 25 | Performed by: FAMILY MEDICINE

## 2022-01-01 PROCEDURE — 83735 ASSAY OF MAGNESIUM: CPT | Performed by: HOSPITALIST

## 2022-01-01 PROCEDURE — 93306 TTE W/DOPPLER COMPLETE: CPT | Mod: 26 | Performed by: INTERNAL MEDICINE

## 2022-01-01 PROCEDURE — 250N000011 HC RX IP 250 OP 636: Performed by: INTERNAL MEDICINE

## 2022-01-01 PROCEDURE — 99232 SBSQ HOSP IP/OBS MODERATE 35: CPT | Performed by: INTERNAL MEDICINE

## 2022-01-01 PROCEDURE — 999N000157 HC STATISTIC RCP TIME EA 10 MIN

## 2022-01-01 PROCEDURE — 99231 SBSQ HOSP IP/OBS SF/LOW 25: CPT | Performed by: INTERNAL MEDICINE

## 2022-01-01 PROCEDURE — 82310 ASSAY OF CALCIUM: CPT | Performed by: EMERGENCY MEDICINE

## 2022-01-01 PROCEDURE — 97161 PT EVAL LOW COMPLEX 20 MIN: CPT | Mod: GP

## 2022-01-01 PROCEDURE — 99221 1ST HOSP IP/OBS SF/LOW 40: CPT | Performed by: NURSE PRACTITIONER

## 2022-01-01 PROCEDURE — 36415 COLL VENOUS BLD VENIPUNCTURE: CPT | Performed by: HOSPITALIST

## 2022-01-01 PROCEDURE — 99213 OFFICE O/P EST LOW 20 MIN: CPT | Performed by: FAMILY MEDICINE

## 2022-01-01 PROCEDURE — G1010 CDSM STANSON: HCPCS

## 2022-01-01 PROCEDURE — 70450 CT HEAD/BRAIN W/O DYE: CPT

## 2022-01-01 PROCEDURE — 85007 BL SMEAR W/DIFF WBC COUNT: CPT | Performed by: INTERNAL MEDICINE

## 2022-01-01 PROCEDURE — 85027 COMPLETE CBC AUTOMATED: CPT | Performed by: INTERNAL MEDICINE

## 2022-01-01 PROCEDURE — 258N000003 HC RX IP 258 OP 636: Performed by: INTERNAL MEDICINE

## 2022-01-01 PROCEDURE — 93005 ELECTROCARDIOGRAM TRACING: CPT

## 2022-01-01 PROCEDURE — 258N000003 HC RX IP 258 OP 636: Performed by: HOSPITALIST

## 2022-01-01 PROCEDURE — 92526 ORAL FUNCTION THERAPY: CPT | Mod: GN

## 2022-01-01 PROCEDURE — 99239 HOSP IP/OBS DSCHRG MGMT >30: CPT | Performed by: HOSPITALIST

## 2022-01-01 PROCEDURE — 36415 COLL VENOUS BLD VENIPUNCTURE: CPT | Performed by: EMERGENCY MEDICINE

## 2022-01-01 PROCEDURE — 93005 ELECTROCARDIOGRAM TRACING: CPT | Performed by: STUDENT IN AN ORGANIZED HEALTH CARE EDUCATION/TRAINING PROGRAM

## 2022-01-01 PROCEDURE — 96374 THER/PROPH/DIAG INJ IV PUSH: CPT

## 2022-01-01 PROCEDURE — 99232 SBSQ HOSP IP/OBS MODERATE 35: CPT | Performed by: HOSPITALIST

## 2022-01-01 PROCEDURE — 10060 I&D ABSCESS SIMPLE/SINGLE: CPT | Performed by: FAMILY MEDICINE

## 2022-01-01 PROCEDURE — 99222 1ST HOSP IP/OBS MODERATE 55: CPT | Mod: 25 | Performed by: INTERNAL MEDICINE

## 2022-01-01 PROCEDURE — 84443 ASSAY THYROID STIM HORMONE: CPT | Performed by: INTERNAL MEDICINE

## 2022-01-01 PROCEDURE — 93010 ELECTROCARDIOGRAM REPORT: CPT | Mod: HIP | Performed by: INTERNAL MEDICINE

## 2022-01-01 PROCEDURE — 85007 BL SMEAR W/DIFF WBC COUNT: CPT | Performed by: EMERGENCY MEDICINE

## 2022-01-01 PROCEDURE — C9803 HOPD COVID-19 SPEC COLLECT: HCPCS

## 2022-01-01 PROCEDURE — 80048 BASIC METABOLIC PNL TOTAL CA: CPT | Performed by: HOSPITALIST

## 2022-01-01 PROCEDURE — 250N000011 HC RX IP 250 OP 636: Performed by: EMERGENCY MEDICINE

## 2022-01-01 PROCEDURE — 87186 SC STD MICRODIL/AGAR DIL: CPT | Performed by: FAMILY MEDICINE

## 2022-01-01 PROCEDURE — 85027 COMPLETE CBC AUTOMATED: CPT | Performed by: EMERGENCY MEDICINE

## 2022-01-01 PROCEDURE — 97116 GAIT TRAINING THERAPY: CPT | Mod: GP

## 2022-01-01 PROCEDURE — 93296 REM INTERROG EVL PM/IDS: CPT | Performed by: INTERNAL MEDICINE

## 2022-01-01 PROCEDURE — 99285 EMERGENCY DEPT VISIT HI MDM: CPT | Mod: 25

## 2022-01-01 PROCEDURE — 255N000002 HC RX 255 OP 636: Performed by: INTERNAL MEDICINE

## 2022-01-01 PROCEDURE — 82310 ASSAY OF CALCIUM: CPT | Performed by: HOSPITALIST

## 2022-01-01 PROCEDURE — 71045 X-RAY EXAM CHEST 1 VIEW: CPT

## 2022-01-01 PROCEDURE — 258N000003 HC RX IP 258 OP 636: Performed by: EMERGENCY MEDICINE

## 2022-01-01 PROCEDURE — 250N000009 HC RX 250: Performed by: HOSPITALIST

## 2022-01-01 PROCEDURE — 99233 SBSQ HOSP IP/OBS HIGH 50: CPT | Performed by: INTERNAL MEDICINE

## 2022-01-01 PROCEDURE — 85027 COMPLETE CBC AUTOMATED: CPT | Performed by: HOSPITALIST

## 2022-01-01 PROCEDURE — 71046 X-RAY EXAM CHEST 2 VIEWS: CPT

## 2022-01-01 PROCEDURE — 85018 HEMOGLOBIN: CPT | Performed by: INTERNAL MEDICINE

## 2022-01-01 PROCEDURE — 999N000127 HC STATISTIC PERIPHERAL IV START W US GUIDANCE

## 2022-01-01 PROCEDURE — 96361 HYDRATE IV INFUSION ADD-ON: CPT

## 2022-01-01 PROCEDURE — 999N000104 HC STATISTIC NO CHARGE

## 2022-01-01 PROCEDURE — 84484 ASSAY OF TROPONIN QUANT: CPT | Performed by: EMERGENCY MEDICINE

## 2022-01-01 PROCEDURE — 210N000001 HC R&B IMCU HEART CARE

## 2022-01-01 PROCEDURE — 83605 ASSAY OF LACTIC ACID: CPT | Performed by: INTERNAL MEDICINE

## 2022-01-01 PROCEDURE — 93005 ELECTROCARDIOGRAM TRACING: CPT | Performed by: HOSPITALIST

## 2022-01-01 PROCEDURE — 96360 HYDRATION IV INFUSION INIT: CPT

## 2022-01-01 PROCEDURE — 85025 COMPLETE CBC W/AUTO DIFF WBC: CPT | Performed by: EMERGENCY MEDICINE

## 2022-01-01 PROCEDURE — 250N000011 HC RX IP 250 OP 636

## 2022-01-01 PROCEDURE — 80053 COMPREHEN METABOLIC PANEL: CPT | Performed by: INTERNAL MEDICINE

## 2022-01-01 PROCEDURE — 97535 SELF CARE MNGMENT TRAINING: CPT | Mod: GO

## 2022-01-01 PROCEDURE — 99442 PR PHYSICIAN TELEPHONE EVALUATION 11-20 MIN: CPT | Mod: 95 | Performed by: INTERNAL MEDICINE

## 2022-01-01 PROCEDURE — 82565 ASSAY OF CREATININE: CPT

## 2022-01-01 PROCEDURE — 99233 SBSQ HOSP IP/OBS HIGH 50: CPT | Performed by: NURSE PRACTITIONER

## 2022-01-01 PROCEDURE — 36415 COLL VENOUS BLD VENIPUNCTURE: CPT | Performed by: NURSE PRACTITIONER

## 2022-01-01 PROCEDURE — 85730 THROMBOPLASTIN TIME PARTIAL: CPT | Performed by: NURSE PRACTITIONER

## 2022-01-01 PROCEDURE — 250N000009 HC RX 250

## 2022-01-01 PROCEDURE — 99205 OFFICE O/P NEW HI 60 MIN: CPT | Performed by: INTERNAL MEDICINE

## 2022-01-01 PROCEDURE — 86140 C-REACTIVE PROTEIN: CPT | Performed by: EMERGENCY MEDICINE

## 2022-01-01 PROCEDURE — 36415 COLL VENOUS BLD VENIPUNCTURE: CPT | Performed by: STUDENT IN AN ORGANIZED HEALTH CARE EDUCATION/TRAINING PROGRAM

## 2022-01-01 PROCEDURE — 72146 MRI CHEST SPINE W/O DYE: CPT | Mod: MG

## 2022-01-01 PROCEDURE — 87635 SARS-COV-2 COVID-19 AMP PRB: CPT | Performed by: EMERGENCY MEDICINE

## 2022-01-01 PROCEDURE — 250N000009 HC RX 250: Performed by: EMERGENCY MEDICINE

## 2022-01-01 PROCEDURE — 99223 1ST HOSP IP/OBS HIGH 75: CPT | Mod: AI | Performed by: INTERNAL MEDICINE

## 2022-01-01 PROCEDURE — 97110 THERAPEUTIC EXERCISES: CPT | Mod: GO

## 2022-01-01 PROCEDURE — 250N000013 HC RX MED GY IP 250 OP 250 PS 637

## 2022-01-01 PROCEDURE — 84484 ASSAY OF TROPONIN QUANT: CPT | Performed by: INTERNAL MEDICINE

## 2022-01-01 PROCEDURE — 80053 COMPREHEN METABOLIC PANEL: CPT | Performed by: EMERGENCY MEDICINE

## 2022-01-01 PROCEDURE — 99222 1ST HOSP IP/OBS MODERATE 55: CPT | Performed by: SPECIALIST

## 2022-01-01 PROCEDURE — 250N000009 HC RX 250: Performed by: INTERNAL MEDICINE

## 2022-01-01 PROCEDURE — 82550 ASSAY OF CK (CPK): CPT | Performed by: EMERGENCY MEDICINE

## 2022-01-01 PROCEDURE — 92610 EVALUATE SWALLOWING FUNCTION: CPT | Mod: GN

## 2022-01-01 PROCEDURE — 80048 BASIC METABOLIC PNL TOTAL CA: CPT | Performed by: INTERNAL MEDICINE

## 2022-01-01 PROCEDURE — 83550 IRON BINDING TEST: CPT | Performed by: INTERNAL MEDICINE

## 2022-01-01 PROCEDURE — 87070 CULTURE OTHR SPECIMN AEROBIC: CPT | Performed by: FAMILY MEDICINE

## 2022-01-01 PROCEDURE — 99223 1ST HOSP IP/OBS HIGH 75: CPT | Performed by: NURSE PRACTITIONER

## 2022-01-01 PROCEDURE — 71250 CT THORAX DX C-: CPT | Mod: MG

## 2022-01-01 PROCEDURE — 97110 THERAPEUTIC EXERCISES: CPT | Mod: GP

## 2022-01-01 PROCEDURE — 97165 OT EVAL LOW COMPLEX 30 MIN: CPT | Mod: GO

## 2022-01-01 PROCEDURE — 85610 PROTHROMBIN TIME: CPT | Performed by: NURSE PRACTITIONER

## 2022-01-01 PROCEDURE — 250N000013 HC RX MED GY IP 250 OP 250 PS 637: Performed by: STUDENT IN AN ORGANIZED HEALTH CARE EDUCATION/TRAINING PROGRAM

## 2022-01-01 PROCEDURE — 82728 ASSAY OF FERRITIN: CPT | Performed by: INTERNAL MEDICINE

## 2022-01-01 PROCEDURE — 99221 1ST HOSP IP/OBS SF/LOW 40: CPT | Performed by: INTERNAL MEDICINE

## 2022-01-01 PROCEDURE — 82040 ASSAY OF SERUM ALBUMIN: CPT | Performed by: INTERNAL MEDICINE

## 2022-01-01 PROCEDURE — 87077 CULTURE AEROBIC IDENTIFY: CPT | Performed by: FAMILY MEDICINE

## 2022-01-01 PROCEDURE — 250N000013 HC RX MED GY IP 250 OP 250 PS 637: Performed by: NURSE PRACTITIONER

## 2022-01-01 PROCEDURE — 81001 URINALYSIS AUTO W/SCOPE: CPT | Performed by: HOSPITALIST

## 2022-01-01 RX ORDER — MULTIVIT WITH MINERALS/LUTEIN
500 TABLET ORAL DAILY
Status: DISCONTINUED | OUTPATIENT
Start: 2022-01-01 | End: 2022-01-01 | Stop reason: HOSPADM

## 2022-01-01 RX ORDER — SODIUM CHLORIDE 9 MG/ML
INJECTION, SOLUTION INTRAVENOUS CONTINUOUS
Status: ACTIVE | OUTPATIENT
Start: 2022-01-01 | End: 2022-01-01

## 2022-01-01 RX ORDER — OLANZAPINE 10 MG/2ML
5 INJECTION, POWDER, FOR SOLUTION INTRAMUSCULAR ONCE
Status: COMPLETED | OUTPATIENT
Start: 2022-01-01 | End: 2022-01-01

## 2022-01-01 RX ORDER — METOPROLOL SUCCINATE 25 MG/1
25 TABLET, EXTENDED RELEASE ORAL DAILY
Qty: 30 TABLET | Refills: 0 | Status: SHIPPED | OUTPATIENT
Start: 2022-01-01 | End: 2022-07-25

## 2022-01-01 RX ORDER — LORAZEPAM 2 MG/ML
.5-1 CONCENTRATE ORAL EVERY 4 HOURS PRN
Status: DISCONTINUED | OUTPATIENT
Start: 2022-01-01 | End: 2022-01-01 | Stop reason: HOSPADM

## 2022-01-01 RX ORDER — LEVOTHYROXINE SODIUM 25 UG/1
50 TABLET ORAL DAILY
Status: DISCONTINUED | OUTPATIENT
Start: 2022-01-01 | End: 2022-01-01

## 2022-01-01 RX ORDER — TRAZODONE HYDROCHLORIDE 50 MG/1
50 TABLET, FILM COATED ORAL AT BEDTIME
Status: DISCONTINUED | OUTPATIENT
Start: 2022-01-01 | End: 2022-01-01

## 2022-01-01 RX ORDER — POTASSIUM CHLORIDE 1500 MG/1
40 TABLET, EXTENDED RELEASE ORAL ONCE
Status: COMPLETED | OUTPATIENT
Start: 2022-01-01 | End: 2022-01-01

## 2022-01-01 RX ORDER — SODIUM CHLORIDE 9 MG/ML
INJECTION, SOLUTION INTRAVENOUS CONTINUOUS
Status: DISCONTINUED | OUTPATIENT
Start: 2022-01-01 | End: 2022-01-01 | Stop reason: HOSPADM

## 2022-01-01 RX ORDER — CALCIUM CARBONATE 500(1250)
1 TABLET ORAL DAILY
Status: DISCONTINUED | OUTPATIENT
Start: 2022-01-01 | End: 2022-01-01 | Stop reason: HOSPADM

## 2022-01-01 RX ORDER — LORAZEPAM 2 MG/ML
0.25 INJECTION INTRAMUSCULAR ONCE
Status: COMPLETED | OUTPATIENT
Start: 2022-01-01 | End: 2022-01-01

## 2022-01-01 RX ORDER — SODIUM CHLORIDE 9 MG/ML
INJECTION, SOLUTION INTRAVENOUS ONCE
Status: COMPLETED | OUTPATIENT
Start: 2022-01-01 | End: 2022-01-01

## 2022-01-01 RX ORDER — CALCIUM CARBONATE 500(1250)
1 TABLET ORAL DAILY
Status: DISCONTINUED | OUTPATIENT
Start: 2022-01-01 | End: 2022-01-01

## 2022-01-01 RX ORDER — LIDOCAINE 40 MG/G
CREAM TOPICAL
Status: DISCONTINUED | OUTPATIENT
Start: 2022-01-01 | End: 2022-01-01 | Stop reason: HOSPADM

## 2022-01-01 RX ORDER — ONDANSETRON 4 MG/1
4 TABLET, ORALLY DISINTEGRATING ORAL EVERY 6 HOURS PRN
Status: DISCONTINUED | OUTPATIENT
Start: 2022-01-01 | End: 2022-01-01 | Stop reason: HOSPADM

## 2022-01-01 RX ORDER — OLANZAPINE 10 MG/2ML
5 INJECTION, POWDER, FOR SOLUTION INTRAMUSCULAR DAILY PRN
Status: DISCONTINUED | OUTPATIENT
Start: 2022-01-01 | End: 2022-01-01

## 2022-01-01 RX ORDER — LORAZEPAM 2 MG/ML
0.5 INJECTION INTRAMUSCULAR SEE ADMIN INSTRUCTIONS
Status: DISCONTINUED | OUTPATIENT
Start: 2022-01-01 | End: 2022-01-01

## 2022-01-01 RX ORDER — OLANZAPINE 10 MG/2ML
5 INJECTION, POWDER, FOR SOLUTION INTRAMUSCULAR EVERY 8 HOURS PRN
Status: DISCONTINUED | OUTPATIENT
Start: 2022-01-01 | End: 2022-01-01 | Stop reason: HOSPADM

## 2022-01-01 RX ORDER — POTASSIUM CHLORIDE 7.45 MG/ML
10 INJECTION INTRAVENOUS
Status: COMPLETED | OUTPATIENT
Start: 2022-01-01 | End: 2022-01-01

## 2022-01-01 RX ORDER — APIXABAN 5 MG/1
5 TABLET, FILM COATED ORAL 2 TIMES DAILY
Qty: 180 TABLET | Refills: 1 | Status: SHIPPED | OUTPATIENT
Start: 2022-01-01

## 2022-01-01 RX ORDER — HYDROMORPHONE HCL IN WATER/PF 6 MG/30 ML
0.2 PATIENT CONTROLLED ANALGESIA SYRINGE INTRAVENOUS
Status: CANCELLED | OUTPATIENT
Start: 2022-01-01

## 2022-01-01 RX ORDER — DEXTROSE MONOHYDRATE 50 MG/ML
INJECTION, SOLUTION INTRAVENOUS CONTINUOUS
Status: DISCONTINUED | OUTPATIENT
Start: 2022-01-01 | End: 2022-01-01

## 2022-01-01 RX ORDER — AMOXICILLIN 250 MG
1 CAPSULE ORAL 2 TIMES DAILY
Status: DISCONTINUED | OUTPATIENT
Start: 2022-01-01 | End: 2022-01-01 | Stop reason: HOSPADM

## 2022-01-01 RX ORDER — VITAMIN B COMPLEX
1000 TABLET ORAL DAILY
Status: DISCONTINUED | OUTPATIENT
Start: 2022-01-01 | End: 2022-01-01 | Stop reason: HOSPADM

## 2022-01-01 RX ORDER — ATORVASTATIN CALCIUM 40 MG/1
40 TABLET, FILM COATED ORAL AT BEDTIME
Status: DISCONTINUED | OUTPATIENT
Start: 2022-01-01 | End: 2022-01-01

## 2022-01-01 RX ORDER — ACETAMINOPHEN 325 MG/1
975 TABLET ORAL EVERY 8 HOURS
Status: DISCONTINUED | OUTPATIENT
Start: 2022-01-01 | End: 2022-01-01

## 2022-01-01 RX ORDER — SODIUM CHLORIDE 9 MG/ML
INJECTION, SOLUTION INTRAVENOUS CONTINUOUS
Status: DISCONTINUED | OUTPATIENT
Start: 2022-01-01 | End: 2022-01-01

## 2022-01-01 RX ORDER — ACETAMINOPHEN 325 MG/1
650 TABLET ORAL EVERY 8 HOURS PRN
Status: DISCONTINUED | OUTPATIENT
Start: 2022-01-01 | End: 2022-01-01 | Stop reason: HOSPADM

## 2022-01-01 RX ORDER — ENOXAPARIN SODIUM 100 MG/ML
40 INJECTION SUBCUTANEOUS 2 TIMES DAILY
Status: DISCONTINUED | OUTPATIENT
Start: 2022-01-01 | End: 2022-01-01

## 2022-01-01 RX ORDER — LEVOTHYROXINE SODIUM 50 UG/1
TABLET ORAL
Qty: 90 TABLET | Refills: 1 | Status: SHIPPED | OUTPATIENT
Start: 2022-01-01

## 2022-01-01 RX ORDER — DOXYCYCLINE HYCLATE 100 MG
100 TABLET ORAL 2 TIMES DAILY
Qty: 20 TABLET | Refills: 0 | Status: SHIPPED | OUTPATIENT
Start: 2022-01-01 | End: 2022-01-01

## 2022-01-01 RX ORDER — KETOROLAC TROMETHAMINE 15 MG/ML
15 INJECTION, SOLUTION INTRAMUSCULAR; INTRAVENOUS EVERY 6 HOURS PRN
Status: DISCONTINUED | OUTPATIENT
Start: 2022-01-01 | End: 2022-01-01

## 2022-01-01 RX ORDER — METOPROLOL SUCCINATE 25 MG/1
50 TABLET, EXTENDED RELEASE ORAL DAILY
Qty: 180 TABLET | Refills: 1 | Status: ON HOLD | OUTPATIENT
Start: 2022-01-01 | End: 2022-01-01

## 2022-01-01 RX ORDER — METOPROLOL SUCCINATE 25 MG/1
25 TABLET, EXTENDED RELEASE ORAL DAILY
Status: DISCONTINUED | OUTPATIENT
Start: 2022-01-01 | End: 2022-01-01 | Stop reason: HOSPADM

## 2022-01-01 RX ORDER — DOCUSATE SODIUM 100 MG/1
100 CAPSULE, LIQUID FILLED ORAL 2 TIMES DAILY PRN
COMMUNITY
End: 2022-01-01

## 2022-01-01 RX ORDER — SOTALOL HYDROCHLORIDE 80 MG/1
80 TABLET ORAL EVERY 12 HOURS SCHEDULED
Status: DISCONTINUED | OUTPATIENT
Start: 2022-01-01 | End: 2022-01-01

## 2022-01-01 RX ORDER — NALOXONE HYDROCHLORIDE 0.4 MG/ML
0.2 INJECTION, SOLUTION INTRAMUSCULAR; INTRAVENOUS; SUBCUTANEOUS
Status: DISCONTINUED | OUTPATIENT
Start: 2022-01-01 | End: 2022-01-01

## 2022-01-01 RX ORDER — SENNOSIDES 8.6 MG
650 CAPSULE ORAL EVERY 8 HOURS PRN
COMMUNITY

## 2022-01-01 RX ORDER — ONDANSETRON 4 MG/1
4 TABLET, ORALLY DISINTEGRATING ORAL EVERY 6 HOURS PRN
Status: DISCONTINUED | OUTPATIENT
Start: 2022-01-01 | End: 2022-01-01

## 2022-01-01 RX ORDER — HYDROMORPHONE HCL IN WATER/PF 6 MG/30 ML
0.2 PATIENT CONTROLLED ANALGESIA SYRINGE INTRAVENOUS
Status: DISCONTINUED | OUTPATIENT
Start: 2022-01-01 | End: 2022-01-01

## 2022-01-01 RX ORDER — ASPIRIN 81 MG/1
81 TABLET, CHEWABLE ORAL DAILY
Status: DISCONTINUED | OUTPATIENT
Start: 2022-01-01 | End: 2022-01-01 | Stop reason: HOSPADM

## 2022-01-01 RX ORDER — AMOXICILLIN 250 MG
1 CAPSULE ORAL 2 TIMES DAILY
Status: DISCONTINUED | OUTPATIENT
Start: 2022-01-01 | End: 2022-01-01

## 2022-01-01 RX ORDER — ASPIRIN 81 MG/1
81 TABLET, CHEWABLE ORAL DAILY
Status: DISCONTINUED | OUTPATIENT
Start: 2022-01-01 | End: 2022-01-01

## 2022-01-01 RX ORDER — DOXYCYCLINE HYCLATE 100 MG
100 TABLET ORAL 2 TIMES DAILY
Qty: 10 TABLET | Refills: 0 | Status: SHIPPED | OUTPATIENT
Start: 2022-01-01 | End: 2022-01-01

## 2022-01-01 RX ORDER — ACETAMINOPHEN 650 MG/1
650 SUPPOSITORY RECTAL EVERY 4 HOURS PRN
Status: DISCONTINUED | OUTPATIENT
Start: 2022-01-01 | End: 2022-01-01 | Stop reason: HOSPADM

## 2022-01-01 RX ORDER — METOPROLOL TARTRATE 1 MG/ML
5 INJECTION, SOLUTION INTRAVENOUS
Status: DISCONTINUED | OUTPATIENT
Start: 2022-01-01 | End: 2022-01-01 | Stop reason: HOSPADM

## 2022-01-01 RX ORDER — MORPHINE SULFATE 2 MG/ML
2 INJECTION, SOLUTION INTRAMUSCULAR; INTRAVENOUS
Status: DISCONTINUED | OUTPATIENT
Start: 2022-01-01 | End: 2022-01-01

## 2022-01-01 RX ORDER — IOPAMIDOL 755 MG/ML
75 INJECTION, SOLUTION INTRAVASCULAR ONCE
Status: COMPLETED | OUTPATIENT
Start: 2022-01-01 | End: 2022-01-01

## 2022-01-01 RX ORDER — NALOXONE HYDROCHLORIDE 0.4 MG/ML
0.4 INJECTION, SOLUTION INTRAMUSCULAR; INTRAVENOUS; SUBCUTANEOUS
Status: DISCONTINUED | OUTPATIENT
Start: 2022-01-01 | End: 2022-01-01

## 2022-01-01 RX ORDER — ATROPINE SULFATE 10 MG/ML
2 SOLUTION/ DROPS OPHTHALMIC
Status: DISCONTINUED | OUTPATIENT
Start: 2022-01-01 | End: 2022-01-01 | Stop reason: HOSPADM

## 2022-01-01 RX ORDER — BETAMETHASONE DIPROPIONATE 0.5 MG/G
CREAM TOPICAL
COMMUNITY
Start: 2022-01-01 | End: 2022-01-01

## 2022-01-01 RX ORDER — LIDOCAINE 4 G/G
1 PATCH TOPICAL
Status: DISCONTINUED | OUTPATIENT
Start: 2022-01-01 | End: 2022-01-01 | Stop reason: HOSPADM

## 2022-01-01 RX ORDER — METOPROLOL SUCCINATE 25 MG/1
25 TABLET, EXTENDED RELEASE ORAL DAILY
Status: DISCONTINUED | OUTPATIENT
Start: 2022-01-01 | End: 2022-01-01

## 2022-01-01 RX ORDER — HALOPERIDOL 5 MG/ML
2 INJECTION INTRAMUSCULAR ONCE
Status: DISCONTINUED | OUTPATIENT
Start: 2022-01-01 | End: 2022-01-01

## 2022-01-01 RX ORDER — SOTALOL HYDROCHLORIDE 80 MG/1
80 TABLET ORAL EVERY 12 HOURS SCHEDULED
Status: DISCONTINUED | OUTPATIENT
Start: 2022-01-01 | End: 2022-01-01 | Stop reason: HOSPADM

## 2022-01-01 RX ORDER — AMOXICILLIN 250 MG
2 CAPSULE ORAL 2 TIMES DAILY
Status: DISCONTINUED | OUTPATIENT
Start: 2022-01-01 | End: 2022-01-01 | Stop reason: HOSPADM

## 2022-01-01 RX ORDER — METOPROLOL TARTRATE 1 MG/ML
5 INJECTION, SOLUTION INTRAVENOUS
Status: DISCONTINUED | OUTPATIENT
Start: 2022-01-01 | End: 2022-01-01

## 2022-01-01 RX ORDER — POLYETHYLENE GLYCOL 3350 17 G/17G
17 POWDER, FOR SOLUTION ORAL DAILY
Status: DISCONTINUED | OUTPATIENT
Start: 2022-01-01 | End: 2022-01-01

## 2022-01-01 RX ORDER — VITAMIN B COMPLEX
1000 TABLET ORAL DAILY
Status: DISCONTINUED | OUTPATIENT
Start: 2022-01-01 | End: 2022-01-01

## 2022-01-01 RX ORDER — DUTASTERIDE 0.5 MG/1
0.5 CAPSULE, LIQUID FILLED ORAL DAILY
Status: DISCONTINUED | OUTPATIENT
Start: 2022-01-01 | End: 2022-01-01

## 2022-01-01 RX ORDER — BISACODYL 10 MG
10 SUPPOSITORY, RECTAL RECTAL DAILY PRN
Status: DISCONTINUED | OUTPATIENT
Start: 2022-01-01 | End: 2022-01-01 | Stop reason: HOSPADM

## 2022-01-01 RX ORDER — METOPROLOL TARTRATE 1 MG/ML
5 INJECTION, SOLUTION INTRAVENOUS ONCE
Status: COMPLETED | OUTPATIENT
Start: 2022-01-01 | End: 2022-01-01

## 2022-01-01 RX ORDER — LIDOCAINE 40 MG/G
CREAM TOPICAL
Status: DISCONTINUED | OUTPATIENT
Start: 2022-01-01 | End: 2022-01-01

## 2022-01-01 RX ORDER — ONDANSETRON 2 MG/ML
4 INJECTION INTRAMUSCULAR; INTRAVENOUS EVERY 6 HOURS PRN
Status: DISCONTINUED | OUTPATIENT
Start: 2022-01-01 | End: 2022-01-01 | Stop reason: HOSPADM

## 2022-01-01 RX ORDER — MORPHINE SULFATE 20 MG/ML
5 SOLUTION ORAL EVERY 4 HOURS
Status: DISCONTINUED | OUTPATIENT
Start: 2022-01-01 | End: 2022-01-01 | Stop reason: HOSPADM

## 2022-01-01 RX ORDER — SOTALOL HYDROCHLORIDE 80 MG/1
TABLET ORAL
Qty: 180 TABLET | Refills: 2 | Status: SHIPPED | OUTPATIENT
Start: 2022-01-01

## 2022-01-01 RX ORDER — METOPROLOL TARTRATE 25 MG/1
25 TABLET, FILM COATED ORAL ONCE
Status: COMPLETED | OUTPATIENT
Start: 2022-01-01 | End: 2022-01-01

## 2022-01-01 RX ORDER — LEVOTHYROXINE SODIUM 25 UG/1
50 TABLET ORAL ONCE
Status: DISCONTINUED | OUTPATIENT
Start: 2022-01-01 | End: 2022-01-01 | Stop reason: HOSPADM

## 2022-01-01 RX ORDER — SENNOSIDES 8.6 MG
650 CAPSULE ORAL EVERY 8 HOURS PRN
Status: DISCONTINUED | OUTPATIENT
Start: 2022-01-01 | End: 2022-01-01 | Stop reason: CLARIF

## 2022-01-01 RX ORDER — AMOXICILLIN 250 MG
2 CAPSULE ORAL 2 TIMES DAILY
Status: DISCONTINUED | OUTPATIENT
Start: 2022-01-01 | End: 2022-01-01

## 2022-01-01 RX ORDER — MORPHINE SULFATE 20 MG/ML
10 SOLUTION ORAL
Status: DISCONTINUED | OUTPATIENT
Start: 2022-01-01 | End: 2022-01-01 | Stop reason: HOSPADM

## 2022-01-01 RX ORDER — ATORVASTATIN CALCIUM 40 MG/1
40 TABLET, FILM COATED ORAL AT BEDTIME
Status: DISCONTINUED | OUTPATIENT
Start: 2022-01-01 | End: 2022-01-01 | Stop reason: HOSPADM

## 2022-01-01 RX ORDER — CALCIUM CARBONATE 500 MG/1
1000 TABLET, CHEWABLE ORAL 4 TIMES DAILY PRN
Status: DISCONTINUED | OUTPATIENT
Start: 2022-01-01 | End: 2022-01-01 | Stop reason: HOSPADM

## 2022-01-01 RX ORDER — LORAZEPAM 2 MG/ML
0.5 INJECTION INTRAMUSCULAR EVERY 4 HOURS PRN
Status: DISCONTINUED | OUTPATIENT
Start: 2022-01-01 | End: 2022-01-01

## 2022-01-01 RX ORDER — SOTALOL HYDROCHLORIDE 120 MG/1
120 TABLET ORAL EVERY 12 HOURS SCHEDULED
Status: DISCONTINUED | OUTPATIENT
Start: 2022-01-01 | End: 2022-01-01

## 2022-01-01 RX ORDER — CEPHALEXIN 500 MG/1
500 CAPSULE ORAL 2 TIMES DAILY
Qty: 20 CAPSULE | Refills: 0 | Status: SHIPPED | OUTPATIENT
Start: 2022-01-01 | End: 2022-01-01

## 2022-01-01 RX ORDER — SOTALOL HYDROCHLORIDE 80 MG/1
80 TABLET ORAL DAILY
Status: DISCONTINUED | OUTPATIENT
Start: 2022-01-01 | End: 2022-01-01

## 2022-01-01 RX ORDER — ONDANSETRON 2 MG/ML
4 INJECTION INTRAMUSCULAR; INTRAVENOUS EVERY 6 HOURS PRN
Status: DISCONTINUED | OUTPATIENT
Start: 2022-01-01 | End: 2022-01-01

## 2022-01-01 RX ORDER — CEPHALEXIN 500 MG/1
500 CAPSULE ORAL EVERY 12 HOURS SCHEDULED
Status: DISCONTINUED | OUTPATIENT
Start: 2022-01-01 | End: 2022-01-01

## 2022-01-01 RX ORDER — DILTIAZEM HYDROCHLORIDE 5 MG/ML
15 INJECTION INTRAVENOUS ONCE
Status: COMPLETED | OUTPATIENT
Start: 2022-01-01 | End: 2022-01-01

## 2022-01-01 RX ORDER — SCOLOPAMINE TRANSDERMAL SYSTEM 1 MG/1
1 PATCH, EXTENDED RELEASE TRANSDERMAL
Status: DISCONTINUED | OUTPATIENT
Start: 2022-01-01 | End: 2022-01-01 | Stop reason: HOSPADM

## 2022-01-01 RX ORDER — ACETAMINOPHEN 650 MG/1
650 SUPPOSITORY RECTAL EVERY 4 HOURS PRN
Status: CANCELLED | OUTPATIENT
Start: 2022-01-01

## 2022-01-01 RX ORDER — CEFAZOLIN SODIUM 1 G/3ML
1 INJECTION, POWDER, FOR SOLUTION INTRAMUSCULAR; INTRAVENOUS EVERY 8 HOURS
Status: DISCONTINUED | OUTPATIENT
Start: 2022-01-01 | End: 2022-01-01

## 2022-01-01 RX ADMIN — ENOXAPARIN SODIUM 40 MG: 40 INJECTION SUBCUTANEOUS at 08:06

## 2022-01-01 RX ADMIN — ASPIRIN 81 MG CHEWABLE TABLET 81 MG: 81 TABLET CHEWABLE at 09:50

## 2022-01-01 RX ADMIN — CEPHALEXIN 500 MG: 500 CAPSULE ORAL at 10:34

## 2022-01-01 RX ADMIN — SOTALOL HYDROCHLORIDE 80 MG: 80 TABLET ORAL at 09:35

## 2022-01-01 RX ADMIN — DUTASTERIDE 0.5 MG: 0.5 CAPSULE, LIQUID FILLED ORAL at 09:15

## 2022-01-01 RX ADMIN — SENNOSIDES AND DOCUSATE SODIUM 2 TABLET: 50; 8.6 TABLET ORAL at 08:51

## 2022-01-01 RX ADMIN — CEFAZOLIN 1 G: 1 INJECTION, POWDER, FOR SOLUTION INTRAMUSCULAR; INTRAVENOUS at 13:05

## 2022-01-01 RX ADMIN — METOPROLOL TARTRATE 5 MG: 5 INJECTION INTRAVENOUS at 01:59

## 2022-01-01 RX ADMIN — Medication 500 MG: at 10:04

## 2022-01-01 RX ADMIN — CEFAZOLIN 1 G: 1 INJECTION, POWDER, FOR SOLUTION INTRAMUSCULAR; INTRAVENOUS at 13:31

## 2022-01-01 RX ADMIN — IOPAMIDOL 75 ML: 755 INJECTION, SOLUTION INTRAVENOUS at 14:58

## 2022-01-01 RX ADMIN — SOTALOL HYDROCHLORIDE 80 MG: 80 TABLET ORAL at 20:14

## 2022-01-01 RX ADMIN — SODIUM CHLORIDE: 9 INJECTION, SOLUTION INTRAVENOUS at 20:49

## 2022-01-01 RX ADMIN — METOPROLOL TARTRATE 5 MG: 5 INJECTION INTRAVENOUS at 01:31

## 2022-01-01 RX ADMIN — Medication 500 MG: at 08:06

## 2022-01-01 RX ADMIN — LIDOCAINE 1 PATCH: 246 PATCH TOPICAL at 08:08

## 2022-01-01 RX ADMIN — SOTALOL HYDROCHLORIDE 80 MG: 80 TABLET ORAL at 09:54

## 2022-01-01 RX ADMIN — APIXABAN 5 MG: 5 TABLET, FILM COATED ORAL at 08:51

## 2022-01-01 RX ADMIN — SENNOSIDES AND DOCUSATE SODIUM 1 TABLET: 50; 8.6 TABLET ORAL at 09:07

## 2022-01-01 RX ADMIN — ASPIRIN 81 MG CHEWABLE TABLET 81 MG: 81 TABLET CHEWABLE at 09:34

## 2022-01-01 RX ADMIN — SENNOSIDES AND DOCUSATE SODIUM 1 TABLET: 50; 8.6 TABLET ORAL at 10:03

## 2022-01-01 RX ADMIN — HYDROMORPHONE HYDROCHLORIDE 0.2 MG: 0.2 INJECTION, SOLUTION INTRAMUSCULAR; INTRAVENOUS; SUBCUTANEOUS at 16:02

## 2022-01-01 RX ADMIN — OLANZAPINE 5 MG: 10 INJECTION, POWDER, FOR SOLUTION INTRAMUSCULAR at 17:57

## 2022-01-01 RX ADMIN — MORPHINE SULFATE 2 MG: 2 INJECTION, SOLUTION INTRAMUSCULAR; INTRAVENOUS at 02:43

## 2022-01-01 RX ADMIN — METOPROLOL SUCCINATE 25 MG: 25 TABLET, EXTENDED RELEASE ORAL at 08:58

## 2022-01-01 RX ADMIN — SODIUM CHLORIDE 500 ML: 9 INJECTION, SOLUTION INTRAVENOUS at 19:38

## 2022-01-01 RX ADMIN — APIXABAN 5 MG: 5 TABLET, FILM COATED ORAL at 09:05

## 2022-01-01 RX ADMIN — METOPROLOL SUCCINATE 25 MG: 25 TABLET, EXTENDED RELEASE ORAL at 09:44

## 2022-01-01 RX ADMIN — CEFAZOLIN 1 G: 1 INJECTION, POWDER, FOR SOLUTION INTRAMUSCULAR; INTRAVENOUS at 21:53

## 2022-01-01 RX ADMIN — ACETAMINOPHEN 650 MG: 650 SUPPOSITORY RECTAL at 01:18

## 2022-01-01 RX ADMIN — LIDOCAINE 1 PATCH: 246 PATCH TOPICAL at 12:24

## 2022-01-01 RX ADMIN — DUTASTERIDE 0.5 MG: 0.5 CAPSULE, LIQUID FILLED ORAL at 10:39

## 2022-01-01 RX ADMIN — SOTALOL HYDROCHLORIDE 80 MG: 80 TABLET ORAL at 21:00

## 2022-01-01 RX ADMIN — LEVOTHYROXINE SODIUM 50 MCG: 0.03 TABLET ORAL at 09:05

## 2022-01-01 RX ADMIN — SOTALOL HYDROCHLORIDE 80 MG: 80 TABLET ORAL at 09:44

## 2022-01-01 RX ADMIN — METOPROLOL TARTRATE 5 MG: 5 INJECTION INTRAVENOUS at 10:43

## 2022-01-01 RX ADMIN — METOPROLOL TARTRATE 5 MG: 5 INJECTION INTRAVENOUS at 02:29

## 2022-01-01 RX ADMIN — ACETAMINOPHEN 1000 MG: 160 LIQUID ORAL at 20:16

## 2022-01-01 RX ADMIN — LIDOCAINE 1 PATCH: 246 PATCH TOPICAL at 08:38

## 2022-01-01 RX ADMIN — ACETAMINOPHEN 975 MG: 325 TABLET, FILM COATED ORAL at 09:01

## 2022-01-01 RX ADMIN — HYDROMORPHONE HYDROCHLORIDE 0.2 MG: 0.2 INJECTION, SOLUTION INTRAMUSCULAR; INTRAVENOUS; SUBCUTANEOUS at 01:53

## 2022-01-01 RX ADMIN — HYDROMORPHONE HYDROCHLORIDE 0.2 MG: 0.2 INJECTION, SOLUTION INTRAMUSCULAR; INTRAVENOUS; SUBCUTANEOUS at 01:44

## 2022-01-01 RX ADMIN — Medication 1000 UNITS: at 08:07

## 2022-01-01 RX ADMIN — SOTALOL HYDROCHLORIDE 120 MG: 120 TABLET ORAL at 21:26

## 2022-01-01 RX ADMIN — SOTALOL HYDROCHLORIDE 80 MG: 80 TABLET ORAL at 08:06

## 2022-01-01 RX ADMIN — CEFAZOLIN 1 G: 1 INJECTION, POWDER, FOR SOLUTION INTRAMUSCULAR; INTRAVENOUS at 05:03

## 2022-01-01 RX ADMIN — METOPROLOL SUCCINATE 25 MG: 25 TABLET, EXTENDED RELEASE ORAL at 09:04

## 2022-01-01 RX ADMIN — CEFAZOLIN 1 G: 1 INJECTION, POWDER, FOR SOLUTION INTRAMUSCULAR; INTRAVENOUS at 05:56

## 2022-01-01 RX ADMIN — ACETAMINOPHEN 975 MG: 325 TABLET, FILM COATED ORAL at 01:03

## 2022-01-01 RX ADMIN — DEXTROSE MONOHYDRATE: 50 INJECTION, SOLUTION INTRAVENOUS at 10:37

## 2022-01-01 RX ADMIN — Medication 500 MG: at 09:04

## 2022-01-01 RX ADMIN — DILTIAZEM HYDROCHLORIDE 15 MG: 5 INJECTION, SOLUTION INTRAVENOUS at 17:25

## 2022-01-01 RX ADMIN — ATORVASTATIN CALCIUM 40 MG: 40 TABLET, FILM COATED ORAL at 20:45

## 2022-01-01 RX ADMIN — ASPIRIN 81 MG CHEWABLE TABLET 81 MG: 81 TABLET CHEWABLE at 10:03

## 2022-01-01 RX ADMIN — SOTALOL HYDROCHLORIDE 80 MG: 80 TABLET ORAL at 08:59

## 2022-01-01 RX ADMIN — APIXABAN 5 MG: 5 TABLET, FILM COATED ORAL at 09:49

## 2022-01-01 RX ADMIN — CEFAZOLIN 1 G: 1 INJECTION, POWDER, FOR SOLUTION INTRAMUSCULAR; INTRAVENOUS at 21:55

## 2022-01-01 RX ADMIN — SODIUM CHLORIDE 500 ML: 9 INJECTION, SOLUTION INTRAVENOUS at 14:10

## 2022-01-01 RX ADMIN — SENNOSIDES AND DOCUSATE SODIUM 2 TABLET: 50; 8.6 TABLET ORAL at 09:54

## 2022-01-01 RX ADMIN — ENOXAPARIN SODIUM 40 MG: 40 INJECTION SUBCUTANEOUS at 21:49

## 2022-01-01 RX ADMIN — LIDOCAINE 1 PATCH: 246 PATCH TOPICAL at 08:20

## 2022-01-01 RX ADMIN — SOTALOL HYDROCHLORIDE 80 MG: 80 TABLET ORAL at 20:41

## 2022-01-01 RX ADMIN — DUTASTERIDE 0.5 MG: 0.5 CAPSULE, LIQUID FILLED ORAL at 08:43

## 2022-01-01 RX ADMIN — Medication 1 MG: at 23:30

## 2022-01-01 RX ADMIN — ENOXAPARIN SODIUM 40 MG: 40 INJECTION SUBCUTANEOUS at 21:55

## 2022-01-01 RX ADMIN — ACETAMINOPHEN 650 MG: 650 SUPPOSITORY RECTAL at 09:32

## 2022-01-01 RX ADMIN — POTASSIUM CHLORIDE 40 MEQ: 1500 TABLET, EXTENDED RELEASE ORAL at 09:16

## 2022-01-01 RX ADMIN — DEXTROSE MONOHYDRATE: 50 INJECTION, SOLUTION INTRAVENOUS at 09:28

## 2022-01-01 RX ADMIN — Medication 500 MG: at 09:48

## 2022-01-01 RX ADMIN — ACETAMINOPHEN 650 MG: 650 SUPPOSITORY RECTAL at 05:56

## 2022-01-01 RX ADMIN — PERFLUTREN 3 ML: 6.52 INJECTION, SUSPENSION INTRAVENOUS at 11:20

## 2022-01-01 RX ADMIN — ATORVASTATIN CALCIUM 40 MG: 40 TABLET, FILM COATED ORAL at 21:03

## 2022-01-01 RX ADMIN — DEXTROSE MONOHYDRATE: 50 INJECTION, SOLUTION INTRAVENOUS at 20:32

## 2022-01-01 RX ADMIN — METOPROLOL TARTRATE 5 MG: 5 INJECTION INTRAVENOUS at 20:44

## 2022-01-01 RX ADMIN — SODIUM CHLORIDE 500 ML: 9 INJECTION, SOLUTION INTRAVENOUS at 12:55

## 2022-01-01 RX ADMIN — OLANZAPINE 5 MG: 10 INJECTION, POWDER, FOR SOLUTION INTRAMUSCULAR at 20:57

## 2022-01-01 RX ADMIN — METOPROLOL SUCCINATE 25 MG: 25 TABLET, EXTENDED RELEASE ORAL at 08:50

## 2022-01-01 RX ADMIN — HYDROMORPHONE HYDROCHLORIDE 0.2 MG: 1 INJECTION, SOLUTION INTRAMUSCULAR; INTRAVENOUS; SUBCUTANEOUS at 18:29

## 2022-01-01 RX ADMIN — HYDROMORPHONE HYDROCHLORIDE 1 MG: 2 TABLET ORAL at 02:51

## 2022-01-01 RX ADMIN — BISACODYL 10 MG: 10 SUPPOSITORY RECTAL at 11:05

## 2022-01-01 RX ADMIN — HYDROMORPHONE HYDROCHLORIDE 0.2 MG: 0.2 INJECTION, SOLUTION INTRAMUSCULAR; INTRAVENOUS; SUBCUTANEOUS at 04:20

## 2022-01-01 RX ADMIN — ATROPINE SULFATE 2 DROP: 10 SOLUTION/ DROPS OPHTHALMIC at 11:31

## 2022-01-01 RX ADMIN — HYDROMORPHONE HYDROCHLORIDE 0.2 MG: 0.2 INJECTION, SOLUTION INTRAMUSCULAR; INTRAVENOUS; SUBCUTANEOUS at 10:42

## 2022-01-01 RX ADMIN — ACETAMINOPHEN 975 MG: 325 TABLET, FILM COATED ORAL at 09:48

## 2022-01-01 RX ADMIN — MORPHINE SULFATE 2 MG: 2 INJECTION, SOLUTION INTRAMUSCULAR; INTRAVENOUS at 14:14

## 2022-01-01 RX ADMIN — ENOXAPARIN SODIUM 40 MG: 40 INJECTION SUBCUTANEOUS at 21:50

## 2022-01-01 RX ADMIN — SCOPALAMINE 1 PATCH: 1 PATCH, EXTENDED RELEASE TRANSDERMAL at 15:40

## 2022-01-01 RX ADMIN — OLANZAPINE 5 MG: 10 INJECTION, POWDER, FOR SOLUTION INTRAMUSCULAR at 19:03

## 2022-01-01 RX ADMIN — DEXTROSE MONOHYDRATE: 50 INJECTION, SOLUTION INTRAVENOUS at 23:42

## 2022-01-01 RX ADMIN — METOPROLOL SUCCINATE 25 MG: 25 TABLET, EXTENDED RELEASE ORAL at 09:12

## 2022-01-01 RX ADMIN — ACETAMINOPHEN 975 MG: 325 TABLET, FILM COATED ORAL at 00:35

## 2022-01-01 RX ADMIN — ASPIRIN 81 MG CHEWABLE TABLET 81 MG: 81 TABLET CHEWABLE at 08:06

## 2022-01-01 RX ADMIN — METOPROLOL SUCCINATE 25 MG: 25 TABLET, EXTENDED RELEASE ORAL at 08:07

## 2022-01-01 RX ADMIN — Medication 500 MG: at 09:55

## 2022-01-01 RX ADMIN — MORPHINE SULFATE 2 MG: 2 INJECTION, SOLUTION INTRAMUSCULAR; INTRAVENOUS at 08:36

## 2022-01-01 RX ADMIN — CEFAZOLIN 1 G: 1 INJECTION, POWDER, FOR SOLUTION INTRAMUSCULAR; INTRAVENOUS at 20:53

## 2022-01-01 RX ADMIN — APIXABAN 5 MG: 5 TABLET, FILM COATED ORAL at 19:50

## 2022-01-01 RX ADMIN — LEVOTHYROXINE SODIUM 50 MCG: 0.03 TABLET ORAL at 08:44

## 2022-01-01 RX ADMIN — SODIUM CHLORIDE: 9 INJECTION, SOLUTION INTRAVENOUS at 11:05

## 2022-01-01 RX ADMIN — ACETAMINOPHEN 975 MG: 325 TABLET, FILM COATED ORAL at 16:47

## 2022-01-01 RX ADMIN — SOTALOL HYDROCHLORIDE 80 MG: 80 TABLET ORAL at 20:44

## 2022-01-01 RX ADMIN — SOTALOL HYDROCHLORIDE 120 MG: 120 TABLET ORAL at 09:04

## 2022-01-01 RX ADMIN — ATORVASTATIN CALCIUM 40 MG: 40 TABLET, FILM COATED ORAL at 21:35

## 2022-01-01 RX ADMIN — METOPROLOL TARTRATE 25 MG: 25 TABLET, FILM COATED ORAL at 01:57

## 2022-01-01 RX ADMIN — METOPROLOL TARTRATE 5 MG: 5 INJECTION INTRAVENOUS at 00:29

## 2022-01-01 RX ADMIN — ATORVASTATIN CALCIUM 40 MG: 40 TABLET, FILM COATED ORAL at 20:33

## 2022-01-01 RX ADMIN — ATORVASTATIN CALCIUM 40 MG: 40 TABLET, FILM COATED ORAL at 22:48

## 2022-01-01 RX ADMIN — OLANZAPINE 5 MG: 10 INJECTION, POWDER, FOR SOLUTION INTRAMUSCULAR at 02:43

## 2022-01-01 RX ADMIN — SODIUM CHLORIDE: 9 INJECTION, SOLUTION INTRAVENOUS at 00:07

## 2022-01-01 RX ADMIN — SOTALOL HYDROCHLORIDE 80 MG: 80 TABLET ORAL at 08:44

## 2022-01-01 RX ADMIN — SODIUM CHLORIDE: 9 INJECTION, SOLUTION INTRAVENOUS at 16:29

## 2022-01-01 RX ADMIN — CEFAZOLIN 1 G: 1 INJECTION, POWDER, FOR SOLUTION INTRAMUSCULAR; INTRAVENOUS at 14:07

## 2022-01-01 RX ADMIN — Medication 500 MG: at 08:44

## 2022-01-01 RX ADMIN — Medication 1000 UNITS: at 09:50

## 2022-01-01 RX ADMIN — DUTASTERIDE 0.5 MG: 0.5 CAPSULE, LIQUID FILLED ORAL at 08:19

## 2022-01-01 RX ADMIN — ATORVASTATIN CALCIUM 40 MG: 40 TABLET, FILM COATED ORAL at 21:55

## 2022-01-01 RX ADMIN — Medication 1000 UNITS: at 08:44

## 2022-01-01 RX ADMIN — ACETAMINOPHEN 975 MG: 325 TABLET, FILM COATED ORAL at 18:28

## 2022-01-01 RX ADMIN — LORAZEPAM 0.25 MG: 2 INJECTION INTRAMUSCULAR; INTRAVENOUS at 13:52

## 2022-01-01 RX ADMIN — LORAZEPAM 0.5 MG: 2 INJECTION INTRAMUSCULAR; INTRAVENOUS at 00:29

## 2022-01-01 RX ADMIN — HYDROMORPHONE HYDROCHLORIDE 0.2 MG: 0.2 INJECTION, SOLUTION INTRAMUSCULAR; INTRAVENOUS; SUBCUTANEOUS at 20:05

## 2022-01-01 RX ADMIN — LORAZEPAM 1 MG: 2 LIQUID ORAL at 12:25

## 2022-01-01 RX ADMIN — CEPHALEXIN 500 MG: 500 CAPSULE ORAL at 20:14

## 2022-01-01 RX ADMIN — ACETAMINOPHEN 650 MG: 650 SUPPOSITORY RECTAL at 19:03

## 2022-01-01 RX ADMIN — ENOXAPARIN SODIUM 40 MG: 40 INJECTION SUBCUTANEOUS at 21:05

## 2022-01-01 RX ADMIN — SOTALOL HYDROCHLORIDE 80 MG: 80 TABLET ORAL at 10:38

## 2022-01-01 RX ADMIN — HYDROMORPHONE HYDROCHLORIDE 0.2 MG: 0.2 INJECTION, SOLUTION INTRAMUSCULAR; INTRAVENOUS; SUBCUTANEOUS at 05:31

## 2022-01-01 RX ADMIN — LORAZEPAM 0.5 MG: 2 INJECTION INTRAMUSCULAR; INTRAVENOUS at 14:54

## 2022-01-01 RX ADMIN — APIXABAN 5 MG: 5 TABLET, FILM COATED ORAL at 20:13

## 2022-01-01 RX ADMIN — CEFAZOLIN 1 G: 1 INJECTION, POWDER, FOR SOLUTION INTRAMUSCULAR; INTRAVENOUS at 13:23

## 2022-01-01 RX ADMIN — Medication 500 MG: at 09:02

## 2022-01-01 RX ADMIN — ACETAMINOPHEN 975 MG: 325 TABLET, FILM COATED ORAL at 09:13

## 2022-01-01 RX ADMIN — SOTALOL HYDROCHLORIDE 80 MG: 80 TABLET ORAL at 08:14

## 2022-01-01 RX ADMIN — POTASSIUM CHLORIDE 10 MEQ: 7.46 INJECTION, SOLUTION INTRAVENOUS at 12:26

## 2022-01-01 RX ADMIN — ACETAMINOPHEN 650 MG: 650 SUPPOSITORY RECTAL at 01:12

## 2022-01-01 RX ADMIN — LEVOTHYROXINE SODIUM 50 MCG: 0.03 TABLET ORAL at 10:38

## 2022-01-01 RX ADMIN — POTASSIUM CHLORIDE 10 MEQ: 7.46 INJECTION, SOLUTION INTRAVENOUS at 10:41

## 2022-01-01 RX ADMIN — ASPIRIN 81 MG CHEWABLE TABLET 81 MG: 81 TABLET CHEWABLE at 09:09

## 2022-01-01 RX ADMIN — DUTASTERIDE 0.5 MG: 0.5 CAPSULE, LIQUID FILLED ORAL at 08:07

## 2022-01-01 RX ADMIN — ASPIRIN 81 MG CHEWABLE TABLET 81 MG: 81 TABLET CHEWABLE at 08:44

## 2022-01-01 RX ADMIN — ATORVASTATIN CALCIUM 40 MG: 40 TABLET, FILM COATED ORAL at 21:26

## 2022-01-01 RX ADMIN — APIXABAN 5 MG: 5 TABLET, FILM COATED ORAL at 10:03

## 2022-01-01 RX ADMIN — SOTALOL HYDROCHLORIDE 120 MG: 120 TABLET ORAL at 08:51

## 2022-01-01 RX ADMIN — HYDROMORPHONE HYDROCHLORIDE 0.2 MG: 0.2 INJECTION, SOLUTION INTRAMUSCULAR; INTRAVENOUS; SUBCUTANEOUS at 13:50

## 2022-01-01 RX ADMIN — ENOXAPARIN SODIUM 40 MG: 40 INJECTION SUBCUTANEOUS at 10:39

## 2022-01-01 RX ADMIN — Medication 1000 UNITS: at 08:51

## 2022-01-01 RX ADMIN — APIXABAN 5 MG: 5 TABLET, FILM COATED ORAL at 21:03

## 2022-01-01 RX ADMIN — LIDOCAINE 1 PATCH: 246 PATCH TOPICAL at 09:41

## 2022-01-01 RX ADMIN — SODIUM CHLORIDE: 9 INJECTION, SOLUTION INTRAVENOUS at 05:56

## 2022-01-01 RX ADMIN — Medication 1000 UNITS: at 09:35

## 2022-01-01 RX ADMIN — LORAZEPAM 0.5 MG: 2 INJECTION INTRAMUSCULAR; INTRAVENOUS at 13:20

## 2022-01-01 RX ADMIN — ENOXAPARIN SODIUM 40 MG: 40 INJECTION SUBCUTANEOUS at 20:51

## 2022-01-01 RX ADMIN — MORPHINE SULFATE 10 MG: 20 SOLUTION ORAL at 12:25

## 2022-01-01 RX ADMIN — ASPIRIN 81 MG CHEWABLE TABLET 81 MG: 81 TABLET CHEWABLE at 09:03

## 2022-01-01 RX ADMIN — METOPROLOL SUCCINATE 25 MG: 25 TABLET, EXTENDED RELEASE ORAL at 08:44

## 2022-01-01 RX ADMIN — OLANZAPINE 5 MG: 10 INJECTION, POWDER, FOR SOLUTION INTRAMUSCULAR at 02:02

## 2022-01-01 RX ADMIN — DUTASTERIDE 0.5 MG: 0.5 CAPSULE, LIQUID FILLED ORAL at 09:37

## 2022-01-01 RX ADMIN — ENOXAPARIN SODIUM 40 MG: 40 INJECTION SUBCUTANEOUS at 09:32

## 2022-01-01 RX ADMIN — ASPIRIN 81 MG CHEWABLE TABLET 81 MG: 81 TABLET CHEWABLE at 10:38

## 2022-01-01 RX ADMIN — APIXABAN 5 MG: 5 TABLET, FILM COATED ORAL at 09:02

## 2022-01-01 RX ADMIN — METOPROLOL SUCCINATE 25 MG: 25 TABLET, EXTENDED RELEASE ORAL at 10:38

## 2022-01-01 RX ADMIN — OLANZAPINE 5 MG: 10 INJECTION, POWDER, FOR SOLUTION INTRAMUSCULAR at 06:10

## 2022-01-01 RX ADMIN — ATORVASTATIN CALCIUM 40 MG: 40 TABLET, FILM COATED ORAL at 20:14

## 2022-01-01 RX ADMIN — SOTALOL HYDROCHLORIDE 80 MG: 80 TABLET ORAL at 09:09

## 2022-01-01 RX ADMIN — Medication 1000 UNITS: at 10:03

## 2022-01-01 RX ADMIN — SENNOSIDES AND DOCUSATE SODIUM 1 TABLET: 50; 8.6 TABLET ORAL at 21:03

## 2022-01-01 RX ADMIN — Medication 500 MG: at 08:51

## 2022-01-01 RX ADMIN — LIDOCAINE 1 PATCH: 246 PATCH TOPICAL at 10:39

## 2022-01-01 RX ADMIN — CEFAZOLIN 1 G: 1 INJECTION, POWDER, FOR SOLUTION INTRAMUSCULAR; INTRAVENOUS at 06:15

## 2022-01-01 RX ADMIN — SODIUM CHLORIDE: 9 INJECTION, SOLUTION INTRAVENOUS at 00:26

## 2022-01-01 RX ADMIN — Medication 1000 UNITS: at 09:03

## 2022-01-01 RX ADMIN — SODIUM CHLORIDE 500 ML: 9 INJECTION, SOLUTION INTRAVENOUS at 10:49

## 2022-01-01 RX ADMIN — DEXTROSE MONOHYDRATE: 50 INJECTION, SOLUTION INTRAVENOUS at 21:55

## 2022-01-01 RX ADMIN — DEXTROSE MONOHYDRATE: 50 INJECTION, SOLUTION INTRAVENOUS at 13:26

## 2022-01-01 RX ADMIN — SODIUM CHLORIDE: 9 INJECTION, SOLUTION INTRAVENOUS at 15:50

## 2022-01-01 RX ADMIN — CEFAZOLIN 1 G: 1 INJECTION, POWDER, FOR SOLUTION INTRAMUSCULAR; INTRAVENOUS at 06:08

## 2022-01-01 RX ADMIN — MORPHINE SULFATE 10 MG: 20 SOLUTION ORAL at 10:24

## 2022-01-01 RX ADMIN — OLANZAPINE 5 MG: 10 INJECTION, POWDER, FOR SOLUTION INTRAMUSCULAR at 19:52

## 2022-01-01 RX ADMIN — METOPROLOL SUCCINATE 25 MG: 25 TABLET, EXTENDED RELEASE ORAL at 09:36

## 2022-01-01 RX ADMIN — LEVOTHYROXINE SODIUM 50 MCG: 0.03 TABLET ORAL at 08:23

## 2022-01-01 RX ADMIN — DUTASTERIDE 0.5 MG: 0.5 CAPSULE, LIQUID FILLED ORAL at 09:08

## 2022-01-01 RX ADMIN — ENOXAPARIN SODIUM 40 MG: 40 INJECTION SUBCUTANEOUS at 08:37

## 2022-01-01 RX ADMIN — SENNOSIDES AND DOCUSATE SODIUM 2 TABLET: 50; 8.6 TABLET ORAL at 21:02

## 2022-01-01 RX ADMIN — MORPHINE SULFATE 2 MG: 2 INJECTION, SOLUTION INTRAMUSCULAR; INTRAVENOUS at 20:07

## 2022-01-01 RX ADMIN — SOTALOL HYDROCHLORIDE 80 MG: 80 TABLET ORAL at 21:51

## 2022-01-01 RX ADMIN — SENNOSIDES AND DOCUSATE SODIUM 1 TABLET: 50; 8.6 TABLET ORAL at 21:26

## 2022-01-01 RX ADMIN — BISACODYL 10 MG: 10 SUPPOSITORY RECTAL at 09:32

## 2022-01-01 RX ADMIN — LEVOTHYROXINE SODIUM 50 MCG: 0.03 TABLET ORAL at 09:44

## 2022-01-01 RX ADMIN — METOPROLOL TARTRATE 5 MG: 5 INJECTION INTRAVENOUS at 05:49

## 2022-01-01 RX ADMIN — LEVOTHYROXINE SODIUM 50 MCG: 0.03 TABLET ORAL at 08:07

## 2022-01-01 RX ADMIN — SOTALOL HYDROCHLORIDE 120 MG: 120 TABLET ORAL at 21:04

## 2022-01-01 RX ADMIN — ACETAMINOPHEN 650 MG: 650 SUPPOSITORY RECTAL at 09:26

## 2022-01-01 RX ADMIN — LIDOCAINE 1 PATCH: 246 PATCH TOPICAL at 09:33

## 2022-01-01 RX ADMIN — SOTALOL HYDROCHLORIDE 80 MG: 80 TABLET ORAL at 21:55

## 2022-01-01 RX ADMIN — OLANZAPINE 5 MG: 10 INJECTION, POWDER, FOR SOLUTION INTRAMUSCULAR at 19:49

## 2022-01-01 RX ADMIN — ACETAMINOPHEN 650 MG: 650 SUPPOSITORY RECTAL at 11:05

## 2022-01-01 RX ADMIN — Medication 500 MG: at 10:03

## 2022-01-01 RX ADMIN — ATORVASTATIN CALCIUM 40 MG: 40 TABLET, FILM COATED ORAL at 21:51

## 2022-01-01 RX ADMIN — APIXABAN 5 MG: 5 TABLET, FILM COATED ORAL at 21:26

## 2022-01-01 RX ADMIN — LIDOCAINE 1 PATCH: 246 PATCH TOPICAL at 13:02

## 2022-01-01 RX ADMIN — ASPIRIN 81 MG CHEWABLE TABLET 81 MG: 81 TABLET CHEWABLE at 08:51

## 2022-01-01 RX ADMIN — HYDROMORPHONE HYDROCHLORIDE 0.3 MG: 1 INJECTION, SOLUTION INTRAMUSCULAR; INTRAVENOUS; SUBCUTANEOUS at 15:50

## 2022-01-01 RX ADMIN — SENNOSIDES AND DOCUSATE SODIUM 1 TABLET: 50; 8.6 TABLET ORAL at 21:37

## 2022-01-01 RX ADMIN — TRAZODONE HYDROCHLORIDE 25 MG: 50 TABLET ORAL at 22:49

## 2022-01-01 RX ADMIN — METOPROLOL SUCCINATE 25 MG: 25 TABLET, EXTENDED RELEASE ORAL at 09:49

## 2022-01-01 RX ADMIN — LEVOTHYROXINE SODIUM 50 MCG: 0.03 TABLET ORAL at 09:35

## 2022-01-01 RX ADMIN — Medication 500 MG: at 09:36

## 2022-01-01 RX ADMIN — METOPROLOL SUCCINATE 25 MG: 25 TABLET, EXTENDED RELEASE ORAL at 08:23

## 2022-01-01 RX ADMIN — SODIUM CHLORIDE 500 ML: 9 INJECTION, SOLUTION INTRAVENOUS at 17:21

## 2022-01-01 RX ADMIN — ENOXAPARIN SODIUM 40 MG: 40 INJECTION SUBCUTANEOUS at 09:06

## 2022-01-01 ASSESSMENT — ACTIVITIES OF DAILY LIVING (ADL)
ADLS_ACUITY_SCORE: 40
CHANGE_IN_FUNCTIONAL_STATUS_SINCE_ONSET_OF_CURRENT_ILLNESS/INJURY: NO
ADLS_ACUITY_SCORE: 34
DRESSING/BATHING_DIFFICULTY: NO
ADLS_ACUITY_SCORE: 35
WALKING_OR_CLIMBING_STAIRS_DIFFICULTY: NO
ADLS_ACUITY_SCORE: 40
ADLS_ACUITY_SCORE: 26
ADLS_ACUITY_SCORE: 26
ADLS_ACUITY_SCORE: 51
ADLS_ACUITY_SCORE: 26
ADLS_ACUITY_SCORE: 38
TOILETING_ISSUES: NO
ADLS_ACUITY_SCORE: 26
ADLS_ACUITY_SCORE: 40
ADLS_ACUITY_SCORE: 26
ADLS_ACUITY_SCORE: 33
ADLS_ACUITY_SCORE: 47
DIFFICULTY_EATING/SWALLOWING: NO
ADLS_ACUITY_SCORE: 34
ADLS_ACUITY_SCORE: 47
ADLS_ACUITY_SCORE: 35
ADLS_ACUITY_SCORE: 51
ADLS_ACUITY_SCORE: 39
ADLS_ACUITY_SCORE: 47
ADLS_ACUITY_SCORE: 38
ADLS_ACUITY_SCORE: 47
ADLS_ACUITY_SCORE: 40
ADLS_ACUITY_SCORE: 36
ADLS_ACUITY_SCORE: 36
ADLS_ACUITY_SCORE: 35
ADLS_ACUITY_SCORE: 40
ADLS_ACUITY_SCORE: 36
ADLS_ACUITY_SCORE: 42
ADLS_ACUITY_SCORE: 34
ADLS_ACUITY_SCORE: 40
ADLS_ACUITY_SCORE: 40
ADLS_ACUITY_SCORE: 36
ADLS_ACUITY_SCORE: 51
ADLS_ACUITY_SCORE: 42
ADLS_ACUITY_SCORE: 40
ADLS_ACUITY_SCORE: 34
ADLS_ACUITY_SCORE: 52
EQUIPMENT_CURRENTLY_USED_AT_HOME: CANE, QUAD
ADLS_ACUITY_SCORE: 36
ADLS_ACUITY_SCORE: 47
HEARING_DIFFICULTY_OR_DEAF: NO
DRESSING/BATHING_DIFFICULTY: NO
ADLS_ACUITY_SCORE: 26
ADLS_ACUITY_SCORE: 33
WEAR_GLASSES_OR_BLIND: NO
ADLS_ACUITY_SCORE: 38
ADLS_ACUITY_SCORE: 39
ADLS_ACUITY_SCORE: 40
ADLS_ACUITY_SCORE: 36
ADLS_ACUITY_SCORE: 36
ADLS_ACUITY_SCORE: 47
CONCENTRATING,_REMEMBERING_OR_MAKING_DECISIONS_DIFFICULTY: NO
ADLS_ACUITY_SCORE: 36
DIFFICULTY_COMMUNICATING: NO
ADLS_ACUITY_SCORE: 44
ADLS_ACUITY_SCORE: 34
ADLS_ACUITY_SCORE: 39
ADLS_ACUITY_SCORE: 36
ADLS_ACUITY_SCORE: 39
ADLS_ACUITY_SCORE: 38
ADLS_ACUITY_SCORE: 39
ADLS_ACUITY_SCORE: 36
ADLS_ACUITY_SCORE: 34
WEAR_GLASSES_OR_BLIND: NO
ADLS_ACUITY_SCORE: 40
ADLS_ACUITY_SCORE: 34
ADLS_ACUITY_SCORE: 30
FALL_HISTORY_WITHIN_LAST_SIX_MONTHS: NO
ADLS_ACUITY_SCORE: 40
ADLS_ACUITY_SCORE: 51
ADLS_ACUITY_SCORE: 34
ADLS_ACUITY_SCORE: 34
ADLS_ACUITY_SCORE: 38
ADLS_ACUITY_SCORE: 44
ADLS_ACUITY_SCORE: 26
ADLS_ACUITY_SCORE: 51
ADLS_ACUITY_SCORE: 44
ADLS_ACUITY_SCORE: 51
ADLS_ACUITY_SCORE: 36
ADLS_ACUITY_SCORE: 47
ADLS_ACUITY_SCORE: 42
ADLS_ACUITY_SCORE: 42
ADLS_ACUITY_SCORE: 39
ADLS_ACUITY_SCORE: 36
ADLS_ACUITY_SCORE: 33
ADLS_ACUITY_SCORE: 40
ADLS_ACUITY_SCORE: 36
ADLS_ACUITY_SCORE: 51
ADLS_ACUITY_SCORE: 40
ADLS_ACUITY_SCORE: 40
DOING_ERRANDS_INDEPENDENTLY_DIFFICULTY: NO
ADLS_ACUITY_SCORE: 47
ADLS_ACUITY_SCORE: 26
TOILETING_ISSUES: NO
ADLS_ACUITY_SCORE: 40
ADLS_ACUITY_SCORE: 34
ADLS_ACUITY_SCORE: 51
ADLS_ACUITY_SCORE: 47
WALKING_OR_CLIMBING_STAIRS_DIFFICULTY: NO
ADLS_ACUITY_SCORE: 51
DEPENDENT_IADLS:: CLEANING
ADLS_ACUITY_SCORE: 47
ADLS_ACUITY_SCORE: 34
ADLS_ACUITY_SCORE: 39
ADLS_ACUITY_SCORE: 26
DIFFICULTY_EATING/SWALLOWING: NO
ADLS_ACUITY_SCORE: 40
ADLS_ACUITY_SCORE: 39
CHANGE_IN_FUNCTIONAL_STATUS_SINCE_ONSET_OF_CURRENT_ILLNESS/INJURY: NO
ADLS_ACUITY_SCORE: 47
ADLS_ACUITY_SCORE: 38
ADLS_ACUITY_SCORE: 47
ADLS_ACUITY_SCORE: 35
ADLS_ACUITY_SCORE: 36
DEPENDENT_IADLS:: CLEANING
DOING_ERRANDS_INDEPENDENTLY_DIFFICULTY: NO
ADLS_ACUITY_SCORE: 36
ADLS_ACUITY_SCORE: 38
ADLS_ACUITY_SCORE: 38
ADLS_ACUITY_SCORE: 26
ADLS_ACUITY_SCORE: 39
ADLS_ACUITY_SCORE: 36
FALL_HISTORY_WITHIN_LAST_SIX_MONTHS: NO
ADLS_ACUITY_SCORE: 36
ADLS_ACUITY_SCORE: 51
ADLS_ACUITY_SCORE: 36
ADLS_ACUITY_SCORE: 34
ADLS_ACUITY_SCORE: 42
ADLS_ACUITY_SCORE: 38
ADLS_ACUITY_SCORE: 26
ADLS_ACUITY_SCORE: 26
ADLS_ACUITY_SCORE: 51
ADLS_ACUITY_SCORE: 40
PREVIOUS_RESPONSIBILITIES: MEAL PREP;HOUSEKEEPING;LAUNDRY;MEDICATION MANAGEMENT;FINANCES;DRIVING
CONCENTRATING,_REMEMBERING_OR_MAKING_DECISIONS_DIFFICULTY: NO
ADLS_ACUITY_SCORE: 39
ADLS_ACUITY_SCORE: 36
ADLS_ACUITY_SCORE: 38
ADLS_ACUITY_SCORE: 40

## 2022-01-01 ASSESSMENT — ENCOUNTER SYMPTOMS
CONFUSION: 0
COLOR CHANGE: 0
DIFFICULTY URINATING: 0
ARTHRALGIAS: 0
FEVER: 0
SHORTNESS OF BREATH: 0
EYE REDNESS: 0
ABDOMINAL PAIN: 0
ABDOMINAL PAIN: 0
DIZZINESS: 1
NECK STIFFNESS: 0
WOUND: 1
HEADACHES: 0
LIGHT-HEADEDNESS: 1

## 2022-01-06 NOTE — PROGRESS NOTES
Type: alert remote pacemaker transmission for AT/AF >12/24 hours.  Presenting rhythm: normal sinus, AP-VS, frequent PACs and PVCs, rate 80-90 bpm.  Battery/lead status: stable  Arrhythmias: since 12/25/21, 198 mode switches, EGMs confirm atrial tachy arrhythmia, per trend a total of 12.5 hrs 1/4/22 - 1/5/22, burden 7%, V-rates >/=120 bpm 60%. No V-high rate episodes detected.  Anticoagulant: apixaban  Comments: normal magnet and pacemaker function. Routed to device QUINCY.  TIM Black, Device Specialist     Known PAF with some RVR. Toprol XL recently added, few burst of RVR still seen but average HR per logbook with this AF Episode is 115 bpm, ( bpm range). Hx asymptomatic with AF. Now back in Sinus rhythm. Will monitor for now.   Krystin Mcnulty RN

## 2022-01-10 NOTE — TELEPHONE ENCOUNTER
1/10/2022 Called patient about 50 second NSVT episode on 1/7/2022 at 4 pm.  He doesn't remember feeling anything.    No charge. No Epic interface required.  Type: alert remote pacemaker transmission for VT episode.  Presenting rhythm: sinus and AP- bpn. Frequent PACs.   Battery/lead status: stable.  Arrhythmias: since 1/6/22; 515 mode switches, EGMs confirm atrial tachy arrhythmia, total of 15hrs noted on 1/7/22, v-rates >/=120bpm 60%, AF burden 8%. One VT episode on 1/7/22 4:00pm, rates 200-250 bpm, duration 50 seconds.  Anticoagulant: apixaban.  Comments: Normal magnet and pacemaker function. Routed to device RN for review. DARWIN Hickey, Device Specialist  Addendum: Agree with above- episode copied below. Patient denied any symptoms. Called [employer] and they said the break in the episode isn't measured or a set time, but looking at the whole episode, duration was at least 40-50 seconds. Will route to Dr Schilling.  Chey Hatfield, Device RN.

## 2022-01-10 NOTE — TELEPHONE ENCOUNTER
No charge. No Epic interface required.  Type: alert remote pacemaker transmission for VT episode.  Presenting rhythm: sinus and AP- bpn. Frequent PACs.   Battery/lead status: stable.  Arrhythmias: since 1/6/22; 515 mode switches, EGMs confirm atrial tachy arrhythmia, total of 15hrs noted on 1/7/22, v-rates >/=120bpm 60%, AF burden 8%. One VT episode on 1/7/22 4:00pm, rates 200-250 bpm, duration 50 seconds.  Anticoagulant: apixaban.  Comments: Normal magnet and pacemaker function. Routed to device RN for review. ELemuel Ferroec, Device Specialist     Transmission reviewed, agree with above. Increasing AF burden noted over last ~3 weeks, frequent RVR noted. Takes Sotalol 80 mg BID and Toprol XL 25 mg daily recently added for RVR. Continues to have RVR despite Toprol, also now noted to have and episode of V.tach lasting possibly up to 50 seconds (EGM truncated) with rates reaching 200s bpm.      Call placed to patient to review findings/assess for symptoms with VT and medication compliance. LVM for callback.     Krystin Mcnulty RN

## 2022-01-11 NOTE — TELEPHONE ENCOUNTER
Patient was reached by other Device RN. See notes by ESTER Hatfield RN.     Will close this encounter.  Krystin Mcnulty RN

## 2022-01-12 NOTE — PROGRESS NOTES
===View-only below this line===  ----- Message -----  From: Emeka Schilling DO  Sent: 1/11/2022   4:23 PM CST  To: Capo Peña RN, QUINCY Chacon,     Patient was asymptomatic with VT event.  Recommend we increase metoprolol to 50 mg.  Can we set up a virtual visit or phone visit with the patient in 1-2 weeks.      Thanks    Mehul

## 2022-01-13 NOTE — PROGRESS NOTES
PC with patient, review of reason for call and recommendations. Pt will increase his medication, and wrote on the bottle, to reflect increase. Rx update sent to [facility]. Arranged telephone visit with patient to his phone # [phone], with Mat for Tuesday 2/1/22 at 1250. Pt verbalized understanding. No further questions at this time. ETTA,Rn

## 2022-01-17 NOTE — PROGRESS NOTES
No charge. No Epic interface required.  Type: alert remote pacemaker transmission for AT/AF at least 12 of 24 hours.  Presenting rhythm: normal sinus and AP-VS, rate 70 bpm. Frequent PACs.  Battery/lead status; stable  Arrhythmias: since 1/10/22, 1265 mode switches, EGMs confirm atrial tachy arrhythmia, at least 22 hrs 1/15/22 - 1/16/22, burden 9% (since 8/24/21), V-rates >/=120 bpm 60-65%. Two SVT and 51 nonsustained ventricular high rate episodes; EGMs suggest AF with RVR.  Anticoagulant: apixaban  Comments: normal magnet and pacemaker function. Routed to device FARRUKH Black, Device Specialist      Known recent increase in AF burden, V-rates continue to be on higher side despite additional Toprol XL (currently at 50 mg daily per chart). Most recent AF episode 22.5 hours with an average V-rate of 135 bpm during that episode per device logbook. Historically asymptomatic with AF episodes. Now back in NSR. AF burden up to 9%.     Has upcoming appointment with Dr. Schilling 2/1/22. Will update with today's report of AF/RVR.   Krystin Mcnulty RN

## 2022-01-17 NOTE — PROGRESS NOTES
Spoke with patient on 1/10/22, and patient was increasing his Metoprolol the following day, to Metoprolol succinate 50 mg daily on 1/11/22. ETTARN

## 2022-01-31 NOTE — PROGRESS NOTES
Type: alert remote pacemaker transmission for AT/AF at least 12 of 24 hours.  Presenting rhythm: AP-VS and normal sinus with frequent PACs, rate 72 bpm. Occasional PVCs also noted.  Battery/lead status: stable  Arrhythmias: since 1/17/22, 182 mode switches, EGMs confirm AF, total of 13 hrs in past 24, burden 9%, V-rates >/=120 bpm 60-65%. Seven nonsustained ventricular high rate episodes; EGMs suggest RVR.  Anticoagulant: apixaban  Comments: normal magnet and pacemaker function. Routed to device RN.  TIM Black, Device Specialist       Remote transmission reviewed, agree with above report. Known AF with recent increase in AF events over last 2-3 months, and tendency toward RVR noted. Torpol XL increased to 50 mg a couple weeks ago for NSVT and RVR. Continues to show some RVR on device logbook despite increase in Toprol. Was back in NSR at time of this transmission yesterday (1/30). Daily burden trends with episodes of AF lasing > 8 hours show average rates in the 120-130s, with max rates 190-200s. See list below.     Patient has phone visit with Dr. Schilling tomorrow, will forward recent device results for review at that appointment.     Krystin Mcnulty RN              6 month daily burden trends:

## 2022-02-01 PROBLEM — Z95.0 CARDIAC PACEMAKER IN SITU: Status: ACTIVE | Noted: 2022-01-01

## 2022-02-01 PROBLEM — I48.0 PAROXYSMAL ATRIAL FIBRILLATION WITH RVR (H): Status: ACTIVE | Noted: 2022-01-01

## 2022-02-01 PROBLEM — I47.29 NSVT (NONSUSTAINED VENTRICULAR TACHYCARDIA) (H): Status: ACTIVE | Noted: 2022-01-01

## 2022-02-01 PROBLEM — I49.5 SA NODE DYSFUNCTION (H): Status: ACTIVE | Noted: 2022-01-01

## 2022-02-01 PROBLEM — I25.10 CORONARY ARTERY DISEASE INVOLVING NATIVE CORONARY ARTERY OF NATIVE HEART WITHOUT ANGINA PECTORIS: Status: ACTIVE | Noted: 2022-01-01

## 2022-02-01 PROBLEM — I44.7 LBBB (LEFT BUNDLE BRANCH BLOCK): Status: ACTIVE | Noted: 2022-01-01
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
## 2022-02-17 NOTE — PROGRESS NOTES
DIAGNOSIS:  Encounter Diagnosis   Name Primary?     Scalp abscess, I and D'ed Yes        PLAN:     I and D to scalp abscessj with wound culture sent    Needs definitive treatment at Derm    Cephalexin 500 mg twice daily for 10 days    While on cephalexin take a probiotic like Activia daily               HPI:  Has a lump on the top of his head.  The lump has been present for about 3 months and showed up sometimes after his [facility] exm.          Current Outpatient Medications   Medication     ascorbic acid (VITAMIN C) 1000 MG TABS     aspirin 81 MG chewable tablet     atorvastatin (LIPITOR) 40 MG tablet     Calcium-Magnesium-Vitamin D (CALCIUM 500 PO)     cholecalciferol (VITAMIN D-1000 MAX ST) 1000 UNITS TABS     clindamycin (CLEOCIN) 300 MG capsule     dutasteride (AVODART) 0.5 MG capsule     ELIQUIS ANTICOAGULANT 5 MG tablet     Glucosamine HCl (SM GLUCOSAMINE HCL) 1500 MG TABS     levothyroxine (SYNTHROID/LEVOTHROID) 50 MCG tablet     metoprolol succinate ER (TOPROL-XL) 25 MG 24 hr tablet     Multiple Vitamins-Minerals (CENTRUM SILVER) per tablet     multivitamin  with lutein (OCUVITE WITH LTEIN) CAPS per capsule     nitroglycerin (NITROSTAT) 0.4 MG SL tablet     Oyster Shell Calcium (CVS OYSTER SHELL CALCIUM) 500 MG tablet     PAIN RELIEVER EXTRA STRENGTH 500 MG tablet     permethrin (ELIMITE) 5 % external cream     polyethylene glycol (MIRALAX/GLYCOLAX) Packet     senna (SENOKOT) 8.6 MG tablet     sotalol (BETAPACE) 80 MG tablet     cephALEXin (KEFLEX) 500 MG capsule     No current facility-administered medications for this visit.       Pmh: reviewed  Psh: reviewed  Allergy:  reviewed      EXAM:    BP (!) 155/79 (BP Location: Left arm, Patient Position: Sitting, Cuff Size: Adult Regular)   Pulse 53   Temp 97.4  F (36.3  C) (Oral)   Resp 16   Wt 75.9 kg (167 lb 4 oz)   BMI 26.20 kg/m    GEN:   ALERT, NAD, ORIENTED TIMES THREE  SKIN: APPROX 3 CM FLUCUTANT ROUNDED LUMP ON THE VERTEX OF THE SCALP WITHOUT  SURROUNDING REDNESS OR SWELLING.    USIG STERILE PROCEDURE THE AREA IS CLEANED WITH ALCOHOL, DRAPE APPLIED, AND SMALL INCISION MADE WITH A #15 BLADE (PT DECLINED LOCAL).  THERE WAS THE RELEASE OF A MODERATE AMOUNT OF PUS THAT WAS SENT FOR CULTURE.   THE FEEL IS OF A HONEYCOMBED LESION

## 2022-02-17 NOTE — PATIENT INSTRUCTIONS
I and D to scalp abscessj with wound culture sent    Needs definitive treatment at Derm    Cephalexin 500 mg twice daily for 10 days    While on cephalexin take a probiotic like Activia daily

## 2022-02-17 NOTE — LETTER
February 28, 2022      Seng Deshpande  [address] [place] [id number]        Dear ,  We are writing to inform you of your test results.    Rishabh Ellsworth:  Please see us in follow up regarding your scalp lesion.  Also follow through on Dermatology consultation.    Resulted Orders   Abscess Aerobic Bacterial Culture Routine   Result Value Ref Range    Culture 2+ Staphylococcus aureus MRSA (A)        If you have any questions or concerns, please call the clinic at the number listed above.       Sincerely,      Jose Angel Alvarez MD

## 2022-02-21 NOTE — TELEPHONE ENCOUNTER
Per the patient, the lump is not doing very well.  Its got discharge coming from it and it feels soft.  Patient did make appt.  3/8 150pm with Dr jeffers.  Please call with any questions

## 2022-02-21 NOTE — TELEPHONE ENCOUNTER
"LMTCB regarding how the \"lump\"  On his scalp that we drained is doing;  And also to see if he has a derm appt set up.   "

## 2022-02-22 NOTE — TELEPHONE ENCOUNTER
VICK to schedule a follow up appointment with Dr. Alvarez. OK for [facility] spot

## 2022-02-23 NOTE — TELEPHONE ENCOUNTER
Attempted another call to patient. LMTCB.     Please schedule with Dr Alvarez this week for a follow up visit. OK to use same day or reserved slots if needed.

## 2022-03-01 NOTE — TELEPHONE ENCOUNTER
"Patient calling initially to report a non-healing wound on scalp.  Patient then stated that he \"almost passed out while [place] [facility], the staff had to drive me home\".  Patient states he still feels confused, and that is new for him.  Writer asked patient if he had somebody home with him.  Patient confirmed that he had \"a friend with him\".  Disposition is to call 911 now.   Writer offered to call for patient, but patient stated \"I will do it\".    Rochelle Lombardi RN  [facility] Nurse Advisor  4:26 PM 3/1/2022      Reason for Disposition    Difficult to awaken or acting confused (e.g., disoriented, slurred speech)    Additional Information    Negative: [1] Weakness (i.e., paralysis, loss of muscle strength) of the face, arm or leg on one side of the body AND [2] sudden onset AND [3] present now    Negative: [1] Numbness (i.e., loss of sensation) of the face, arm or leg on one side of the body AND [2] sudden onset AND [3] present now    Negative: [1] Loss of speech or garbled speech AND [2] sudden onset AND [3] present now    Protocols used: DIZZINESS - VERTIGO-A-AH  COVID 19 Nurse Triage Plan/Patient Instructions    Please be aware that novel coronavirus (COVID-19) may be circulating in the community. If you develop symptoms such as fever, cough, or SOB or if you have concerns about the presence of another infection including coronavirus (COVID-19), please contact your health care provider or visit [email or web address].     Disposition/Instructions    Call to EMS/911 recommended. Follow protocol based instructions.     Bring Your Own Device:  Please also bring your smart device(s) (smart phones, tablets, laptops) and their charging cables for your personal use and to communicate with your care team during your visit.    Thank you for taking steps to prevent the spread of this virus.  o Limit your contact with others.  o Wear a simple mask to cover your cough.  o Wash your hands well and " often.    Resources    [facility]: About COVID-19: [email or web address]    CDC: What to Do If You're Sick: www.cdc.gov/coronavirus/2019-ncov/about/steps-when-sick.html    CDC: Ending Home Isolation: www.cdc.gov/coronavirus/2019-ncov/hcp/disposition-in-home-patients.html     CDC: Caring for Someone: www.cdc.gov/coronavirus/2019-ncov/if-you-are-sick/care-for-someone.html     [facility]: Interim Guidance for Hospital Discharge to Home: www.health.[facility].[place]./diseases/coronavirus/hcp/hospdischarge.pdf    [facility] clinical trials (COVID-19 research studies): [email or web address]-clinical-trials     Below are the COVID-19 hotlines at the [place] [facility] ([facility]). Interpreters are available.   o For health questions: Call 632-497-4168 or 1-227.864.1862 (7 a.m. to 7 p.m.)  o For questions about schools and childcare: Call 253-259-6143 or 1-243.624.2437 (7 a.m. to 7 p.m.)

## 2022-03-01 NOTE — TELEPHONE ENCOUNTER
----- Message from Jose Angel Alvarez MD sent at 2/28/2022  8:38 PM CST -----  Please verify with pt that he has a dermatology appt.  I did ask him to follow up with me but he has not been in for follow up yet.  Let me know if he wants us to take another look at that scalp lesion.
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
## 2022-03-02 NOTE — ED NOTES
"Pt expresses having \"shingles\" for 10 years since he received the shingles vaccine. Pt's only open abscess is the one on his head that tested positive for MRSA infection.   "
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
## 2022-03-02 NOTE — ED TRIAGE NOTES
Patient here for dizziness since 1200, he states that he stood up and staggered and felt dizzy and was not able to walk per his normal. Patient states the dizziness has improved. Of note patient is being treated for abscess to the top of his head for the last couple weeks.

## 2022-03-02 NOTE — DISCHARGE INSTRUCTIONS
Eat 3 meals per day and drink plenty of fluids to stay hydrated.    Follow up with your Primary Care provider in 2 days for a recheck.    Return to the Emergency Department for any difficulty breathing, persistent vomiting, new or worsening symptoms, or any other concerns.

## 2022-03-02 NOTE — ED NOTES
Pt expressed he only eats one meal and fluids one time a day when he visits his wife at the nursing home. Pt expressed an understanding for needing help in the home.

## 2022-03-08 NOTE — TELEPHONE ENCOUNTER
"Pt calls to report \"shingles on my back, shoulders and chest.\"  Ongoing since \"December of 2015.\"  \"Saw several skin doctors who couldn't solve it.\"    States \"Today saw dermatologist -> \"Dr Gian Feliciano in [place].\"  Derm provider is apparently affiliated with [facility].  No chart notes to reference.  Pt does not request triage.  Simply wishes to review instructions on the \"salve\" he was given.  Betamethasone dipropionate cream.    Pt wishes to discuss topical instructions.  Instructions state \"Apply thin layer to affected area on body 1-to-2x daily for up to two weeks; then two weeks off.\"  \"Repeat as needed for flares.\"    Discussed practical methods for reaching back area with topical cream.  Also discussed going without any shirt as feasible while home during daytime.  Discussed relief of shingles pain.  Pt has Tylenol on his current med list.  Advised taking this at least at bedtime for relief of shingles discomfort.  Pt agrees to plan.  No further questions at this time.    Radha Morrow  [facility] Nurse Advisor     Additional Information    [1] Follow-up call to recent contact AND [2] information only call, no triage required    Protocols used: INFORMATION ONLY CALL-A-    ________________________    COVID 19 Nurse Triage Plan/Patient Instructions    Please be aware that novel coronavirus (COVID-19) may be circulating in the community. If you develop symptoms such as fever, cough, or SOB or if you have concerns about the presence of another infection including coronavirus (COVID-19), please contact your health care provider or visit [email or web address].     Disposition/Instructions    Additional COVID19 information to add for patients.   How can I protect others?  If you have symptoms (fever, cough, body aches or trouble breathing): Stay home and away from others (self-isolate) until:    At least 10 days have passed since your symptoms started, And     You ve had no fever--and no medicine " "that reduces fever--for 1 full day (24 hours), And      Your other symptoms have resolved (gotten better).     If you don t have symptoms, but a test showed that you have COVID-19 (you tested positive):    Stay home and away from others (self-isolate). Follow the tips under \"How do I self-isolate?\" below for 10 days (20 days if you have a weak immune system).    You don't need to be retested for COVID-19 before going back to school or work. As long as you're fever-free and feeling better, you can go back to school, work and other activities after waiting the 10 or 20 days.     How do I self-isolate?    Stay in your own room, even for meals. Use your own bathroom if you can.     Stay away from others in your home. No hugging, kissing or shaking hands. No visitors.    Don t go to work, school or anywhere else.     Clean  high touch  surfaces often (doorknobs, counters, handles, etc.). Use a household cleaning spray or wipes. You ll find a full list on the EPA website:  www.epa.gov/pesticide-registration/list-n-disinfectants-use-against-sars-cov-2.    Cover your mouth and nose with a mask, tissue or washcloth to avoid spreading germs.    Wash your hands and face often. Use soap and water.    Caregivers in these groups are at risk for severe illness due to COVID-19:  o People 65 years and older  o People who live in a nursing home or long-term care facility  o People with chronic disease (lung, heart, cancer, diabetes, kidney, liver, immunologic)  o People who have a weakened immune system, including those who:  - Are in cancer treatment  - Take medicine that weakens the immune system, such as corticosteroids  - Had a bone marrow or organ transplant  - Have an immune deficiency  - Have poorly controlled HIV or AIDS  - Are obese (body mass index of 40 or higher)  - Smoke regularly    Caregivers should wear gloves while washing dishes, handling laundry and cleaning bedrooms and bathrooms.    Use caution when washing and " drying laundry: Don t shake dirty laundry, and use the warmest water setting that you can.    For more tips, go to www.cdc.gov/coronavirus/2019-ncov/downloads/10Things.pdf.    How can I take care of myself?  1. Get lots of rest. Drink extra fluids (unless a doctor has told you not to).     2. Take Tylenol (acetaminophen) for fever or pain. If you have liver or kidney problems, ask your family doctor if it s okay to take Tylenol.     Adults can take either:     650 mg (two 325 mg pills) every 4 to 6 hours, or     1,000 mg (two 500 mg pills) every 8 hours as needed.     Note: Don t take more than 3,000 mg in one day.   Acetaminophen is found in many medicines (both prescribed and over-the-counter medicines). Read all labels to be sure you don t take too much.     For children, check the Tylenol bottle for the right dose. The dose is based on the child s age or weight.    3. If you have other health problems (like cancer, heart failure, an organ transplant or severe kidney disease): Call your specialty clinic if you don t feel better in the next 2 days.    4. Know when to call 911: Emergency warning signs include:    Trouble breathing or shortness of breath    Pain or pressure in the chest that doesn t go away    Feeling confused like you haven t felt before, or not being able to wake up    Bluish-colored lips or face    What are the symptoms of COVID-19?     The most common symptoms are cough, fever and trouble breathing.     Less common symptoms include body aches, chills, diarrhea (loose, watery poops), fatigue (feeling very tired), headache, runny nose, sore throat and loss of smell.    COVID-19 can cause severe coughing (bronchitis) and lung infection (pneumonia).    How does it spread?     The virus may spread when a person coughs or sneezes into the air. The virus can travel about 6 feet this way, and it can live on surfaces.      Common  (household disinfectants) will kill the virus.    Who is at  risk?  Anyone can catch COVID-19 if they re around someone who has the virus.    How can others protect themselves?     Stay away from people who have COVID-19 (or symptoms of COVID-19).    Wash hands often with soap and water. Or, use hand  with at least 60% alcohol.    Avoid touching the eyes, nose or mouth.     Wear a face mask when you go out in public, when sick or when caring for a sick person.    Where can I get more information?    [facility]: About COVID-19: [email or web address]    CDC: What to Do If You re Sick: www.cdc.gov/coronavirus/2019-ncov/about/steps-when-sick.html    CDC: Ending Home Isolation: www.cdc.gov/coronavirus/2019-ncov/hcp/disposition-in-home-patients.html     CDC: Caring for Someone: www.cdc.gov/coronavirus/2019-ncov/if-you-are-sick/care-for-someone.html     [facility]: Interim Guidance for Hospital Discharge to Home: www.[facility].mn./diseases/coronavirus/hcp/hospdischarge.pdf    [facility] clinical trials (COVID-19 research studies): clinicalaffairs.[facility]-clinical-trials     Below are the COVID-19 hotlines at the [place] [facility] ([facility]). Interpreters are available.   o For health questions: Call 637-484-8042 or 1-172.128.7940 (7 a.m. to 7 p.m.)  o For questions about schools and childcare: Call 470-410-9416 or 1-333.464.2101 (7 a.m. to 7 p.m.)          Thank you for taking steps to prevent the spread of this virus.  o Limit your contact with others.  o Wear a simple mask to cover your cough.  o Wash your hands well and often.    Resources    [facility]: About COVID-19: [email or web address]    CDC: What to Do If You're Sick: www.cdc.gov/coronavirus/2019-ncov/about/steps-when-sick.html    CDC: Ending Home Isolation: www.cdc.gov/coronavirus/2019-ncov/hcp/disposition-in-home-patients.html     CDC: Caring for Someone: www.cdc.gov/coronavirus/2019-ncov/if-you-are-sick/care-for-someone.html     [facility]: Interim Guidance  for Hospital Discharge to Home: www.health.[facility].[place].us/diseases/coronavirus/hcp/hospdischarge.pdf    [facility] clinical trials (COVID-19 research studies): clinicalaffairs.[facility]/umn-clinical-trials     Below are the COVID-19 hotlines at the [place] Department [facility] ([facility]). Interpreters are available.   o For health questions: Call 560-923-6385 or 1-269.776.7107 (7 a.m. to 7 p.m.)  o For questions about schools and childcare: Call 908-197-4240 or 1-936.663.6682 (7 a.m. to 7 p.m.)

## 2022-04-26 NOTE — PROGRESS NOTES
Assessment & Plan     Abscess  Infected abscess and surrounding cellulitis right posterior lateral shoulder  Area of abscess has been roughly 6 cm in diameter there is scabbing thickened skin of the surface no active drainage noted patient unaware of how long its been present  Discussed starting on antibiotics concern for possible bursa being involved-worried about incision and drainage and allowing infection to go deeper  Discussed returning in 1 week to reevaluate the shoulder  Patient is agreement on antibiotics will return next week  Seen a few weeks ago for problem on the scalp swabbing positive for MRSA  Patient has allergies to sulfa so we will treat with doxycycline  - doxycycline hyclate (VIBRA-TABS) 100 MG tablet; Take 1 tablet (100 mg) by mouth 2 times daily        No follow-ups on file.    Taras Avila MD  [facility]    Jay Ellsworth is a 89 year old who presents for the following health issues     HPI   89-year-old male presenting with right shoulder concerns  Patient called into clinic concerns about needing to go to the ER due to shoulder problem  He presents today he has a large abscess and cellulitis on the right shoulder just posterior and lateral to bursa area on the right.  He is unaware of how long its been present.  He was seen last by another provider little while ago for a problem on his scalp which was swabbed and positive for MRSA.  Discussed with patient and concern about the location of this and the possibility of incision and drainage leading to the infection we will go deeper to the shoulder.  Discussed with him trying to facilitate drainage by warm packing if he is able to reach the area  Discuss starting on antibiotics  To suggest close follow-up next week to reassess the need of imaging or incision and drainage for ongoing antibiotics  He will contact me sooner if any worsening symptoms      Review of Systems         Objective    /75  (BP Location: Left arm, Patient Position: Sitting, Cuff Size: Adult Regular)   Pulse 56   Resp 24   Wt 70.8 kg (156 lb)   BMI 24.43 kg/m    Body mass index is 24.43 kg/m .  Physical Exam   GENERAL: healthy, alert and no distress  RESP: lungs clear to auscultation - no rales, rhonchi or wheezes  CV: regular rate and rhythm, normal S1 S2, no S3 or S4, no murmur, click or rub, no peripheral edema and peripheral pulses strong  MS: Right shoulder showing 5 to 6 cm wide abscess painful to touch warmth and surrounding erythema and cellulitis of the skin-area on the surface is very thickened and scab-like patient unaware of its been draining  NEURO: Normal strength and tone, mentation intact and speech normal

## 2022-05-03 NOTE — PROGRESS NOTES
Assessment & Plan     Abscess  Patient has to return to clinic for reevaluation of shoulder abscess on the right side  The abscess has decreased by about 66% he still has a couple days left of the doxycycline I think we will need more further  Area has been being covered by a caregiver and has been draining  Cellulitis almost completely resolved on visualization still some fluid in the abscess will extend another 5 days of his antibiotics  Patient contact me if symptoms or not completely resolved  - doxycycline hyclate (VIBRA-TABS) 100 MG tablet; Take 1 tablet (100 mg) by mouth 2 times daily        No follow-ups on file.    Taras Avila MD  [facility]    Jay Ellsworth is a 89 year old who presents for the following health issues     HPI     Patient seen last week with an abscess to the right posterior shoulder and started on oral antibiotics  S return this week for reevaluation to make sure things are improving or if the need for opening the abscess  The lesion has been draining and decreasing in size dramatically  Still some fluid noted but it also is actively been draining cellulitis and erythema have decreased  Is a 70 few more days of antibiotics #5 more days to the pharmacy  Patient is to contact me symptoms do not completely resolve    Review of Systems         Objective    BP (!) 140/82 (BP Location: Left arm, Patient Position: Sitting, Cuff Size: Adult Regular)   Pulse 75   Temp (!) 96.3  F (35.7  C) (Oral)   Resp 16   Wt 71.7 kg (158 lb)   BMI 24.75 kg/m    Body mass index is 24.75 kg/m .  Physical Exam   GENERAL: healthy, alert and no distress  RESP: lungs clear to auscultation - no rales, rhonchi or wheezes  CV: regular rate and rhythm, normal S1 S2, no S3 or S4, no murmur, click or rub, no peripheral edema and peripheral pulses strong  SKIN: Previous skin abscess decreased in size greatly is still has a scab over the superior aspect of it but draining

## 2022-05-04 NOTE — TELEPHONE ENCOUNTER
Please inform patient we typically do not remove dead skin after the abscess resolves and shrinks- this will usually slowly break down and fall off- if he wants me to take a look at it I will see him over lunch Friday when I'm in clinic next- can offer him at noon

## 2022-05-04 NOTE — TELEPHONE ENCOUNTER
Patient calling.  He says Dr Avila has been treating him for abscess on right shoulder and now there is dead skin all around that area.  He would like to come in and have it removed.    Please call patient to advise.

## 2022-06-21 PROBLEM — I48.0 PAROXYSMAL ATRIAL FIBRILLATION (H): Status: ACTIVE | Noted: 2022-01-01
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
## 2022-06-21 NOTE — ED NOTES
Bed: JNED-16  Expected date: 6/21/22  Expected time: 4:23 PM  Means of arrival: Ambulance  Comments:  [facility] - syncope.

## 2022-06-21 NOTE — ED TRIAGE NOTES
The pt arrives via EMS from home. Was at the store today when started to feel dizzy, he was unable to keep shopping at drove home. He was able to make it into the house and lay on the couch where he believes that he had a syncopal episode. Was found to be in Afib RVR by EMS with a rate in the 120's-130's. Pt has a pacemaker. Denies chest pain or SOB.      Triage Assessment     Row Name 06/21/22 [id number]       Triage Assessment (Adult)    Airway WDL WDL       Cognitive/Neuro/Behavioral WDL    Cognitive/Neuro/Behavioral WDL WDL

## 2022-06-22 NOTE — CONSULTS
Care Management Initial Consult    General Information  Assessment completed with: Patient, Friend, patient, Rosy allen  Type of CM/SW Visit: Initial Assessment    Primary Care Provider verified and updated as needed: Yes   Readmission within the last 30 days:        Reason for Consult: discharge planning  Advance Care Planning: Advance Care Planning Reviewed: other (see comments) (has at home per patient)          Communication Assessment  Patient's communication style: spoken language (English or Bilingual)    Hearing Difficulty or Deaf: no   Wear Glasses or Blind: no    Cognitive  Cognitive/Neuro/Behavioral: WDL                      Living Environment:   People in home: alone (wife currently in TCU)     Current living Arrangements: house      Able to return to prior arrangements: yes       Family/Social Support:  Care provided by: self  Provides care for:    Marital Status:   Wife, Neighbor, Other (specify) (friends)  Kamala       Description of Support System: Supportive, Involved    Support Assessment: Adequate family and caregiver support, Adequate social supports    Current Resources:   Patient receiving home care services: No     Community Resources: Housekeeping/Chore Agency  Equipment currently used at home: cane, straight  Supplies currently used at home: None    Employment/Financial:  Employment Status:          Financial Concerns:             Lifestyle & Psychosocial Needs:  Social Determinants of Health     Tobacco Use: Low Risk      Smoking Tobacco Use: Never Smoker     Smokeless Tobacco Use: Never Used   Alcohol Use: Not on file   Financial Resource Strain: Not on file   Food Insecurity: Not on file   Transportation Needs: Not on file   Physical Activity: Not on file   Stress: Not on file   Social Connections: Not on file   Intimate Partner Violence: Not on file   Depression: Not at risk     PHQ-2 Score: 2   Housing Stability: Not on file       Functional Status:  Prior to admission  patient needed assistance:   Dependent ADLs:: Independent  Dependent IADLs:: Cleaning       Mental Health Status:          Chemical Dependency Status:                Values/Beliefs:  Spiritual, Cultural Beliefs, Alevism Practices, Values that affect care:                 Additional Information:  Met with patient and friends Thomas and Rosy. Patient reports he lives in a house with his wife Kamala. Kamala has been at [facility] for about 5 months per patient. He visits her about every other day. Says he is independent at baseline, drives, does not have any formal services. Friend Rosy does housekeeping for him and bring Ensure. Says nephew Javier is in [place] and is financial POA, unsure about healthcare but patient says he has documents at home. Patient would like Thomas and his wife Ron added as contacts to his chart- is ok with them receiving information.     Mary Kate Leiva RN
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
## 2022-06-22 NOTE — PROGRESS NOTES
Progress Note        Assessment/Plan  Seng Deshpande is a 89 year old male with history of paroxysmal atrial fibrillation, TIA, CAD, hyperlipidemia, hypertension, sick sinus syndrome status post pacemaker placement, NSVT, LBBB and prostate cancer admitted on 6/21/2022 due to syncope in the setting of atrial fibrillation with RVR.     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  Telemetry , trend trops   Follow-up echocardiogram/pacer check   Follow cardiology consult     Paroxysmal atrial fibrillation/Sick sinus syndrome status post pacemaker placement  He received diltiazem 50 mg IV and systolic blood pressure dropped to 80s; improved to 90s with IV fluid  Heart rate is controlled at this time  Resume PTA sotalol 80 mg daily with hold parameters  Resume PTA metoprolol succinate 50 mg daily with hold parameters  Resume PTA Eliquis     Hypothyroidism  Resume PTA levothyroxine  Normal TSH     TIA  Aspirin 81 mg daily  Atorvastatin 40 mg daily  Resume PTA metoprolol succinate 50 mg daily  PT/OT     CAD  Aspirin 81 mg daily  Atorvastatin 40 mg daily  Resume PTA metoprolol succinate 50 mg daily  Nitroglycerin as needed     Hyperlipidemia  Atorvastatin 40 mg daily     Hypertension  Blood pressure improved after hydration  Resume metoprolol succinate and sotalol with hold parameters     Prostate cancer   Resume PTA dutasteride 0.5 mg daily     Diet: 2 Gram Sodium Diet  DVT Prophylaxis: DOAC  Lemus Catheter: Not present  Central Lines: None  Cardiac Monitoring: ACTIVE order. Indication: Tachyarrhythmias, acute (48 hours)  Code Status: Full Code    Subjective  No issues overnight, no further syncope or dizziness,  no chest pain or sob,      Objective  Vital signs in last 24 hours  Temp:  [97.9  F (36.6  C)-98.9  F (37.2  C)] 98.7  F (37.1  C)  Pulse:  [] 95  Resp:  [13-36] 20  BP: ()/(52-97)  132/97  SpO2:  [92 %-99 %] 93 %    Input and Output in 24 hrs     Intake/Output Summary (Last 24 hours) at 6/22/2022 1114  Last data filed at 6/22/2022 1040  Gross per 24 hour   Intake 323 ml   Output 200 ml   Net 123 ml       GEN: Alert and oriented. Not in acute distress  HEENT: Atraumatic    Pupils- round and reactive to light bilaterally   Neck- supple, no JVP elevation, no lymphadenopathy or thyromegaly   Sclera- anicteric   Mucous membrane- moist and pink  CHEST: Clear to auscultation bilaterally  HEART: S1S2 regular. No murmurs, rubs or gallops  ABDOMEN: Soft. Non-tender, non-distended. No organomegaly. No guarding or rigidity. Bowel sounds- active  Extremities: No pedal oedema  CNS: No focal neurological deficit. No involuntary movements  SKIN: No skin rash, no cyanosis or clubbing      Pertinent Labs       Recent Results (from the past 24 hour(s))   ECG 12-LEAD WITH MUSE (L[facility])    Collection Time: 06/21/22  4:41 PM   Result Value Ref Range    Systolic Blood Pressure 115 mmHg    Diastolic Blood Pressure 73 mmHg    Ventricular Rate 115 BPM    Atrial Rate 85 BPM    FL Interval  ms    QRS Duration 124 ms     ms    QTc 486 ms    P Axis  degrees    R AXIS -18 degrees    T Axis 133 degrees    Interpretation ECG       Atrial fibrillation with rapid ventricular response  Left bundle branch block  Abnormal ECG  When compared with ECG of 01-MAR-2022 18:08,  No significant change was found  Confirmed by SEE ED PROVIDER NOTE FOR, ECG INTERPRETATION ([id number]),  MICHELLE ALARCON ([id number]) on 6/22/2022 7:49:47 AM     Extra Blue Top Tube    Collection Time: 06/21/22  4:48 PM   Result Value Ref Range    Hold Specimen [facility]    Extra Red Top Tube    Collection Time: 06/21/22  4:48 PM   Result Value Ref Range    Hold Specimen [facility]    Extra Green Top (Lithium Heparin) Tube    Collection Time: 06/21/22  4:48 PM   Result Value Ref Range    Hold Specimen [facility]    Extra Purple Top Tube    Collection Time: 06/21/22  4:48 PM   Result  Value Ref Range    Hold Specimen [facility]    Comprehensive metabolic panel    Collection Time: 06/21/22  4:48 PM   Result Value Ref Range    Sodium 142 136 - 145 mmol/L    Potassium 4.6 3.5 - 5.0 mmol/L    Chloride 108 (H) 98 - 107 mmol/L    Carbon Dioxide (CO2) 26 22 - 31 mmol/L    Anion Gap 8 5 - 18 mmol/L    Urea Nitrogen 31 (H) 8 - 28 mg/dL    Creatinine 0.91 0.70 - 1.30 mg/dL    Calcium 9.8 8.5 - 10.5 mg/dL    Glucose 115 70 - 125 mg/dL    Alkaline Phosphatase 69 45 - 120 U/L    AST 22 0 - 40 U/L    ALT 13 0 - 45 U/L    Protein Total 7.0 6.0 - 8.0 g/dL    Albumin 3.2 (L) 3.5 - 5.0 g/dL    Bilirubin Total 0.4 0.0 - 1.0 mg/dL    GFR Estimate 77 >60 mL/min/1.73m2   Troponin I    Collection Time: 06/21/22  4:48 PM   Result Value Ref Range    Troponin I 0.02 0.00 - 0.29 ng/mL   CBC with platelets and differential    Collection Time: 06/21/22  4:48 PM   Result Value Ref Range    WBC Count 4.8 4.0 - 11.0 10e3/uL    RBC Count 2.81 (L) 4.40 - 5.90 10e6/uL    Hemoglobin 9.4 (L) 13.3 - 17.7 g/dL    Hematocrit 30.5 (L) 40.0 - 53.0 %     (H) 78 - 100 fL    MCH 33.5 (H) 26.5 - 33.0 pg    MCHC 30.8 (L) 31.5 - 36.5 g/dL    RDW 15.5 (H) 10.0 - 15.0 %    Platelet Count 168 150 - 450 10e3/uL   Manual Differential    Collection Time: 06/21/22  4:48 PM   Result Value Ref Range    % Neutrophils 71 %    % Lymphocytes 12 %    % Monocytes 15 %    % Eosinophils 2 %    % Basophils 0 %    Absolute Neutrophils 3.4 1.6 - 8.3 10e3/uL    Absolute Lymphocytes 0.6 (L) 0.8 - 5.3 10e3/uL    Absolute Monocytes 0.7 0.0 - 1.3 10e3/uL    Absolute Eosinophils 0.1 0.0 - 0.7 10e3/uL    Absolute Basophils 0.0 0.0 - 0.2 10e3/uL    RBC Morphology Confirmed RBC Indices     Platelet Assessment  Automated Count Confirmed. Platelet morphology is normal.     Automated Count Confirmed. Platelet morphology is normal.   B-Type Natriuretic Peptide (Pending sale to [facility])    Collection Time: 06/21/22  4:48 PM   Result Value Ref Range     (H) 0 - 93 pg/mL   TSH with  free T4 reflex    Collection Time: 06/21/22  4:48 PM   Result Value Ref Range    TSH 2.11 0.30 - 5.00 uIU/mL   Asymptomatic COVID-19 Virus (Coronavirus) by PCR Nasopharyngeal    Collection Time: 06/21/22  7:48 PM    Specimen: Nasopharyngeal; Swab   Result Value Ref Range    SARS CoV2 PCR Negative Negative       EKG Results reviewed       Advanced Care Planning      MD VANDANA KimD

## 2022-06-22 NOTE — PLAN OF CARE
Problem: Plan of Care - These are the overarching goals to be used throughout the patient stay.    Goal: Plan of Care Review/Shift Note  Outcome: Ongoing, Progressing  Flowsheets (Taken 6/22/2022 1349)  Plan of Care Reviewed With:   patient   friend  Overall Patient Progress: improving  Patient and two friends updated at bedside by MD and RN. Verbalized understanding. He did report that he did not want to come to the hospital in the first place, but EMS arrived at his house and he is unsure who called. Agreeable to stay overnight for obs.     Problem: Self-Care Deficit  Goal: Improved Ability to Complete Activities of Daily Living  Outcome: Ongoing, Progressing  Intervention: Promote Activity and Functional [place]  Recent Flowsheet Documentation  Taken 6/22/2022 1300 by Courtney Mahmood, RN  Activity Assistance Provided: assistance, stand-by  Taken 6/22/2022 1035 by Courtney Mahmood, RN  Activity Assistance Provided: assistance, stand-by  Independent at baseline, still drives. Lives in a private residence, spouse at [facility]. Unsteady gait, SBA with gait belt and FWW, which he does use at baseline. High risk for falls. Resting comfortably.    Problem: Dysrhythmia  Goal: Normalized Cardiac Rhythm  Outcome: Ongoing, Progressing  Spontaneously converted from A-fib to NSR. Occasional PAC's/PVC's, 1st degree AVB, BBB. S/p pacemaker. VSS. Denied CP, SOB, dizziness. Cards following. Sotalol increased from 80mg bid to 120mg bid.

## 2022-06-22 NOTE — PHARMACY-ADMISSION MEDICATION HISTORY
Pharmacy Note - Admission Medication History    Pertinent Provider Information: Patient does not think he is currently taking Avodart or Synthroid, but both have consistent refills.   ______________________________________________________________________    Prior To Admission (PTA) med list completed and updated in EMR.       PTA Med List   Medication Sig Note Last Dose     acetaminophen (TYLENOL 8 HOUR ARTHRITIS PAIN) 650 MG CR tablet Take 650 mg by mouth every 8 hours as needed for mild pain or fever  Unknown at Unknown time     aspirin 81 MG chewable tablet Take 81 mg by mouth daily  6/21/2022 at Unknown time     atorvastatin (LIPITOR) 40 MG tablet Take 1 tablet (40 mg) by mouth At Bedtime  6/20/2022 at Unknown time     calcium carbonate 500 mg (elemental) (OSCAL) 500 MG tablet Take 1 tablet by mouth daily  6/21/2022 at Unknown time     cholecalciferol 25 MCG (1000 UT) TABS Take 1,000 Units by mouth daily  6/21/2022 at Unknown time     docusate sodium (COLACE) 100 MG capsule Take 100 mg by mouth 2 times daily as needed for constipation  Unknown at Unknown time     dutasteride (AVODART) 0.5 MG capsule Take 1 capsule (0.5 mg) by mouth daily 6/21/2022: Patient recalls being on this medication previously, but thought he stopped it a few years ago when his urologist retired and he couldn't get in for follow-up. However, a prescription with multiple refills was filled at his primary pharmacy in April. Unknown at Unknown time     ELIQUIS ANTICOAGULANT 5 MG tablet Take 1 tablet (5 mg) by mouth 2 times daily  6/21/2022 at Unknown time     Glucosamine HCl 500 MG TABS Take 500 mg by mouth daily  6/21/2022 at Unknown time     levothyroxine (SYNTHROID/LEVOTHROID) 50 MCG tablet TAKE 1 TABLET(50 MCG) BY MOUTH DAILY 6/21/2022: Patient has been filling this medication consistently for the past year. However, states he is  not aware of any thyroid issues and is not on medication for his thyroid. Unknown at Unknown time      metoprolol succinate ER (TOPROL-XL) 25 MG 24 hr tablet Take 2 tablets (50 mg) by mouth daily (Patient taking differently: Take 25 mg by mouth daily) 6/21/2022: Prescription written for two tablets (50 mg) daily. However, patient reports taking only one tablet. 6/21/2022 at Unknown time     multivitamin  with lutein (OCUVITE WITH LTEIN) CAPS per capsule Take 1 capsule by mouth daily  6/21/2022 at Unknown time     polyethylene glycol (MIRALAX/GLYCOLAX) Packet Take 17 g by mouth daily as needed  Unknown at Unknown time     sotalol (BETAPACE) 80 MG tablet TAKE 1 TABLET(80 MG) BY MOUTH EVERY 12 HOURS  6/21/2022 at Unknown time     vitamin C (ASCORBIC ACID) 500 MG tablet Take 500 mg by mouth daily  6/21/2022 at Unknown time       Information source(s): Patient and CareEverywhere/SureScripts  Method of interview communication: in-person    Summary of Changes to PTA Med List  Discontinued: clindamycin, doxycycline, betamethasone, multivitamin  Changed: metoprolol, APAP    Allergies were reviewed, assessed, and updated with the patient.      Patient does not use any multi-dose medications prior to admission.    The information provided in this note is only as accurate as the sources available at the time of the update(s).    Thank you for the opportunity to participate in the care of this patient.    Clair Lopez [facility]  6/21/2022 8:21 PM

## 2022-06-23 NOTE — PLAN OF CARE
PRIMARY DIAGNOSIS: GENERALIZED WEAKNESS    OUTPATIENT/OBSERVATION GOALS TO BE MET BEFORE DISCHARGE  1. Orthostatic performed: N/A    2. Tolerating PO medications: Yes    3. Return to near baseline physical activity:Yes    4. Cleared for discharge by consultants (if involved): No    Pt alert and oriented x3-4, forgetful at times.  Pleasant and cooperative.   Denies lightheadedness or pain.  Up to BR via SBA with walker, voiding, some SOB noted with activity.  Tele SR with 1AVB and BBB.     Discharge Planner Nurse   Safe discharge environment identified: Yes  Barriers to discharge: Yes       Entered by: Velma Curry RN 06/23/2022 5:47 AM     Please review provider order for any additional goals.   Nurse to notify provider when observation goals have been met and patient is ready for discharge.

## 2022-06-23 NOTE — PROGRESS NOTES
Progress Note        Assessment/Plan  Seng Deshpande is a 89 year old male with history of paroxysmal atrial fibrillation, TIA, CAD, hyperlipidemia, hypertension, sick sinus syndrome status post pacemaker placement, NSVT, LBBB and prostate cancer admitted on 6/21/2022 due to syncope in the setting of atrial fibrillation with RVR.     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  Telemetry , trend trops   Cardiology following, appreciate input  Device interrogated      Paroxysmal atrial fibrillation/Sick sinus syndrome status post pacemaker placement  He received diltiazem 50 mg IV and systolic blood pressure dropped to 80s; improved to 90s with IV fluid  Heart rate is controlled at this time  PTA sotalol increased to 120mg BID today by cards   Resume PTA metoprolol succinate 50 mg daily with hold parameters  Resume PTA Eliquis  Planned to repeat EKG tomorrow to assess for QTc    Hypothyroidism  Resume PTA levothyroxine  Normal TSH     TIA  Aspirin 81 mg daily  Atorvastatin 40 mg daily  PT/OT consulted     CAD  Aspirin 81 mg daily  Atorvastatin 40 mg daily  Resume PTA metoprolol succinate 50 mg daily  Nitroglycerin as needed     Hyperlipidemia  Atorvastatin 40 mg daily     Hypertension  Blood pressure improved after hydration  Resume metoprolol succinate and sotalol with hold parameters     Prostate cancer   Resume PTA dutasteride 0.5 mg daily     Diet: 2 Gram Sodium Diet  DVT Prophylaxis: DOAC  Lemus Catheter: Not present  Central Lines: None  Cardiac Monitoring: ACTIVE order. Indication: Tachyarrhythmias, acute (48 hours)  Code Status: Full Code    Subjective  .  Patient is new to me. Chart reviewed and events noted.  Patient seen and examined.   Pleasant male, sitting up in bed, states feels ok today. Lives at home. Denies any more syncope, chest pain, SOB.     Objective  Vital signs in last  24 hours  Temp:  [97.7  F (36.5  C)-98.5  F (36.9  C)] 97.9  F (36.6  C)  Pulse:  [67-72] 70  Resp:  [16-20] 16  BP: (110-152)/(59-84) 119/62  SpO2:  [92 %-96 %] 93 %    Input and Output in 24 hrs     Intake/Output Summary (Last 24 hours) at 6/22/2022 1114  Last data filed at 6/22/2022 1040  Gross per 24 hour   Intake 323 ml   Output 200 ml   Net 123 ml       GEN: Alert and oriented. Not in acute distress  HEENT: Atraumatic, NC, EOMI.   CHEST: Clear to auscultation bilaterally  HEART: Irregularly irregular  ABDOMEN: Soft. Non-tender, non-distended.   Extremities: No pedal oedema  CNS: No focal neurological deficit. No involuntary movements  SKIN: No skin rash, no cyanosis or clubbing      Pertinent Labs       Recent Results (from the past 24 hour(s))   Basic metabolic panel    Collection Time: 06/23/22  4:53 AM   Result Value Ref Range    Sodium 138 136 - 145 mmol/L    Potassium 3.8 3.5 - 5.0 mmol/L    Chloride 109 (H) 98 - 107 mmol/L    Carbon Dioxide (CO2) 23 22 - 31 mmol/L    Anion Gap 6 5 - 18 mmol/L    Urea Nitrogen 20 8 - 28 mg/dL    Creatinine 0.74 0.70 - 1.30 mg/dL    Calcium 9.3 8.5 - 10.5 mg/dL    Glucose 92 70 - 125 mg/dL    GFR Estimate 83 >60 mL/min/1.73m2   CBC with platelets and differential    Collection Time: 06/23/22  4:53 AM   Result Value Ref Range    WBC Count 5.5 4.0 - 11.0 10e3/uL    RBC Count 2.66 (L) 4.40 - 5.90 10e6/uL    Hemoglobin 8.8 (L) 13.3 - 17.7 g/dL    Hematocrit 28.2 (L) 40.0 - 53.0 %     (H) 78 - 100 fL    MCH 33.1 (H) 26.5 - 33.0 pg    MCHC 31.2 (L) 31.5 - 36.5 g/dL    RDW 15.4 (H) 10.0 - 15.0 %    Platelet Count 140 (L) 150 - 450 10e3/uL   Manual Differential    Collection Time: 06/23/22  4:53 AM   Result Value Ref Range    % Neutrophils 54 %    % Lymphocytes 21 %    % Monocytes 19 %    % Eosinophils 6 %    % Basophils 0 %    Absolute Neutrophils 3.0 1.6 - 8.3 10e3/uL    Absolute Lymphocytes 1.2 0.8 - 5.3 10e3/uL    Absolute Monocytes 1.0 0.0 - 1.3 10e3/uL    Absolute  Eosinophils 0.3 0.0 - 0.7 10e3/uL    Absolute Basophils 0.0 0.0 - 0.2 10e3/uL    RBC Morphology Confirmed RBC Indices     Platelet Assessment  Automated Count Confirmed. Platelet morphology is normal.     Automated Count Confirmed. Platelet morphology is normal.       Tele reviewed : showed A. Fib, rate controlled      Advanced Care Planning  Case d/w patient, bedside RN, SW/CM    Zoya Diaz MD  Hospitalist

## 2022-06-23 NOTE — PROGRESS NOTES
Occupational Therapy      06/23/22 1420   Quick Adds   Type of Visit Initial Occupational Therapy Evaluation   Living Environment   People in Home alone   Current Living Arrangements house   Home Accessibility stairs to enter home;stairs within home   Number of Stairs, Main Entrance 3   Stair Railings, Main Entrance railings safe and in good condition   Number of Stairs, Within Home, Primary ten   Stair Railings, Within Home, Primary railings safe and in good condition;railings on both sides of stairs   Transportation Anticipated family or friend will provide   Living Environment Comments Tub/shower w/  GB/SC Pt also has W/I shower w/ SC/GB   Self-Care   Usual Activity Tolerance good   Current Activity Tolerance moderate   Regular Exercise No   Equipment Currently Used at Home cane, straight   Fall history within last six months no   Activity/Exercise/Self-Care Comment Ind. w/ all Self care tasks   Instrumental Activities of Daily Living (IADL)   Previous Responsibilities meal prep;housekeeping;laundry;medication management;finances;driving   General Information   Onset of Illness/Injury or Date of Surgery 06/21/22   Referring Physician Sachin Purdy MD   Patient/Family Therapy Goal Statement (OT) to get home   Additional Occupational Profile Info/Pertinent History of Current Problem Pt presenting w/ A-fib w/ RVR, TIA, CAD, hyperlipidemia, HTN, sick sinus rhythm s/p pacemaker NSVT, LBBB & prostate Cancer.   Existing Precautions/Restrictions fall   Cognitive Status Examination   Orientation Status orientation to person, place and time   Range of Motion Comprehensive   General Range of Motion no range of motion deficits identified   Bed Mobility   Bed Mobility supine-sit;sit-supine   Supine-Sit [place] (Bed Mobility) supervision;verbal cues   Sit-Supine [place] (Bed Mobility) supervision;verbal cues   Assistive Device (Bed Mobility) bed rails   Transfers   Transfers bed-chair transfer;sit-stand  transfer   Transfer Skill: Bed to Chair/Chair to Bed   Bed-Chair [place] (Transfers) contact guard;verbal cues;supervision   Assistive Device (Bed-Chair Transfers) rolling walker   Sit-Stand Transfer   Sit-Stand [place] (Transfers) contact guard;verbal cues;supervision   Assistive Device (Sit-Stand Transfers) walker, front-wheeled   Activities of Daily Living   BADL Assessment/Intervention upper body dressing;lower body dressing   Upper Body Dressing Assessment/Training   Position (Upper Body Dressing) unsupported sitting   [place] Level (Upper Body Dressing) verbal cues;supervision;minimum assist (75% patient effort)   Lower Body Dressing Assessment/Training   Position (Lower Body Dressing) unsupported sitting   [place] Level (Lower Body Dressing) verbal cues;supervision   Clinical Impression   Criteria for Skilled Therapeutic Interventions Met (OT) Yes, treatment indicated   OT Diagnosis Decreased ADL ind.   OT Problem List-Impairments impacting ADL problems related to;activity tolerance impaired;balance;mobility;strength   Assessment of Occupational Performance 1-3 Performance Deficits   Planned Therapy Interventions (OT) ADL retraining;balance training;strengthening;transfer training   Clinical Decision Making Complexity (OT) low complexity   Risk & Benefits of therapy have been explained evaluation/treatment results reviewed;patient   OT Discharge Planning   OT Discharge Recommendation (DC Rec) home with assist   OT Rationale for DC Rec Pt ambulating well w/ CGA-SBA w/ fww no LOB, pt lives alone and may benefit from added services/check ins to ensure safety upon d/c home.   Therapy Certification   Start of Care Date 06/21/22   Certification date from 06/21/22   Certification date to 06/28/22   Medical Diagnosis A.fib w/ RVR   Total Evaluation Time (Minutes)   Total Evaluation Time (Minutes) 8   OT Goals   Therapy Frequency (OT) Daily   OT Predicted Duration/Target Date for Goal Attainment  06/29/22   OT Goals Hygiene/Grooming;Transfers;Toilet Transfer/Toileting;Aerobic Activity   OT: Hygiene/Grooming modified independent;while standing   OT: Transfer Modified independent;with assistive device   OT: Toilet Transfer/Toileting Modified independent;using adaptive equipment   OT: Perform aerobic activity with stable cardiovascular response continuous activity;10 minutes

## 2022-06-23 NOTE — PLAN OF CARE
PRIMARY DIAGNOSIS: GENERALIZED WEAKNESS    OUTPATIENT/OBSERVATION GOALS TO BE MET BEFORE DISCHARGE  1. Orthostatic performed: N/A    2. Tolerating PO medications: Yes    3. Return to near baseline physical activity: Yes    4. Cleared for discharge by consultants (if involved): No    Discharge Planner Nurse   Safe discharge environment identified: Yes  Barriers to discharge: Yes       Entered by: Velma Curry RN 06/23/2022 4:23 AM     Please review provider order for any additional goals.   Nurse to notify provider when observation goals have been met and patient is ready for discharge.

## 2022-06-23 NOTE — UTILIZATION REVIEW
Concurrent stay review; Secondary Review Determination     Under the authority of the Utilization Management Committee, the utilization review process indicated a secondary review on Seng Deshpande.  The review outcome is based on review of the medical records, discussions with staff, and applying clinical experience noted on the date of the review.        (x) Observation Status Appropriate - Concurrent stay review    RATIONALE FOR DETERMINATION   89 yr old male with PAF, TIA, CAD, HLD, HTN, SSS s/p pacer, NSVT, LBBB presented with syncope event and found to be in Afib with RVR.  Sotalol dose increased.  IVF given over night first night.  Staying for repeat EKG in AM to assess QTC.  HR has been controlled since ED.      Patient is clinically improving and there is no clear indication to change patient's status to inpatient. The severity of illness, intensity of service provided, expected LOS and risk for adverse outcome make the care appropriate for observation.    The information on this document is developed by the utilization review team in order for the business office to ensure compliance.  This only denotes the appropriateness of proper admission status and does not reflect the quality of care rendered.         The definitions of Inpatient Status and Observation Status used in making the determination above are those provided in the CMS Coverage Manual, Chapter 1 and Chapter 6, section 70.4.      Sincerely,   Rachel Barriga MD  Utilization Review  Physician Advisor  [facility]

## 2022-06-23 NOTE — PROGRESS NOTES
06/23/22 1450   Quick Adds   Type of Visit Initial PT Evaluation   Living Environment   People in Home alone   Current Living Arrangements house   Home Accessibility stairs to enter home   Number of Stairs, Main Entrance 4   Stair Railings, Main Entrance railings on both sides of stairs   Transportation Anticipated family or friend will provide   Self-Care   Usual Activity Tolerance good   Current Activity Tolerance moderate   Equipment Currently Used at Home cane, quad  (pt has FWW)   Fall history within last six months no   Activity/Exercise/Self-Care Comment patient independent with mobility and ADL,drives and visits wife at SNF every other day   General Information   Onset of Illness/Injury or Date of Surgery 06/21/22   Referring Physician Zoya Diaz   Patient/Family Therapy Goals Statement (PT) return home   Pertinent History of Current Problem (include personal factors and/or comorbidities that impact the POC) a-fib with RVR   Existing Precautions/Restrictions fall;cardiac   Weight-Bearing Status - LLE full weight-bearing   Weight-Bearing Status - RLE full weight-bearing   General Observations cooperative   Cognition   Affect/Mental Status (Cognition) WFL   Orientation Status (Cognition) oriented x 4   Pain Assessment   Patient Currently in Pain No   Range of Motion (ROM)   Range of Motion ROM is WNL   Strength (Manual Muscle Testing)   Strength (Manual Muscle Testing) strength is WFL   Bed Mobility   Comment, (Bed Mobility) pt independent   Transfers   Comment, (Transfers) SBA with SEC   Gait/Stairs (Locomotion)   [place] Level (Gait) minimum assist (75% patient effort)   Assistive Device (Gait) cane, straight   Distance in Feet (Required for LE Total Joints) 160 feet   Pattern (Gait) step-through   Deviations/Abnormal Patterns (Gait) other (see comments)   Comment, (Gait/Stairs) unsteady   Clinical Impression   Criteria for Skilled Therapeutic Intervention Yes, treatment indicated   PT Diagnosis  (PT) impaired functional mobility   Influenced by the following impairments weakness,inactivity   Functional limitations due to impairments gait ,stairs   Clinical Presentation (PT Evaluation Complexity) Stable/Uncomplicated   Clinical Presentation Rationale pt presents as med diagnosed   Clinical Decision Making (Complexity) low complexity   Planned Therapy Interventions (PT) balance training;gait training;stair training;transfer training   Anticipated Equipment Needs at Discharge (PT) walker, rolling   Risk & Benefits of therapy have been explained evaluation/treatment results reviewed;patient   Clinical Impression Comments Patient is a 89 yo male admitted with a-fib and RVR presents with unsteady gait with SEC -per pt report pt he uses 4 prong cane at home.Appropriate for skilled PT to ensure safety with amb and stairs as pt lives alone   PT Discharge Planning   PT Discharge Recommendation (DC Rec) home with assist;home with home care physical therapy   PT Rationale for DC Rec was independent prior,recommend HHA ,home PT and family /friends to assist as needed   Plan of Care Review   Plan of Care Reviewed With patient   Total Evaluation Time   Total Evaluation Time (Minutes) 15   Physical Therapy Goals   PT Frequency Daily   PT Predicted Duration/Target Date for Goal Attainment 06/27/22   PT Goals Transfers;Gait;Stairs   PT: Transfers Modified independent;Sit to/from stand;Assistive device   PT: Gait Supervision/stand-by assist;Rolling walker;Greater than 200 feet   PT: Stairs Supervision/stand-by assist;4 stairs;Rail on both sides
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
## 2022-06-23 NOTE — PLAN OF CARE
observe PRIMARY DIAGNOSIS: SYNCOPE/TIA  OUTPATIENT/OBSERVATION GOALS TO BE MET BEFORE DISCHARGE:  1. Orthostatic performed: N/A    2. Diagnostic testing complete & at baseline neurologic testing: Yes    3. Cleared by consultants (if involved): No    4. Interpretation of cardiac rhythm per telemetry tech: 1st degree BBB    5. Tolerating adequate PO diet and medications: Yes    6. Return to near baseline physical activity or neurologic status: Yes    Discharge Planner Nurse   Safe discharge environment identified: Yes  Barriers to discharge: Yes       Entered by: Naresh Akers RN 06/23/2022 1:16 PM     Please review provider order for any additional goals.   Nurse to notify provider when observation goals have been met and patient is ready for discharge.Goal Outcome Evaluation:      Pt states he is feeling well and is no longer feeling dizzy or lightheaded.  Pt denies pain nausea sob.   Pt is forgetful at times.  Pt is calm and cooperative with staff.    PT will have ECG in a.m. per cardiology.         Writer interrogated [employer]   Writer paged cardiology that results were in chart.

## 2022-06-23 NOTE — PROGRESS NOTES
Care Management Follow Up    Length of Stay (days): 0    Expected Discharge Date: 06/24/2022     Concerns to be Addressed:     Discharge planning  Patient plan of care discussed at interdisciplinary rounds: Yes    Anticipated Discharge Disposition:  Home     Anticipated Discharge Services:  To be determined  Anticipated Discharge DME:  cane    Patient/family educated on Medicare website which has current facility and service quality ratings:    Education Provided on the Discharge Plan:  yes  Patient/Family in Agreement with the Plan:  yes    Referrals Placed by CM/SW:  Not at this time  Private pay costs discussed: Not applicable    Additional Information:  CM Chart reviewed and met with MD for updates. Cardiology following, PT OT to be consulted.  Patient lives at home alone with support from friends and neighbors, he is independent with a cane.   Wife is currently at [facility]. He reports he has 4 steps to enter home.   SW received voicemail from Ron- friend and neighbor of pt. Pt reports consent to call and update Ron. VM left with her.       LSIA Frankel

## 2022-06-23 NOTE — PROGRESS NOTES
[facility]      OUTPATIENT OCCUPATIONAL THERAPY  EVALUATION  PLAN OF TREATMENT FOR OUTPATIENT REHABILITATION  (COMPLETE FOR INITIAL CLAIMS ONLY)  Patient's Last Name, First Name, M.I.  YOB: 1926  Seng Deshpande                          Provider's Name  [facility] Medical Record No.  [id number]                               Onset Date:  06/21/22   Start of Care Date:  (P) 06/21/22     Type:     ___PT   _X_OT   ___SLP Medical Diagnosis:                           OT Diagnosis:  Decreased ADL ind.   Visits from SOC:  1   _________________________________________________________________________________  Plan of Treatment/Functional Goals    Planned Interventions: ADL retraining, balance training, strengthening, transfer training   Goals: See Occupational Therapy Goals on Care Plan in [facility] electronic health record.    Therapy Frequency:    Predicted Duration of Therapy Intervention:    _________________________________________________________________________________    I CERTIFY THE NEED FOR THESE SERVICES FURNISHED UNDER        THIS PLAN OF TREATMENT AND WHILE UNDER MY CARE     (Physician co-signature of this document indicates review and certification of the therapy plan).               ,      Referring Physician: Sachin Purdy MD            Initial Assessment        See Occupational Therapy evaluation dated (P) 06/21/22 in Epic electronic health record.

## 2022-06-23 NOTE — PLAN OF CARE
"PRIMARY DIAGNOSIS: \"GENERIC\" NURSING  OUTPATIENT/OBSERVATION GOALS TO BE MET BEFORE DISCHARGE:  ADLs back to baseline: Yes    Activity and level of assistance: Up with standby assistance.    Pain status: Pain free.    Return to near baseline physical activity: Yes     Discharge Planner Nurse   Safe discharge environment identified: Yes  Barriers to discharge: No       Entered by: Mary Kate Escamilla RN 06/22/2022 10:41 PM     Please review provider order for any additional goals.   Nurse to notify provider when observation goals have been met and patient is ready for discharge.Goal Outcome Evaluation:                      "

## 2022-06-23 NOTE — PROGRESS NOTES
[facility]      OUTPATIENT PHYSICAL THERAPY EVALUATION  PLAN OF TREATMENT FOR OUTPATIENT REHABILITATION  (COMPLETE FOR INITIAL CLAIMS ONLY)  Patient's Last Name, First Name, M.I.  YOB: 1926  Seng Deshpande                        Provider's Name  [facility] Medical Record No.  [id number]                               Onset Date:  06/21/22   Start of Care Date:         Type:     _X_PT   ___OT   ___SLP Medical Diagnosis:                           PT Diagnosis:  impaired functional mobility   Visits from SOC:  1   _________________________________________________________________________________  Plan of Treatment/Functional Goals    Planned Interventions: balance training, gait training, stair training, transfer training     Goals: See Physical Therapy Goals on Care Plan in [facility] electronic health record.    Therapy Frequency: Daily  Predicted Duration of Therapy Intervention: 06/27/22  _________________________________________________________________________________    I CERTIFY THE NEED FOR THESE SERVICES FURNISHED UNDER        THIS PLAN OF TREATMENT AND WHILE UNDER MY CARE     (Physician co-signature of this document indicates review and certification of the therapy plan).              Certification date from: (P) 06/23/22,      Referring Physician: Zoya Diaz            Initial Assessment        See Physical Therapy evaluation dated   in Epic electronic health record.

## 2022-06-23 NOTE — PLAN OF CARE
"PRIMARY DIAGNOSIS: \"GENERIC\" NURSING  OUTPATIENT/OBSERVATION GOALS TO BE MET BEFORE DISCHARGE:  ADLs back to baseline: Yes    Activity and level of assistance: Up with standby assistance.    Pain status: Pain free.    Return to near baseline physical activity: Yes     Discharge Planner Nurse   Safe discharge environment identified: Yes  Barriers to discharge: No       Entered by: Mary Kate Escamilla RN 06/22/2022 10:43 PM     Please review provider order for any additional goals.   Nurse to notify provider when observation goals have been met and patient is ready for discharge.Goal Outcome Evaluation:    Pt A/O x 4, forgetful. Tele - normal sinus rhythm with 1st degree AVB and BBB. Denied pain. Ambulated in [place] x 1 with SBA.                     "

## 2022-06-24 NOTE — UTILIZATION REVIEW
Admission Status; Secondary Review Determination   Under the authority of the Utilization Management Committee, the utilization review process indicated a secondary review on Seng Deshpande. The review outcome is based on review of the medical records, discussions with staff, and applying clinical experience noted on the date of the review.   (x) Inpatient Status Appropriate - This patient's medical care is consistent with medical management for inpatient care and reasonable inpatient medical practice.     RATIONALE FOR DETERMINATION   89 yr old male with PAF, TIA, CAD, HLD, HTN, SSS s/p pacer, NSVT, LBBB presented with syncope event and found to be in Afib with RVR.  IV diltiazem x 1.Sotalol dose increased.  IVF given over night first night as hypotension from Diltiazem.  Has stayed for repeat EKG to assess QTC with Sotalol increase however now QTC prolonged and need to decrease Sotalol again and trend HR and BP.  Now crossing 4th night with cardiology continuing to adjust medications and close monitoring of HR and BP as well as telemetry and QTC.  At the time of admission with the information available to the attending physician more than 2 nights Hospital complex care was anticipated, based on patient risk of adverse outcome if treated as outpatient and complex care required. Inpatient admission is appropriate based on the Medicare guidelines.   The information on this document is developed by the utilization review team in order for the business office to ensure compliance. This only denotes the appropriateness of proper admission status and does not reflect the quality of care rendered.   The definitions of Inpatient Status and Observation Status used in making the determination above are those provided in the CMS Coverage Manual, Chapter 1 and Chapter 6, section 70.4.   Sincerely,   Rachel Barriga MD  Utilization Review  Physician Advisor  [facility]

## 2022-06-24 NOTE — DISCHARGE INSTRUCTIONS
Home care services have been arranged for you:  Home Care Agency: [facility]  Home Care Services: PT, OT, and HHA  Home Care Phone: [phone]    Instructions: Home care agency will call within 48 hours of discharge to schedule appointments. Your first appointment is scheduled for Tuesday June 28th.

## 2022-06-24 NOTE — PLAN OF CARE
Patient denied pain throughout the night. Very forgetful, does not use call light. Unsteady; needs assistance with walking. Tele NSR (no pacer spikes) with 1AVB; looks paced without spikes. Patient looking for his house keys in the middle of the night which are in his belongings. Planning to discharge today; needs transportation.     Vitals:    06/23/22 1953 06/24/22 0019 06/24/22 0020 06/24/22 0558   BP: (P) 127/61  (!) 155/72 (!) 146/75   BP Location: (P) Right arm  Right arm Right arm   Pulse: (P) 69 65 67 72   Resp: (P) 16  20 18   Temp: (P) 97.6  F (36.4  C)  98.3  F (36.8  C) 97.5  F (36.4  C)   TempSrc: (P) Oral  Oral Oral   SpO2: (P) 95%  98% 95%   Weight:            Problem: Plan of Care - These are the overarching goals to be used throughout the patient stay.    Goal: Optimal Comfort and Wellbeing  Outcome: Ongoing, Progressing  Goal: Readiness for Transition of Care  Outcome: Ongoing, Progressing  Flowsheets (Taken 6/24/2022 0612)  Barriers to Discharge: transportation   Goal Outcome Evaluation:
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
## 2022-06-24 NOTE — PROGRESS NOTES
PRIMARY DIAGNOSIS: SYNCOPE/TIA  OUTPATIENT/OBSERVATION GOALS TO BE MET BEFORE DISCHARGE:  1. Orthostatic performed: No    2. Diagnostic testing complete & at baseline neurologic testing: No    3. Cleared by consultants (if involved): No    4. Interpretation of cardiac rhythm per telemetry tech: NSR with 1st degree AVB, no pacer spikes    5. Tolerating adequate PO diet and medications: Yes    6. Return to near baseline physical activity or neurologic status: Yes    Discharge Planner Nurse   Safe discharge environment identified: Yes  Barriers to discharge: Yes       Entered by: Samira Goel RN 06/24/2022 1:15 PM     Please review provider order for any additional goals.   Nurse to notify provider when observation goals have been met and patient is ready for discharge.

## 2022-06-24 NOTE — PROGRESS NOTES
Care Management Discharge Note    Discharge Date: 06/24/2022       Discharge Disposition:  Home    Discharge Services:  Home Care (PT, OT, HHA)    Discharge DME:  Cane    Discharge Transportation: family or friend will provide    Private pay costs discussed: transportation costs    Education Provided on the Discharge Plan: yes   Persons Notified of Discharge Plans: patient  Patient/Family in Agreement with the Plan: yes     Handoff Referral Completed: Yes    Additional Information:  SW met with Pt to discuss discharge plans. Pt lives alone and reports he has a neighbor that visits one time a week to make him breakfast. Pt states he becomes forgetful about cleaning so his neighbor does assist during her weekly visit. Pt reports he is connected with his nephew, but his nephew lives in [place] and does not visit much. SW discussed Pt trying home care. Pt appeared unsure but was open to having home care set up for PT, OT, and HHA. Pt also requested transport home. SW informed Pt of potential out-of-pocket costs, Pt expressed understanding. Transport through [facility] needed when Pt is ready for discharge.    Home care referrals sent to [facility] and [facility]. CAROLINA spoke to Roberth at [facility] to follow-up on referral. Roberth reported she will review and call back.    1:06 PM  Talon from [facility] called to accept Pt. Talon said they scheduled PT services for Tuesday (6/28). Fax discharge orders to [phone]. If calling during the weekend, call using the weekend on-call #: [phone]. Pt not currently medically ready, CM to inform [facility] when Pt is discharging.     AYAD Edgar

## 2022-06-24 NOTE — PLAN OF CARE
Problem: Dysrhythmia  Goal: Normalized Cardiac Rhythm  Outcome: Ongoing, Progressing   Goal Outcome Evaluation:    Paced rhythm 1st degree AVB with pacs.  Ate well.  Alert and orientated x 4.  Pt walked the the bathroom with assist of 1 and walker.  Pt was on the call light x 15 during shift.  Pt is very concerned regarding his discharge tomorrow and how he is getting home and he need denture cream for his dentures.  Staff explained that the  will talk to him tomorrow and not to worry.

## 2022-06-24 NOTE — PLAN OF CARE
Patient is alert and oriented, forgetful. Patient does not use call light to call for help to go to the bathroom or go back to bed. Chair and bed alarm utilized for patient safety. Patient denies pain or discomfort. Patient is anticipating discharge tomorrow.      Problem: Plan of Care - These are the overarching goals to be used throughout the patient stay.    Goal: Plan of Care Review/Shift Note  Description: The Plan of Care Review/Shift note should be completed every shift.  The Outcome Evaluation is a brief statement about your assessment that the patient is improving, declining, or no change.  This information will be displayed automatically on your shift note.  Outcome: Ongoing, Progressing  Goal: Absence of Hospital-Acquired Illness or Injury  Outcome: Ongoing, Progressing  Intervention: Identify and Manage Fall Risk  Recent Flowsheet Documentation  Taken 6/24/2022 1610 by Samira Goel RN  Safety Promotion/Fall Prevention:   activity supervised   bed alarm on   lighting adjusted   mobility aid in reach   nonskid shoes/slippers when out of bed   patient and family education   room door open   room near nurse's station  Taken 6/24/2022 1217 by Samira Goel RN  Safety Promotion/Fall Prevention:   activity supervised   bed alarm on   lighting adjusted   mobility aid in reach   nonskid shoes/slippers when out of bed   patient and family education   room door open   room near nurse's station  Taken 6/24/2022 0803 by Samira Goel, RN  Safety Promotion/Fall Prevention:   activity supervised   bed alarm on   lighting adjusted   mobility aid in reach   nonskid shoes/slippers when out of bed   patient and family education   room door open   room near nurse's station  Intervention: Prevent Skin Injury  Recent Flowsheet Documentation  Taken 6/24/2022 1233 by Samira Goel RN  Body Position: position changed independently  Intervention: Prevent and Manage VTE (Venous Thromboembolism)  Risk  Recent Flowsheet Documentation  Taken 6/24/2022 1610 by Samira Goel, RN  Activity Management: up in chair  Taken 6/24/2022 1500 by Samira Goel, RN  Activity Management: ambulated to bathroom  Taken 6/24/2022 1217 by Samira Goel, RN  Activity Management: ambulated to bathroom  Goal: Readiness for Transition of Care  Outcome: Ongoing, Progressing     Problem: Risk for Delirium  Goal: Optimal Coping  Outcome: Ongoing, Progressing   Goal Outcome Evaluation:

## 2022-06-24 NOTE — PROGRESS NOTES
Progress Note        Assessment/Plan  Seng Deshpande is a 89 year old male with history of paroxysmal atrial fibrillation, TIA, CAD, hyperlipidemia, hypertension, sick sinus syndrome status post pacemaker placement, NSVT, LBBB and prostate cancer admitted on 6/21/2022 due to syncope in the setting of atrial fibrillation with RVR.     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  Telemetry , trend trops   Cardiology following, appreciate input  Device interrogated      Paroxysmal atrial fibrillation/Sick sinus syndrome status post pacemaker placement  He received diltiazem 50 mg IV and systolic blood pressure dropped to 80s; improved to 90s with IV fluid  Heart rate is controlled at this time  PTA sotalol increased to 120mg BID today by cards , but noted prolonged QT hence decreased back to 80mg BID to start from 6/25 AM  Continue PTA metoprolol succinate 50 mg daily with hold parameters and Wliquis  Planned to repeat EKG tomorrow AM to assess for QTc, If QTc better tomorrow can discharge home in AM    Hypothyroidism  Resume PTA levothyroxine  Normal TSH     TIA  Aspirin 81 mg daily  Atorvastatin 40 mg daily  PT/OT following     CAD  Aspirin 81 mg daily  Atorvastatin 40 mg daily  Resume PTA metoprolol succinate 50 mg daily  Nitroglycerin as needed     Hyperlipidemia  Atorvastatin 40 mg daily     Hypertension  Blood pressure improved after hydration  Resume metoprolol succinate and sotalol with hold parameters     Prostate cancer   Resume PTA dutasteride 0.5 mg daily     Diet: 2 Gram Sodium Diet  DVT Prophylaxis: DOAC  Lemus Catheter: Not present  Central Lines: None  Cardiac Monitoring: ACTIVE order. Indication: Tachyarrhythmias, acute (48 hours)  Code Status: Full Code    Subjective  Chart reviewed and events noted.  Patient seen and examined.   Pleasant male, sitting up in chair. No dizziness,  chest pain, SOB.     Objective  Vital signs in last 24 hours  Temp:  [97.5  F (36.4  C)-98.3  F (36.8  C)] 98  F (36.7  C)  Pulse:  [65-75] 75  Resp:  [16-20] 18  BP: (132-155)/(60-77) 143/77  SpO2:  [93 %-98 %] 96 %    Input and Output in 24 hrs     Intake/Output Summary (Last 24 hours) at 6/22/2022 1114  Last data filed at 6/22/2022 1040  Gross per 24 hour   Intake 323 ml   Output 200 ml   Net 123 ml       GEN: Alert and oriented. Not in acute distress  HEENT: Atraumatic, NC, EOMI.   CHEST: Clear to auscultation bilaterally  HEART: Irregularly irregular  ABDOMEN: Soft. Non-tender, non-distended.   Extremities: No pedal edema  CNS: No focal neurological deficit. No involuntary movements  SKIN: No skin rash, no cyanosis or clubbing      Pertinent Labs       Recent Results (from the past 24 hour(s))   ECG 12-LEAD WITH MUSE (LHE)    Collection Time: 06/24/22  7:58 AM   Result Value Ref Range    Systolic Blood Pressure  mmHg    Diastolic Blood Pressure  mmHg    Ventricular Rate 71 BPM    Atrial Rate 55 BPM    HI Interval  ms    QRS Duration 138 ms     ms    QTc 521 ms    P Axis  degrees    R AXIS -30 degrees    T Axis 109 degrees    Interpretation ECG       Atrial fibrillation with premature ventricular or aberrantly conducted complexes  Left axis deviation  Left bundle branch block  Abnormal ECG  When compared with ECG of 21-JUN-2022 16:41,  Vent. rate has decreased BY  44 BPM         Tele reviewed : showed A. Fib, rate controlled      Advanced Care Planning    Case d/w patient, bedside RN, SW/CM    Zoya Diaz MD  Hospitalist

## 2022-06-25 NOTE — PLAN OF CARE
Problem: Dysrhythmia  Goal: Normalized Cardiac Rhythm  Outcome: Ongoing, Progressing     Problem: Risk for Delirium  Goal: Optimal Coping  Outcome: Ongoing, Progressing   Goal Outcome Evaluation:  Patient denied pain. Forgetful--sets bed alarm off throughout the overnight hours.  Reminders given to use call light to avoid falls.  Slept in between cares.   At 0009, patient had 6 [place] Vtach, asymptomatic. Patient was sleeping when RN checked in on patient.  Patient verbalized desire to go home today.

## 2022-06-25 NOTE — PLAN OF CARE
Physical Therapy Discharge Summary    Reason for therapy discharge:    Discharged to home.    Progress towards therapy goal(s). See goals on Care Plan in [facility] electronic health record for goal details.  Goals partially met.  Barriers to achieving goals:   discharge from facility.    Therapy recommendation(s):    Continue home exercise program. Continue use of walker.
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
## 2022-06-25 NOTE — PLAN OF CARE
Goal Outcome Evaluation:      Patient discharged home, with wheelchair and taxi service ordered from Hospital.  Discharge paperwork and medication information given and explained to him, anticoagulation important education explained to him about bleeding precautions.  He has no questions or concerns.    Home care at home, discharge F/U appointment made for him, will call for Cardiology appointment.  Orthostatic blood pressure readings given to MD, BP did improve, MD did not want to put parameters for holding cardiac medications.

## 2022-06-25 NOTE — PLAN OF CARE
Occupational Therapy Discharge Summary    Reason for therapy discharge:    Discharged to home.    Progress towards therapy goal(s). See goals on Care Plan in [facility] electronic health record for goal details.  Goals partially met.  Barriers to achieving goals:   discharge from facility.    Therapy recommendation(s):    Recommend home OT eval and driving assessment

## 2022-06-25 NOTE — PROVIDER NOTIFICATION
06/25/22 1055   Lying Orthostatic BP   Lying Orthostatic /60   Lying Orthostatic Pulse 62 bpm   Sitting Orthostatic BP   Sitting Orthostatic /62   Sitting Orthostatic Pulse 74 bpm   Standing Orthostatic BP   Standing Orthostatic /68   Standing Orthostatic Pulse 74 bpm   Patient states he feels weak, not dizzy or lightheaded.

## 2022-06-25 NOTE — DISCHARGE SUMMARY
[facility] MEDICINE  DISCHARGE SUMMARY     Primary Care Physician: Taras Avila  Admission Date: 6/21/2022   Discharge Provider: Zoya Diaz MD Discharge Date: 6/25/2022   Diet:   Active Diet and Nourishment Order   Procedures     2 Gram Sodium Diet     Diet       Code Status: Full Code   Activity: DCACTIVITY: Activity as tolerated        Condition at Discharge: Stable     REASON FOR PRESENTATION(See Admission Note for Details)     Syncope    PRINCIPAL & ACTIVE DISCHARGE DIAGNOSES     Active Problems:    Paroxysmal atrial fibrillation (H)      PENDING LABS     Unresulted Labs Ordered in the Past 30 Days of this Admission     No orders found from 5/22/2022 to 6/22/2022.            PROCEDURES ( this hospitalization only)          RECOMMENDATIONS TO OUTPATIENT PROVIDER FOR F/U VISIT     Follow-up Appointments     Follow-up and recommended labs and tests       Follow up with primary care provider, Taras Avila, within 7 days   for hospital follow- up.  No follow up labs or test are needed.    Follow up with Dr. Schilling in 1-2 weeks                 DISPOSITION     Home with home care    SUMMARY OF HOSPITAL COURSE:      Seng Deshpande is a 89 year old male with history of paroxysmal atrial fibrillation, TIA, CAD, hyperlipidemia, hypertension, sick sinus syndrome status post pacemaker placement, NSVT, LBBB and prostate cancer admitted on 6/21/2022 due to syncope in the setting of atrial fibrillation with RVR.     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  Appreciate cardiology input  Device interrogated      Paroxysmal atrial fibrillation/Sick sinus syndrome status post pacemaker placement  He received diltiazem 50 mg IV and systolic blood pressure dropped to 80s; improved to 90s with IV fluid  Heart rate is controlled at this time  PTA sotalol  increased to 120mg BID by cards , but noted prolonged QT hence decreased back to 80mg BID   Continue PTA metoprolol succinate 50 mg daily with hold parameters and eliquis  Repeat EKG showed improved QTc       Hypothyroidism  Continue PTA levothyroxine  Normal TSH     TIA  Aspirin 81 mg daily  Atorvastatin 40 mg daily  PT/OT following     CAD  Aspirin 81 mg daily  Atorvastatin 40 mg daily  Continue  metoprolol succinate 50 mg daily  Nitroglycerin as needed     Hyperlipidemia  Atorvastatin 40 mg daily     Hypertension  Blood pressure improved after hydration  Resume metoprolol succinate and sotalol with hold parameters     Prostate cancer   Resume PTA dutasteride 0.5 mg daily    .Patient stable to be discharged home with home cares today. Please refer to discharge medications and instructions for more details.      Discharge Medications with Med changes:     Current Discharge Medication List      CONTINUE these medications which have CHANGED    Details   metoprolol succinate ER (TOPROL XL) 25 MG 24 hr tablet Take 1 tablet (25 mg) by mouth daily for 30 days  Qty: 30 tablet, Refills: 0    Associated Diagnoses: Chronic atrial fibrillation (H)         CONTINUE these medications which have NOT CHANGED    Details   acetaminophen (TYLENOL 8 HOUR ARTHRITIS PAIN) 650 MG CR tablet Take 650 mg by mouth every 8 hours as needed for mild pain or fever      aspirin 81 MG chewable tablet Take 81 mg by mouth daily      atorvastatin (LIPITOR) 40 MG tablet Take 1 tablet (40 mg) by mouth At Bedtime  Qty: 90 tablet, Refills: 0    Associated Diagnoses: Dyslipidemia      calcium carbonate 500 mg (elemental) (OSCAL) 500 MG tablet Take 1 tablet by mouth daily      cholecalciferol 25 MCG (1000 UT) TABS Take 1,000 Units by mouth daily      docusate sodium (COLACE) 100 MG capsule Take 100 mg by mouth 2 times daily as needed for constipation      dutasteride (AVODART) 0.5 MG capsule Take 1 capsule (0.5 mg) by mouth daily  Qty: 90 capsule,  Refills: 4    Associated Diagnoses: Prostate cancer (H)      ELIQUIS ANTICOAGULANT 5 MG tablet Take 1 tablet (5 mg) by mouth 2 times daily  Qty: 180 tablet, Refills: 1    Associated Diagnoses: Paroxysmal atrial fibrillation (H)      Glucosamine HCl 500 MG TABS Take 500 mg by mouth daily      levothyroxine (SYNTHROID/LEVOTHROID) 50 MCG tablet TAKE 1 TABLET(50 MCG) BY MOUTH DAILY  Qty: 90 tablet, Refills: 1    Associated Diagnoses: Hypothyroidism, unspecified type      multivitamin  with lutein (OCUVITE WITH LTEIN) CAPS per capsule Take 1 capsule by mouth daily      polyethylene glycol (MIRALAX/GLYCOLAX) Packet Take 17 g by mouth daily as needed      sotalol (BETAPACE) 80 MG tablet TAKE 1 TABLET(80 MG) BY MOUTH EVERY 12 HOURS  Qty: 180 tablet, Refills: 2    Associated Diagnoses: Paroxysmal atrial fibrillation (H)      vitamin C (ASCORBIC ACID) 500 MG tablet Take 500 mg by mouth daily                   Rationale for medication changes:      See med rec        Consults       CARDIOLOGY IP CONSULT  CARE MANAGEMENT / SOCIAL WORK IP CONSULT  PHYSICAL THERAPY ADULT IP CONSULT  OCCUPATIONAL THERAPY ADULT IP CONSULT    Immunizations given this encounter     Most Recent Immunizations   Administered Date(s) Administered     COVID-19,PF,Moderna 11/15/2021     FLU 6-35 months 09/10/2010     Flu 65+ Years 09/16/2019     Flu, Unspecified 10/07/2008     Influenza (High Dose) 3 valent vaccine 09/16/2019     Influenza (IIV3) PF 09/24/2013     Influenza Vaccine, 6+MO IM (QUADRIVALENT W/PRESERVATIVES) 09/24/2013     Influenza, Quad, High Dose, Pf, 65yr+ (Fluzone HD) 09/29/2021     Pneumo Conj 13-V (2010&after) 05/04/2016     Pneumococcal 23 valent 10/05/2005     Td (Adult), Adsorbed 05/02/1926     Td,adult,historic,unspecified 05/02/1926     Tdap (Adacel,Boostrix) 09/12/2017     Zoster vaccine, live 07/01/2009           Anticoagulation Information      Recent INR results: No results for input(s): INR in the last 168 hours.  Warfarin  doses (if applicable) or name of other anticoagulant: Eliquis      SIGNIFICANT IMAGING FINDINGS     Results for orders placed or performed during the hospital encounter of 06/21/22   CT Head w/o Contrast    Impression    IMPRESSION:  1.  No CT evidence of acute intracranial abnormality.  2.  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   XR Chest 1 View    Impression    IMPRESSION: Stable cardiac enlargement with stable pulmonary vascular congestion. Mild interstitial opacities right lung base slightly increased since prior suggestive of increasing component of volume overload. No pulmonary infiltrates. No pneumothorax.   Calcified thoracic aorta. Sternotomy. Left-sided cardiac pacemaker.   Echocardiogram Complete   Result Value Ref Range    LVEF  45-50%        SIGNIFICANT LABORATORY FINDINGS     XR Chest 1 View  Result Date: 6/21/2022      IMPRESSION: Stable cardiac enlargement with stable pulmonary vascular congestion. Mild interstitial opacities right lung base slightly increased since prior suggestive of increasing component of volume overload. No pulmonary infiltrates. No pneumothorax.  Calcified thoracic aorta. Sternotomy. Left-sided cardiac pacemaker.    Echocardiogram Complete  Result Date: 6/22/2022  Interpretation Summary  The visual ejection fraction is 45-50%. Septal wall motion abnormality may reflect pacemaker activation. Mid to distal septal hypokinesis Mildly decreased right ventricular systolic function The right ventricle is mild to moderately dilated. There is mild (1+) mitral regurgitation. There is moderate (2+) tricuspid regurgitation. ______________________________________________________________________________ Left Ventricle The left ventricle is normal in size. There is normal left ventricular wall thickness. The visual ejection fraction is 45-50%. Septal wall motion abnormality may reflect pacemaker activation. Mid to distal septal hypokinesis.  Right Ventricle The right ventricle is mild to moderately dilated.  TAPSE is abnormal, which is consistent with abnormal right ventricular systolic function. Mildly decreased right ventricular systolic function. There is a pacemaker lead in the right ventricle.  Atria The left atrium is severely dilated. Right atrium not well visualized. Intact atrial septum.  Mitral Valve The mitral valve leaflets are mildly thickened. There is mild (1+) mitral regurgitation.  Tricuspid Valve Tricuspid valve leaflets appear normal. There is moderate (2+) tricuspid regurgitation. The right ventricular systolic pressure is approximated at 27mmHg plus the right atrial pressure.  Aortic Valve The aortic valve is trileaflet with aortic valve sclerosis. There is mild (1+) aortic regurgitation. No aortic stenosis is present.  Pulmonic Valve The pulmonic valve is not well seen, but is grossly normal. There is trace pulmonic valvular regurgitation.  Vessels Mild aortic root enlargement. Normal size ascending aorta. Inferior vena cava not well visualized for estimation of right atrial pressure.  Pericardium There is no pericardial effusion.  ______________________________________________________________________________ MMode/2D Measurements & Calculations IVSd: 0.89 cm LVIDd: 5.0 cm LVIDs: 4.1 cm LVPWd: 1.00 cm FS: 18.0 % LV mass(C)d: 167.8 grams LV mass(C)dI: 91.3 grams/m2  Ao root diam: 3.9 cm LA dimension: 5.2 cm LA/Ao: 1.3 LVOT diam: 2.1 cm LVOT area: 3.3 cm2 LA Volume Indexed (AL/bp): 63.5 ml/m2 RWT: 0.40  Time Measurements MM HR: 74.0 BPM  Doppler Measurements & Calculations MV E max fransisca: 91.7 cm/sec MV A max fransisca: 72.3 cm/sec MV E/A: 1.3  MV dec slope: 619.9 cm/sec2 MV dec time: 0.15 sec Ao V2 max: 155.5 cm/sec Ao max PG: 10.0 mmHg Ao V2 mean: 108.1 cm/sec Ao mean P.4 mmHg Ao V2 VTI: 32.7 cm LUCIEN(I,D): 2.1 cm2 LUCIEN(V,D): 2.0 cm2 AI P1/2t: 491.5 msec LV V1 max PG: 3.5 mmHg LV V1 max: 93.6 cm/sec LV V1 VTI: 20.8 cm SV(LVOT): 69.0 ml SI(LVOT): 37.5 ml/m2 PA acc time: 0.08 sec TR max fransisca: 259.4 cm/sec TR  max P.9 mmHg AV Willie Ratio (DI): 0.60 LUCIEN Index (cm2/m2): 1.1 E/E': 11.6 E/E' av.4 Lateral E/e': 11.6 Medial E/e': 17.2 Peak E' Willie: 7.9 cm/sec  ______________________________________________________________________________ Report approved by: Frantz Vergara 2022 01:23 PM       CT Head w/o Contrast  Result Date: 2022      IMPRESSION: 1.  No CT evidence of acute intracranial abnormality. 2.  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      Discharge Orders        Home Care Referral      Reason for your hospital stay    Dizziness     Follow-up and recommended labs and tests     Follow up with primary care provider, Taras Avila, within 7 days for hospital follow- up.  No follow up labs or test are needed.    Follow up with Dr. Schilling in 1-2 weeks     Activity    Your activity upon discharge: activity as tolerated     Diet    Follow this diet upon discharge: Orders Placed This Encounter      2 Gram Sodium Diet       Examination   Physical Exam   Temp:  [97.5  F (36.4  C)-98  F (36.7  C)] 97.9  F (36.6  C)  Pulse:  [60-80] 75  Resp:  [18-20] 20  BP: ()/(51-72) 95/51  SpO2:  [91 %-97 %] 92 %  Wt Readings from Last 1 Encounters:   22 70.3 kg (155 lb)         GEN: Alert and oriented. Not in acute distress  HEENT: Atraumatic, NC, EOMI.   CHEST: Clear to auscultation bilaterally  HEART: Irregularly irregular  ABDOMEN: Soft. Non-tender, non-distended.   Extremities: No pedal edema  CNS: No focal neurological deficit. No involuntary movements  SKIN: No skin rash, no cyanosis or clubbing    Please see EMR for more detailed significant labs, imaging, consultant notes etc.    Zoya LLOYD MD, personally saw the patient today and spent greater than 30 minutes discharging this patient.    Zoya Diaz MD  [facility]    CC:Taras Avila

## 2022-06-25 NOTE — PROGRESS NOTES
Care Management Discharge Note    Discharge Date: 06/25/2022       Discharge Disposition: Home, Home Care    Discharge Services: None    Discharge DME:  (per treatment team)    Discharge Transportation: health plan transportation    Private pay costs discussed: transportation costs    PAS Confirmation Code:  (not applicable)  Patient/family educated on Medicare website which has current facility and service quality ratings:  (not applicable)    Education Provided on the Discharge Plan:  Per treatment team   Persons Notified of Discharge Plans: patient   Patient/Family in Agreement with the Plan: yes    Handoff Referral Completed: [facility] faxed orders to home care    Additional Information:  Pt discharging home to prior living environment.  [facility] to provide PT, OT, HHA services to Pt.  First PT appointment scheduled for Tuesday, 6/28.  Pt reports to CM on 6/24 that neighbor comes over to help out as needed. Pt requesting [facility] wheel chair transportation (cost discussed by CM on 6/24).  [facility] wheel chair ride scheduled for 1630.  Orders faxed to home care.         AYAD Calabrese

## 2022-06-27 NOTE — TELEPHONE ENCOUNTER
[facility] calling for orders for delay of care to start 6/28.  Pt and Ot to eval and treat   home health aide to follow.  Delay due to staffing

## 2022-06-28 NOTE — TELEPHONE ENCOUNTER
Reason for call:  Home Healthcare Reason for Call:  Home Health Care    Rosario  with [facility] called regarding (reason for call): PT, OT    Orders are needed for this patient. PT, OT    PT: & additional visits for walking, balance, and Strength    OT: Patient declined OT as this time.      Phone Number Homecare Nurse can be reached at: [phone]    Can we leave a detailed message on this number? YES    Phone number patient can be reached at: Home number on file [phone] (home)    Best Time: Anytime    Call taken on 6/28/2022 at 12:54 PM by Karolyn Mahmood

## 2022-07-09 PROBLEM — J93.9 PNEUMOTHORAX ON RIGHT: Status: ACTIVE | Noted: 2022-01-01

## 2022-07-09 PROBLEM — S22.41XA MULTIPLE FRACTURES OF RIBS, RIGHT SIDE, INITIAL ENCOUNTER FOR CLOSED FRACTURE: Status: ACTIVE | Noted: 2022-01-01
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
## 2022-07-09 PROBLEM — S22.080A COMPRESSION FRACTURE OF T12 VERTEBRA, INITIAL ENCOUNTER (H): Status: ACTIVE | Noted: 2022-01-01

## 2022-07-09 PROBLEM — I48.20 CHRONIC ATRIAL FIBRILLATION (H): Status: ACTIVE | Noted: 2022-01-01

## 2022-07-09 PROBLEM — Z79.01 ANTICOAGULATED BY ANTICOAGULATION TREATMENT: Status: ACTIVE | Noted: 2022-01-01

## 2022-07-09 PROBLEM — S12.101A CLOSED NONDISPLACED FRACTURE OF SECOND CERVICAL VERTEBRA, UNSPECIFIED FRACTURE MORPHOLOGY, INITIAL ENCOUNTER (H): Status: ACTIVE | Noted: 2022-01-01

## 2022-07-09 NOTE — ED NOTES
Trauma alert called. Patient fell yesterday down basement stairs with possible loss of consciousness. Bedding covered with fair amount of dried blood. EMS reported blood when patient picked up. Patient is on a blood thinner.

## 2022-07-09 NOTE — PHARMACY-ADMISSION MEDICATION HISTORY
Pharmacy Note - Admission Medication History    Pertinent Provider Information: Patient is not sure when he last took his medications  -Has consistently been filling metoprolol & dutasteride but states he is not taking      ______________________________________________________________________    Prior To Admission (PTA) med list completed and updated in EMR.       PTA Med List   Medication Sig Last Dose     acetaminophen (TYLENOL) 650 MG CR tablet Take 650 mg by mouth every 8 hours as needed for mild pain or fever Past Week at prn     aspirin 81 MG chewable tablet Take 81 mg by mouth daily Past Week at Unknown time     atorvastatin (LIPITOR) 40 MG tablet Take 1 tablet (40 mg) by mouth At Bedtime Past Week at Unknown time     calcium carbonate 500 mg (elemental) (OSCAL) 500 MG tablet Take 1 tablet by mouth daily Past Week at Unknown time     cholecalciferol 25 MCG (1000 UT) TABS Take 1,000 Units by mouth daily Past Week at Unknown time     ELIQUIS ANTICOAGULANT 5 MG tablet Take 1 tablet (5 mg) by mouth 2 times daily Past Week at Unknown time     Glucosamine HCl 500 MG TABS Take 500 mg by mouth daily Past Week at Unknown time     levothyroxine (SYNTHROID/LEVOTHROID) 50 MCG tablet TAKE 1 TABLET(50 MCG) BY MOUTH DAILY Past Week at Unknown time     multivitamin  with lutein (OCUVITE WITH LTEIN) CAPS per capsule Take 1 capsule by mouth daily Past Week at Unknown time     polyethylene glycol (MIRALAX/GLYCOLAX) Packet Take 17 g by mouth daily as needed Past Week at Unknown time     sotalol (BETAPACE) 80 MG tablet TAKE 1 TABLET(80 MG) BY MOUTH EVERY 12 HOURS Past Week at Unknown time     vitamin C (ASCORBIC ACID) 500 MG tablet Take 500 mg by mouth daily Past Week at Unknown time       Information source(s): Patient, Clinic records and [facility]  Method of interview communication: in-person    Summary of Changes to PTA Med List  New: -  Discontinued: docusate   Changed: -    Patient was asked about  OTC/herbal products specifically.  PTA med list reflects this.    In the past week, patient estimated taking medication this percent of the time:  50-90% due to illness.    Allergies were reviewed, assessed, and updated with the patient.      Patient does not use any multi-dose medications prior to admission.    The information provided in this note is only as accurate as the sources available at the time of the update(s).    Thank you for the opportunity to participate in the care of this patient.    MANOLO CANO RPH  7/9/2022 5:04 PM

## 2022-07-09 NOTE — ED NOTES
No change in neuro status. Patient remains A/O. C-collar remains in place. Patient c/o pain to lower back and buttocks area.

## 2022-07-09 NOTE — ED NOTES
Patient has bruising around left eye, left side of nose, right knee and left arm. Patient stated the bruise on his left arm occurred prior to his fall yesterday. Has dried blood to the face, fingers and arms. Dried blood in nares.

## 2022-07-09 NOTE — ED TRIAGE NOTES
Patient brought to ED via [facility] for evaluation post fall last night. Per EMS, patient fell down stairs and managed to crawl to a bedroom in the basement which is where EMS found patient today. EMS stated patient is unsure if he had any LOC. Is on blood thinners.  Has dried blood in nares and on upper extremities. [place] also covered in blood. EMS stated patient was evaluated in ED 2 weeks ago for syncopal episode. Has pacemaker. EMS vitals stable and glucose. 168.      Triage Assessment     Row Name 07/09/22 1242       Triage Assessment (Adult)    Airway WDL WDL       Respiratory WDL    Respiratory WDL WDL       Skin Circulation/Temperature WDL    Skin Circulation/Temperature WDL X  dried blood on upper extremities; nares       Cardiac WDL    Cardiac WDL X  irregular; pacemaker       Peripheral/Neurovascular WDL    Peripheral Neurovascular WDL WDL       Cognitive/Neuro/Behavioral WDL    Cognitive/Neuro/Behavioral WDL WDL
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
## 2022-07-09 NOTE — ED NOTES
Patient remains A/O x4. Neuros remain intact. Patient c/o low back pain and pain to buttocks which started after CT.

## 2022-07-09 NOTE — ED NOTES
Dr. Isidro updated that patient's MRI cannot be completed until Monday because he has a pacemaker.

## 2022-07-09 NOTE — ED NOTES
Bed: [place] [address]  Expected date:   Expected time:   Means of arrival: Ambulance  Comments:  WBL: Fall, on thinners, poss LOC

## 2022-07-09 NOTE — ED NOTES
Bruising to left arm. Patient stated bruise was prior to last fall. Patient also has bruising to right knee. Currently, denies any pain. A/O x 4. Moves all extremities. CMS intact. Has dried blood to bilateral hands/fingers. EMS/patient's friend reported large amount on blood at patient's residence and on clothing. Dried blood in nares on arrival. No visible sign of current bleeding.
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
## 2022-07-09 NOTE — H&P
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 thoracic vertebral bodies are preserved in height. There is lucency present through the anterior bridging osteophyte T5-T6 level, although this demonstrates sclerotic margins and is likely chronic. No other evidence of acute thoracic   fracture. Numerous old appearing left posterior rib fractures. There are acute appearing displaced right-sided rib fractures involving right rib 4, 5, 6.    CANAL/FORAMINA: No significant central canal stenosis. There is moderate bilateral foraminal narrowing T11-T12 level.    PARASPINAL: Mild paraspinous hematoma anterior to T11-T12. Extensive vascular calcification. There is extensive infiltrate involving the posterior aspects of the left lung that could reflect contusion and/or pneumonitis. Small bilateral pleural   effusions.      Impression    IMPRESSION:  1.  Unstable fracture pattern involving T11-T12 level. Fracture is present through a bridging anterior marginal osteophyte T11-T12 level with fracture paralleling the superior endplate of T12. There is widening of the T11-T12 posterior facets   bilaterally. This suggests involvement of the posterior column as  well. Mild amorphous anterior paraspinous hematoma.    2.  Age-indeterminate compression fracture of T1, as described on the cervical spine from today.    3.  Not mentioned above is compression deformity of L1. See dedicated CT lumbar spine.    4.  Small right apical pneumothorax. Findings phoned to Dr. Sarwat Isidro at 7/9/2022 3:59 PM    5.  Contusion or pneumonitis involving the posterior left lung.     Lumbar spine CT w/o contrast    Narrative    EXAM: CT LUMBAR SPINE W/O CONTRAST  LOCATION: [facility]  DATE/TIME: 7/9/2022 3:01 PM    INDICATION: Trauma, pain.  COMPARISON: CT thoracic spine from today.  TECHNIQUE: Routine CT Lumbar Spine without IV contrast. Multiplanar reformats. Dose reduction techniques were used.     FINDINGS:  VERTEBRA: 5 lumbar type vertebral bodies. T12 fracture with fracture through the anterior bridging osteophyte T11-T12 level, detailed in dedicated CT thoracic spine. There is moderate to moderately severe compression fracture deformity of L1 with loss of   vertebral body height 50-60%. 1 mm osseous retropulsion. While this is age-indeterminate, it may be chronic given the degree of anterior marginal osteophytes bridging at T12-L1 level. Additionally, there is no evidence of significant paraspinous   hematoma adjacent to the L1 vertebral body. There is mild kyphosis at the thoracolumbar junction. 2 mm retrolisthesis L2 on L3 with otherwise anatomic sagittal and coronal alignment. Other lumbar vertebral bodies are preserved in height. There is a   displaced fragment anterior to the inferior endplate of L2, but this demonstrates well-defined, sclerotic margins and is likely chronic. No other convincing evidence of acute lumbar fracture. There is early osseous bridging across the anterior aspects of   the sacroiliac joints.    CANAL/FORAMINA: No high-grade central canal or foraminal stenosis. Mild canal stenosis L4-L5.    PARASPINAL: Extensive arterial vascular  calcification. Small bilateral pleural effusions. Colonic diverticulosis. Distended urinary bladder incompletely visualized.      Impression    IMPRESSION:  1.  T12 fracture detailed on dedicated CT thoracic spine.    2.  Moderate to moderately severe compression fracture deformity of L1. While age-indeterminate, it may be chronic given the degree of bulky anterior marginal osteophytes bridging at T12-L1 level and the lack of significant paraspinous hematoma adjacent   to the L1 vertebral body.    3.  Chronic appearing displaced fragment anterior to the inferior endplate of L2.    4.  No convincing evidence of acute lumbar fracture or posttraumatic subluxation.
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
## 2022-07-10 NOTE — PROGRESS NOTES
Spoke with swat nurse regarding interrogation of pacer needing to be completed.  Pt has [place] [employer] Pacemaker.
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
## 2022-07-10 NOTE — PROGRESS NOTES
Speech-Language Pathology: Clinical Swallow Evaluation     07/10/22 1500   General Information   Onset of Illness/Injury or Date of Surgery 07/09/22   Pertinent History of Current Problem Seng Deshpande is a 89 year old male with history of syncope, paroxysmal atrial fibrillation, TIA/CVA, CAD, hyperlipidemia, hypertension, SSS S/P pacemaker, NSVT, LBBB and prostate cancer admitted on 07/09/2022 after a fall with multiple fractures - Unstable T11-T12 fracture, L1 compression fracture, C2 fracture.  Recent history of syncope likely due to A. fib with RVR and not taking medications.   General Observations Positioning restriction until MRI completed tomorrow. He can only be up to 30 degrees. [place] J collar in place.   Past History of Dysphagia none   Type of Evaluation   Type of Evaluation Swallow Evaluation   Oral Motor   Oral Musculature generally intact   Dentition (Oral Motor)   Dentition (Oral Motor) adequate dentition   Facial Symmetry (Oral Motor)   Facial Symmetry (Oral Motor) WNL   Lip Function (Oral Motor)   Lip Range of Motion (Oral Motor) WNL   Tongue Function (Oral Motor)   Tongue ROM (Oral Motor) WNL   Jaw Function (Oral Motor)   Jaw Function (Oral Motor) WNL;other (see comments)  (functional jaw opening with [place] J collar in place)   Vocal Quality/Secretion Management (Oral Motor)   Vocal Quality (Oral Motor) WNL   General Swallowing Observations   Comment, General Swallowing Observations Pt states he feels like he chokes when he drinks water. This is new from his fall.   Current Diet/Method of Nutritional Intake (General Swallowing Observations, NIS) thin liquids (level 0);regular diet   Swallowing Evaluation Clinical swallow evaluation   Clinical Swallow Evaluation   Clinical Swallow Evaluation Textures Trialed thin liquids;pureed   Clinical Swallow Eval: Thin Liquid Texture Trial   Mode of Presentation, Thin Liquids straw   Volume of Liquid or Food Presented 2 sips   Oral Phase of Swallow WFL    Diagnostic Statement Immediate throat clear and cough.   Clinical Swallow Evaluation: Puree Solid Texture Trial   Mode of Presentation, Puree spoon   Volume of Puree Presented 1 spoonful   Oral Phase, Puree WFL   Diagnostic Statement No oral dysphagia or overt sx's aspiration.   Swallowing Recommendations   Diet Consistency Recommendations NPO;other (see comments)  (exbept meds crushed in applesauce. Will re-eval only positioning restriction is lifted)   Comment, Swallowing Recommendations Pt was evaluated with his restricted positioning protocol of 30 degrees with [place] J collar in place. Pt showing sx's aspiration with thin liquids and no sx's with applesauce. Recommend NPO except meds crushed in applesauce until his MRI is completed tomorrow to determine if pt is able to sit upright. Once this is determined, will re-eval his swallow and make nutrition recommendation.   General Therapy Interventions   Planned Therapy Interventions Dysphagia Treatment   Clinical Impression   Criteria for Skilled Therapeutic Interventions Met (SLP Eval) Yes, treatment indicated   SLP Diagnosis dysphagia   Clinical Impression Comments Follow up tomorrow after MRI to see if positioning restriction is lifted and pt can sit upright and re-eval swallow.    Total Evaluation Time   Total Evaluation Time (Minutes) 15

## 2022-07-10 NOTE — PLAN OF CARE
Problem: Plan of Care - These are the overarching goals to be used throughout the patient stay.    Goal: Absence of Hospital-Acquired Illness or Injury  Intervention: Identify and Manage Fall Risk  Recent Flowsheet Documentation  Taken 7/9/2022 1813 by Karolyn Robert RN  Safety Promotion/Fall Prevention: bed alarm on     Problem: Risk for Delirium  Goal: Optimal Coping  Outcome: Ongoing, Progressing  Goal: Improved Behavioral Control  Outcome: Ongoing, Progressing  Goal: Improved Attention and Thought Clarity  Outcome: Ongoing, Progressing  Goal: Improved Sleep  Outcome: Ongoing, Progressing     Problem: Fracture Stabilization and Management (Orthopaedic Fracture)  Goal: Fracture Stability  Outcome: Ongoing, Progressing     Problem: Functional Ability Impaired (Orthopaedic Fracture)  Goal: Optimal Functional Ability  Outcome: Ongoing, Progressing     Problem: Pain (Orthopaedic Fracture)  Goal: Acceptable Pain Control  Outcome: Ongoing, Progressing     Problem: Respiratory Compromise (Orthopaedic Fracture)  Goal: Effective Oxygenation and Ventilation  Outcome: Ongoing, Progressing  Intervention: Promote Airway Secretion Clearance  Recent Flowsheet Documentation  Taken 7/9/2022 1813 by Karolyn Robert RN  Cough And Deep Breathing: done with encouragement   Goal Outcome Evaluation:  Pt was confused at start of admit, having had opiate in ED.  He has denied pain all shift, and his confusion is clearing.  He has been compliant with strict bedrest at 30 degrees, and tolerating neck collar.  He had zero bladder scan 1 hr after garcia removed.  O2 on 2 LPM per NC.  He had a cough after drinking water with meds via straw x 2.  He ate very small bites for supper.

## 2022-07-10 NOTE — PLAN OF CARE
Problem: Functional Ability Impaired (Orthopaedic Fracture)  Goal: Optimal Functional Ability  Outcome: Ongoing, Not Progressing     Problem: Risk for Delirium  Goal: Optimal Coping  Outcome: Ongoing, Progressing     Problem: Pain (Orthopaedic Fracture)  Goal: Acceptable Pain Control  Outcome: Ongoing, Progressing   Goal Outcome Evaluation:      Pt denies pain when asked.  Pt disorientated to situation, orientated to person, time and place.  Pt had aspiration of thin liquids at breakfast and also with oatmeal per CNA.  However, able to eat applesauce with crushed medications this morning.  Informed Hospitalist of findings and neuro spine ordered speech eval.  Pt only able to have head of bed elevated at most 30 degrees and to remain in cervical collar at all times.  Neuro spine ordered new [place]-J to be delivered.

## 2022-07-10 NOTE — CONSULTS
Consultation - Surgery  Seng Deshpande,  1926, MRN [id number]    Admitting Dx: Cardiac pacemaker in situ [Z95.0]  Chronic atrial fibrillation (H) [I48.20]  Pneumothorax on right [J93.9]  Anticoagulated by anticoagulation treatment [Z79.01]  Fall, initial encounter [W19.XXXA]  Compression fracture of T12 vertebra, initial encounter (H) [S22.080A]  Closed nondisplaced fracture of second cervical vertebra, unspecified fracture morphology, initial encounter (H) [S12.101A]  Multiple fractures of ribs, right side, initial encounter for closed fracture [S22.41XA]    PCP: Taras Avila, [phone]   Code status:  Full Code       Extended Emergency Contact Information  Primary Emergency Contact: Rosy Black  Mobile Phone: [phone]  Relation: Friend  Secondary Emergency Contact: MillicentKamala gibbs  Address: [address] [place] [address]  Home Phone: [phone]  Mobile Phone: [phone]  Relation: Spouse       Assessment and Plan   Impression:  Elderly gentleman with fall with multiple rib fractures and tiny apical pneumothorax.  Also with lateral mass fracture of C2.  Nondisplaced.  Patient denies any pain at this time.  Any discomfort he has he says is in his back.  The pneumothorax present is tiny and this was even 24 hours after his fall so I do not think this will give him any further problems.  Nothing to add from a general surgery/trauma protocol.      Plan:  Patient should be encouraged to use incentive spirometer.  We will sign off.           Chief Complaint <principal problem not specified>       HPI    We have been requested by Dr. Burroughs as part of the trauma team protocol to evaluate Seng Deshpande for right rib fractures with a pneumothorax.  This is a 89 year old year old male who fell 24 hours prior to his admission.  He was eventually found down.  His main complaint is back pain.  The patient had a CT scan basically from his head all the way through the abdomen.   He was found to have a tiny apical pneumothorax as well as 3 rib fractures.  He is also found to have a fracture in the lateral mass of C2.  The patient denies any pain this morning.  He denies any shortness of breath.       Medical History  Patient Active Problem List   Diagnosis     Prostate cancer (H)     Paroxysmal atrial fibrillation with RVR (H)     SA node dysfunction (H)     LBBB (left bundle branch block)     NSVT (nonsustained ventricular tachycardia) (H)     Coronary artery disease involving native coronary artery of native heart without angina pectoris     Cardiac pacemaker in situ     Paroxysmal atrial fibrillation (H)     Chronic atrial fibrillation (H)     Pneumothorax on right     Anticoagulated by anticoagulation treatment     Fall, initial encounter     Compression fracture of T12 vertebra, initial encounter (H)     Closed nondisplaced fracture of second cervical vertebra, unspecified fracture morphology, initial encounter (H)     Multiple fractures of ribs, right side, initial encounter for closed fracture       [unfilled] Surgical History  Past Surgical History:   Procedure Laterality Date     BYPASS GRAFT ARTERY CORONARY  1995    Comments: X3 VESSELS by Dr. Kevin INGRAM UNLISTED PROCEDURE, ABDOMEN/PERITONEUM/OMENTUM      Description: Hernia Repair;  Recorded: 09/24/2008;     CYSTOSCOPY       HC REMOVE TONSILS/ADENOIDS,<13 Y/O      Description: Tonsillectomy With Adenoidectomy;  Recorded: 09/24/2008;     PENIS SURGERY       PROSTATE SURGERY       PROSTATE SURGERY       REPLACEMENT TOTAL KNEE       ZZC APPENDECTOMY      Description: Appendectomy;  Recorded: 09/24/2008;        Social History  Social History     Tobacco Use     Smoking status: Never Smoker     Smokeless tobacco: Never Used   Substance Use Topics     Alcohol use: No     Drug use: No       Allergies  Allergies   Allergen Reactions     Tramadol Other (See Comments)     Confusion  Other reaction(s): Confusion  Confusion  Confusion      Hydrocortisone Other (See Comments)     Passed out after injection in knee  Passed out after injection in knee  Passed out after injection in knee     Penicillins Unknown and Other (See Comments)     Sulfa Drugs Other (See Comments) and Unknown    Family History  Reviewed, and family history includes Intracerebral hemorrhage (age of onset: 32.00) in his mother; Sudden Death (age of onset: 82.00) in his father.  The Family history is not pertinent to the patients chief complaint. 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 Wife was a teacher at [employer] in [place]     Additional psychosocial needs reviewed per nursing assessment.     Prior to Admission Medications   Current Outpatient Medications   Medication Instructions     acetaminophen (TYLENOL) 650 mg, Oral, EVERY 8 HOURS PRN     aspirin (ASA) 81 mg, Oral, DAILY     atorvastatin (LIPITOR) 40 mg, Oral, AT BEDTIME     calcium carbonate 500 mg (elemental) (OSCAL) 500 MG tablet 1 tablet, Oral, DAILY     cholecalciferol 1,000 Units, Oral, DAILY     dutasteride (AVODART) 0.5 mg, Oral, DAILY     ELIQUIS ANTICOAGULANT 5 mg, Oral, 2 TIMES DAILY     Glucosamine HCl 500 mg, Oral, DAILY     levothyroxine (SYNTHROID/LEVOTHROID) 50 MCG tablet TAKE 1 TABLET(50 MCG) BY MOUTH DAILY     metoprolol succinate ER (TOPROL XL) 25 mg, Oral, DAILY     multivitamin  with lutein (OCUVITE WITH LTEIN) CAPS per capsule 1 capsule, Oral, DAILY     polyethylene glycol (MIRALAX) 17 g, Oral, DAILY PRN     sotalol (BETAPACE) 80 MG tablet TAKE 1 TABLET(80 MG) BY MOUTH EVERY 12 HOURS     vitamin C (ASCORBIC ACID) 500 mg, Oral, DAILY           Review of Systems:  Pertinent items are noted in HPI. Physical Exam:  Temp:  [97.8  F (36.6  C)-98.3  F (36.8  C)] 97.8  F (36.6  C)  Pulse:  [] 73  Resp:  [14-32] 18  BP: (127-171)/(58-93) 127/93  SpO2:  [92 %-97 %] 97 %    General appearance:  alert, appears stated age and cooperative, he is wearing his c-collar  Neck exam was not done as patient is being followed by neurosurgery and is in a c-collar.  Lungs: clear to auscultation bilaterally  Heart: regular rate and rhythm  Abdomen: soft, non-tender; bowel sounds normal; no masses,  no organomegaly  Extremities: He has a few areas of excoriation on his extremities but nothing significant.  Pulses: 2+ and symmetric  Skin: Again a few areas of bruising and excoriations but no significant injuries.  Neurologic: Grossly normal       Pertinent Labs  Lab Results: personally reviewed.   Lab Results   Component Value Date     07/10/2022     05/13/2010    CO2 23 07/10/2022    CO2 24 05/13/2010    BUN 29 07/10/2022    BUN 18.6 05/13/2010    BUN 19 05/13/2010     Lab Results   Component Value Date    WBC 8.0 07/10/2022    HGB 8.7 07/10/2022    HCT 27.4 07/10/2022     07/10/2022     07/10/2022        Pertinent Radiology  Radiology Results: Personally reviewed image/s and Personally reviewed impression/s  EKG Results: not reviewed.

## 2022-07-10 NOTE — PROGRESS NOTES
Hospitalist Progress Note        CODE STATUS:  Full Code    Assessment/Plan:  Seng Deshpande is a 89 YO man with hx of syncope, paroxysmal atrial fibrillation, SSS s/p PPM, NSVT, LBBB, CAD, TIA/CVA, HTN, HLD and prostrate cancer who was BIBEMS after being found down following mechanical fall.     Fall, Mechanical  - Patient very clear on fact that he tripped going down the stairs of his basement. He was found by EMS. Patient states his friend called EMS.   - Appears base on his history that it was mechanical and he was fully aware.   - Check TSH, vitamin D  - PT/OT    Unstable T11-T12 fracture, L1 compression fracture, C2 fracture  - C-Collar in place, continue   - Neurosurgery consulted; MRI Cervical and thoracic spine pending  - Continue current pain management   - PT/OT    Acute Hypoxic Respiratory Failure   Pulmonary infiltrates likely 2/2 aspiration ISO epistaxis  Small pneumothorax  Mildly displaced 4-6 rib fractures on the right  - Remains on 2 LPM but denies respiratory symptoms  - CT chest 7/9: Multifocal groundglass and tree-in-bud opacities in both lungs, likely infection and/or aspiration. Trace bilateral pleural effusions.  - Seen by trauma for pneumothorax; see note. No specific interventions indicated, continue IS.  - Wean O2 as tolerated    Mildly Displaced Nasal Bone fracture & septal fracture   - Consult ENT    Paroxysmal Atrial Fibrillation  Hx of Sick Sinus Syndrome s/p PPM  Hx of NSVT  - Notified by RN of HR >120 this AM, suspect due to dehydration.   - Continue sotalol 80 mg q12h & Toprol Xl 25 mg daily. Monitor & replete lytes prn.   - Will ask Cardiology to see  - Eliquis was held yesterday. He is not actively bleeding and hgb is at baseline. If no further bleeding today, will resume Eliquis tonight       Dehydration   - 500 ml NS bolus ordered now  - Increase maintenance IVF for now as his PO intake is not predictable due to difficulty with C-Collar    Chronic Anemia  - Anemia does not  appear to be acute, he seems to be at his baseline which in the last year is about ~9  - Continue to monitor  - Follow up iron studies ordered on admission      Delirium  - Had episode of confusion due to pain medication in ED. This has since cleared. He is calm, cooperative and AAOx3 and understands situation.     Hx of CAD  Hx of TIA/CVA  HLD  - Continue ASA 81 mg daily & Lipitor 40 mg daily     Hypothyroidism  - His TSH was just checked 2 weeks ago and wnl   - Continue home synthroid     HTN  - Blood pressure readings acceptable. Some elevations can likely be attributed to pain.     Prostate cancer  - Dutasteride 0.5 mg daily       DVT Prophylaxis: SCDs    Subjective:  Interval History:   Patient seen and examined.   He is AAOx3. Oriented to situation. He is able to give me a coherent history.   Currently he does not have any pain.   He denies CP or shortness of breath.  He is requesting salve on his lips otherwise no other issues.    Review of Systems:   As mentioned in subjective.    Patient Active Problem List   Diagnosis     Prostate cancer (H)     Paroxysmal atrial fibrillation with RVR (H)     SA node dysfunction (H)     LBBB (left bundle branch block)     NSVT (nonsustained ventricular tachycardia) (H)     Coronary artery disease involving native coronary artery of native heart without angina pectoris     Cardiac pacemaker in situ     Paroxysmal atrial fibrillation (H)     Chronic atrial fibrillation (H)     Pneumothorax on right     Anticoagulated by anticoagulation treatment     Fall, initial encounter     Compression fracture of T12 vertebra, initial encounter (H)     Closed nondisplaced fracture of second cervical vertebra, unspecified fracture morphology, initial encounter (H)     Multiple fractures of ribs, right side, initial encounter for closed fracture       Scheduled Meds:    acetaminophen  975 mg Oral Q8H     atorvastatin  40 mg Oral At Bedtime     calcium carbonate 500 mg (elemental)  1 tablet  Oral Daily     dutasteride  0.5 mg Oral Daily     levothyroxine  50 mcg Oral Daily     metoprolol succinate ER  25 mg Oral Daily     polyethylene glycol  17 g Oral Daily     sodium chloride (PF)  3 mL Intracatheter Q8H     sotalol  80 mg Oral Q12H [facility] (08/20)     Vitamin D3  1,000 Units Oral Daily     Continuous Infusions:    sodium chloride 50 mL/hr at 07/10/22 0500     PRN Meds:.HYDROmorphone, HYDROmorphone, lidocaine 4%, lidocaine (buffered or not buffered), melatonin, OLANZapine, sodium chloride (PF)    Objective:  Vital signs in last 24 hours:  Temp:  [97.8  F (36.6  C)-98.3  F (36.8  C)] 97.8  F (36.6  C)  Pulse:  [] 73  Resp:  [14-32] 18  BP: (127-171)/(58-93) 127/93  SpO2:  [92 %-97 %] 97 %        Intake/Output Summary (Last 24 hours) at 7/10/2022 0946  Last data filed at 7/10/2022 0600  Gross per 24 hour   Intake 1579 ml   Output 1000 ml   Net 579 ml       Physical Exam:  General: Appears stated age. In NAD.  HEENT: NCAT, EOMI,.   Neck: Neck in C-Collar  CV: Normal S1S2, regular rhythm, normal rate  Lungs: Clear on auscultation of sides  Abdomen: Soft, NT, ND, +BS  Extremities: No LE edema b/l  Neuro: AAOx3.   Psych: Normal Affect.     Lab Results:    Recent Results (from the past 24 hour(s))   ECG 12-LEAD WITH MUSE (L[facility])    Collection Time: 07/09/22  1:31 PM   Result Value Ref Range    Systolic Blood Pressure 133 mmHg    Diastolic Blood Pressure 59 mmHg    Ventricular Rate 90 BPM    Atrial Rate 166 BPM    HI Interval  ms    QRS Duration 142 ms     ms    QTc 526 ms    P Axis  degrees    R AXIS -22 degrees    T Axis 119 degrees    Interpretation ECG       Atrial fibrillation with premature ventricular or aberrantly conducted complexes  Left bundle branch block  Abnormal ECG  When compared with ECG of 09-JUL-2022 13:27,  No significant change was found  Confirmed by SEE ED PROVIDER NOTE FOR, ECG INTERPRETATION ([id number]),  MICHELLE ALARCON ([id number]) on 7/9/2022 11:52:42 PM     Basic metabolic panel     Collection Time: 07/09/22  1:53 PM   Result Value Ref Range    Sodium 140 136 - 145 mmol/L    Potassium 4.1 3.5 - 5.0 mmol/L    Chloride 105 98 - 107 mmol/L    Carbon Dioxide (CO2) 23 22 - 31 mmol/L    Anion Gap 12 5 - 18 mmol/L    Urea Nitrogen 36 (H) 8 - 28 mg/dL    Creatinine 0.81 0.70 - 1.30 mg/dL    Calcium 9.7 8.5 - 10.5 mg/dL    Glucose 132 (H) 70 - 125 mg/dL    GFR Estimate 81 >60 mL/min/1.73m2   Troponin I (now)    Collection Time: 07/09/22  1:53 PM   Result Value Ref Range    Troponin I 0.03 0.00 - 0.29 ng/mL   Magnesium    Collection Time: 07/09/22  1:53 PM   Result Value Ref Range    Magnesium 2.0 1.8 - 2.6 mg/dL   CBC with platelets and differential    Collection Time: 07/09/22  1:53 PM   Result Value Ref Range    WBC Count 10.8 4.0 - 11.0 10e3/uL    RBC Count 2.80 (L) 4.40 - 5.90 10e6/uL    Hemoglobin 9.5 (L) 13.3 - 17.7 g/dL    Hematocrit 29.1 (L) 40.0 - 53.0 %     (H) 78 - 100 fL    MCH 33.9 (H) 26.5 - 33.0 pg    MCHC 32.6 31.5 - 36.5 g/dL    RDW 15.4 (H) 10.0 - 15.0 %    Platelet Count 154 150 - 450 10e3/uL    % Neutrophils 76 %    % Lymphocytes 7 %    % Monocytes 16 %    % Eosinophils 0 %    % Basophils 0 %    % Immature Granulocytes 1 %    NRBCs per 100 WBC 0 <1 /100    Absolute Neutrophils 8.3 1.6 - 8.3 10e3/uL    Absolute Lymphocytes 0.7 (L) 0.8 - 5.3 10e3/uL    Absolute Monocytes 1.7 (H) 0.0 - 1.3 10e3/uL    Absolute Eosinophils 0.0 0.0 - 0.7 10e3/uL    Absolute Basophils 0.0 0.0 - 0.2 10e3/uL    Absolute Immature Granulocytes 0.1 <=0.4 10e3/uL    Absolute NRBCs 0.0 10e3/uL   Extra Blue Top Tube    Collection Time: 07/09/22  1:53 PM   Result Value Ref Range    Hold Specimen JIC    Extra Red Top Tube    Collection Time: 07/09/22  1:53 PM   Result Value Ref Range    Hold Specimen JIC    CK total    Collection Time: 07/09/22  1:53 PM   Result Value Ref Range     (H) 30 - 190 U/L   Asymptomatic COVID-19 Virus (Coronavirus) by PCR Nasopharyngeal    Collection Time: 07/09/22  1:54 PM     Specimen: Nasopharyngeal; Swab   Result Value Ref Range    SARS CoV2 PCR Negative Negative   Creatinine POCT    Collection Time: 07/09/22  2:20 PM   Result Value Ref Range    Creatinine POCT 0.7 0.7 - 1.3 mg/dL    GFR, ESTIMATED POCT >60 >60 mL/min/1.73m2   Hemoglobin    Collection Time: 07/09/22  7:24 PM   Result Value Ref Range    Hemoglobin 9.0 (L) 13.3 - 17.7 g/dL   Comprehensive metabolic panel    Collection Time: 07/10/22  6:31 AM   Result Value Ref Range    Sodium 140 136 - 145 mmol/L    Potassium 3.8 3.5 - 5.0 mmol/L    Chloride 110 (H) 98 - 107 mmol/L    Carbon Dioxide (CO2) 23 22 - 31 mmol/L    Anion Gap 7 5 - 18 mmol/L    Urea Nitrogen 29 (H) 8 - 28 mg/dL    Creatinine 0.73 0.70 - 1.30 mg/dL    Calcium 8.7 8.5 - 10.5 mg/dL    Glucose 104 70 - 125 mg/dL    Alkaline Phosphatase 63 45 - 120 U/L    AST 32 0 - 40 U/L    ALT 15 0 - 45 U/L    Protein Total 6.5 6.0 - 8.0 g/dL    Albumin 2.9 (L) 3.5 - 5.0 g/dL    Bilirubin Total 0.8 0.0 - 1.0 mg/dL    GFR Estimate 83 >60 mL/min/1.73m2   CBC with Platelets and Reflex to Iron Studies    Collection Time: 07/10/22  6:31 AM   Result Value Ref Range    WBC Count 8.0 4.0 - 11.0 10e3/uL    RBC Count 2.56 (L) 4.40 - 5.90 10e6/uL    Hemoglobin 8.7 (L) 13.3 - 17.7 g/dL    Hematocrit 27.4 (L) 40.0 - 53.0 %     (H) 78 - 100 fL    MCH 34.0 (H) 26.5 - 33.0 pg    MCHC 31.8 31.5 - 36.5 g/dL    RDW 15.9 (H) 10.0 - 15.0 %    Platelet Count 128 (L) 150 - 450 10e3/uL   Extra Green Top (Lithium Heparin) Tube    Collection Time: 07/10/22  6:31 AM   Result Value Ref Range    Hold Specimen [facility]        Serum Glucose range:   Recent Labs   Lab 07/10/22  0631 07/09/22  1353    132*     ABG: No lab results found in last 7 days.  CBC:   Recent Labs   Lab 07/10/22  0631 07/09/22  1924 07/09/22  1353   WBC 8.0  --  10.8   HGB 8.7* 9.0* 9.5*   HCT 27.4*  --  29.1*   *  --  104*   *  --  154   NEUTROPHIL  --   --  76   LYMPH  --   --  7   MONOCYTE  --   --  16    EOSINOPHIL  --   --  0     Chemistry:   Recent Labs   Lab 07/10/22  0631 22  1420 22  1353     --  140   POTASSIUM 3.8  --  4.1   CHLORIDE 110*  --  105   CO2 23  --  23   BUN 29*  --  36*   CR 0.73 0.7 0.81   GFRESTIMATED 83 >60 81   MAURI 8.7  --  9.7   MAG  --   --  2.0   PROTTOTAL 6.5  --   --    ALBUMIN 2.9*  --   --    AST 32  --   --    ALT 15  --   --    ALKPHOS 63  --   --    BILITOTAL 0.8  --   --      Coags:  No results for input(s): INR, PROTIME, PTT in the last 168 hours.    Invalid input(s): APTT  Cardiac Markers:  Recent Labs   Lab 22  1353   TROPONINI 0.03          XR Chest 1 View    Result Date: 2022  EXAM: XR CHEST 1 VIEW LOCATION: [facility] DATE/TIME: 2022 7:54 PM INDICATION: Syncope with bibasilar Rales. COMPARISON: 2022, 2016     IMPRESSION: Stable cardiac enlargement with stable pulmonary vascular congestion. Mild interstitial opacities right lung base slightly increased since prior suggestive of increasing component of volume overload. No pulmonary infiltrates. No pneumothorax.  Calcified thoracic aorta. Sternotomy. Left-sided cardiac pacemaker.    Echocardiogram Complete    Result Date: 2022  [id number]^MEENA^MELE^[place] [address]  Name: LIOR STOVALL MRN: [id number] : 1926 Study Date: 2022 10:51 AM Age: 89 yrs Gender: Male Patient Location: [facility] Reason For Study: Atrial Fibrillation Ordering Physician: MELE ROBLEDO Referring Physician: CAROL WALLER Performed By: ACE  BSA: 1.8 m2 Height: 67 in Weight: 160 lb HR: 72 BP: 132/97 mmHg ______________________________________________________________________________ Procedure Complete Echo Adult. Definity (NDC #22121-801) given intravenously. 3mL given. ______________________________________________________________________________ Interpretation Summary  The visual  ejection fraction is 45-50%. Septal wall motion abnormality may reflect pacemaker activation. Mid to distal septal hypokinesis Mildly decreased right ventricular systolic function The right ventricle is mild to moderately dilated. There is mild (1+) mitral regurgitation. There is moderate (2+) tricuspid regurgitation. ______________________________________________________________________________ Left Ventricle The left ventricle is normal in size. There is normal left ventricular wall thickness. The visual ejection fraction is 45-50%. Septal wall motion abnormality may reflect pacemaker activation. Mid to distal septal hypokinesis.  Right Ventricle The right ventricle is mild to moderately dilated. TAPSE is abnormal, which is consistent with abnormal right ventricular systolic function. Mildly decreased right ventricular systolic function. There is a pacemaker lead in the right ventricle.  Atria The left atrium is severely dilated. Right atrium not well visualized. Intact atrial septum.  Mitral Valve The mitral valve leaflets are mildly thickened. There is mild (1+) mitral regurgitation.  Tricuspid Valve Tricuspid valve leaflets appear normal. There is moderate (2+) tricuspid regurgitation. The right ventricular systolic pressure is approximated at 27mmHg plus the right atrial pressure.  Aortic Valve The aortic valve is trileaflet with aortic valve sclerosis. There is mild (1+) aortic regurgitation. No aortic stenosis is present.  Pulmonic Valve The pulmonic valve is not well seen, but is grossly normal. There is trace pulmonic valvular regurgitation.  Vessels Mild aortic root enlargement. Normal size ascending aorta. Inferior vena cava not well visualized for estimation of right atrial pressure.  Pericardium There is no pericardial effusion.  ______________________________________________________________________________ MMode/2D Measurements & Calculations IVSd: 0.89 cm LVIDd: 5.0 cm LVIDs: 4.1 cm LVPWd: 1.00 cm  FS: 18.0 % LV mass(C)d: 167.8 grams LV mass(C)dI: 91.3 grams/m2  Ao root diam: 3.9 cm LA dimension: 5.2 cm LA/Ao: 1.3 LVOT diam: 2.1 cm LVOT area: 3.3 cm2 LA Volume Indexed (AL/bp): 63.5 ml/m2 RWT: 0.40  Time Measurements MM HR: 74.0 BPM  Doppler Measurements & Calculations MV E max willie: 91.7 cm/sec MV A max willie: 72.3 cm/sec MV E/A: 1.3  MV dec slope: 619.9 cm/sec2 MV dec time: 0.15 sec Ao V2 max: 155.5 cm/sec Ao max PG: 10.0 mmHg Ao V2 mean: 108.1 cm/sec Ao mean P.4 mmHg Ao V2 VTI: 32.7 cm LUCIEN(I,D): 2.1 cm2 LUCIEN(V,D): 2.0 cm2 AI P1/2t: 491.5 msec LV V1 max PG: 3.5 mmHg LV V1 max: 93.6 cm/sec LV V1 VTI: 20.8 cm SV(LVOT): 69.0 ml SI(LVOT): 37.5 ml/m2 PA acc time: 0.08 sec TR max willie: 259.4 cm/sec TR max P.9 mmHg AV Willie Ratio (DI): 0.60 LUCIEN Index (cm2/m2): 1.1 E/E': 11.6 E/E' av.4 Lateral E/e': 11.6 Medial E/e': 17.2 Peak E' Willie: 7.9 cm/sec  ______________________________________________________________________________ Report approved by: Frantz Vergara 2022 01:23 PM       XR Chest Port 1 View    Result Date: 2022  EXAM: XR CHEST PORT 1 VIEW LOCATION: [facility] DATE/TIME: 2022 1:11 PM INDICATION: fall COMPARISON: 2022     IMPRESSION: Interval development of displaced fractures of the right posterior fourth, fifth, and sixth ribs, age indeterminate and possibly acute or subacute. Correlate with tenderness. There are additional old healed right posterior rib fractures. No pleural effusion or pneumothorax. Worsening of multifocal patchy opacities in both lungs, either due to pulmonary edema or pneumonia. Stable mild cardiomegaly. Median sternotomy and mediastinal surgical clips. Pacemaker.    Cardiac Device Check - Remote    Result Date: 2022  Type: routine remote pacemaker transmission. Presenting rhythm: normal sinus and AP-VS rate 87 bpm. Frequent PACs and PVCs. Battery/lead status: stable Arrhythmias: since [place] Consult done 22, two new AT/AF,  both <1 minute. No ventricular high rate episodes detected. Anticoagulant: apixaban Comments: normal magnet and pacemaker function.  TIM Black, Device Specialist I have reviewed and interpreted the device interrogation, settings, programming, and encounter summary. The device is functioning within normal device parameters. I agree with the current findings, assessment and plan.    CTA Neck with Contrast    Result Date: 7/9/2022  EXAM: CTA NECK WITH CONTRAST LOCATION: [facility] DATE/TIME: 7/9/2022 2:50 PM INDICATION: C2 fracture, pain recent fall. COMPARISON: Head CT from today. CT facial bones from today. CT cervical spine from today. CONTRAST: Isovue 370 75 ml. TECHNIQUE: Neck CT angiogram with IV contrast. Axial helical CT images of the neck vessels were obtained during the arterial phase of intravenous contrast administration. Axial helical 2D reconstructed images and multiplanar 3D MIP reconstructed images of the neck vessels were performed by the technologist. Dose reduction techniques were used. All stenosis measurements made according to NASCET criteria unless otherwise specified. FINDINGS: Intracranial segments demonstrate moderate diffuse middle cerebral stenoses bilaterally. RIGHT CAROTID: Atherosclerotic plaque results in 50-70% stenosis in the right ICA. No dissection. LEFT CAROTID: Atherosclerotic plaque results in less than 50% stenosis in the left ICA. No dissection. VERTEBRAL ARTERIES: No focal stenosis or dissection. Balanced vertebral arteries. No evidence of vertebral occlusion or dissection related to the nondisplaced left C2 fracture. There are moderate bilateral focal stenoses present in the fourth segment distal vertebral arteries. Basilar artery is unremarkable. Moderate stenoses noted diffusely in the posterior cerebral arteries. AORTIC ARCH: No significant stenosis at the great vessel origins. The left vertebral artery originates from the arch, normal variant.  NONVASCULAR STRUCTURES: Small right apical pneumothorax. This was noted on the CT cervical spine from today. The known nondisplaced left lateral mass fracture at C2 is less well-visualized given differences in technique. There is fracture noted of the right nasal bone, age-indeterminate. Correlate clinically.     IMPRESSION: 1.  No evidence of vertebral occlusion or dissection from the nondisplaced left lateral mass fracture C2 level. No hemodynamically significant extracranial stenosis and no evidence of dissection. 2.  Numerous intracranial stenoses throughout the anterior and posterior circulation. 3.  Small right apical pneumothorax. Findings phoned to referring provider at 7/9/2022 4:00 PM.     Cervical spine CT w/o contrast    Result Date: 7/9/2022  EXAM: CT FACIAL BONES WITHOUT CONTRAST, CT CERVICAL SPINE W/O CONTRAST LOCATION: [facility] DATE/TIME: 7/9/2022 1:01 PM INDICATION: trauma, injury. COMPARISON: Head CT 06/21/2022 TECHNIQUE: 1) Routine CT Facial Bones without IV contrast. Multiplanar reformats. Dose reduction techniques were used. 2) Routine CT Cervical Spine without IV contrast. Multiplanar reformats. Dose reduction techniques were used. FINDINGS: FACIAL BONE CT: OSSEOUS STRUCTURES/SOFT TISSUES: Small soft tissue swelling over the nose. Mildly displaced nasal bone fractures. Nondisplaced fracture of the anterior nasal septum. The walls of the orbits are intact. No zygomatic arch fractures. The walls of the maxillary sinuses are intact. No zygomatic arch fractures. The pterygoid plates are intact. No mandibular fractures. The temporomandibular joints are intact. Mild degenerative changes of the temporomandibular joints. Poor dentition. Periapical lucencies involving teeth #12, and 26. ORBITAL CONTENTS: Prior bilateral cataract surgery. Visualized portions of the orbits are otherwise unremarkable. SINUSES: Moderate opacification of the ethmoid air cells. CERVICAL SPINE CT:  VERTEBRA: 2 mm retrolisthesis of C4 on C5. 1 mm spondylolisthesis of C7 on T1. 2 mm spondylolisthesis of T1 on T2. Age indeterminate 40-50% compression fracture of T1. 21 degrees of segmental kyphosis from C7 to T2. The rest of the vertebral body heights  are maintained. Nondisplaced fracture involving the left lateral mass of C2 with probable extension into the left C2 transverse foramen. Craniocervical junction is within normal limits. No prevertebral soft tissue edema. CANAL/FORAMINA: Moderate intervertebral disc height loss C4-C5 to C6-C7. No high-grade spinal canal narrowing. Moderate left neuroforaminal narrowing at C3-C4. Moderate right neuroforaminal narrowing at C4-C5. Mild bilateral neuroforaminal narrowing at C5-C6. PARASPINAL: Mild symmetric atrophy of the posterior paraspinal musculature. Multinodular thyroid gland. Moderate atherosclerotic calcification of the carotid bulbs. Small right apical pneumothorax.     IMPRESSION: FACIAL BONE CT: 1.  Small soft tissue swelling over the nose. 2.  Mildly displaced nasal bone fractures. Nondisplaced fracture of the anterior nasal septum. 3.  Poor dentition. Periapical lucencies involving teeth #12, and 26, suspicious for odontogenic disease. CERVICAL SPINE CT: 1.  Nondisplaced fracture involving the left lateral mass of C2 with probable extension into the left C2 transverse foramen. Recommend obtaining CT angiogram to exclude vascular injury. 2.  Age indeterminate 40-50% compression fracture of T1. 21 degrees of segmental kyphosis from C7 to T2. 3.  Small right apical pneumothorax. Discussed the findings with Dr. Burroughs at 1:52 PM, 07/09/2022.    Chest CT w/o contrast    Result Date: 7/9/2022  EXAM: CT CHEST W/O CONTRAST LOCATION: [facility] DATE/TIME: 7/9/2022 3:01 PM INDICATION: Fall.  Pneumothorax seen on CT neck COMPARISON: None. TECHNIQUE: CT chest without IV contrast. Multiplanar reformats were obtained. Dose reduction techniques  were used. CONTRAST: None. FINDINGS: LUNGS AND PLEURA: Tiny right apical pneumothorax as seen on the prior C-spine CT. Patchy groundglass and tree-in-bud opacities within both lower lobes and the lingula. Trace bilateral pleural fluid. MEDIASTINUM/AXILLAE: Coarse calcified nodules. No aortic aneurysm. Left-sided pacemaker. CORONARY ARTERY CALCIFICATION: Previous intervention (stents or CABG). UPPER ABDOMEN: Calcified gallstones and heavily calcified distal vertebral arteries. MUSCULOSKELETAL: Osteopenia and degenerative disease in the spine. Median sternotomy wires. Healed bilateral rib fractures. Mildly displaced acute right fourth-sixth rib fractures posteriorly     IMPRESSION: 1.  Tiny right apical pneumothorax, as seen on C-spine CT. 2.  Multifocal groundglass and tree-in-bud opacities in both lungs, likely infection and/or aspiration. Trace bilateral pleural effusions. 3.  Mildly displaced right fourth-sixth rib fractures.     Head CT w/o contrast    Result Date: 7/9/2022  EXAM: CT HEAD W/O CONTRAST LOCATION: [facility] DATE/TIME: 7/9/2022 1:00 PM INDICATION: trauma anticoagulated COMPARISON: None. TECHNIQUE: Routine CT Head without IV contrast. Multiplanar reformats. Dose reduction techniques were used. FINDINGS: INTRACRANIAL CONTENTS: No evidence of acute intracranial hemorrhage or mass effect. Scattered foci of decreased attenuation within the cerebral hemispheric white matter which are nonspecific, though most commonly ascribed to chronic small vessel ischemic  disease. 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 Scattered foci of decreased attenuation within the cerebral hemispheric white matter which are nonspecific, though most commonly ascribed to chronic small vessel ischemic disease. The basilar cisterns are patent. VISUALIZED ORBITS/SINUSES/MASTOIDS: Lateral cataract surgery. The partially imaged globes are otherwise unremarkable. The partially imaged paranasal  sinuses, mastoid air cells and middle ear cavities are unremarkable. BONES/SOFT TISSUES: The visualized skull base and calvarium are unremarkable.     IMPRESSION:  1.  No evidence of acute intracranial hemorrhage or mass effect. 2.  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 3.  Moderate nonspecific white matter changes. 4.  Moderate brain parenchymal volume loss.    CT Head w/o Contrast    Result Date: 6/21/2022  EXAM: CT HEAD W/O CONTRAST LOCATION: [facility] DATE/TIME: 6/21/2022 8:17 PM INDICATION: Syncope, simple, normal neuro exam. COMPARISON: Head CT 3/1/2022 TECHNIQUE: Routine CT Head without IV contrast. Multiplanar reformats. Dose reduction techniques were used. FINDINGS: INTRACRANIAL CONTENTS: No intracranial hemorrhage, extraaxial collection, or mass effect.  No CT evidence of acute infarct. Severe presumed chronic small vessel ischemic changes. Multiple old periventricular infarcts. Left cerebellar infarcts. Moderate generalized volume loss. No hydrocephalus. VISUALIZED ORBITS/SINUSES/MASTOIDS: Prior bilateral cataract surgery. Visualized portions of the orbits are otherwise unremarkable. No paranasal sinus mucosal disease. No middle ear or mastoid effusion. BONES/SOFT TISSUES: No acute abnormality.     IMPRESSION: 1.  No CT evidence of acute intracranial abnormality. 2.  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    Lumbar spine CT w/o contrast    Result Date: 7/9/2022  EXAM: CT LUMBAR SPINE W/O CONTRAST LOCATION: [facility] DATE/TIME: 7/9/2022 3:01 PM INDICATION: Trauma, pain. COMPARISON: CT thoracic spine from today. TECHNIQUE: Routine CT Lumbar Spine without IV contrast. Multiplanar reformats. Dose reduction techniques were used. FINDINGS: VERTEBRA: 5 lumbar type vertebral bodies. T12 fracture with fracture through the anterior bridging osteophyte T11-T12 level, detailed in dedicated CT thoracic spine. There is moderate to moderately severe compression fracture  deformity of L1 with loss of  vertebral body height 50-60%. 1 mm osseous retropulsion. While this is age-indeterminate, it may be chronic given the degree of anterior marginal osteophytes bridging at T12-L1 level. Additionally, there is no evidence of significant paraspinous hematoma adjacent to the L1 vertebral body. There is mild kyphosis at the thoracolumbar junction. 2 mm retrolisthesis L2 on L3 with otherwise anatomic sagittal and coronal alignment. Other lumbar vertebral bodies are preserved in height. There is a displaced fragment anterior to the inferior endplate of L2, but this demonstrates well-defined, sclerotic margins and is likely chronic. No other convincing evidence of acute lumbar fracture. There is early osseous bridging across the anterior aspects of  the sacroiliac joints. CANAL/FORAMINA: No high-grade central canal or foraminal stenosis. Mild canal stenosis L4-L5. PARASPINAL: Extensive arterial vascular calcification. Small bilateral pleural effusions. Colonic diverticulosis. Distended urinary bladder incompletely visualized.     IMPRESSION: 1.  T12 fracture detailed on dedicated CT thoracic spine. 2.  Moderate to moderately severe compression fracture deformity of L1. While age-indeterminate, it may be chronic given the degree of bulky anterior marginal osteophytes bridging at T12-L1 level and the lack of significant paraspinous hematoma adjacent to the L1 vertebral body. 3.  Chronic appearing displaced fragment anterior to the inferior endplate of L2. 4.  No convincing evidence of acute lumbar fracture or posttraumatic subluxation.    CT Facial Bones without Contrast    Result Date: 7/9/2022  EXAM: CT FACIAL BONES WITHOUT CONTRAST, CT CERVICAL SPINE W/O CONTRAST LOCATION: [facility] DATE/TIME: 7/9/2022 1:01 PM INDICATION: trauma, injury. COMPARISON: Head CT 06/21/2022 TECHNIQUE: 1) Routine CT Facial Bones without IV contrast. Multiplanar reformats. Dose reduction  techniques were used. 2) Routine CT Cervical Spine without IV contrast. Multiplanar reformats. Dose reduction techniques were used. FINDINGS: FACIAL BONE CT: OSSEOUS STRUCTURES/SOFT TISSUES: Small soft tissue swelling over the nose. Mildly displaced nasal bone fractures. Nondisplaced fracture of the anterior nasal septum. The walls of the orbits are intact. No zygomatic arch fractures. The walls of the maxillary sinuses are intact. No zygomatic arch fractures. The pterygoid plates are intact. No mandibular fractures. The temporomandibular joints are intact. Mild degenerative changes of the temporomandibular joints. Poor dentition. Periapical lucencies involving teeth #12, and 26. ORBITAL CONTENTS: Prior bilateral cataract surgery. Visualized portions of the orbits are otherwise unremarkable. SINUSES: Moderate opacification of the ethmoid air cells. CERVICAL SPINE CT: VERTEBRA: 2 mm retrolisthesis of C4 on C5. 1 mm spondylolisthesis of C7 on T1. 2 mm spondylolisthesis of T1 on T2. Age indeterminate 40-50% compression fracture of T1. 21 degrees of segmental kyphosis from C7 to T2. The rest of the vertebral body heights  are maintained. Nondisplaced fracture involving the left lateral mass of C2 with probable extension into the left C2 transverse foramen. Craniocervical junction is within normal limits. No prevertebral soft tissue edema. CANAL/FORAMINA: Moderate intervertebral disc height loss C4-C5 to C6-C7. No high-grade spinal canal narrowing. Moderate left neuroforaminal narrowing at C3-C4. Moderate right neuroforaminal narrowing at C4-C5. Mild bilateral neuroforaminal narrowing at C5-C6. PARASPINAL: Mild symmetric atrophy of the posterior paraspinal musculature. Multinodular thyroid gland. Moderate atherosclerotic calcification of the carotid bulbs. Small right apical pneumothorax.     IMPRESSION: FACIAL BONE CT: 1.  Small soft tissue swelling over the nose. 2.  Mildly displaced nasal bone fractures. Nondisplaced  fracture of the anterior nasal septum. 3.  Poor dentition. Periapical lucencies involving teeth #12, and 26, suspicious for odontogenic disease. CERVICAL SPINE CT: 1.  Nondisplaced fracture involving the left lateral mass of C2 with probable extension into the left C2 transverse foramen. Recommend obtaining CT angiogram to exclude vascular injury. 2.  Age indeterminate 40-50% compression fracture of T1. 21 degrees of segmental kyphosis from C7 to T2. 3.  Small right apical pneumothorax. Discussed the findings with Dr. Burroughs at 1:52 PM, 07/09/2022.    CT Thoracic Spine w/o Contrast    Result Date: 7/9/2022  EXAM: CT THORACIC SPINE W/O CONTRAST LOCATION: [facility] DATE/TIME: 7/9/2022 3:01 PM INDICATION: Trauma, pain. COMPARISON: CT cervical spine and CT lumbar spine from today. TECHNIQUE: Routine CT Thoracic Spine without IV contrast. Multiplanar reformats. Dose reduction techniques were used. FINDINGS:  topogram demonstrates multilead left subclavian pacer and median sternotomy wires. VERTEBRA: 12 rib-bearing thoracic vertebral segments with 5 lumbar type vertebral bodies. As discussed on CT cervical spine, there is age-indeterminate 40-50% compression fracture of T1 with associated 21 degrees segmental kyphosis C7-T2. Additionally, there is evidence of acute fracture through a bridging anterior marginal osteophyte T11-T12 level with fracture extending through the anterosuperior cortex of T12 and fracture lucency paralleling the superior endplate of T12, involving the anterior column and middle column T12 level. No definite extension into the pedicles or posterior elements at the T11 or T12 level. There is slight widening of the posterior facets T11-T12, relative to the adjacent posterior facets suggesting involvement of the posterior column. Mild associated amorphous anterior paraspinous hematoma. No convincing evidence of epidural hematoma, to limits of CT. Sagittal alignment is  maintained. There are thin anterior syndesmophytes throughout the majority of the thoracic spine. Other thoracic vertebral bodies are preserved in height. There is lucency present through the anterior bridging osteophyte T5-T6 level, although this demonstrates sclerotic margins and is likely chronic. No other evidence of acute thoracic fracture. Numerous old appearing left posterior rib fractures. There are acute appearing displaced right-sided rib fractures involving right rib 4, 5, 6. CANAL/FORAMINA: No significant central canal stenosis. There is moderate bilateral foraminal narrowing T11-T12 level. PARASPINAL: Mild paraspinous hematoma anterior to T11-T12. Extensive vascular calcification. There is extensive infiltrate involving the posterior aspects of the left lung that could reflect contusion and/or pneumonitis. Small bilateral pleural effusions.     IMPRESSION: 1.  Unstable fracture pattern involving T11-T12 level. Fracture is present through a bridging anterior marginal osteophyte T11-T12 level with fracture paralleling the superior endplate of T12. There is widening of the T11-T12 posterior facets bilaterally. This suggests involvement of the posterior column as well. Mild amorphous anterior paraspinous hematoma. 2.  Age-indeterminate compression fracture of T1, as described on the cervical spine from today. 3.  Not mentioned above is compression deformity of L1. See dedicated CT lumbar spine. 4.  Small right apical pneumothorax. Findings phoned to Dr. Sarwat Isidro at 7/9/2022 3:59 PM 5.  Contusion or pneumonitis involving the posterior left lung.           07/10/2022   Katherine Leonardo MD  Hospitalist  Pager: [phone]

## 2022-07-10 NOTE — PLAN OF CARE
"  Problem: Plan of Care - These are the overarching goals to be used throughout the patient stay.    Goal: Absence of Hospital-Acquired Illness or Injury  Intervention: Prevent and Manage VTE (Venous Thromboembolism) Risk  Recent Flowsheet Documentation  Taken 7/10/2022 0016 by Felisha Chu RN  VTE Prevention/Management: SCDs (sequential compression devices) on  Activity Management:   activity minimized   bedrest     Problem: Plan of Care - These are the overarching goals to be used throughout the patient stay.    Goal: Patient-Specific Goal (Individualized)  Description: You can add care plan individualizations to a care plan. Examples of Individualization might be:  \"Parent requests to be called daily at 9am for status\", \"I have a hard time hearing out of my right ear\", or \"Do not touch me to wake me up as it startles me\".  Outcome: Ongoing, Progressing     Problem: Pain (Orthopaedic Fracture)  Goal: Acceptable Pain Control  Outcome: Ongoing, Progressing   Goal Outcome Evaluation:      Pt ia A/O times 3, disoriented to situation, pt denied pain all night but will be in pain when moving. Pt's neuro check is intact, garcia was inserted. Pt is on 2L , refused medication but coperative with other cares.                "

## 2022-07-10 NOTE — PLAN OF CARE
Pt has continued to be restless and confused. He got PRN Trazodone and has a 1:1 sitter due to his need for strict bedrest for unstable spinal fx. He has become frustrated and agitated with staff attempts to settle him.  He has pulled one IV out this shift, the other is covered with a sleeve.  He tolerates his new [place] J collar and has not tried to take off, but has taken his O2 cannula off and has de-satted with this activity.  His HR has been up to the 150s, down to 80s after PRN Metoprolol and scheduled Sotolol, but very irregular with A-fib and PVCs with occasional paced beats.  Problem: Plan of Care - These are the overarching goals to be used throughout the patient stay.    Goal: Absence of Hospital-Acquired Illness or Injury  Intervention: Identify and Manage Fall Risk  Recent Flowsheet Documentation  Taken 7/10/2022 1700 by Karolyn Robert RN  Safety Promotion/Fall Prevention:    bed alarm on    fall prevention program maintained  Intervention: Prevent and Manage VTE (Venous Thromboembolism) Risk  Recent Flowsheet Documentation  Taken 7/10/2022 1700 by Karolyn Robert RN  VTE Prevention/Management: SCDs (sequential compression devices) on  Activity Management: bedrest  Goal: Optimal Comfort and Wellbeing  Intervention: Monitor Pain and Promote Comfort  Recent Flowsheet Documentation  Taken 7/10/2022 1828 by Karolyn Robert RN  Pain Management Interventions: medication (see MAR)     Problem: Embolism (Orthopaedic Fracture)  Goal: Absence of Embolism Signs and Symptoms  Intervention: Prevent or Manage Embolism Risk  Recent Flowsheet Documentation  Taken 7/10/2022 1700 by Karolyn Robert RN  VTE Prevention/Management: SCDs (sequential compression devices) on     Problem: Functional Ability Impaired (Orthopaedic Fracture)  Goal: Optimal Functional Ability  Intervention: Optimize Functional Ability  Recent Flowsheet Documentation  Taken 7/10/2022 1700 by Karolyn Robert RN  Activity Management: bedrest  Activity  Assistance Provided: assistance, 2 people     Problem: Pain (Orthopaedic Fracture)  Goal: Acceptable Pain Control  Intervention: Manage Acute Orthopaedic-Related Pain  Recent Flowsheet Documentation  Taken 7/10/2022 1828 by Karolyn Robert, RN  Pain Management Interventions: medication (see MAR)     Problem: Respiratory Compromise (Orthopaedic Fracture)  Goal: Effective Oxygenation and Ventilation  Intervention: Promote Airway Secretion Clearance  Recent Flowsheet Documentation  Taken 7/10/2022 1700 by Karolyn Robert, RN  Cough And Deep Breathing: done independently per patient   Goal Outcome Evaluation:

## 2022-07-10 NOTE — PROGRESS NOTES
Patient was seen at [place] [id number] for the fitting and delivery of a [place] J 300S (with extra pad set) and [place] collar (showering purposes only). Patient was pleased with the fit and function.

## 2022-07-11 NOTE — PROGRESS NOTES
Chart review. Patient received new collar yesterday. Was to have MRI today of C and T spine to review fractures including potential unstable T11-12 fracture with widening. Cancelled per hosptialist for concerns of poor quality image and patient safety due to patient agitation. Patient will need to remain on bedrest with HOB as low as it can go safely considering respiratory status until MRI can be obtained. Attempted to reach nephew yesterday without success. Would question family or patient's desire for fusion if spine is deemed unstable as well as medical clearance. Additional plans - brace vs surgery vs no intervention to be determined once imaging can be safely obtained. Please call with questions.     SAMMY Romero  [facility]  O: [phone]

## 2022-07-11 NOTE — PROVIDER NOTIFICATION
was reported by 1;1 that pt pulled out garcia despite efforts to stop him. Moderate amount of bleeding noted. House officer notified and coming to see pt.

## 2022-07-11 NOTE — PROGRESS NOTES
RN paged to inform patient became agitated and pulled out garcia catheter. Mild bleeding reported. Went to examine the patient, observed small amount of blood collected in depends but no obvious trauma to the urethral opening. Given patients ongoing agitation throughout evening and high likelihood he may pull at catheter if placed again, will opt instead to bladder scan and straight cath. Nursing staff agreeable to plan.     Mary Kate Amaro MD

## 2022-07-11 NOTE — PROGRESS NOTES
RN paged regarding patient's worsening agitation. Now pulling out lines, pulled out catheter earlier in the evening. Patient non-violent. Not redirectable despite Zyprexa 5mg x 2. Will place order for mitts.     Mary Kate Amaro MD

## 2022-07-11 NOTE — PLAN OF CARE
Problem: Risk for Delirium  Goal: Optimal Coping  Outcome: Ongoing, Progressing     Problem: Risk for Delirium  Goal: Improved Sleep  Outcome: Ongoing, Progressing     Problem: Bleeding (Orthopaedic Fracture)  Goal: Absence of Bleeding  Outcome: Ongoing, Progressing   Goal Outcome Evaluation:      Pt did not sleep most part of the night, was confused and pulling on iv and gown, pulled out his garcia, house officer came up to see him, he has a brief on now, he was medicated for agitation with Zyprexa, his IV was stopped because he constantly tries to pull it out and oxygen removed, his stats are fin, he had two doses about 3 hours apart, he is with a 1:1, took his medication crushed in apple sauce with minimal cough, denied pain, is disoriented to time, situation and place. He now has a pull up on which he seems to prefer to the brief.

## 2022-07-11 NOTE — PROGRESS NOTES
"Care Management Follow Up    Length of Stay (days): 2    Expected Discharge Date:  Pending     Concerns to be Addressed: mentation, 1:1, agitation, determination for treatment plan, therapy eval        Patient plan of care discussed at interdisciplinary rounds: Yes    Anticipated Discharge Disposition:  TBD     Anticipated Discharge Services:  TBD  Anticipated Discharge DME:  TBD         Additional Information:  Patient admitted post fall at home.  Neurosurgery consulted for unstable T11-T12 fracture, L1 compression fracture, C2 fracture. Patient agitated, on 1:1, needs MRI. Will need PT/OT eval when appropriate.    Social History:  Seng lives in a house alone. It has multiple levels. [facility]. He is independent with ADLs and uses a walker or cane for mobility. He has help from a \"friend Rosy who helps with housekeeping\". Confirmed [facility] ([facility])  has been providing home PT.    Final discharge plan pending progression and recommendations.           Tamiko Alonso RN        "

## 2022-07-11 NOTE — PROVIDER NOTIFICATION
"Pt restless and agitated pulling at all lines, gown pulled off, trying to rip brief off stating \"I have my own shorts in there\" as he points to the ceiling.  Reorienting pt unsuccessful.  Pt throwing legs over side rails and at times requiring assist of 2 people to keep him from removing things.  No void since pt removed garcia.  Bladder scan 93.  Call placed to house officer to update about agitation.  New order for additional dose of zyprexa.   "

## 2022-07-11 NOTE — PROGRESS NOTES
Hospitalist Progress Note        CODE STATUS:  Full Code    Assessment/Plan:  Seng Deshpande is a 89 YO man with hx of syncope, paroxysmal atrial fibrillation, SSS s/p PPM, NSVT, LBBB, CAD, TIA/CVA, HTN, HLD and prostrate cancer who was BIBEMS after being found down following mechanical fall.     Fall, Mechanical  - Patient very clear on fact that he tripped going down the stairs of his basement. He was found by EMS.   - Appears base on his history that it was mechanical and he was fully aware.   - PT/OT    Unstable T11-T12 fracture, L1 compression fracture, C2 fracture  - Cervical Collar in place, continue.   - Neurosurgery consulted; MRI Cervical and thoracic spine pending. Patient is too delirious for this currently.   - NPO   - Optimize pain control  - PT/OT     Delirium  - Had episode of confusion due to pain medication in ED. Yesterday morning, he was calm, cooperative and AAOx3 and understood situation.   - Overnight with delirium and agitation. Lemus catheter/PIV removed for safety.   - Discontinue dilaudid.   - Continue 1:1. Removing mitts this morning helped. Optimize pain control.  - Check UA to r/o UTI  - Psych consult.     Mildly Displaced Nasal Bone fracture & septal fracture   - ENT consult is pending.    Acute Hypoxic Respiratory Failure - resolved  Pulmonary infiltrates likely 2/2 aspiration ISO epistaxis  Small pneumothorax  Mildly displaced 4-6 rib fractures on the right  - Off supplemental O2 this morning and saturating ok.  - CT chest 7/9: Multifocal groundglass and tree-in-bud opacities in both lungs, likely infection and/or aspiration. Trace bilateral pleural effusions.  - Seen by trauma for pneumothorax; see note. No specific interventions indicated, continue IS.    Paroxysmal Atrial Fibrillation  Hx of Sick Sinus Syndrome s/p PPM  Hx of NSVT  - Rates now  wnl. Episode of rapid afib resolved yesterday with hydration.  - Continue sotalol 80 mg q12h & Toprol Xl 25 mg daily. Monitor & replete  lytes prn.   - Continue holding eliquis   - Telemetry    Dehydration - resolved.  - Received 500 ml NS bolus x 2 yesterday. mIVF discontinued as he does not have PIV now for safety in setting of delirium.    Chronic Anemia  - Anemia does not appear to be acute, he seems to be at his baseline which in the last year is about ~9  - Continue to monitor  - Follow up iron studies ordered on admission     Hx of CAD  Hx of TIA/CVA  HLD  - Continue Lipitor 40 mg daily. Holding ASA 81 mg daily.     Hypothyroidism  - His TSH was just checked 2 weeks ago and wnl   - Continue home synthroid     HTN  - Blood pressure readings acceptable.      Prostate cancer  - Dutasteride 0.5 mg daily       DVT Prophylaxis: SCDs    Subjective:  Interval History:   Patient seen and examined.   Overnight noted to be very agitated. Was attempting to remove garcia and PIV. Both were removed by staff for safety and mittens applied.  Received Zyprexa close to 3am and then again 6am.  Currently he is awake and alert. He is briefly redirectable. Fidgeting with mitts.  1:1 at bedside.   Seemed to calm down once mitts were removed.     Review of Systems:   As mentioned in subjective.    Patient Active Problem List   Diagnosis     Prostate cancer (H)     Paroxysmal atrial fibrillation with RVR (H)     SA node dysfunction (H)     LBBB (left bundle branch block)     NSVT (nonsustained ventricular tachycardia) (H)     Coronary artery disease involving native coronary artery of native heart without angina pectoris     Cardiac pacemaker in situ     Paroxysmal atrial fibrillation (H)     Chronic atrial fibrillation (H)     Pneumothorax on right     Anticoagulated by anticoagulation treatment     Fall, initial encounter     Compression fracture of T12 vertebra, initial encounter (H)     Closed nondisplaced fracture of second cervical vertebra, unspecified fracture morphology, initial encounter (H)     Multiple fractures of ribs, right side, initial encounter for  closed fracture       Scheduled Meds:    acetaminophen  975 mg Oral Q8H     [Held by provider] apixaban ANTICOAGULANT  5 mg Oral BID     [Held by provider] aspirin  81 mg Oral Daily     atorvastatin  40 mg Oral At Bedtime     calcium carbonate 500 mg (elemental)  1 tablet Oral Daily     dutasteride  0.5 mg Oral Daily     haloperidol lactate  2 mg Intramuscular Once     levothyroxine  50 mcg Oral Daily     metoprolol succinate ER  25 mg Oral Daily     polyethylene glycol  17 g Oral Daily     sodium chloride (PF)  3 mL Intracatheter Q8H     sotalol  80 mg Oral Q12H SAURAV (08/20)     Vitamin D3  1,000 Units Oral Daily     Continuous Infusions:    [Held by provider] sodium chloride Stopped (07/11/22 0630)     PRN Meds:.lidocaine 4%, lidocaine (buffered or not buffered), melatonin, metoprolol, OLANZapine, sodium chloride (PF)    Objective:  Vital signs in last 24 hours:  Temp:  [97.6  F (36.4  C)-98  F (36.7  C)] 97.9  F (36.6  C)  Pulse:  [] 84  Resp:  [16-22] 20  BP: (118-167)/(66-87) 137/80  SpO2:  [91 %-97 %] 92 %        Intake/Output Summary (Last 24 hours) at 7/10/2022 0946  Last data filed at 7/10/2022 0600  Gross per 24 hour   Intake 1579 ml   Output 1000 ml   Net 579 ml       Physical Exam:  General: Appears stated age. In NAD.  HEENT: NCAT, EOMI,.   Neck: Neck in C-Collar  CV: Normal S1S2, regular rhythm, normal rate  Lungs: Clear on auscultation of sides  Abdomen: Soft, NT, ND, +BS  Extremities: No LE edema b/l  Neuro: AAOx2  Psych: Normal Affect. Restless. Continuously attempting to remove mitts.     Lab Results:    Recent Results (from the past 24 hour(s))   INR    Collection Time: 07/10/22  1:46 PM   Result Value Ref Range    INR 1.20 (H) 0.85 - 1.15   Partial thromboplastin time    Collection Time: 07/10/22  1:46 PM   Result Value Ref Range    aPTT 28 22 - 38 Seconds   Basic metabolic panel    Collection Time: 07/11/22  9:32 AM   Result Value Ref Range    Sodium 142 136 - 145 mmol/L    Potassium 3.5  3.5 - 5.0 mmol/L    Chloride 114 (H) 98 - 107 mmol/L    Carbon Dioxide (CO2) 20 (L) 22 - 31 mmol/L    Anion Gap 8 5 - 18 mmol/L    Urea Nitrogen 27 8 - 28 mg/dL    Creatinine 0.69 (L) 0.70 - 1.30 mg/dL    Calcium 8.5 8.5 - 10.5 mg/dL    Glucose 126 (H) 70 - 125 mg/dL    GFR Estimate 85 >60 mL/min/1.73m2   Magnesium    Collection Time: 07/11/22  9:32 AM   Result Value Ref Range    Magnesium 2.1 1.8 - 2.6 mg/dL   CBC with platelets and differential    Collection Time: 07/11/22  9:32 AM   Result Value Ref Range    WBC Count 9.9 4.0 - 11.0 10e3/uL    RBC Count 2.51 (L) 4.40 - 5.90 10e6/uL    Hemoglobin 8.4 (L) 13.3 - 17.7 g/dL    Hematocrit 27.1 (L) 40.0 - 53.0 %     (H) 78 - 100 fL    MCH 33.5 (H) 26.5 - 33.0 pg    MCHC 31.0 (L) 31.5 - 36.5 g/dL    RDW 16.1 (H) 10.0 - 15.0 %    Platelet Count 112 (L) 150 - 450 10e3/uL       Serum Glucose range:   Recent Labs   Lab 07/11/22  0932 07/10/22  0631 07/09/22  1353   * 104 132*     ABG: No lab results found in last 7 days.  CBC:   Recent Labs   Lab 07/11/22  0932 07/10/22  0631 07/09/22  1924 07/09/22  1353   WBC 9.9 8.0  --  10.8   HGB 8.4* 8.7* 9.0* 9.5*   HCT 27.1* 27.4*  --  29.1*   * 107*  --  104*   * 128*  --  154   NEUTROPHIL  --   --   --  76   LYMPH  --   --   --  7   MONOCYTE  --   --   --  16   EOSINOPHIL  --   --   --  0     Chemistry:   Recent Labs   Lab 07/11/22  0932 07/10/22  0631 07/09/22  1420 07/09/22  1353    140  --  140   POTASSIUM 3.5 3.8  --  4.1   CHLORIDE 114* 110*  --  105   CO2 20* 23  --  23   BUN 27 29*  --  36*   CR 0.69* 0.73 0.7 0.81   GFRESTIMATED 85 83 >60 81   MAURI 8.5 8.7  --  9.7   MAG 2.1  --   --  2.0   PROTTOTAL  --  6.5  --   --    ALBUMIN  --  2.9*  --   --    AST  --  32  --   --    ALT  --  15  --   --    ALKPHOS  --  63  --   --    BILITOTAL  --  0.8  --   --      Coags:  Recent Labs   Lab 07/10/22  1346   INR 1.20*   PTT 28     Cardiac Markers:  Recent Labs   Lab 07/09/22  1353   TROPONINI 0.03           XR Chest 1 View    Result Date: 2022  EXAM: XR CHEST 1 VIEW LOCATION: [facility] DATE/TIME: 2022 7:54 PM INDICATION: Syncope with bibasilar Rales. COMPARISON: 2022, 2016     IMPRESSION: Stable cardiac enlargement with stable pulmonary vascular congestion. Mild interstitial opacities right lung base slightly increased since prior suggestive of increasing component of volume overload. No pulmonary infiltrates. No pneumothorax.  Calcified thoracic aorta. Sternotomy. Left-sided cardiac pacemaker.    Echocardiogram Complete    Result Date: 2022  [id number]^[email or web address]^MELE^[place] [address]  Name: LIOR STOVALL MRN: [id number] : 1926 Study Date: 2022 10:51 AM Age: 89 yrs Gender: Male Patient Location: [facility] Reason For Study: Atrial Fibrillation Ordering Physician: MELE ROBLEDO Referring Physician: CAROL WALLER Performed By: ACE  BSA: 1.8 m2 Height: 67 in Weight: 160 lb HR: 72 BP: 132/97 mmHg ______________________________________________________________________________ Procedure Complete Echo Adult. Definity (NDC #16493-642) given intravenously. 3mL given. ______________________________________________________________________________ Interpretation Summary  The visual ejection fraction is 45-50%. Septal wall motion abnormality may reflect pacemaker activation. Mid to distal septal hypokinesis Mildly decreased right ventricular systolic function The right ventricle is mild to moderately dilated. There is mild (1+) mitral regurgitation. There is moderate (2+) tricuspid regurgitation. ______________________________________________________________________________ Left Ventricle The left ventricle is normal in size. There is normal left ventricular wall thickness. The visual ejection fraction is 45-50%. Septal wall motion abnormality may reflect pacemaker  activation. Mid to distal septal hypokinesis.  Right Ventricle The right ventricle is mild to moderately dilated. TAPSE is abnormal, which is consistent with abnormal right ventricular systolic function. Mildly decreased right ventricular systolic function. There is a pacemaker lead in the right ventricle.  Atria The left atrium is severely dilated. Right atrium not well visualized. Intact atrial septum.  Mitral Valve The mitral valve leaflets are mildly thickened. There is mild (1+) mitral regurgitation.  Tricuspid Valve Tricuspid valve leaflets appear normal. There is moderate (2+) tricuspid regurgitation. The right ventricular systolic pressure is approximated at 27mmHg plus the right atrial pressure.  Aortic Valve The aortic valve is trileaflet with aortic valve sclerosis. There is mild (1+) aortic regurgitation. No aortic stenosis is present.  Pulmonic Valve The pulmonic valve is not well seen, but is grossly normal. There is trace pulmonic valvular regurgitation.  Vessels Mild aortic root enlargement. Normal size ascending aorta. Inferior vena cava not well visualized for estimation of right atrial pressure.  Pericardium There is no pericardial effusion.  ______________________________________________________________________________ MMode/2D Measurements & Calculations IVSd: 0.89 cm LVIDd: 5.0 cm LVIDs: 4.1 cm LVPWd: 1.00 cm FS: 18.0 % LV mass(C)d: 167.8 grams LV mass(C)dI: 91.3 grams/m2  Ao root diam: 3.9 cm LA dimension: 5.2 cm LA/Ao: 1.3 LVOT diam: 2.1 cm LVOT area: 3.3 cm2 LA Volume Indexed (AL/bp): 63.5 ml/m2 RWT: 0.40  Time Measurements MM HR: 74.0 BPM  Doppler Measurements & Calculations MV E max fransisca: 91.7 cm/sec MV A max fransisca: 72.3 cm/sec MV E/A: 1.3  MV dec slope: 619.9 cm/sec2 MV dec time: 0.15 sec Ao V2 max: 155.5 cm/sec Ao max PG: 10.0 mmHg Ao V2 mean: 108.1 cm/sec Ao mean P.4 mmHg Ao V2 VTI: 32.7 cm LUCIEN(I,D): 2.1 cm2 LUCIEN(V,D): 2.0 cm2 AI P1/2t: 491.5 msec LV V1 max PG: 3.5 mmHg LV V1 max: 93.6  cm/sec LV V1 VTI: 20.8 cm SV(LVOT): 69.0 ml SI(LVOT): 37.5 ml/m2 PA acc time: 0.08 sec TR max willie: 259.4 cm/sec TR max P.9 mmHg AV Willie Ratio (DI): 0.60 LUCIEN Index (cm2/m2): 1.1 E/E': 11.6 E/E' av.4 Lateral E/e': 11.6 Medial E/e': 17.2 Peak E' Willie: 7.9 cm/sec  ______________________________________________________________________________ Report approved by: Frantz Vergara 2022 01:23 PM       XR Chest Port 1 View    Result Date: 2022  EXAM: XR CHEST PORT 1 VIEW LOCATION: [facility] DATE/TIME: 2022 1:11 PM INDICATION: fall COMPARISON: 2022     IMPRESSION: Interval development of displaced fractures of the right posterior fourth, fifth, and sixth ribs, age indeterminate and possibly acute or subacute. Correlate with tenderness. There are additional old healed right posterior rib fractures. No pleural effusion or pneumothorax. Worsening of multifocal patchy opacities in both lungs, either due to pulmonary edema or pneumonia. Stable mild cardiomegaly. Median sternotomy and mediastinal surgical clips. Pacemaker.    Cardiac Device Check - Remote    Result Date: 2022  Type: routine remote pacemaker transmission. Presenting rhythm: normal sinus and AP-VS rate 87 bpm. Frequent PACs and PVCs. Battery/lead status: stable Arrhythmias: since [place] Consult done 22, two new AT/AF, both <1 minute. No ventricular high rate episodes detected. Anticoagulant: apixaban Comments: normal magnet and pacemaker function.  TIM Black, Device Specialist I have reviewed and interpreted the device interrogation, settings, programming, and encounter summary. The device is functioning within normal device parameters. I agree with the current findings, assessment and plan.    CTA Neck with Contrast    Result Date: 2022  EXAM: CTA NECK WITH CONTRAST LOCATION: [facility] DATE/TIME: 2022 2:50 PM INDICATION: C2 fracture, pain recent fall. COMPARISON: Head  CT from today. CT facial bones from today. CT cervical spine from today. CONTRAST: Isovue 370 75 ml. TECHNIQUE: Neck CT angiogram with IV contrast. Axial helical CT images of the neck vessels were obtained during the arterial phase of intravenous contrast administration. Axial helical 2D reconstructed images and multiplanar 3D MIP reconstructed images of the neck vessels were performed by the technologist. Dose reduction techniques were used. All stenosis measurements made according to NASCET criteria unless otherwise specified. FINDINGS: Intracranial segments demonstrate moderate diffuse middle cerebral stenoses bilaterally. RIGHT CAROTID: Atherosclerotic plaque results in 50-70% stenosis in the right ICA. No dissection. LEFT CAROTID: Atherosclerotic plaque results in less than 50% stenosis in the left ICA. No dissection. VERTEBRAL ARTERIES: No focal stenosis or dissection. Balanced vertebral arteries. No evidence of vertebral occlusion or dissection related to the nondisplaced left C2 fracture. There are moderate bilateral focal stenoses present in the fourth segment distal vertebral arteries. Basilar artery is unremarkable. Moderate stenoses noted diffusely in the posterior cerebral arteries. AORTIC ARCH: No significant stenosis at the great vessel origins. The left vertebral artery originates from the arch, normal variant. NONVASCULAR STRUCTURES: Small right apical pneumothorax. This was noted on the CT cervical spine from today. The known nondisplaced left lateral mass fracture at C2 is less well-visualized given differences in technique. There is fracture noted of the right nasal bone, age-indeterminate. Correlate clinically.     IMPRESSION: 1.  No evidence of vertebral occlusion or dissection from the nondisplaced left lateral mass fracture C2 level. No hemodynamically significant extracranial stenosis and no evidence of dissection. 2.  Numerous intracranial stenoses throughout the anterior and posterior  circulation. 3.  Small right apical pneumothorax. Findings phoned to referring provider at 7/9/2022 4:00 PM.     Cervical spine CT w/o contrast    Result Date: 7/9/2022  EXAM: CT FACIAL BONES WITHOUT CONTRAST, CT CERVICAL SPINE W/O CONTRAST LOCATION: [facility] DATE/TIME: 7/9/2022 1:01 PM INDICATION: trauma, injury. COMPARISON: Head CT 06/21/2022 TECHNIQUE: 1) Routine CT Facial Bones without IV contrast. Multiplanar reformats. Dose reduction techniques were used. 2) Routine CT Cervical Spine without IV contrast. Multiplanar reformats. Dose reduction techniques were used. FINDINGS: FACIAL BONE CT: OSSEOUS STRUCTURES/SOFT TISSUES: Small soft tissue swelling over the nose. Mildly displaced nasal bone fractures. Nondisplaced fracture of the anterior nasal septum. The walls of the orbits are intact. No zygomatic arch fractures. The walls of the maxillary sinuses are intact. No zygomatic arch fractures. The pterygoid plates are intact. No mandibular fractures. The temporomandibular joints are intact. Mild degenerative changes of the temporomandibular joints. Poor dentition. Periapical lucencies involving teeth #12, and 26. ORBITAL CONTENTS: Prior bilateral cataract surgery. Visualized portions of the orbits are otherwise unremarkable. SINUSES: Moderate opacification of the ethmoid air cells. CERVICAL SPINE CT: VERTEBRA: 2 mm retrolisthesis of C4 on C5. 1 mm spondylolisthesis of C7 on T1. 2 mm spondylolisthesis of T1 on T2. Age indeterminate 40-50% compression fracture of T1. 21 degrees of segmental kyphosis from C7 to T2. The rest of the vertebral body heights  are maintained. Nondisplaced fracture involving the left lateral mass of C2 with probable extension into the left C2 transverse foramen. Craniocervical junction is within normal limits. No prevertebral soft tissue edema. CANAL/FORAMINA: Moderate intervertebral disc height loss C4-C5 to C6-C7. No high-grade spinal canal narrowing. Moderate  left neuroforaminal narrowing at C3-C4. Moderate right neuroforaminal narrowing at C4-C5. Mild bilateral neuroforaminal narrowing at C5-C6. PARASPINAL: Mild symmetric atrophy of the posterior paraspinal musculature. Multinodular thyroid gland. Moderate atherosclerotic calcification of the carotid bulbs. Small right apical pneumothorax.     IMPRESSION: FACIAL BONE CT: 1.  Small soft tissue swelling over the nose. 2.  Mildly displaced nasal bone fractures. Nondisplaced fracture of the anterior nasal septum. 3.  Poor dentition. Periapical lucencies involving teeth #12, and 26, suspicious for odontogenic disease. CERVICAL SPINE CT: 1.  Nondisplaced fracture involving the left lateral mass of C2 with probable extension into the left C2 transverse foramen. Recommend obtaining CT angiogram to exclude vascular injury. 2.  Age indeterminate 40-50% compression fracture of T1. 21 degrees of segmental kyphosis from C7 to T2. 3.  Small right apical pneumothorax. Discussed the findings with Dr. Burroughs at 1:52 PM, 07/09/2022.    Chest CT w/o contrast    Result Date: 7/9/2022  EXAM: CT CHEST W/O CONTRAST LOCATION: [facility] DATE/TIME: 7/9/2022 3:01 PM INDICATION: Fall.  Pneumothorax seen on CT neck COMPARISON: None. TECHNIQUE: CT chest without IV contrast. Multiplanar reformats were obtained. Dose reduction techniques were used. CONTRAST: None. FINDINGS: LUNGS AND PLEURA: Tiny right apical pneumothorax as seen on the prior C-spine CT. Patchy groundglass and tree-in-bud opacities within both lower lobes and the lingula. Trace bilateral pleural fluid. MEDIASTINUM/AXILLAE: Coarse calcified nodules. No aortic aneurysm. Left-sided pacemaker. CORONARY ARTERY CALCIFICATION: Previous intervention (stents or CABG). UPPER ABDOMEN: Calcified gallstones and heavily calcified distal vertebral arteries. MUSCULOSKELETAL: Osteopenia and degenerative disease in the spine. Median sternotomy wires. Healed bilateral rib  fractures. Mildly displaced acute right fourth-sixth rib fractures posteriorly     IMPRESSION: 1.  Tiny right apical pneumothorax, as seen on C-spine CT. 2.  Multifocal groundglass and tree-in-bud opacities in both lungs, likely infection and/or aspiration. Trace bilateral pleural effusions. 3.  Mildly displaced right fourth-sixth rib fractures.     Head CT w/o contrast    Result Date: 7/9/2022  EXAM: CT HEAD W/O CONTRAST LOCATION: [facility] DATE/TIME: 7/9/2022 1:00 PM INDICATION: trauma anticoagulated COMPARISON: None. TECHNIQUE: Routine CT Head without IV contrast. Multiplanar reformats. Dose reduction techniques were used. FINDINGS: INTRACRANIAL CONTENTS: No evidence of acute intracranial hemorrhage or mass effect. Scattered foci of decreased attenuation within the cerebral hemispheric white matter which are nonspecific, though most commonly ascribed to chronic small vessel ischemic  disease. 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 Scattered foci of decreased attenuation within the cerebral hemispheric white matter which are nonspecific, though most commonly ascribed to chronic small vessel ischemic disease. The basilar cisterns are patent. VISUALIZED ORBITS/SINUSES/MASTOIDS: Lateral cataract surgery. The partially imaged globes are otherwise unremarkable. The partially imaged paranasal sinuses, mastoid air cells and middle ear cavities are unremarkable. BONES/SOFT TISSUES: The visualized skull base and calvarium are unremarkable.     IMPRESSION:  1.  No evidence of acute intracranial hemorrhage or mass effect. 2.  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 3.  Moderate nonspecific white matter changes. 4.  Moderate brain parenchymal volume loss.    CT Head w/o Contrast    Result Date: 6/21/2022  EXAM: CT HEAD W/O CONTRAST LOCATION: [facility] DATE/TIME: 6/21/2022 8:17 PM INDICATION: Syncope, simple, normal neuro exam. COMPARISON: Head CT  3/1/2022 TECHNIQUE: Routine CT Head without IV contrast. Multiplanar reformats. Dose reduction techniques were used. FINDINGS: INTRACRANIAL CONTENTS: No intracranial hemorrhage, extraaxial collection, or mass effect.  No CT evidence of acute infarct. Severe presumed chronic small vessel ischemic changes. Multiple old periventricular infarcts. Left cerebellar infarcts. Moderate generalized volume loss. No hydrocephalus. VISUALIZED ORBITS/SINUSES/MASTOIDS: Prior bilateral cataract surgery. Visualized portions of the orbits are otherwise unremarkable. No paranasal sinus mucosal disease. No middle ear or mastoid effusion. BONES/SOFT TISSUES: No acute abnormality.     IMPRESSION: 1.  No CT evidence of acute intracranial abnormality. 2.  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    Lumbar spine CT w/o contrast    Result Date: 7/9/2022  EXAM: CT LUMBAR SPINE W/O CONTRAST LOCATION: [facility] DATE/TIME: 7/9/2022 3:01 PM INDICATION: Trauma, pain. COMPARISON: CT thoracic spine from today. TECHNIQUE: Routine CT Lumbar Spine without IV contrast. Multiplanar reformats. Dose reduction techniques were used. FINDINGS: VERTEBRA: 5 lumbar type vertebral bodies. T12 fracture with fracture through the anterior bridging osteophyte T11-T12 level, detailed in dedicated CT thoracic spine. There is moderate to moderately severe compression fracture deformity of L1 with loss of  vertebral body height 50-60%. 1 mm osseous retropulsion. While this is age-indeterminate, it may be chronic given the degree of anterior marginal osteophytes bridging at T12-L1 level. Additionally, there is no evidence of significant paraspinous hematoma adjacent to the L1 vertebral body. There is mild kyphosis at the thoracolumbar junction. 2 mm retrolisthesis L2 on L3 with otherwise anatomic sagittal and coronal alignment. Other lumbar vertebral bodies are preserved in height. There is a displaced fragment anterior to the inferior endplate of L2, but this  demonstrates well-defined, sclerotic margins and is likely chronic. No other convincing evidence of acute lumbar fracture. There is early osseous bridging across the anterior aspects of  the sacroiliac joints. CANAL/FORAMINA: No high-grade central canal or foraminal stenosis. Mild canal stenosis L4-L5. PARASPINAL: Extensive arterial vascular calcification. Small bilateral pleural effusions. Colonic diverticulosis. Distended urinary bladder incompletely visualized.     IMPRESSION: 1.  T12 fracture detailed on dedicated CT thoracic spine. 2.  Moderate to moderately severe compression fracture deformity of L1. While age-indeterminate, it may be chronic given the degree of bulky anterior marginal osteophytes bridging at T12-L1 level and the lack of significant paraspinous hematoma adjacent to the L1 vertebral body. 3.  Chronic appearing displaced fragment anterior to the inferior endplate of L2. 4.  No convincing evidence of acute lumbar fracture or posttraumatic subluxation.    CT Facial Bones without Contrast    Result Date: 7/9/2022  EXAM: CT FACIAL BONES WITHOUT CONTRAST, CT CERVICAL SPINE W/O CONTRAST LOCATION: [facility] DATE/TIME: 7/9/2022 1:01 PM INDICATION: trauma, injury. COMPARISON: Head CT 06/21/2022 TECHNIQUE: 1) Routine CT Facial Bones without IV contrast. Multiplanar reformats. Dose reduction techniques were used. 2) Routine CT Cervical Spine without IV contrast. Multiplanar reformats. Dose reduction techniques were used. FINDINGS: FACIAL BONE CT: OSSEOUS STRUCTURES/SOFT TISSUES: Small soft tissue swelling over the nose. Mildly displaced nasal bone fractures. Nondisplaced fracture of the anterior nasal septum. The walls of the orbits are intact. No zygomatic arch fractures. The walls of the maxillary sinuses are intact. No zygomatic arch fractures. The pterygoid plates are intact. No mandibular fractures. The temporomandibular joints are intact. Mild degenerative changes of the  temporomandibular joints. Poor dentition. Periapical lucencies involving teeth #12, and 26. ORBITAL CONTENTS: Prior bilateral cataract surgery. Visualized portions of the orbits are otherwise unremarkable. SINUSES: Moderate opacification of the ethmoid air cells. CERVICAL SPINE CT: VERTEBRA: 2 mm retrolisthesis of C4 on C5. 1 mm spondylolisthesis of C7 on T1. 2 mm spondylolisthesis of T1 on T2. Age indeterminate 40-50% compression fracture of T1. 21 degrees of segmental kyphosis from C7 to T2. The rest of the vertebral body heights  are maintained. Nondisplaced fracture involving the left lateral mass of C2 with probable extension into the left C2 transverse foramen. Craniocervical junction is within normal limits. No prevertebral soft tissue edema. CANAL/FORAMINA: Moderate intervertebral disc height loss C4-C5 to C6-C7. No high-grade spinal canal narrowing. Moderate left neuroforaminal narrowing at C3-C4. Moderate right neuroforaminal narrowing at C4-C5. Mild bilateral neuroforaminal narrowing at C5-C6. PARASPINAL: Mild symmetric atrophy of the posterior paraspinal musculature. Multinodular thyroid gland. Moderate atherosclerotic calcification of the carotid bulbs. Small right apical pneumothorax.     IMPRESSION: FACIAL BONE CT: 1.  Small soft tissue swelling over the nose. 2.  Mildly displaced nasal bone fractures. Nondisplaced fracture of the anterior nasal septum. 3.  Poor dentition. Periapical lucencies involving teeth #12, and 26, suspicious for odontogenic disease. CERVICAL SPINE CT: 1.  Nondisplaced fracture involving the left lateral mass of C2 with probable extension into the left C2 transverse foramen. Recommend obtaining CT angiogram to exclude vascular injury. 2.  Age indeterminate 40-50% compression fracture of T1. 21 degrees of segmental kyphosis from C7 to T2. 3.  Small right apical pneumothorax. Discussed the findings with Dr. Burroughs at 1:52 PM, 07/09/2022.    CT Thoracic Spine w/o  Contrast    Result Date: 7/9/2022  EXAM: CT THORACIC SPINE W/O CONTRAST LOCATION: [facility] DATE/TIME: 7/9/2022 3:01 PM INDICATION: Trauma, pain. COMPARISON: CT cervical spine and CT lumbar spine from today. TECHNIQUE: Routine CT Thoracic Spine without IV contrast. Multiplanar reformats. Dose reduction techniques were used. FINDINGS:  topogram demonstrates multilead left subclavian pacer and median sternotomy wires. VERTEBRA: 12 rib-bearing thoracic vertebral segments with 5 lumbar type vertebral bodies. As discussed on CT cervical spine, there is age-indeterminate 40-50% compression fracture of T1 with associated 21 degrees segmental kyphosis C7-T2. Additionally, there is evidence of acute fracture through a bridging anterior marginal osteophyte T11-T12 level with fracture extending through the anterosuperior cortex of T12 and fracture lucency paralleling the superior endplate of T12, involving the anterior column and middle column T12 level. No definite extension into the pedicles or posterior elements at the T11 or T12 level. There is slight widening of the posterior facets T11-T12, relative to the adjacent posterior facets suggesting involvement of the posterior column. Mild associated amorphous anterior paraspinous hematoma. No convincing evidence of epidural hematoma, to limits of CT. Sagittal alignment is maintained. There are thin anterior syndesmophytes throughout the majority of the thoracic spine. Other thoracic vertebral bodies are preserved in height. There is lucency present through the anterior bridging osteophyte T5-T6 level, although this demonstrates sclerotic margins and is likely chronic. No other evidence of acute thoracic fracture. Numerous old appearing left posterior rib fractures. There are acute appearing displaced right-sided rib fractures involving right rib 4, 5, 6. CANAL/FORAMINA: No significant central canal stenosis. There is moderate bilateral foraminal  narrowing T11-T12 level. PARASPINAL: Mild paraspinous hematoma anterior to T11-T12. Extensive vascular calcification. There is extensive infiltrate involving the posterior aspects of the left lung that could reflect contusion and/or pneumonitis. Small bilateral pleural effusions.     IMPRESSION: 1.  Unstable fracture pattern involving T11-T12 level. Fracture is present through a bridging anterior marginal osteophyte T11-T12 level with fracture paralleling the superior endplate of T12. There is widening of the T11-T12 posterior facets bilaterally. This suggests involvement of the posterior column as well. Mild amorphous anterior paraspinous hematoma. 2.  Age-indeterminate compression fracture of T1, as described on the cervical spine from today. 3.  Not mentioned above is compression deformity of L1. See dedicated CT lumbar spine. 4.  Small right apical pneumothorax. Findings phoned to Dr. Sarwat Isidro at 7/9/2022 3:59 PM 5.  Contusion or pneumonitis involving the posterior left lung.           07/11/2022   Katherine Leonardo MD  Hospitalist  Pager: [phone]

## 2022-07-11 NOTE — PLAN OF CARE
Goal Outcome Evaluation:  Problem: Risk for Delirium  Goal: Optimal Coping  Outcome: Ongoing, Progressing   Patient disoriented to situation & time this AM. Did know he was at [facility], MITTS removed at 0920 and patient less agitated, although does continue to attempt to scoot self out of bed and stating he wants to stand. 1:1 sitter with patient at all times. This early afternoon he was picking at items in the air that were not there (scissors), Stated he was at home, but did know this is July 2022. Also knew that he had fallen down the stairs. Does c/o some pain. Scheduled tylenol and lidocaine patch given. This nurse and the NA 1:1 frequently reoriented patient. [place] J collar on at all times and HOB < 30 degrees  Checked PVR's. PVR at 1417= 312 ml. Straight cathed for 325 ml. Patient seemed more relaxed after straight cath completed.  Some blood noted at head of penis prior th Straight cathing.

## 2022-07-11 NOTE — PLAN OF CARE
Problem: Restraint, Nonbehavioral (Nonviolent)  Goal: Absence of Harm or Injury  Intervention: Protect Skin and Joint Integrity  Recent Flowsheet Documentation  Taken 7/11/2022 0630 by Clair Pastor RN  Body Position: log-rolled  Taken 7/11/2022 0445 by Clair Pastor RN  Body Position: log-rolled  Taken 7/11/2022 0336 by Clair Pastor RN  Body Position: log-rolled  Taken 7/11/2022 0108 by Clair Pastor RN  Body Position: log-rolled     Problem: Restraint, Behavioral (Acute Care)  Goal: Absence of Harm or Injury  Outcome: Ongoing, Progressing  Intervention: Protect Skin and Joint Integrity  Recent Flowsheet Documentation  Taken 7/11/2022 0630 by Clair Pastor RN  Body Position: log-rolled  Taken 7/11/2022 0445 by Clair Pastor RN  Body Position: log-rolled  Taken 7/11/2022 0336 by Clair Pastor RN  Body Position: log-rolled  Taken 7/11/2022 0108 by Clair Pastor RN  Body Position: log-rolled   Goal Outcome Evaluation:    Pt had progressive periods of agitation and restlessness throughout the night.  Meds given per MD orders with short lived effectiveness.  Pt trying to pull at lines and attempting to get out of bed at the same time.  Pt throwing legs over the bed and when 1:1 sitter was trying to help legs back in bed pt got a hold of his iv line and yanked it out.  Pt also took gown off and ripped tele off.   Updated house officer and obtained an order for bilateral mitts. Mitts applied.

## 2022-07-11 NOTE — PROGRESS NOTES
Is the implanted device safe for MRI Exam?  Yes  Is this device 3T compatible? Yes  Device Type: Pacemaker      Device Information:  Make: avocadostore   [facility]     Cardiology Orders for Device Programming     -- Yes -- The patient has a MRI conditional pulse generator and leads from the same     -- Yes -- The pulse generator and leads have been implanted for at least 6 weeks    -- Yes-- The device is implanted in the right or left pectoral region    -- Yes -- There are not any additional active cardiac devices, abandoned leads, lead extenders or adapters    -- Yes -- The device lead impedance measurements are within the normal range. (Manufacture recommendations: Medtronic Advisa and Revo 200-1,500 ohms; Medtronic ICD and CRT's 200-3000 ohms and defibrillation lead impedance   ohms)    -- N/A -- If the patient is pacemaker dependent the thresholds are less than or equal to 2.0V @ 0.4ms.     Date of last in-office/remote Device check: 6/27/22 (auto-cap on)  Results of last in-office Device check:  1.   Right atrium impedance: 660 Ohms   2.   Right ventricle impedance: 587 Ohms   3.   Left ventricle impedance: n/a      4.   Right atrium threshold: 0.7V @ 0.4 ms   5.   Right ventricle threshold: 1.9V @ 0.4 ms   6.   Left ventricle threshold: n/a      Device programming during the scan guidelines   Pacing Mode (check one): DOO  Pacing Rate: 80  bpm or > intrinsic rates    Krystin Mcnulty RN

## 2022-07-11 NOTE — PLAN OF CARE
Goal Outcome Evaluation:  Pt found standing at bedside, he pulled telemetry leads off and was resistive and very confused with staff attempts to get back to laying down.  He got PRN Zyprexa.  House Officer called.

## 2022-07-11 NOTE — PLAN OF CARE
Goal Outcome Evaluation:  Pt had a new cervical collar placed by orthotist, shortly after pt was noted to have pulled his IV out and had his leg off the bed.  He was cleaned up and carefully repositioned with 2-3 staff, given IV dilaudid and PO tylenol (scheduled) and the IVF that was running in the right arm was moved to the left arm.  Bed alarm on high sensitivity mode, door open for better monitoring from hallway, and a sleeve on IV arm to prevent pulling.

## 2022-07-12 NOTE — PLAN OF CARE
Pt was alert and oriented x2-3 at start of shift, he thought he was at home at first but remembered he is in [facility], and knew that he fell down the stairs at home and broke his back. As the shift progressed he became more confused/agitated and less responsive to questions. Pt was pulling off oxygen tubing, pulling at his [place] j collar, scratching his nose and head until bleeding, and trying to climb out of bed. Pt was able to be redirected for a few minutes at a time before continuing behaviors. 5 mg olanzapine was given IM and was not effective. Pt O2 saturation would drop down to low 80s when oxygen tubing was pulled off. Bladder scans done at 1715: 147 mL and 2200: 175 mL.
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
## 2022-07-12 NOTE — PROGRESS NOTES
Daily Progress Note        CODE STATUS:  Full Code    07/12/22  Assessment/Plan:  Seng Deshpande is a 89 YO man with hx of syncope, paroxysmal atrial fibrillation, SSS s/p PPM, NSVT, LBBB, CAD, TIA/CVA, HTN, HLD and prostrate cancer who was BIBEMS after being found down following mechanical fall.      Fall, Mechanical  - Patient very clear on fact that he tripped going down the stairs of his basement. He was found by EMS.   - Appears base on his history that it was mechanical and he was fully aware.   - PT/OT     Unstable T11-T12 fracture, L1 compression fracture, C2 fracture  - Cervical Collar in place, continue.   - Neurosurgery consulted  - MRI Cervical and thoracic- pending. Ordered one time IV ativan if the patient needs sedation. Patient has a PPM from [employer]. Charge nurse to check with the Rep regarding the MRI compatibility.   - Optimize pain control  - PT/OT      Delirium  - Had episode of confusion due to pain medication in ED. Yesterday morning, he was calm, cooperative and AAOx3 and understood situation.   - Overnight with delirium and agitation.   - Continue 1:1 as needed. Optimize pain control.   - U/A looks abnormal. Will treat with PO keflex for UTI  - Psych consult. Awaiting input     Mildly Displaced Nasal Bone fracture & septal fracture   - ENT consult is pending.     Acute Hypoxic Respiratory Failure - resolved  Pulmonary infiltrates likely 2/2 aspiration ISO epistaxis  Small pneumothorax  Mildly displaced 4-6 rib fractures on the right  - Off supplemental O2 this morning and saturating ok.  - CT chest 7/9: Multifocal groundglass and tree-in-bud opacities in both lungs, likely infection and/or aspiration. Trace bilateral pleural effusions.  - Seen by trauma for pneumothorax; see note. No specific interventions indicated, continue IS.  - NPO for now. Cont maintenance IVF. Speech for swallow study.     Paroxysmal Atrial Fibrillation  Hx of Sick Sinus Syndrome s/p PPM  Hx of NSVT  - Rates  now  wnl. Episode of rapid afib resolved with hydration.  - Continue sotalol 80 mg q12h & Toprol Xl 25 mg daily. Monitor & replete lytes prn.   - Continue holding eliquis   - Telemetry     Dehydration - resolved.  - Received 500 ml NS bolus x 2 yesterday. Cont with the maintenance IVF as he remains NPO.     Chronic Anemia  - Anemia does not appear to be acute, he seems to be at his baseline which in the last year is about ~9  - Continue to monitor  - Follow up iron studies ordered on admission      Hx of CAD  Hx of TIA/CVA  HLD  - Continue Lipitor 40 mg daily. Holding ASA 81 mg daily.      Hypothyroidism  - His TSH was just checked 2 weeks ago and wnl   - Continue home synthroid      HTN  - Blood pressure readings acceptable.       Prostate cancer  - Dutasteride 0.5 mg daily         DVT Prophylaxis: SCDs     LOS: 3 days     Subjective:  Interval History: Notes, labs, imaging reports personally reviewed. Patient is new to me. Discussed with nursing staff. Patient is on 1:1. He was apparently confused and agitated yesterday.  Patient seen and examined this morning. Confused but calmer. Followed simple commands. Asking to go home.     Review of Systems:   As mentioned in subjective.    Patient Active Problem List   Diagnosis     Prostate cancer (H)     Paroxysmal atrial fibrillation with RVR (H)     SA node dysfunction (H)     LBBB (left bundle branch block)     NSVT (nonsustained ventricular tachycardia) (H)     Coronary artery disease involving native coronary artery of native heart without angina pectoris     Cardiac pacemaker in situ     Paroxysmal atrial fibrillation (H)     Chronic atrial fibrillation (H)     Pneumothorax on right     Anticoagulated by anticoagulation treatment     Fall, initial encounter     Compression fracture of T12 vertebra, initial encounter (H)     Closed nondisplaced fracture of second cervical vertebra, unspecified fracture morphology, initial encounter (H)     Multiple fractures of  ribs, right side, initial encounter for closed fracture       Scheduled Meds:    acetaminophen  975 mg Oral Q8H     apixaban ANTICOAGULANT  5 mg Oral BID     aspirin  81 mg Oral Daily     atorvastatin  40 mg Oral At Bedtime     calcium carbonate 500 mg (elemental)  1 tablet Oral Daily     cephALEXin  500 mg Oral Q12H [facility] (08/20)     dutasteride  0.5 mg Oral Daily     levothyroxine  50 mcg Oral Daily     lidocaine  1 patch Transdermal Q24H     lidocaine   Transdermal Q8H [facility]AV     LORazepam  0.5 mg Intravenous See Admin Instructions     metoprolol succinate ER  25 mg Oral Daily     polyethylene glycol  17 g Oral Daily     sodium chloride (PF)  3 mL Intracatheter Q8H     sotalol  80 mg Oral Q12H [facility] (08/20)     Vitamin D3  1,000 Units Oral Daily     Continuous Infusions:    sodium chloride 75 mL/hr at 07/12/22 1105     PRN Meds:.lidocaine 4%, lidocaine (buffered or not buffered), melatonin, metoprolol, OLANZapine, sodium chloride (PF)    Objective:  Vital signs in last 24 hours:  Temp:  [97.8  F (36.6  C)-98.8  F (37.1  C)] 98.8  F (37.1  C)  Pulse:  [70-95] 70  Resp:  [20-24] 22  BP: (137-176)/(65-88) 143/65  SpO2:  [92 %-98 %] 95 %        Intake/Output Summary (Last 24 hours) at 7/12/2022 1447  Last data filed at 7/12/2022 1040  Gross per 24 hour   Intake --   Output 0 ml   Net 0 ml       Physical Exam:    General: Not in obvious distress.  HEENT: Neck collar in place   Chest: Clear to auscultation bilaterally  Heart: S1S2 normal, irregular. No M/R/G  Abdomen: Soft. NT, ND. Bowel sounds- active.  Extremities: No legs swelling  Neuro: Awake. Follows simple commands. Moves all extremities.       Lab Results:(I have personally reviewed the results)    Recent Results (from the past 24 hour(s))   Basic metabolic panel    Collection Time: 07/12/22  5:50 AM   Result Value Ref Range    Sodium 144 136 - 145 mmol/L    Potassium 3.3 (L) 3.5 - 5.0 mmol/L    Chloride 114 (H) 98 - 107 mmol/L    Carbon Dioxide (CO2) 26 22 - 31  mmol/L    Anion Gap 4 (L) 5 - 18 mmol/L    Urea Nitrogen 28 8 - 28 mg/dL    Creatinine 0.75 0.70 - 1.30 mg/dL    Calcium 8.7 8.5 - 10.5 mg/dL    Glucose 114 70 - 125 mg/dL    GFR Estimate 83 >60 mL/min/1.73m2   Magnesium    Collection Time: 07/12/22  5:50 AM   Result Value Ref Range    Magnesium 2.1 1.8 - 2.6 mg/dL   CBC with platelets and differential    Collection Time: 07/12/22  5:50 AM   Result Value Ref Range    WBC Count 9.4 4.0 - 11.0 10e3/uL    RBC Count 2.48 (L) 4.40 - 5.90 10e6/uL    Hemoglobin 8.3 (L) 13.3 - 17.7 g/dL    Hematocrit 27.0 (L) 40.0 - 53.0 %     (H) 78 - 100 fL    MCH 33.5 (H) 26.5 - 33.0 pg    MCHC 30.7 (L) 31.5 - 36.5 g/dL    RDW 16.2 (H) 10.0 - 15.0 %    Platelet Count 117 (L) 150 - 450 10e3/uL   Manual Differential    Collection Time: 07/12/22  5:50 AM   Result Value Ref Range    % Neutrophils 74 %    % Lymphocytes 3 %    % Monocytes 19 %    % Eosinophils 4 %    % Basophils 0 %    Absolute Neutrophils 7.0 1.6 - 8.3 10e3/uL    Absolute Lymphocytes 0.3 (L) 0.8 - 5.3 10e3/uL    Absolute Monocytes 1.8 (H) 0.0 - 1.3 10e3/uL    Absolute Eosinophils 0.4 0.0 - 0.7 10e3/uL    Absolute Basophils 0.0 0.0 - 0.2 10e3/uL    RBC Morphology Confirmed RBC Indices     Platelet Assessment  Automated Count Confirmed. Platelet morphology is normal.     Automated Count Confirmed. Platelet morphology is normal.    Elliptocytes Slight (A) None Seen    RBC Fragments Slight (A) None Seen   ECG 12-LEAD WITH MUSE (L[facility])    Collection Time: 07/12/22  6:12 AM   Result Value Ref Range    Systolic Blood Pressure  mmHg    Diastolic Blood Pressure  mmHg    Ventricular Rate 100 BPM    Atrial Rate 122 BPM    VA Interval  ms    QRS Duration 144 ms     ms    QTc 510 ms    P Axis  degrees    R AXIS -31 degrees    T Axis 138 degrees    Interpretation ECG       Atrial fibrillation with premature ventricular or aberrantly conducted complexes  Left axis deviation  Left bundle branch block  Abnormal ECG  When compared  with ECG of 10-JUL-2022 11:23,  No significant change was found  Confirmed by MARTINA TIDWELL, WILLIAM LOC:[facility]LES ([id number]) on 7/12/2022 9:33:38 AM         All laboratory and imaging data in the past 24 hours reviewed  Serum Glucose range:   Recent Labs   Lab 07/12/22  0550 07/11/22  0932 07/10/22  0631 07/09/22  1353    126* 104 132*     ABG: No lab results found in last 7 days.  CBC:   Recent Labs   Lab 07/12/22  0550 07/11/22  0932 07/10/22  0631 07/09/22  1924 07/09/22  1353   WBC 9.4 9.9 8.0  --  10.8   HGB 8.3* 8.4* 8.7*   < > 9.5*   HCT 27.0* 27.1* 27.4*  --  29.1*   * 108* 107*  --  104*   * 112* 128*  --  154   NEUTROPHIL 74 74  --   --  76   LYMPH 3 9  --   --  7   MONOCYTE 19 15  --   --  16   EOSINOPHIL 4 2  --   --  0    < > = values in this interval not displayed.     Chemistry:   Recent Labs   Lab 07/12/22  0550 07/11/22  0932 07/10/22  0631 07/09/22  1420 07/09/22  1353    142 140  --  140   POTASSIUM 3.3* 3.5 3.8  --  4.1   CHLORIDE 114* 114* 110*  --  105   CO2 26 20* 23  --  23   BUN 28 27 29*  --  36*   CR 0.75 0.69* 0.73   < > 0.81   GFRESTIMATED 83 85 83   < > 81   MAURI 8.7 8.5 8.7  --  9.7   MAG 2.1 2.1  --   --  2.0   PROTTOTAL  --   --  6.5  --   --    ALBUMIN  --   --  2.9*  --   --    AST  --   --  32  --   --    ALT  --   --  15  --   --    ALKPHOS  --   --  63  --   --    BILITOTAL  --   --  0.8  --   --     < > = values in this interval not displayed.     Coags:  Recent Labs   Lab 07/10/22  1346   INR 1.20*   PTT 28     Cardiac Markers:  Recent Labs   Lab 07/09/22  1353   TROPONINI 0.03          XR Chest 2 Views    Result Date: 7/12/2022  EXAM: XR CHEST 2 VW LOCATION: [facility] DATE/TIME: 7/12/2022 1:43 PM INDICATION: PRE MRI cardiac device check. COMPARISON: 07/09/2022.     IMPRESSION: Left subclavian approach dual-chamber pacer with lead tips over the expected region of the right atrium and right ventricle. Pacer leads appear grossly intact.  Increased diffuse bilateral opacities, along with suspicion for septal thickening, suggesting pulmonary edema. Small pleural effusions. Mild-moderate cardiac silhouette enlargement. Sternotomy. Atherosclerosis.     XR Chest 1 View    Result Date: 2022  EXAM: XR CHEST 1 VIEW LOCATION: [facility] DATE/TIME: 2022 7:54 PM INDICATION: Syncope with bibasilar Rales. COMPARISON: 2022, 2016     IMPRESSION: Stable cardiac enlargement with stable pulmonary vascular congestion. Mild interstitial opacities right lung base slightly increased since prior suggestive of increasing component of volume overload. No pulmonary infiltrates. No pneumothorax.  Calcified thoracic aorta. Sternotomy. Left-sided cardiac pacemaker.    Echocardiogram Complete    Result Date: 2022  [id number]^MEENA^MELE^[place] [address]  Name: LIOR STOVALL MRN: [id number] : 1926 Study Date: 2022 10:51 AM Age: 89 yrs Gender: Male Patient Location: [facility] Reason For Study: Atrial Fibrillation Ordering Physician: MELE ROBLEDO Referring Physician: CAROL WALLER Performed By: ACE  BSA: 1.8 m2 Height: 67 in Weight: 160 lb HR: 72 BP: 132/97 mmHg ______________________________________________________________________________ Procedure Complete Echo Adult. Definity (NDC #[id number]) given intravenously. 3mL given. ______________________________________________________________________________ Interpretation Summary  The visual ejection fraction is 45-50%. Septal wall motion abnormality may reflect pacemaker activation. Mid to distal septal hypokinesis Mildly decreased right ventricular systolic function The right ventricle is mild to moderately dilated. There is mild (1+) mitral regurgitation. There is moderate (2+) tricuspid regurgitation. ______________________________________________________________________________  Left Ventricle The left ventricle is normal in size. There is normal left ventricular wall thickness. The visual ejection fraction is 45-50%. Septal wall motion abnormality may reflect pacemaker activation. Mid to distal septal hypokinesis.  Right Ventricle The right ventricle is mild to moderately dilated. TAPSE is abnormal, which is consistent with abnormal right ventricular systolic function. Mildly decreased right ventricular systolic function. There is a pacemaker lead in the right ventricle.  Atria The left atrium is severely dilated. Right atrium not well visualized. Intact atrial septum.  Mitral Valve The mitral valve leaflets are mildly thickened. There is mild (1+) mitral regurgitation.  Tricuspid Valve Tricuspid valve leaflets appear normal. There is moderate (2+) tricuspid regurgitation. The right ventricular systolic pressure is approximated at 27mmHg plus the right atrial pressure.  Aortic Valve The aortic valve is trileaflet with aortic valve sclerosis. There is mild (1+) aortic regurgitation. No aortic stenosis is present.  Pulmonic Valve The pulmonic valve is not well seen, but is grossly normal. There is trace pulmonic valvular regurgitation.  Vessels Mild aortic root enlargement. Normal size ascending aorta. Inferior vena cava not well visualized for estimation of right atrial pressure.  Pericardium There is no pericardial effusion.  ______________________________________________________________________________ MMode/2D Measurements & Calculations IVSd: 0.89 cm LVIDd: 5.0 cm LVIDs: 4.1 cm LVPWd: 1.00 cm FS: 18.0 % LV mass(C)d: 167.8 grams LV mass(C)dI: 91.3 grams/m2  Ao root diam: 3.9 cm LA dimension: 5.2 cm LA/Ao: 1.3 LVOT diam: 2.1 cm LVOT area: 3.3 cm2 LA Volume Indexed (AL/bp): 63.5 ml/m2 RWT: 0.40  Time Measurements MM HR: 74.0 BPM  Doppler Measurements & Calculations MV E max fransisca: 91.7 cm/sec MV A max fransisca: 72.3 cm/sec MV E/A: 1.3  MV dec slope: 619.9 cm/sec2 MV dec time: 0.15 sec Ao V2 max:  155.5 cm/sec Ao max PG: 10.0 mmHg Ao V2 mean: 108.1 cm/sec Ao mean P.4 mmHg Ao V2 VTI: 32.7 cm LUCIEN(I,D): 2.1 cm2 LUCIEN(V,D): 2.0 cm2 AI P1/2t: 491.5 msec LV V1 max PG: 3.5 mmHg LV V1 max: 93.6 cm/sec LV V1 VTI: 20.8 cm SV(LVOT): 69.0 ml SI(LVOT): 37.5 ml/m2 PA acc time: 0.08 sec TR max willie: 259.4 cm/sec TR max P.9 mmHg AV Willie Ratio (DI): 0.60 LUCIEN Index (cm2/m2): 1.1 E/E': 11.6 E/E' av.4 Lateral E/e': 11.6 Medial E/e': 17.2 Peak E' Willie: 7.9 cm/sec  ______________________________________________________________________________ Report approved by: Frantz Vergara 2022 01:23 PM       XR Chest Port 1 View    Result Date: 2022  EXAM: XR CHEST PORT 1 VIEW LOCATION: [facility] DATE/TIME: 2022 1:11 PM INDICATION: fall COMPARISON: 2022     IMPRESSION: Interval development of displaced fractures of the right posterior fourth, fifth, and sixth ribs, age indeterminate and possibly acute or subacute. Correlate with tenderness. There are additional old healed right posterior rib fractures. No pleural effusion or pneumothorax. Worsening of multifocal patchy opacities in both lungs, either due to pulmonary edema or pneumonia. Stable mild cardiomegaly. Median sternotomy and mediastinal surgical clips. Pacemaker.    Cardiac Device Check - Remote    Result Date: 2022  Type: routine remote pacemaker transmission. Presenting rhythm: normal sinus and AP-VS rate 87 bpm. Frequent PACs and PVCs. Battery/lead status: stable Arrhythmias: since [place] Consult done 22, two new AT/AF, both <1 minute. No ventricular high rate episodes detected. Anticoagulant: apixaban Comments: normal magnet and pacemaker function.  TIM Black, Device Specialist I have reviewed and interpreted the device interrogation, settings, programming, and encounter summary. The device is functioning within normal device parameters. I agree with the current findings, assessment and plan.    CTA Neck with  Contrast    Result Date: 7/9/2022  EXAM: CTA NECK WITH CONTRAST LOCATION: [facility] DATE/TIME: 7/9/2022 2:50 PM INDICATION: C2 fracture, pain recent fall. COMPARISON: Head CT from today. CT facial bones from today. CT cervical spine from today. CONTRAST: Isovue 370 75 ml. TECHNIQUE: Neck CT angiogram with IV contrast. Axial helical CT images of the neck vessels were obtained during the arterial phase of intravenous contrast administration. Axial helical 2D reconstructed images and multiplanar 3D MIP reconstructed images of the neck vessels were performed by the technologist. Dose reduction techniques were used. All stenosis measurements made according to NASCET criteria unless otherwise specified. FINDINGS: Intracranial segments demonstrate moderate diffuse middle cerebral stenoses bilaterally. RIGHT CAROTID: Atherosclerotic plaque results in 50-70% stenosis in the right ICA. No dissection. LEFT CAROTID: Atherosclerotic plaque results in less than 50% stenosis in the left ICA. No dissection. VERTEBRAL ARTERIES: No focal stenosis or dissection. Balanced vertebral arteries. No evidence of vertebral occlusion or dissection related to the nondisplaced left C2 fracture. There are moderate bilateral focal stenoses present in the fourth segment distal vertebral arteries. Basilar artery is unremarkable. Moderate stenoses noted diffusely in the posterior cerebral arteries. AORTIC ARCH: No significant stenosis at the great vessel origins. The left vertebral artery originates from the arch, normal variant. NONVASCULAR STRUCTURES: Small right apical pneumothorax. This was noted on the CT cervical spine from today. The known nondisplaced left lateral mass fracture at C2 is less well-visualized given differences in technique. There is fracture noted of the right nasal bone, age-indeterminate. Correlate clinically.     IMPRESSION: 1.  No evidence of vertebral occlusion or dissection from the nondisplaced  left lateral mass fracture C2 level. No hemodynamically significant extracranial stenosis and no evidence of dissection. 2.  Numerous intracranial stenoses throughout the anterior and posterior circulation. 3.  Small right apical pneumothorax. Findings phoned to referring provider at 7/9/2022 4:00 PM.     Cervical spine CT w/o contrast    Result Date: 7/9/2022  EXAM: CT FACIAL BONES WITHOUT CONTRAST, CT CERVICAL SPINE W/O CONTRAST LOCATION: [facility] DATE/TIME: 7/9/2022 1:01 PM INDICATION: trauma, injury. COMPARISON: Head CT 06/21/2022 TECHNIQUE: 1) Routine CT Facial Bones without IV contrast. Multiplanar reformats. Dose reduction techniques were used. 2) Routine CT Cervical Spine without IV contrast. Multiplanar reformats. Dose reduction techniques were used. FINDINGS: FACIAL BONE CT: OSSEOUS STRUCTURES/SOFT TISSUES: Small soft tissue swelling over the nose. Mildly displaced nasal bone fractures. Nondisplaced fracture of the anterior nasal septum. The walls of the orbits are intact. No zygomatic arch fractures. The walls of the maxillary sinuses are intact. No zygomatic arch fractures. The pterygoid plates are intact. No mandibular fractures. The temporomandibular joints are intact. Mild degenerative changes of the temporomandibular joints. Poor dentition. Periapical lucencies involving teeth #12, and 26. ORBITAL CONTENTS: Prior bilateral cataract surgery. Visualized portions of the orbits are otherwise unremarkable. SINUSES: Moderate opacification of the ethmoid air cells. CERVICAL SPINE CT: VERTEBRA: 2 mm retrolisthesis of C4 on C5. 1 mm spondylolisthesis of C7 on T1. 2 mm spondylolisthesis of T1 on T2. Age indeterminate 40-50% compression fracture of T1. 21 degrees of segmental kyphosis from C7 to T2. The rest of the vertebral body heights  are maintained. Nondisplaced fracture involving the left lateral mass of C2 with probable extension into the left C2 transverse foramen.  Craniocervical junction is within normal limits. No prevertebral soft tissue edema. CANAL/FORAMINA: Moderate intervertebral disc height loss C4-C5 to C6-C7. No high-grade spinal canal narrowing. Moderate left neuroforaminal narrowing at C3-C4. Moderate right neuroforaminal narrowing at C4-C5. Mild bilateral neuroforaminal narrowing at C5-C6. PARASPINAL: Mild symmetric atrophy of the posterior paraspinal musculature. Multinodular thyroid gland. Moderate atherosclerotic calcification of the carotid bulbs. Small right apical pneumothorax.     IMPRESSION: FACIAL BONE CT: 1.  Small soft tissue swelling over the nose. 2.  Mildly displaced nasal bone fractures. Nondisplaced fracture of the anterior nasal septum. 3.  Poor dentition. Periapical lucencies involving teeth #12, and 26, suspicious for odontogenic disease. CERVICAL SPINE CT: 1.  Nondisplaced fracture involving the left lateral mass of C2 with probable extension into the left C2 transverse foramen. Recommend obtaining CT angiogram to exclude vascular injury. 2.  Age indeterminate 40-50% compression fracture of T1. 21 degrees of segmental kyphosis from C7 to T2. 3.  Small right apical pneumothorax. Discussed the findings with Dr. Burroughs at 1:52 PM, 07/09/2022.    Chest CT w/o contrast    Result Date: 7/9/2022  EXAM: CT CHEST W/O CONTRAST LOCATION: [facility] DATE/TIME: 7/9/2022 3:01 PM INDICATION: Fall.  Pneumothorax seen on CT neck COMPARISON: None. TECHNIQUE: CT chest without IV contrast. Multiplanar reformats were obtained. Dose reduction techniques were used. CONTRAST: None. FINDINGS: LUNGS AND PLEURA: Tiny right apical pneumothorax as seen on the prior C-spine CT. Patchy groundglass and tree-in-bud opacities within both lower lobes and the lingula. Trace bilateral pleural fluid. MEDIASTINUM/AXILLAE: Coarse calcified nodules. No aortic aneurysm. Left-sided pacemaker. CORONARY ARTERY CALCIFICATION: Previous intervention (stents or  CABG). UPPER ABDOMEN: Calcified gallstones and heavily calcified distal vertebral arteries. MUSCULOSKELETAL: Osteopenia and degenerative disease in the spine. Median sternotomy wires. Healed bilateral rib fractures. Mildly displaced acute right fourth-sixth rib fractures posteriorly     IMPRESSION: 1.  Tiny right apical pneumothorax, as seen on C-spine CT. 2.  Multifocal groundglass and tree-in-bud opacities in both lungs, likely infection and/or aspiration. Trace bilateral pleural effusions. 3.  Mildly displaced right fourth-sixth rib fractures.     Head CT w/o contrast    Result Date: 7/9/2022  EXAM: CT HEAD W/O CONTRAST LOCATION: [facility] DATE/TIME: 7/9/2022 1:00 PM INDICATION: trauma anticoagulated COMPARISON: None. TECHNIQUE: Routine CT Head without IV contrast. Multiplanar reformats. Dose reduction techniques were used. FINDINGS: INTRACRANIAL CONTENTS: No evidence of acute intracranial hemorrhage or mass effect. Scattered foci of decreased attenuation within the cerebral hemispheric white matter which are nonspecific, though most commonly ascribed to chronic small vessel ischemic  disease. 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 Scattered foci of decreased attenuation within the cerebral hemispheric white matter which are nonspecific, though most commonly ascribed to chronic small vessel ischemic disease. The basilar cisterns are patent. VISUALIZED ORBITS/SINUSES/MASTOIDS: Lateral cataract surgery. The partially imaged globes are otherwise unremarkable. The partially imaged paranasal sinuses, mastoid air cells and middle ear cavities are unremarkable. BONES/SOFT TISSUES: The visualized skull base and calvarium are unremarkable.     IMPRESSION:  1.  No evidence of acute intracranial hemorrhage or mass effect. 2.  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 3.  Moderate nonspecific white matter changes. 4.  Moderate brain parenchymal volume loss.    CT Head w/o  Contrast    Result Date: 6/21/2022  EXAM: CT HEAD W/O CONTRAST LOCATION: [facility] DATE/TIME: 6/21/2022 8:17 PM INDICATION: Syncope, simple, normal neuro exam. COMPARISON: Head CT 3/1/2022 TECHNIQUE: Routine CT Head without IV contrast. Multiplanar reformats. Dose reduction techniques were used. FINDINGS: INTRACRANIAL CONTENTS: No intracranial hemorrhage, extraaxial collection, or mass effect.  No CT evidence of acute infarct. Severe presumed chronic small vessel ischemic changes. Multiple old periventricular infarcts. Left cerebellar infarcts. Moderate generalized volume loss. No hydrocephalus. VISUALIZED ORBITS/SINUSES/MASTOIDS: Prior bilateral cataract surgery. Visualized portions of the orbits are otherwise unremarkable. No paranasal sinus mucosal disease. No middle ear or mastoid effusion. BONES/SOFT TISSUES: No acute abnormality.     IMPRESSION: 1.  No CT evidence of acute intracranial abnormality. 2.  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    Lumbar spine CT w/o contrast    Result Date: 7/9/2022  EXAM: CT LUMBAR SPINE W/O CONTRAST LOCATION: [facility] DATE/TIME: 7/9/2022 3:01 PM INDICATION: Trauma, pain. COMPARISON: CT thoracic spine from today. TECHNIQUE: Routine CT Lumbar Spine without IV contrast. Multiplanar reformats. Dose reduction techniques were used. FINDINGS: VERTEBRA: 5 lumbar type vertebral bodies. T12 fracture with fracture through the anterior bridging osteophyte T11-T12 level, detailed in dedicated CT thoracic spine. There is moderate to moderately severe compression fracture deformity of L1 with loss of  vertebral body height 50-60%. 1 mm osseous retropulsion. While this is age-indeterminate, it may be chronic given the degree of anterior marginal osteophytes bridging at T12-L1 level. Additionally, there is no evidence of significant paraspinous hematoma adjacent to the L1 vertebral body. There is mild kyphosis at the thoracolumbar junction. 2 mm  retrolisthesis L2 on L3 with otherwise anatomic sagittal and coronal alignment. Other lumbar vertebral bodies are preserved in height. There is a displaced fragment anterior to the inferior endplate of L2, but this demonstrates well-defined, sclerotic margins and is likely chronic. No other convincing evidence of acute lumbar fracture. There is early osseous bridging across the anterior aspects of  the sacroiliac joints. CANAL/FORAMINA: No high-grade central canal or foraminal stenosis. Mild canal stenosis L4-L5. PARASPINAL: Extensive arterial vascular calcification. Small bilateral pleural effusions. Colonic diverticulosis. Distended urinary bladder incompletely visualized.     IMPRESSION: 1.  T12 fracture detailed on dedicated CT thoracic spine. 2.  Moderate to moderately severe compression fracture deformity of L1. While age-indeterminate, it may be chronic given the degree of bulky anterior marginal osteophytes bridging at T12-L1 level and the lack of significant paraspinous hematoma adjacent to the L1 vertebral body. 3.  Chronic appearing displaced fragment anterior to the inferior endplate of L2. 4.  No convincing evidence of acute lumbar fracture or posttraumatic subluxation.    CT Facial Bones without Contrast    Result Date: 7/9/2022  EXAM: CT FACIAL BONES WITHOUT CONTRAST, CT CERVICAL SPINE W/O CONTRAST LOCATION: [facility] DATE/TIME: 7/9/2022 1:01 PM INDICATION: trauma, injury. COMPARISON: Head CT 06/21/2022 TECHNIQUE: 1) Routine CT Facial Bones without IV contrast. Multiplanar reformats. Dose reduction techniques were used. 2) Routine CT Cervical Spine without IV contrast. Multiplanar reformats. Dose reduction techniques were used. FINDINGS: FACIAL BONE CT: OSSEOUS STRUCTURES/SOFT TISSUES: Small soft tissue swelling over the nose. Mildly displaced nasal bone fractures. Nondisplaced fracture of the anterior nasal septum. The walls of the orbits are intact. No zygomatic arch  fractures. The walls of the maxillary sinuses are intact. No zygomatic arch fractures. The pterygoid plates are intact. No mandibular fractures. The temporomandibular joints are intact. Mild degenerative changes of the temporomandibular joints. Poor dentition. Periapical lucencies involving teeth #12, and 26. ORBITAL CONTENTS: Prior bilateral cataract surgery. Visualized portions of the orbits are otherwise unremarkable. SINUSES: Moderate opacification of the ethmoid air cells. CERVICAL SPINE CT: VERTEBRA: 2 mm retrolisthesis of C4 on C5. 1 mm spondylolisthesis of C7 on T1. 2 mm spondylolisthesis of T1 on T2. Age indeterminate 40-50% compression fracture of T1. 21 degrees of segmental kyphosis from C7 to T2. The rest of the vertebral body heights  are maintained. Nondisplaced fracture involving the left lateral mass of C2 with probable extension into the left C2 transverse foramen. Craniocervical junction is within normal limits. No prevertebral soft tissue edema. CANAL/FORAMINA: Moderate intervertebral disc height loss C4-C5 to C6-C7. No high-grade spinal canal narrowing. Moderate left neuroforaminal narrowing at C3-C4. Moderate right neuroforaminal narrowing at C4-C5. Mild bilateral neuroforaminal narrowing at C5-C6. PARASPINAL: Mild symmetric atrophy of the posterior paraspinal musculature. Multinodular thyroid gland. Moderate atherosclerotic calcification of the carotid bulbs. Small right apical pneumothorax.     IMPRESSION: FACIAL BONE CT: 1.  Small soft tissue swelling over the nose. 2.  Mildly displaced nasal bone fractures. Nondisplaced fracture of the anterior nasal septum. 3.  Poor dentition. Periapical lucencies involving teeth #12, and 26, suspicious for odontogenic disease. CERVICAL SPINE CT: 1.  Nondisplaced fracture involving the left lateral mass of C2 with probable extension into the left C2 transverse foramen. Recommend obtaining CT angiogram to exclude vascular injury. 2.  Age indeterminate 40-50%  compression fracture of T1. 21 degrees of segmental kyphosis from C7 to T2. 3.  Small right apical pneumothorax. Discussed the findings with Dr. Burroughs at 1:52 PM, 07/09/2022.    CT Thoracic Spine w/o Contrast    Result Date: 7/9/2022  EXAM: CT THORACIC SPINE W/O CONTRAST LOCATION: [facility] DATE/TIME: 7/9/2022 3:01 PM INDICATION: Trauma, pain. COMPARISON: CT cervical spine and CT lumbar spine from today. TECHNIQUE: Routine CT Thoracic Spine without IV contrast. Multiplanar reformats. Dose reduction techniques were used. FINDINGS:  topogram demonstrates multilead left subclavian pacer and median sternotomy wires. VERTEBRA: 12 rib-bearing thoracic vertebral segments with 5 lumbar type vertebral bodies. As discussed on CT cervical spine, there is age-indeterminate 40-50% compression fracture of T1 with associated 21 degrees segmental kyphosis C7-T2. Additionally, there is evidence of acute fracture through a bridging anterior marginal osteophyte T11-T12 level with fracture extending through the anterosuperior cortex of T12 and fracture lucency paralleling the superior endplate of T12, involving the anterior column and middle column T12 level. No definite extension into the pedicles or posterior elements at the T11 or T12 level. There is slight widening of the posterior facets T11-T12, relative to the adjacent posterior facets suggesting involvement of the posterior column. Mild associated amorphous anterior paraspinous hematoma. No convincing evidence of epidural hematoma, to limits of CT. Sagittal alignment is maintained. There are thin anterior syndesmophytes throughout the majority of the thoracic spine. Other thoracic vertebral bodies are preserved in height. There is lucency present through the anterior bridging osteophyte T5-T6 level, although this demonstrates sclerotic margins and is likely chronic. No other evidence of acute thoracic fracture. Numerous old appearing left  posterior rib fractures. There are acute appearing displaced right-sided rib fractures involving right rib 4, 5, 6. CANAL/FORAMINA: No significant central canal stenosis. There is moderate bilateral foraminal narrowing T11-T12 level. PARASPINAL: Mild paraspinous hematoma anterior to T11-T12. Extensive vascular calcification. There is extensive infiltrate involving the posterior aspects of the left lung that could reflect contusion and/or pneumonitis. Small bilateral pleural effusions.     IMPRESSION: 1.  Unstable fracture pattern involving T11-T12 level. Fracture is present through a bridging anterior marginal osteophyte T11-T12 level with fracture paralleling the superior endplate of T12. There is widening of the T11-T12 posterior facets bilaterally. This suggests involvement of the posterior column as well. Mild amorphous anterior paraspinous hematoma. 2.  Age-indeterminate compression fracture of T1, as described on the cervical spine from today. 3.  Not mentioned above is compression deformity of L1. See dedicated CT lumbar spine. 4.  Small right apical pneumothorax. Findings phoned to Dr. Sarwat Isidro at 7/9/2022 3:59 PM 5.  Contusion or pneumonitis involving the posterior left lung.       Latest radiology report personally reviewed.    Note created using dragon voice recognition software so sounds alike errors may have escaped editing.      07/12/2022   Joe Frye MD  Hospitalist, [facility]  Pager: [phone]

## 2022-07-12 NOTE — PLAN OF CARE
"Goal Outcome Evaluation:     Patient oriented to time, situation and person. Disoriented to place. Also verbalizing that the \"car was being brought around\" and \"I want to stand up and go home\". Some confusion, no hallucinating noted today.Has been more cooperative this shift. IV restarted and patient leaving the IV alone. Continues to be unable to void, PVR's 164 and 229 this shift. Loose cough, appears to tolerate meds crushed in applesauce. Tele - NSR with BBB and occas. PVC's.Did c/o back pain and was able to give this nurse a pain number 6/10. Tylenol given and appears to have given him some relief. Has remained on strict bedrest with HOB no greater than 30 degrees. [place] J collar on at all times. Oxymask on 2-3L with sats in low 90's. 1:1 sitter remains with patient for safety  "

## 2022-07-12 NOTE — PROGRESS NOTES
"Neurosurgery Progress Note  07/12/22    A/P: Seng Deshpande is a 89 year old male who is s/p fall resulting in C2 lateral mass fracture, no vertebral artery injury as well as T11-T12 fracture through anterior osteophyte with widening of the anterior body. Suspect acute fracture but will need MRI to fully evaluate. Possibility this is an unstable fracture that would require surgery- discussed this with Seng today who tells me he feels \"questionable\" about surgery. Will need MRI to fully evaluate- Seng would like to proceed with MRI.     Addendum: able to find previous imaging from 2017, fracture is definitely new since then. MRI still helpful to determining posterior element involvement. Will order brace as would be needed regardless      Neurosurgery Attending: Dr. Foster    S: Cooperative this morning his RN tells me, following commands, answering questions, appropriate. Tells me his low back hurts, 6/10    PMH: No interval change  PSH: No interval change    ROS:  A full 14 point review of systems was otherwise completed and is negative aside from that mentioned above in the HPI    O:  /75 (BP Location: Right arm)   Pulse 77   Temp 97.9  F (36.6  C) (Axillary)   Resp 20   Ht 1.753 m (5' 9\")   Wt 68 kg (150 lb)   SpO2 96%   BMI 22.15 kg/m    General: Awake, alert, NAD  HEENT: AT/NC, EOMI, face symmetric, oral mucosa moist and pink  Heart: RRR: Nonlabored respirations without accessory muscle use.  Abd: S, NT, ND  Neuro: Awake, alert, speech is clear and content is appropriate. MAEW x 4   Coordination: gait testing deferred  Musculoskeletal: Negative straight leg raise bl  Psych: Appropriatemood and affect, pleasant, cooperative, alert and oriented x 3  Skin: scattered bruising     Labs: personally reviewed   Lab Results   Component Value Date     07/12/2022     07/11/2022     05/13/2010    CO2 26 07/12/2022    CO2 20 07/11/2022    CO2 24 05/13/2010    BUN 28 07/12/2022    BUN 27 " 07/11/2022    BUN 18.6 05/13/2010    BUN 19 05/13/2010     Recent Labs   Lab Test 07/12/22  0550 07/11/22  0932 07/10/22  0631 07/09/22  1924 07/09/22  1353 06/23/22  0453 06/21/22  1648 03/01/22  1936 11/22/21  1634 11/13/20  1002 11/01/19  1332 10/25/18  1147   WBC 9.4 9.9 8.0  --  10.8 5.5 4.8 4.3 6.7 5.4 5.2 5.1   HGB 8.3* 8.4* 8.7* 9.0* 9.5* 8.8* 9.4* 9.3* 9.4* 10.4* 11.5* 11.8*   HCT 27.0* 27.1* 27.4*  --  29.1* 28.2* 30.5* 31.3* 30.9* 31.4* 34.1* 34.0*   * 108* 107*  --  104* 106* 109* 109* 108* 99 100 98   * 112* 128*  --  154 140* 168 205 190 180 196 239     Recent Labs   Lab Test 07/10/22  1346   INR 1.20*   PTT 28         I personally visualized all imaging. C/T/L spine CTs evaluated     Mulu Romo CNP  [facility] Neurosurgery  O: [phone]  P: [phone]

## 2022-07-12 NOTE — IR NOTE
Patient's device was not placed into MRI Surescan mode prior to scan. eJamming Rep. Miguel noted that patient was not safe for MRI mode. Will need specific authorized order if scan is to be completed.

## 2022-07-12 NOTE — PLAN OF CARE
Problem: Plan of Care - These are the overarching goals to be used throughout the patient stay.    Goal: Plan of Care Review/Shift Note  Description: Pt goal to be comfortable & free of pain.  Outcome: Ongoing, Progressing   Goal Outcome Evaluation:      Pt c/o pain, having a hard time swallowing Tylenol. MD notified for additional prn meds.    Changed to Solution Tylenol without any success- MD notified changed to Rectal Tylenol & speech eval. [place] at bedside.      Problem: Plan of Care - These are the overarching goals to be used throughout the patient stay.    Goal: Absence of Hospital-Acquired Illness or Injury  Intervention: Identify and Manage Fall Risk  Recent Flowsheet Documentation  Taken 7/12/2022 1730 by Karolny Tavera, RN  Safety Promotion/Fall Prevention:    activity supervised    chair alarm on    bed alarm on    clutter free environment maintained    fall prevention program maintained   Falls program, 1:1 sitter, close to NS.       -MRI to be scheduled tomorrow once coordination down with pacemaker.    Karolyn Tavera, RN

## 2022-07-12 NOTE — PLAN OF CARE
Problem: Plan of Care - These are the overarching goals to be used throughout the patient stay.    Goal: Optimal Comfort and Wellbeing  Outcome: Ongoing, Progressing     Problem: Risk for Delirium  Goal: Optimal Coping  Outcome: Ongoing, Progressing   Goal Outcome Evaluation:      Pt has been delirious all night, trying to get out of bed and saying random things, he had a 1;1 , bp was running high 176/88. House officer was paged for an oral metoprolol because he has no IV access. He had a metoprolol which took a while to bring down his HR and BP. HR was running between 120 to 150 unstable,  had a bladder scan 380 , straight cath 300 ml. Got a call from MRI  to clarify when he is getting MRI that was put on hold. Pt is confused, , taking pills with apple sauce, was coughing a lot  and was suctioned , moderate amount of secretions, on 5 L of oxygen, sats  were 92/93

## 2022-07-13 NOTE — PROGRESS NOTES
S: Seng Deshpande was seen at [facility], Room 401-01, for the evaluation and measurements for a custom TLSO.     Dx:   1. Unstable fracture pattern involving T11-T12 level. Fracture is present through a bridging anterior marginal osteophyte T11-T12 level with fracture paralleling the superior endplate of T12. There is widening of the T11-T12 posterior facets bilaterally. This suggests involvement of the posterior column as well. Mild amorphous anterior paraspinous hematoma.   2.  Age-indeterminate compression fracture of T1, as described on the cervical spine from today.  3.  Not mentioned above is compression deformity of L1.    O: A Custom TLSO is medically necessary and recommended by Neurosurgery due to severity and instability of spinal fracture.     A: Measurements were completed without incident. Custom [employer] TLSO with a Flex Foam liner and rigid thermoplastic external frame.      P: The TLSO will be fabricated and delivered, pending any unforeseen circumstances, by Wednesday afternoon (7/13/2022).  If possible, the TLSO will be delivered earlier.    At the time of delivery, the TLSO will be fit to the patient and all donning/doffing and care instructions will be provided to the patient.    G: The TLSO increases medial-lateral, rotational and anterior-posterior stability of the spine.  The TLSO also applies compression to the patient s soft tissues.  Compression of the patient s soft tissues serves to unload the injured vertebrae which reduces pain and promotes healing.  The goal of this orthosis is to provide spinal stability, promote healing and reduce pain while the patient performs their ADLs.    Electronically signed by Nj Warren CPO, MAZIN    How to Put on a TLSO Back Brace  For optimal fit brace should NOT be donned with patient sitting. Ideal fit is achieved by donning in either laying or standing position. Due to changes in belly tissue, it is difficult to achieve a proper fit  when patient is sitting.  1. Apply a snug-fitting cotton t-shirt.  Loose shirts may cause wrinkles and skin irritation.  2. Patient should be supine. Palpate for waist (below ribs but above hips) so you know where to line up waist grooves of the brace.  3. Logroll patient and slide back section of brace under patient lining up waist groove of brace with patient's waist.  4. Roll patient onto their back with the posterior section behind them. Recheck alignment of waist groove on brace with patient's waist. It may be necessary to logroll patient to the opposite side to get posterior section centered on patient, where it extends anteriorly equal amounts on patient's sides. Repeat logrolling side to side as necessary.  5. Once posterior section is positioned correctly, lay anterior section on patient. Again, line up waist groove of brace to patient's waist. Waist grooves of anterior and posterior section should match up and fit together. Anterior shell should go over the top of posterior shell. Velcro straps should line up with the chafes/D-rings, if they don't, either the anterior or the posterior sections are not in the proper place.  6. Fasten the middle straps first and tighten both sides evenly. Then fasten either top or bottom straps in the same manner. Brace should be snug so as to prevent brace from sliding up on patient. If it is too loose, the brace will slide up and cause discomfort to the patient in the chin and/or axilla area.  7. When properly fit, brace the inferior aspect should allow clearance so as not to cut into the tops of the thighs when sitting but needs to be as low on the pelvic area as possible.    Wearing Schedule  Always check with prescribing doctor for a precise wearing schedule. At times the TLSO is worn only when out of bed while sitting or standing. Other times, the doctor requires the TLSO to be worn at all times.    Cleaning and Maintenance  Rubbing alcohol may be used to clean the  inside and outside of the TLSO. Spray TLSO with alcohol and wipe gently with a cloth. Be sure that TLSO is completely dry prior to application to ensure no skin issues will occur. Always wear a clean, dry, snug-fitting shirt under the brace.    Tips and Problem Solving  Brace migration is inevitable, especially when patient is going from laying to upright in bed. The mattress may push posterior section of brace up, which will push the whole brace up. Migration may also occur when patient sits in a soft cushioned chair. Don't be afraid to readjust brace and pull it down and back to its proper position.    Avoid soft seat cushioned chairs and sit up straight. Soft seat cushions or leaning back into a chair will cause the brace to migrate upward and may place pressure underarms.      Instead of leaning back, try sitting on the front half of the seat of the chair and work your way back until you contact the backrest of the chair.  Sometimes using a pillow in the gap between the back of the chair and the brace will provide more support.    Do not lean forward over a table while eating. Bring the food up to the mouth. This will reduce any pressure on the thighs and chest.    If the TLSO starts to migrate upward under the throat/armpits, ensure that straps are snug. Straps that are loose will allow the brace to shift.

## 2022-07-13 NOTE — IR NOTE
MRI devise monitoring.  [employer] rep via phone placed pt in OVOO.  Devise order re-confirmed with Krystin MATHEW, Devise nurse.  OK to proceed.  Pt drowsy, mildly agitated and confused.  Responds to verbal stimuli.  Denies any SOB of CP.  Pt rhythm A-fib with frequent PVC.  Non correlating HR on monitor verses [employer] tele monitoring.

## 2022-07-13 NOTE — PLAN OF CARE
"  Problem: Risk for Delirium  Goal: Improved Attention and Thought Clarity  Outcome: Ongoing, Progressing     Problem: Fracture Stabilization and Management (Orthopaedic Fracture)  Goal: Fracture Stability  Outcome: Ongoing, Progressing    Patient states he is in the hospital, able to repeat Kinza's after a reminder this morning, \"Oh, I'm familiar with that one.\"    Unable to cough up phlegm and spit out or swallow.  Frequent cough, oral suction with fabricio only able to suction out a small amount.  Oral care done with upper partial removed and placed in container.    Large incontinent void, bladder scan for 193 after brief changed.      Patient denies pain when immobile, but moans with repositioning, tylenol supp given.    Gerda Huggins RN                    "

## 2022-07-13 NOTE — PLAN OF CARE
"  Problem: Plan of Care - These are the overarching goals to be used throughout the patient stay.    Goal: Plan of Care Review/Shift Note  Description: The Plan of Care Review/Shift note should be completed every shift.  The Outcome Evaluation is a brief statement about your assessment that the patient is improving, declining, or no change.  This information will be displayed automatically on your shift note.  Outcome: Ongoing, Progressing  Goal: Patient-Specific Goal (Individualized)  Description: You can add care plan individualizations to a care plan. Examples of Individualization might be:  \"Parent requests to be called daily at 9am for status\", \"I have a hard time hearing out of my right ear\", or \"Do not touch me to wake me up as it startles me\".  Outcome: Ongoing, Progressing  Goal: Absence of Hospital-Acquired Illness or Injury  Outcome: Ongoing, Progressing  Intervention: Identify and Manage Fall Risk  Recent Flowsheet Documentation  Taken 7/13/2022 0400 by Belinda Yang, RN  Safety Promotion/Fall Prevention:   bed alarm on   activity supervised   fall prevention program maintained   lighting adjusted   room door open   room near nurse's station   room organization consistent   patient and family education   sitter at bedside   toileting scheduled  Taken 7/13/2022 0000 by Belinda Yang, RN  Safety Promotion/Fall Prevention:   bed alarm on   activity supervised   fall prevention program maintained   lighting adjusted   room door open   room near nurse's station   room organization consistent   patient and family education   sitter at bedside   toileting scheduled  Intervention: Prevent Skin Injury  Recent Flowsheet Documentation  Taken 7/13/2022 0118 by Belinda Yang, RN  Body Position:   turned   left   heels elevated  Taken 7/13/2022 0000 by Belinda Yang, RN  Body Position:   neutral head position   supine  Intervention: Prevent and Manage VTE (Venous Thromboembolism) Risk  Recent Flowsheet " Documentation  Taken 7/13/2022 0400 by Belinda Yang RN  Activity Management: bedrest  Taken 7/13/2022 0118 by Belinda Yang RN  Activity Management: bedrest  Taken 7/13/2022 0000 by Belinda Yang RN  Activity Management: bedrest  Goal: Optimal Comfort and Wellbeing  Outcome: Ongoing, Progressing  Intervention: Monitor Pain and Promote Comfort  Recent Flowsheet Documentation  Taken 7/13/2022 0400 by Belinda Yang RN  Pain Management Interventions: emotional support  Taken 7/13/2022 0118 by Belinda Yang RN  Pain Management Interventions: medication (see MAR)  Taken 7/13/2022 0000 by Belinda Yang RN  Pain Management Interventions: emotional support  Goal: Readiness for Transition of Care  Outcome: Ongoing, Progressing     Problem: Risk for Delirium  Goal: Optimal Coping  Outcome: Ongoing, Progressing  Goal: Improved Behavioral Control  Outcome: Ongoing, Progressing  Goal: Improved Attention and Thought Clarity  Outcome: Ongoing, Progressing  Goal: Improved Sleep  Outcome: Ongoing, Progressing     Problem: Bleeding (Orthopaedic Fracture)  Goal: Absence of Bleeding  Outcome: Ongoing, Progressing     Problem: Embolism (Orthopaedic Fracture)  Goal: Absence of Embolism Signs and Symptoms  Outcome: Ongoing, Progressing     Problem: Fracture Stabilization and Management (Orthopaedic Fracture)  Goal: Fracture Stability  Outcome: Ongoing, Progressing     Problem: Functional Ability Impaired (Orthopaedic Fracture)  Goal: Optimal Functional Ability  Outcome: Ongoing, Progressing  Intervention: Optimize Functional Ability  Recent Flowsheet Documentation  Taken 7/13/2022 0400 by Belinda Yang RN  Activity Management: bedrest  Activity Assistance Provided: assistance, 2 people  Taken 7/13/2022 0118 by Belinda Yang RN  Activity Management: bedrest  Activity Assistance Provided: assistance, 2 people  Taken 7/13/2022 0000 by Belinda Yang RN  Activity Management: bedrest  Activity  Assistance Provided: assistance, 2 people     Problem: Infection (Orthopaedic Fracture)  Goal: Absence of Infection Signs and Symptoms  Outcome: Ongoing, Progressing     Problem: Neurovascular Compromise (Orthopaedic Fracture)  Goal: Effective Tissue Perfusion  Outcome: Ongoing, Progressing     Problem: Pain (Orthopaedic Fracture)  Goal: Acceptable Pain Control  Outcome: Ongoing, Progressing  Intervention: Manage Acute Orthopaedic-Related Pain  Recent Flowsheet Documentation  Taken 7/13/2022 0400 by Belinda Yang RN  Pain Management Interventions: emotional support  Taken 7/13/2022 0118 by Belinda Yang RN  Pain Management Interventions: medication (see MAR)  Taken 7/13/2022 0000 by Belinda Yang RN  Pain Management Interventions: emotional support     Problem: Respiratory Compromise (Orthopaedic Fracture)  Goal: Effective Oxygenation and Ventilation  Outcome: Ongoing, Progressing     Problem: Restraint, Nonbehavioral (Nonviolent)  Goal: Absence of Harm or Injury  Outcome: Ongoing, Progressing  Intervention: Protect Skin and Joint Integrity  Recent Flowsheet Documentation  Taken 7/13/2022 0118 by Belinda Yang RN  Body Position:   turned   left   heels elevated  Taken 7/13/2022 0000 by Belinda Yang RN  Body Position:   neutral head position   supine     Problem: Restraint, Behavioral (Acute Care)  Goal: Absence of Harm or Injury  Outcome: Ongoing, Progressing  Intervention: Protect Skin and Joint Integrity  Recent Flowsheet Documentation  Taken 7/13/2022 0118 by Belinda Yang RN  Body Position:   turned   left   heels elevated  Taken 7/13/2022 0000 by Belinda Yang RN  Body Position:   neutral head position   supine     Problem: Violence Risk or Actual  Goal: Anger and Impulse Control  Outcome: Ongoing, Progressing     Problem: Suicide Risk  Goal: Absence of Self-Harm  Outcome: Ongoing, Progressing   Goal Outcome Evaluation:        Pt confused / lethargic. A/ox1. Tele afib with  rvr, prn metoprolol given. [place] J remains in place. 1:1 with pt. No attempts out of bed or to pull at lines or brace. No void this shift, bladder scanned for 173 and 237. Will monitor.

## 2022-07-13 NOTE — PROGRESS NOTES
"Call received from [facility] to clarify settings for MRI today. HR is quite erratic today and also noted yesterday. Was unable to place in DOO mode yesterday because HR was quite high at times. Orders modified today to change mode to ODO or OFF (for BSCi device) . Patient is not dependent and only AP 8%/ RVP 6% normally.     Verbal clarification/approval for \"OFF\" mode given for BSCi RepDARWIN.     Krystin Mcnulty RN    "

## 2022-07-13 NOTE — PLAN OF CARE
Problem: Plan of Care - These are the overarching goals to be used throughout the patient stay.    Goal: Absence of Hospital-Acquired Illness or Injury  Intervention: Identify and Manage Fall Risk  Recent Flowsheet Documentation  Taken 7/13/2022 1700 by Karolyn Tavera, RN  Safety Promotion/Fall Prevention:   bed alarm on   lighting adjusted   mobility aid in reach   safety round/check completed   room near nurse's station   Goal Outcome Evaluation:     Pt on 1:1 sitter, Close to NS. Pt calm & resting after MRI.  Cervical Collar on at all times. TLSO to be fitted tomorrow.    IV ABX, IVF.    Dysphagia- New Swallow Study Eval ordered, NPO.    Karolyn Tavera, RN

## 2022-07-13 NOTE — PROGRESS NOTES
Device Information:  Make: [employer]   Model: L331 Accolade MRI EL     Cardiology Orders for Device Programming      -- Yes -- The patient has a MRI conditional pulse generator and leads from the same      -- Yes -- The pulse generator and leads have been implanted for at least 6 weeks     -- Yes-- The device is implanted in the right or left pectoral region     -- Yes -- There are not any additional active cardiac devices, abandoned leads, lead extenders or adapters     -- Yes -- The device lead impedance measurements are within the normal range. (Manufacture recommendations: Medtronic Advisa and Revo 200-1,500 ohms; Medtronic ICD and CRT's 200-3000 ohms and defibrillation lead impedance   ohms)     -- N/A -- If the patient is pacemaker dependent the thresholds are less than or equal to 2.0V @ 0.4ms.     Date of last in-office/remote Device check: 6/27/22 (auto-cap on)  Results of last in-office Device check:  1.   Right atrium impedance: 660 Ohms   2.   Right ventricle impedance: 587 Ohms   3.   Left ventricle impedance: n/a      4.   Right atrium threshold: 0.7V @ 0.4 ms   5.   Right ventricle threshold: 1.9V @ 0.4 ms   6.   Left ventricle threshold: n/a      Device programming during the scan guidelines   Pacing Mode (check one): OVO  Pacing Rate: 0  bpm or > intrinsic rates due to high heart rates yesterday, IF heart rates lower today can do DOO 80 bpm.    Chey Hatfield, Device RN

## 2022-07-13 NOTE — PROGRESS NOTES
"Attempted to take off 1:1, patient yelling out for help.  'I need help, I can't get out of the bed!\"  Patient stated he wanted to get up to urinate.  Urinal placed without success, given peppermint aromatherapy.  Bladder scanned for 205cc.  Patient refused straight cath at this time.     Better able to handle secretions this afternoon.  Able to state he is at [facility] and reason.  Understands plan of MRI but requesting to keep wedding ring on.  \"They took my wife's wedding ring at ----- for an MRI and never returned it.\"    Gerda Huggins RN  "

## 2022-07-13 NOTE — PROGRESS NOTES
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 Suspect acute fracture but will need MRI to fully evaluate, certainly new since previous imaging in 2017. Possibility this is an unstable fracture that would require surgery- discussed this with Seng today who tells me he feels \"questionable\" about surgery. Will need MRI to fully evaluate- Seng would like to proceed with MRI.     Discussed imaging findings with patient's nephew, Javier, today and he is in agreement with plan for MRI. We discussed posibility of unstable fracture as well potential treatment options for both stable fracture (anterior columns only, no posterior involvement - brace) unstable (3 column injury if posterior elements are involved, typically surgical though we discussed that in his current state that would not seem like an option). We discussed that patient would be a difficult surgical candidate and that our only option may be bracing but question if patient is unable to tolerate this as well. He also would like second emergency contact notified of MRI results as she is a medical doctor. Javier reiterates that Seng is very sharp and should be included in discussion of his next steps of care. Hopefully MRI today, pending results and hospital progress - would not be unreasonable to consider palliative care consultation.     Maintain strict bedrest    Neurosurgery Attending: Dr. Foster    S: Increased secretions today, weak cough. Seng tells me he does not know how he feels about a brace but that we will \"just have to try it\". Two days ago for my colleague he vehemently refused surgery unless it would significantly affect his functionality.     PMH: No interval change  PSH: No interval change    ROS:  A full 14 point review of " "systems was otherwise completed and is negative aside from that mentioned above in the HPI    O:  /69 (BP Location: Right arm)   Pulse 99   Temp 98.3  F (36.8  C) (Axillary)   Resp 20   Ht 1.753 m (5' 9\")   Wt 68 kg (150 lb)   SpO2 94%   BMI 22.15 kg/m    General: Awake, alert, NAD  HEENT: AT/NC, EOMI, face symmetric, oral mucosa moist and pink  Heart: RRR: Nonlabored respirations without accessory muscle use.  Abd: S, NT, ND  Neuro: Awake, alert, speech is clear and content is appropriate. MAEW x 4, antigravity in arms and legs   Coordination: gait testing deferred  Musculoskeletal: Negative straight leg raise bl  Psych: Appropriatemood and affect, pleasant, cooperative, alert and oriented x 3  Skin: scattered bruising     Labs: personally reviewed   Lab Results   Component Value Date     07/12/2022     07/11/2022     05/13/2010    CO2 26 07/12/2022    CO2 20 07/11/2022    CO2 24 05/13/2010    BUN 28 07/12/2022    BUN 27 07/11/2022    BUN 18.6 05/13/2010    BUN 19 05/13/2010     Recent Labs   Lab Test 07/12/22  0550 07/11/22  0932 07/10/22  0631 07/09/22  1924 07/09/22  1353 06/23/22  0453 06/21/22  1648 03/01/22  1936 11/22/21  1634 11/13/20  1002 11/01/19  1332 10/25/18  1147   WBC 9.4 9.9 8.0  --  10.8 5.5 4.8 4.3 6.7 5.4 5.2 5.1   HGB 8.3* 8.4* 8.7* 9.0* 9.5* 8.8* 9.4* 9.3* 9.4* 10.4* 11.5* 11.8*   HCT 27.0* 27.1* 27.4*  --  29.1* 28.2* 30.5* 31.3* 30.9* 31.4* 34.1* 34.0*   * 108* 107*  --  104* 106* 109* 109* 108* 99 100 98   * 112* 128*  --  154 140* 168 205 190 180 196 239     Recent Labs   Lab Test 07/10/22  1346   INR 1.20*   PTT 28         I personally visualized all imaging. C/T/L spine CTs evaluated     Mulu Romo CNP  [facility] Neurosurgery  O: [phone]  P: [phone]        "

## 2022-07-13 NOTE — PROGRESS NOTES
"Care Management Follow Up    Length of Stay (days): 4    Expected Discharge Date: 07/15/2022     Concerns to be Addressed:   1:1 for safety, mentation, MRI pending, Neurosurgery following, therapy evaluation pending   Patient plan of care discussed at interdisciplinary rounds: Yes    Anticipated Discharge Disposition:  TCU vs home with home care     Anticipated Discharge Services:  TBD  Anticipated Discharge DME:  TBD    Patient/family educated on Medicare website which has current facility and service quality ratings:   Not at this time  Education Provided on the Discharge Plan:  Per team  Patient/Family in Agreement with the Plan:      Referrals Placed by CM/SW:  None at this time  Private pay costs discussed: Not applicable    Additional Information:  Chart reviewed  Neurosurgery consulted and following for unstable T11-T12 fracture, L1 compression fracture, C2 fracture. Patient confused, on 1:1 for safety, needs MRI. Will need PT/OT eval when appropriate.    Social History:  Seng lives in a house alone. It has multiple levels. His [facility] [employer]. He is independent with ADLs and uses a walker or cane for mobility. He has help from a \"friend Rosy who helps with housekeeping\". Confirmed [facility] (Cyndie)  has been providing home PT.    Care Management (CM) will continue to follow progression of care, review for recommendations from care team, and assist with discharge planning.      Carolina Dewitt RN      "

## 2022-07-13 NOTE — PROGRESS NOTES
Daily Progress Note        CODE STATUS:  Full Code    07/12/22  Assessment/Plan:  Seng Deshpande is a 89 YO man with hx of syncope, paroxysmal atrial fibrillation, SSS s/p PPM, NSVT, LBBB, CAD, TIA/CVA, HTN, HLD and prostrate cancer who was BIBEMS after being found down following mechanical fall.      Fall, Mechanical  - Patient very clear on fact that he tripped going down the stairs of his basement. He was found by EMS.   - Appears based on his history that it was mechanical and he was fully aware.   - PT/OT     Unstable T11-T12 fracture, L1 compression fracture, C2 fracture  - Cervical Collar in place, continue.   - Neurosurgery consulted  - MRI Cervical and thoracic being done today. Ordered one time IV ativan if the patient needs sedation.  - Based on the MRI findings, might consider pall care consult per NS. Patient would be a difficult surgical candidate should patient have unstable fracture,and choses surgical treatment.  - Optimize pain control  - Cervical and TLSO brace.  - PT/OT when neurosurgery says so.      Delirium  - Continue 1:1 as needed. Optimize pain control.   - U/A looks abnormal. Will treat with antibiotics x 3 days.  - Psych consult appreciated     Mildly Displaced Nasal Bone fracture & septal fracture   - ENT consult is pending.     Acute Hypoxic Respiratory Failure - resolved  Pulmonary infiltrates likely 2/2 aspiration ISO epistaxis  Small pneumothorax  Mildly displaced 4-6 rib fractures on the right  - Off supplemental O2 this morning and saturating ok.  - CT chest 7/9: Multifocal groundglass and tree-in-bud opacities in both lungs, likely infection and/or aspiration. Trace bilateral pleural effusions.  - Seen by trauma for pneumothorax; see note. No specific interventions indicated, continue IS.     Dysphagia  Aspiration risk  - NPO except meds with crushed in applesauce recommended by speech on 7/10. Will request for speech re-eval  - Cont with maintenance IVF    Paroxysmal Atrial  Fibrillation  Hx of Sick Sinus Syndrome s/p PPM  Hx of NSVT  - Rates now  wnl. Episode of rapid afib resolved with hydration.  - Continue sotalol 80 mg q12h & Toprol Xl 25 mg daily. Monitor & replete lytes prn.   - Continue holding eliquis   - Telemetry     Dehydration - resolved.  - Received 500 ml NS bolus x 2 on admission. Cont with the maintenance IVF as he remains NPO.     Chronic Anemia  - Anemia does not appear to be acute, he seems to be at his baseline which in the last year is about ~9  - Continue to monitor  - Follow up iron studies ordered on admission      Hx of CAD  Hx of TIA/CVA  HLD  - Continue Lipitor 40 mg daily. Holding ASA 81 mg daily.      Hypothyroidism  - His TSH was just checked 2 weeks ago and wnl   - Continue home synthroid      HTN  - Blood pressure readings acceptable.       Prostate cancer  - Dutasteride 0.5 mg daily         DVT Prophylaxis: SCDs      Subjective:  Interval History: On 1:1. Neck collar is on. Patient knows he is at [facility]. Lethargic most of the day per nursing staff.     Review of Systems:   As mentioned in subjective.    Patient Active Problem List   Diagnosis     Prostate cancer (H)     Paroxysmal atrial fibrillation with RVR (H)     SA node dysfunction (H)     LBBB (left bundle branch block)     NSVT (nonsustained ventricular tachycardia) (H)     Coronary artery disease involving native coronary artery of native heart without angina pectoris     Cardiac pacemaker in situ     Paroxysmal atrial fibrillation (H)     Chronic atrial fibrillation (H)     Pneumothorax on right     Anticoagulated by anticoagulation treatment     Fall, initial encounter     Compression fracture of T12 vertebra, initial encounter (H)     Closed nondisplaced fracture of second cervical vertebra, unspecified fracture morphology, initial encounter (H)     Multiple fractures of ribs, right side, initial encounter for closed fracture       Scheduled Meds:    [Held by provider] apixaban  ANTICOAGULANT  5 mg Oral BID     aspirin  81 mg Oral Daily     atorvastatin  40 mg Oral At Bedtime     calcium carbonate 500 mg (elemental)  1 tablet Oral Daily     ceFAZolin  1 g Intravenous Q8H     dutasteride  0.5 mg Oral Daily     levothyroxine  50 mcg Oral Daily     lidocaine  1 patch Transdermal Q24H     lidocaine   Transdermal Q8H [facility]     metoprolol succinate ER  25 mg Oral Daily     polyethylene glycol  17 g Oral Daily     sodium chloride (PF)  3 mL Intracatheter Q8H     sotalol  80 mg Oral Q12H SAURAV (08/20)     Vitamin D3  1,000 Units Oral Daily     Continuous Infusions:    sodium chloride 75 mL/hr at 07/13/22 0007     PRN Meds:.acetaminophen, HYDROmorphone, lidocaine 4%, lidocaine (buffered or not buffered), melatonin, metoprolol, naloxone **OR** naloxone **OR** naloxone **OR** naloxone, OLANZapine, sodium chloride (PF)    Objective:  Vital signs in last 24 hours:  Temp:  [97.7  F (36.5  C)-99  F (37.2  C)] 97.7  F (36.5  C)  Pulse:  [] 67  Resp:  [18-26] 22  BP: ()/(64-93) 131/76  SpO2:  [88 %-96 %] 90 %        Intake/Output Summary (Last 24 hours) at 7/12/2022 1447  Last data filed at 7/12/2022 1040  Gross per 24 hour   Intake --   Output 0 ml   Net 0 ml       Physical Exam:    General: Not in obvious distress.  HEENT: Neck collar in place   Chest: Clear to auscultation bilaterally  Heart: S1S2 normal, irregular. No M/R/G  Abdomen: Soft. NT, ND. Bowel sounds- active.  Extremities: No legs swelling  Neuro: Lethargic. Follows simple commands. Moves all extremities.       Lab Results:(I have personally reviewed the results)    Recent Results (from the past 24 hour(s))   Basic metabolic panel    Collection Time: 07/13/22  2:13 AM   Result Value Ref Range    Sodium 147 (H) 136 - 145 mmol/L    Potassium 3.7 3.5 - 5.0 mmol/L    Chloride 117 (H) 98 - 107 mmol/L    Carbon Dioxide (CO2) 21 (L) 22 - 31 mmol/L    Anion Gap 9 5 - 18 mmol/L    Urea Nitrogen 29 (H) 8 - 28 mg/dL    Creatinine 0.74 0.70 - 1.30  mg/dL    Calcium 8.6 8.5 - 10.5 mg/dL    Glucose 125 70 - 125 mg/dL    GFR Estimate 83 >60 mL/min/1.73m2   Magnesium    Collection Time: 07/13/22  2:13 AM   Result Value Ref Range    Magnesium 2.0 1.8 - 2.6 mg/dL   Lactic Acid STAT    Collection Time: 07/13/22  2:13 AM   Result Value Ref Range    Lactic Acid 1.5 0.7 - 2.0 mmol/L   CBC with platelets and differential    Collection Time: 07/13/22  2:13 AM   Result Value Ref Range    WBC Count 13.9 (H) 4.0 - 11.0 10e3/uL    RBC Count 2.50 (L) 4.40 - 5.90 10e6/uL    Hemoglobin 8.4 (L) 13.3 - 17.7 g/dL    Hematocrit 27.2 (L) 40.0 - 53.0 %     (H) 78 - 100 fL    MCH 33.6 (H) 26.5 - 33.0 pg    MCHC 30.9 (L) 31.5 - 36.5 g/dL    RDW 16.3 (H) 10.0 - 15.0 %    Platelet Count 106 (L) 150 - 450 10e3/uL   Manual Differential    Collection Time: 07/13/22  2:13 AM   Result Value Ref Range    % Neutrophils 74 %    % Lymphocytes 24 %    % Monocytes 1 %    % Eosinophils 0 %    % Basophils 1 %    Absolute Neutrophils 10.3 (H) 1.6 - 8.3 10e3/uL    Absolute Lymphocytes 3.3 0.8 - 5.3 10e3/uL    Absolute Monocytes 0.1 0.0 - 1.3 10e3/uL    Absolute Eosinophils 0.0 0.0 - 0.7 10e3/uL    Absolute Basophils 0.1 0.0 - 0.2 10e3/uL    RBC Morphology Confirmed RBC Indices     Platelet Assessment  Automated Count Confirmed. Platelet morphology is normal.     Automated Count Confirmed. Platelet morphology is normal.    Elliptocytes Slight (A) None Seen    Polychromasia Slight (A) None Seen       All laboratory and imaging data in the past 24 hours reviewed  Serum Glucose range:   Recent Labs   Lab 07/13/22  0213 07/12/22  0550 07/11/22  0932 07/10/22  0631    114 126* 104     ABG: No lab results found in last 7 days.  CBC:   Recent Labs   Lab 07/13/22 0213 07/12/22  0550 07/11/22  0932   WBC 13.9* 9.4 9.9   HGB 8.4* 8.3* 8.4*   HCT 27.2* 27.0* 27.1*   * 109* 108*   * 117* 112*   NEUTROPHIL 74 74 74   LYMPH 24 3 9   MONOCYTE 1 19 15   EOSINOPHIL 0 4 2     Chemistry:    Recent Labs   Lab 07/13/22  0213 07/12/22  0550 07/11/22  0932 07/10/22  0631   * 144 142 140   POTASSIUM 3.7 3.3* 3.5 3.8   CHLORIDE 117* 114* 114* 110*   CO2 21* 26 20* 23   BUN 29* 28 27 29*   CR 0.74 0.75 0.69* 0.73   GFRESTIMATED 83 83 85 83   MAURI 8.6 8.7 8.5 8.7   MAG 2.0 2.1 2.1  --    PROTTOTAL  --   --   --  6.5   ALBUMIN  --   --   --  2.9*   AST  --   --   --  32   ALT  --   --   --  15   ALKPHOS  --   --   --  63   BILITOTAL  --   --   --  0.8     Coags:  Recent Labs   Lab 07/10/22  1346   INR 1.20*   PTT 28     Cardiac Markers:  Recent Labs   Lab 07/09/22  1353   TROPONINI 0.03          XR Chest 2 Views    Result Date: 7/12/2022  EXAM: XR CHEST 2 VW LOCATION: [facility] DATE/TIME: 7/12/2022 1:43 PM INDICATION: PRE MRI cardiac device check. COMPARISON: 07/09/2022.     IMPRESSION: Left subclavian approach dual-chamber pacer with lead tips over the expected region of the right atrium and right ventricle. Pacer leads appear grossly intact. Increased diffuse bilateral opacities, along with suspicion for septal thickening, suggesting pulmonary edema. Small pleural effusions. Mild-moderate cardiac silhouette enlargement. Sternotomy. Atherosclerosis.     XR Chest 1 View    Result Date: 6/21/2022  EXAM: XR CHEST 1 VIEW LOCATION: [facility] DATE/TIME: 6/21/2022 7:54 PM INDICATION: Syncope with bibasilar Rales. COMPARISON: 03/01/2022, 11/19/2016     IMPRESSION: Stable cardiac enlargement with stable pulmonary vascular congestion. Mild interstitial opacities right lung base slightly increased since prior suggestive of increasing component of volume overload. No pulmonary infiltrates. No pneumothorax.  Calcified thoracic aorta. Sternotomy. Left-sided cardiac pacemaker.    Echocardiogram Complete    Result Date: 6/22/2022  [id number]^[email or web address]^M  [address] [address]  Name: LIOR STOVALL  MRN: [id number] : 1926 Study Date: 2022 10:51 AM Age: 89 yrs Gender: Male Patient Location: [facility] Reason For Study: Atrial Fibrillation Ordering Physician: MELE ROBLEDO Referring Physician: CAROL WALLER Performed By: ACE  BSA: 1.8 m2 Height: 67 in Weight: 160 lb HR: 72 BP: 132/97 mmHg ______________________________________________________________________________ Procedure Complete Echo Adult. Definity (NDC #13413-678) given intravenously. 3mL given. ______________________________________________________________________________ Interpretation Summary  The visual ejection fraction is 45-50%. Septal wall motion abnormality may reflect pacemaker activation. Mid to distal septal hypokinesis Mildly decreased right ventricular systolic function The right ventricle is mild to moderately dilated. There is mild (1+) mitral regurgitation. There is moderate (2+) tricuspid regurgitation. ______________________________________________________________________________ Left Ventricle The left ventricle is normal in size. There is normal left ventricular wall thickness. The visual ejection fraction is 45-50%. Septal wall motion abnormality may reflect pacemaker activation. Mid to distal septal hypokinesis.  Right Ventricle The right ventricle is mild to moderately dilated. TAPSE is abnormal, which is consistent with abnormal right ventricular systolic function. Mildly decreased right ventricular systolic function. There is a pacemaker lead in the right ventricle.  Atria The left atrium is severely dilated. Right atrium not well visualized. Intact atrial septum.  Mitral Valve The mitral valve leaflets are mildly thickened. There is mild (1+) mitral regurgitation.  Tricuspid Valve Tricuspid valve leaflets appear normal. There is moderate (2+) tricuspid regurgitation. The right ventricular systolic pressure is approximated at 27mmHg plus the right atrial pressure.  Aortic Valve The aortic valve is  trileaflet with aortic valve sclerosis. There is mild (1+) aortic regurgitation. No aortic stenosis is present.  Pulmonic Valve The pulmonic valve is not well seen, but is grossly normal. There is trace pulmonic valvular regurgitation.  Vessels Mild aortic root enlargement. Normal size ascending aorta. Inferior vena cava not well visualized for estimation of right atrial pressure.  Pericardium There is no pericardial effusion.  ______________________________________________________________________________ MMode/2D Measurements & Calculations IVSd: 0.89 cm LVIDd: 5.0 cm LVIDs: 4.1 cm LVPWd: 1.00 cm FS: 18.0 % LV mass(C)d: 167.8 grams LV mass(C)dI: 91.3 grams/m2  Ao root diam: 3.9 cm LA dimension: 5.2 cm LA/Ao: 1.3 LVOT diam: 2.1 cm LVOT area: 3.3 cm2 LA Volume Indexed (AL/bp): 63.5 ml/m2 RWT: 0.40  Time Measurements MM HR: 74.0 BPM  Doppler Measurements & Calculations MV E max willie: 91.7 cm/sec MV A max willie: 72.3 cm/sec MV E/A: 1.3  MV dec slope: 619.9 cm/sec2 MV dec time: 0.15 sec Ao V2 max: 155.5 cm/sec Ao max PG: 10.0 mmHg Ao V2 mean: 108.1 cm/sec Ao mean P.4 mmHg Ao V2 VTI: 32.7 cm LUCIEN(I,D): 2.1 cm2 LUCIEN(V,D): 2.0 cm2 AI P1/2t: 491.5 msec LV V1 max PG: 3.5 mmHg LV V1 max: 93.6 cm/sec LV V1 VTI: 20.8 cm SV(LVOT): 69.0 ml SI(LVOT): 37.5 ml/m2 PA acc time: 0.08 sec TR max willie: 259.4 cm/sec TR max P.9 mmHg AV Willie Ratio (DI): 0.60 LUCIEN Index (cm2/m2): 1.1 E/E': 11.6 E/E' av.4 Lateral E/e': 11.6 Medial E/e': 17.2 Peak E' Willie: 7.9 cm/sec  ______________________________________________________________________________ Report approved by: Frantz Vergara 2022 01:23 PM       XR Chest Port 1 View    Result Date: 2022  EXAM: XR CHEST PORT 1 VIEW LOCATION: [facility] DATE/TIME: 2022 1:11 PM INDICATION: fall COMPARISON: 2022     IMPRESSION: Interval development of displaced fractures of the right posterior fourth, fifth, and sixth ribs, age indeterminate and possibly  acute or subacute. Correlate with tenderness. There are additional old healed right posterior rib fractures. No pleural effusion or pneumothorax. Worsening of multifocal patchy opacities in both lungs, either due to pulmonary edema or pneumonia. Stable mild cardiomegaly. Median sternotomy and mediastinal surgical clips. Pacemaker.    Cardiac Device Check - Remote    Result Date: 7/7/2022  Type: routine remote pacemaker transmission. Presenting rhythm: normal sinus and AP-VS rate 87 bpm. Frequent PACs and PVCs. Battery/lead status: stable Arrhythmias: since [employer] Consult done 6/23/22, two new AT/AF, both <1 minute. No ventricular high rate episodes detected. Anticoagulant: apixaban Comments: normal magnet and pacemaker function.  TIM Black, Device Specialist I have reviewed and interpreted the device interrogation, settings, programming, and encounter summary. The device is functioning within normal device parameters. I agree with the current findings, assessment and plan.    CTA Neck with Contrast    Result Date: 7/9/2022  EXAM: CTA NECK WITH CONTRAST LOCATION: [facility] DATE/TIME: 7/9/2022 2:50 PM INDICATION: C2 fracture, pain recent fall. COMPARISON: Head CT from today. CT facial bones from today. CT cervical spine from today. CONTRAST: Isovue 370 75 ml. TECHNIQUE: Neck CT angiogram with IV contrast. Axial helical CT images of the neck vessels were obtained during the arterial phase of intravenous contrast administration. Axial helical 2D reconstructed images and multiplanar 3D MIP reconstructed images of the neck vessels were performed by the technologist. Dose reduction techniques were used. All stenosis measurements made according to NASCET criteria unless otherwise specified. FINDINGS: Intracranial segments demonstrate moderate diffuse middle cerebral stenoses bilaterally. RIGHT CAROTID: Atherosclerotic plaque results in 50-70% stenosis in the right ICA. No dissection. LEFT CAROTID:  Atherosclerotic plaque results in less than 50% stenosis in the left ICA. No dissection. VERTEBRAL ARTERIES: No focal stenosis or dissection. Balanced vertebral arteries. No evidence of vertebral occlusion or dissection related to the nondisplaced left C2 fracture. There are moderate bilateral focal stenoses present in the fourth segment distal vertebral arteries. Basilar artery is unremarkable. Moderate stenoses noted diffusely in the posterior cerebral arteries. AORTIC ARCH: No significant stenosis at the great vessel origins. The left vertebral artery originates from the arch, normal variant. NONVASCULAR STRUCTURES: Small right apical pneumothorax. This was noted on the CT cervical spine from today. The known nondisplaced left lateral mass fracture at C2 is less well-visualized given differences in technique. There is fracture noted of the right nasal bone, age-indeterminate. Correlate clinically.     IMPRESSION: 1.  No evidence of vertebral occlusion or dissection from the nondisplaced left lateral mass fracture C2 level. No hemodynamically significant extracranial stenosis and no evidence of dissection. 2.  Numerous intracranial stenoses throughout the anterior and posterior circulation. 3.  Small right apical pneumothorax. Findings phoned to referring provider at 7/9/2022 4:00 PM.     Cervical spine CT w/o contrast    Result Date: 7/9/2022  EXAM: CT FACIAL BONES WITHOUT CONTRAST, CT CERVICAL SPINE W/O CONTRAST LOCATION: [facility] DATE/TIME: 7/9/2022 1:01 PM INDICATION: trauma, injury. COMPARISON: Head CT 06/21/2022 TECHNIQUE: 1) Routine CT Facial Bones without IV contrast. Multiplanar reformats. Dose reduction techniques were used. 2) Routine CT Cervical Spine without IV contrast. Multiplanar reformats. Dose reduction techniques were used. FINDINGS: FACIAL BONE CT: OSSEOUS STRUCTURES/SOFT TISSUES: Small soft tissue swelling over the nose. Mildly displaced nasal bone fractures.  Nondisplaced fracture of the anterior nasal septum. The walls of the orbits are intact. No zygomatic arch fractures. The walls of the maxillary sinuses are intact. No zygomatic arch fractures. The pterygoid plates are intact. No mandibular fractures. The temporomandibular joints are intact. Mild degenerative changes of the temporomandibular joints. Poor dentition. Periapical lucencies involving teeth #12, and 26. ORBITAL CONTENTS: Prior bilateral cataract surgery. Visualized portions of the orbits are otherwise unremarkable. SINUSES: Moderate opacification of the ethmoid air cells. CERVICAL SPINE CT: VERTEBRA: 2 mm retrolisthesis of C4 on C5. 1 mm spondylolisthesis of C7 on T1. 2 mm spondylolisthesis of T1 on T2. Age indeterminate 40-50% compression fracture of T1. 21 degrees of segmental kyphosis from C7 to T2. The rest of the vertebral body heights  are maintained. Nondisplaced fracture involving the left lateral mass of C2 with probable extension into the left C2 transverse foramen. Craniocervical junction is within normal limits. No prevertebral soft tissue edema. CANAL/FORAMINA: Moderate intervertebral disc height loss C4-C5 to C6-C7. No high-grade spinal canal narrowing. Moderate left neuroforaminal narrowing at C3-C4. Moderate right neuroforaminal narrowing at C4-C5. Mild bilateral neuroforaminal narrowing at C5-C6. PARASPINAL: Mild symmetric atrophy of the posterior paraspinal musculature. Multinodular thyroid gland. Moderate atherosclerotic calcification of the carotid bulbs. Small right apical pneumothorax.     IMPRESSION: FACIAL BONE CT: 1.  Small soft tissue swelling over the nose. 2.  Mildly displaced nasal bone fractures. Nondisplaced fracture of the anterior nasal septum. 3.  Poor dentition. Periapical lucencies involving teeth #12, and 26, suspicious for odontogenic disease. CERVICAL SPINE CT: 1.  Nondisplaced fracture involving the left lateral mass of C2 with probable extension into the left C2  transverse foramen. Recommend obtaining CT angiogram to exclude vascular injury. 2.  Age indeterminate 40-50% compression fracture of T1. 21 degrees of segmental kyphosis from C7 to T2. 3.  Small right apical pneumothorax. Discussed the findings with Dr. Burroughs at 1:52 PM, 07/09/2022.    Chest CT w/o contrast    Result Date: 7/9/2022  EXAM: CT CHEST W/O CONTRAST LOCATION: [facility] DATE/TIME: 7/9/2022 3:01 PM INDICATION: Fall.  Pneumothorax seen on CT neck COMPARISON: None. TECHNIQUE: CT chest without IV contrast. Multiplanar reformats were obtained. Dose reduction techniques were used. CONTRAST: None. FINDINGS: LUNGS AND PLEURA: Tiny right apical pneumothorax as seen on the prior C-spine CT. Patchy groundglass and tree-in-bud opacities within both lower lobes and the lingula. Trace bilateral pleural fluid. MEDIASTINUM/AXILLAE: Coarse calcified nodules. No aortic aneurysm. Left-sided pacemaker. CORONARY ARTERY CALCIFICATION: Previous intervention (stents or CABG). UPPER ABDOMEN: Calcified gallstones and heavily calcified distal vertebral arteries. MUSCULOSKELETAL: Osteopenia and degenerative disease in the spine. Median sternotomy wires. Healed bilateral rib fractures. Mildly displaced acute right fourth-sixth rib fractures posteriorly     IMPRESSION: 1.  Tiny right apical pneumothorax, as seen on C-spine CT. 2.  Multifocal groundglass and tree-in-bud opacities in both lungs, likely infection and/or aspiration. Trace bilateral pleural effusions. 3.  Mildly displaced right fourth-sixth rib fractures.     Head CT w/o contrast    Result Date: 7/9/2022  EXAM: CT HEAD W/O CONTRAST LOCATION: [facility] DATE/TIME: 7/9/2022 1:00 PM INDICATION: trauma anticoagulated COMPARISON: None. TECHNIQUE: Routine CT Head without IV contrast. Multiplanar reformats. Dose reduction techniques were used. FINDINGS: INTRACRANIAL CONTENTS: No evidence of acute intracranial hemorrhage or  mass effect. Scattered foci of decreased attenuation within the cerebral hemispheric white matter which are nonspecific, though most commonly ascribed to chronic small vessel ischemic  disease. 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 Scattered foci of decreased attenuation within the cerebral hemispheric white matter which are nonspecific, though most commonly ascribed to chronic small vessel ischemic disease. The basilar cisterns are patent. VISUALIZED ORBITS/SINUSES/MASTOIDS: Lateral cataract surgery. The partially imaged globes are otherwise unremarkable. The partially imaged paranasal sinuses, mastoid air cells and middle ear cavities are unremarkable. BONES/SOFT TISSUES: The visualized skull base and calvarium are unremarkable.     IMPRESSION:  1.  No evidence of acute intracranial hemorrhage or mass effect. 2.  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 3.  Moderate nonspecific white matter changes. 4.  Moderate brain parenchymal volume loss.    CT Head w/o Contrast    Result Date: 6/21/2022  EXAM: CT HEAD W/O CONTRAST LOCATION: [facility] DATE/TIME: 6/21/2022 8:17 PM INDICATION: Syncope, simple, normal neuro exam. COMPARISON: Head CT 3/1/2022 TECHNIQUE: Routine CT Head without IV contrast. Multiplanar reformats. Dose reduction techniques were used. FINDINGS: INTRACRANIAL CONTENTS: No intracranial hemorrhage, extraaxial collection, or mass effect.  No CT evidence of acute infarct. Severe presumed chronic small vessel ischemic changes. Multiple old periventricular infarcts. Left cerebellar infarcts. Moderate generalized volume loss. No hydrocephalus. VISUALIZED ORBITS/SINUSES/MASTOIDS: Prior bilateral cataract surgery. Visualized portions of the orbits are otherwise unremarkable. No paranasal sinus mucosal disease. No middle ear or mastoid effusion. BONES/SOFT TISSUES: No acute abnormality.     IMPRESSION: 1.  No CT evidence of acute intracranial abnormality. 2.   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    Lumbar spine CT w/o contrast    Result Date: 7/9/2022  EXAM: CT LUMBAR SPINE W/O CONTRAST LOCATION: [facility] DATE/TIME: 7/9/2022 3:01 PM INDICATION: Trauma, pain. COMPARISON: CT thoracic spine from today. TECHNIQUE: Routine CT Lumbar Spine without IV contrast. Multiplanar reformats. Dose reduction techniques were used. FINDINGS: VERTEBRA: 5 lumbar type vertebral bodies. T12 fracture with fracture through the anterior bridging osteophyte T11-T12 level, detailed in dedicated CT thoracic spine. There is moderate to moderately severe compression fracture deformity of L1 with loss of  vertebral body height 50-60%. 1 mm osseous retropulsion. While this is age-indeterminate, it may be chronic given the degree of anterior marginal osteophytes bridging at T12-L1 level. Additionally, there is no evidence of significant paraspinous hematoma adjacent to the L1 vertebral body. There is mild kyphosis at the thoracolumbar junction. 2 mm retrolisthesis L2 on L3 with otherwise anatomic sagittal and coronal alignment. Other lumbar vertebral bodies are preserved in height. There is a displaced fragment anterior to the inferior endplate of L2, but this demonstrates well-defined, sclerotic margins and is likely chronic. No other convincing evidence of acute lumbar fracture. There is early osseous bridging across the anterior aspects of  the sacroiliac joints. CANAL/FORAMINA: No high-grade central canal or foraminal stenosis. Mild canal stenosis L4-L5. PARASPINAL: Extensive arterial vascular calcification. Small bilateral pleural effusions. Colonic diverticulosis. Distended urinary bladder incompletely visualized.     IMPRESSION: 1.  T12 fracture detailed on dedicated CT thoracic spine. 2.  Moderate to moderately severe compression fracture deformity of L1. While age-indeterminate, it may be chronic given the degree of bulky anterior marginal osteophytes bridging at T12-L1 level and  the lack of significant paraspinous hematoma adjacent to the L1 vertebral body. 3.  Chronic appearing displaced fragment anterior to the inferior endplate of L2. 4.  No convincing evidence of acute lumbar fracture or posttraumatic subluxation.    CT Facial Bones without Contrast    Result Date: 7/9/2022  EXAM: CT FACIAL BONES WITHOUT CONTRAST, CT CERVICAL SPINE W/O CONTRAST LOCATION: [facility] DATE/TIME: 7/9/2022 1:01 PM INDICATION: trauma, injury. COMPARISON: Head CT 06/21/2022 TECHNIQUE: 1) Routine CT Facial Bones without IV contrast. Multiplanar reformats. Dose reduction techniques were used. 2) Routine CT Cervical Spine without IV contrast. Multiplanar reformats. Dose reduction techniques were used. FINDINGS: FACIAL BONE CT: OSSEOUS STRUCTURES/SOFT TISSUES: Small soft tissue swelling over the nose. Mildly displaced nasal bone fractures. Nondisplaced fracture of the anterior nasal septum. The walls of the orbits are intact. No zygomatic arch fractures. The walls of the maxillary sinuses are intact. No zygomatic arch fractures. The pterygoid plates are intact. No mandibular fractures. The temporomandibular joints are intact. Mild degenerative changes of the temporomandibular joints. Poor dentition. Periapical lucencies involving teeth #12, and 26. ORBITAL CONTENTS: Prior bilateral cataract surgery. Visualized portions of the orbits are otherwise unremarkable. SINUSES: Moderate opacification of the ethmoid air cells. CERVICAL SPINE CT: VERTEBRA: 2 mm retrolisthesis of C4 on C5. 1 mm spondylolisthesis of C7 on T1. 2 mm spondylolisthesis of T1 on T2. Age indeterminate 40-50% compression fracture of T1. 21 degrees of segmental kyphosis from C7 to T2. The rest of the vertebral body heights  are maintained. Nondisplaced fracture involving the left lateral mass of C2 with probable extension into the left C2 transverse foramen. Craniocervical junction is within normal limits. No prevertebral soft  tissue edema. CANAL/FORAMINA: Moderate intervertebral disc height loss C4-C5 to C6-C7. No high-grade spinal canal narrowing. Moderate left neuroforaminal narrowing at C3-C4. Moderate right neuroforaminal narrowing at C4-C5. Mild bilateral neuroforaminal narrowing at C5-C6. PARASPINAL: Mild symmetric atrophy of the posterior paraspinal musculature. Multinodular thyroid gland. Moderate atherosclerotic calcification of the carotid bulbs. Small right apical pneumothorax.     IMPRESSION: FACIAL BONE CT: 1.  Small soft tissue swelling over the nose. 2.  Mildly displaced nasal bone fractures. Nondisplaced fracture of the anterior nasal septum. 3.  Poor dentition. Periapical lucencies involving teeth #12, and 26, suspicious for odontogenic disease. CERVICAL SPINE CT: 1.  Nondisplaced fracture involving the left lateral mass of C2 with probable extension into the left C2 transverse foramen. Recommend obtaining CT angiogram to exclude vascular injury. 2.  Age indeterminate 40-50% compression fracture of T1. 21 degrees of segmental kyphosis from C7 to T2. 3.  Small right apical pneumothorax. Discussed the findings with Dr. Burroughs at 1:52 PM, 07/09/2022.    CT Thoracic Spine w/o Contrast    Result Date: 7/9/2022  EXAM: CT THORACIC SPINE W/O CONTRAST LOCATION: [facility] DATE/TIME: 7/9/2022 3:01 PM INDICATION: Trauma, pain. COMPARISON: CT cervical spine and CT lumbar spine from today. TECHNIQUE: Routine CT Thoracic Spine without IV contrast. Multiplanar reformats. Dose reduction techniques were used. FINDINGS:  topogram demonstrates multilead left subclavian pacer and median sternotomy wires. VERTEBRA: 12 rib-bearing thoracic vertebral segments with 5 lumbar type vertebral bodies. As discussed on CT cervical spine, there is age-indeterminate 40-50% compression fracture of T1 with associated 21 degrees segmental kyphosis C7-T2. Additionally, there is evidence of acute fracture through a bridging  anterior marginal osteophyte T11-T12 level with fracture extending through the anterosuperior cortex of T12 and fracture lucency paralleling the superior endplate of T12, involving the anterior column and middle column T12 level. No definite extension into the pedicles or posterior elements at the T11 or T12 level. There is slight widening of the posterior facets T11-T12, relative to the adjacent posterior facets suggesting involvement of the posterior column. Mild associated amorphous anterior paraspinous hematoma. No convincing evidence of epidural hematoma, to limits of CT. Sagittal alignment is maintained. There are thin anterior syndesmophytes throughout the majority of the thoracic spine. Other thoracic vertebral bodies are preserved in height. There is lucency present through the anterior bridging osteophyte T5-T6 level, although this demonstrates sclerotic margins and is likely chronic. No other evidence of acute thoracic fracture. Numerous old appearing left posterior rib fractures. There are acute appearing displaced right-sided rib fractures involving right rib 4, 5, 6. CANAL/FORAMINA: No significant central canal stenosis. There is moderate bilateral foraminal narrowing T11-T12 level. PARASPINAL: Mild paraspinous hematoma anterior to T11-T12. Extensive vascular calcification. There is extensive infiltrate involving the posterior aspects of the left lung that could reflect contusion and/or pneumonitis. Small bilateral pleural effusions.     IMPRESSION: 1.  Unstable fracture pattern involving T11-T12 level. Fracture is present through a bridging anterior marginal osteophyte T11-T12 level with fracture paralleling the superior endplate of T12. There is widening of the T11-T12 posterior facets bilaterally. This suggests involvement of the posterior column as well. Mild amorphous anterior paraspinous hematoma. 2.  Age-indeterminate compression fracture of T1, as described on the cervical spine from today. 3.   Not mentioned above is compression deformity of L1. See dedicated CT lumbar spine. 4.  Small right apical pneumothorax. Findings phoned to Dr. Sarwat Isidro at 7/9/2022 3:59 PM 5.  Contusion or pneumonitis involving the posterior left lung.       Latest radiology report personally reviewed.    Note created using dragon voice recognition software so sounds alike errors may have escaped editing.      07/13/2022   Joe Frye MD  Hospitalist, [facility]  Pager: [phone]

## 2022-07-13 NOTE — IR NOTE
Patient's device placed into MRI Surescan mode prior to scan.  Patient monitored by RN via EKG and pulseoximetry throughout procedure.  Patient stable throughout.  O2 increased to 5L during scan.  Able to titrate to 2L with repositioning post MRI.  Device placed back into previous mode at completion of MRI.

## 2022-07-14 NOTE — PROGRESS NOTES
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 Acute unstable fracture with widening of anterior body and involvement of posterior elements. Seng states he would consider surgery but would like to think about it. Discussed alternative of attempting activity with brace and see how fracture responds. Attempted to reach arleen Herrera, left message to call back. Patient would require T10-L3 fusion to stabilize fracture. This is not without risk considering patient's age. Alternatively patient could trial being upright in brace and see how fracture responds. Patient at high risk for paralysis with this unstable fracture.     Team did discuss potential need for surgery with arleen Herrera yesterday.   \"We discussed that patient would be a difficult surgical candidate and that our only option may be bracing but question if patient is unable to tolerate this as well. He also would like second emergency contact notified of MRI results as she is a medical doctor. Javier reiterates that Seng is very sharp and should be included in discussion of his next steps of care.\"    Maintain strict bedrest til decision is made.     1400: Brace has arrived. OK to sit upright in bed with brace in place to allow for oral intake, speech eval. Spoke with arleen Alejandro as well as family friend Ron. Opinion now is to allow a day or two for everyone to converse and make final decision with Seng's input regarding proceeding with brace/activity vs fusion. Javon and Ron understand risk of activity without stabilization surgery. Both also understands risks of surgery. Both feel Seng is a youthful 89 year old who who continues to be fairly active at baseline. Would request hospital team to give opinion of surgical risk. Javon " "or Ron will let our team know final decision. Please call with questions.     Neurosurgery Attending: Discussed with Dr. Foster    S: Seng reports his back hurts. His legs hurt. No numbness or tingling. Asks if he can go to bed now. Feels constipated and wants to have a bowel movement.     O:  BP (!) 157/78 (BP Location: Right arm)   Pulse 93   Temp 98.3  F (36.8  C) (Axillary)   Resp 18   Ht 1.753 m (5' 9\")   Wt 68 kg (150 lb)   SpO2 97%   BMI 22.15 kg/m       General: Awake, alert, NAD. Appropriately answers my questions today but also asks if he can go to bed now. Mouth is viably dry.     Motor: normal bulk and tone for age     Strength: seems to have full strength in legs and arms.     Musculoskeletal: Negative straight leg raise bl    Skin: scattered bruising, including facial     Labs: personally reviewed     Imaging: personally reviewed MRI, CT cervical and thoracic.     MRI thoracic:                                                              IMPRESSION:  1.  Diastatic fracture is again seen extending to the T12 vertebral body with the degree of diastasis similar to prior. There is moderate prevertebral edema and/or contusion with edema extending from T9 to L2. No significant retropulsion at T11-T12.   Shallow disc herniation in conjunction with ventral displacement of the dorsal epidural fat contributes to at least mild spinal canal stenosis. No associated signal abnormality is seen within the cord at this level.     2.  There is STIR hyperintensity within the dorsal paraspinal soft tissues at the T11 and T12 levels compatible with muscular injury/edema. No abnormal collections identified. As noted on prior CT, T11-T12 facets appear slightly diastatic.    MRI Cervical   IMPRESSION:  1.  Acute C2 fracture is occult on MRI. Fracture line is not visualized. There is trace bone marrow edema in the expected location of the fracture in the left lateral mass of C2. There is slight prevertebral edema. " There is no epidural hematoma.   2.  Chronic wedge compression deformities of T1 and T3.  3.  Advanced degenerative changes as above.    SAMMY Romero  [facility] Neurosurgery  O: [phone]

## 2022-07-14 NOTE — PLAN OF CARE
"  Problem: Plan of Care - These are the overarching goals to be used throughout the patient stay.    Goal: Plan of Care Review/Shift Note  Description: The Plan of Care Review/Shift note should be completed every shift.  The Outcome Evaluation is a brief statement about your assessment that the patient is improving, declining, or no change.  This information will be displayed automatically on your shift note.  Outcome: Ongoing, Progressing  Goal: Patient-Specific Goal (Individualized)  Description: You can add care plan individualizations to a care plan. Examples of Individualization might be:  \"Parent requests to be called daily at 9am for status\", \"I have a hard time hearing out of my right ear\", or \"Do not touch me to wake me up as it startles me\".  Outcome: Ongoing, Progressing  Goal: Absence of Hospital-Acquired Illness or Injury  Outcome: Ongoing, Progressing  Intervention: Identify and Manage Fall Risk  Recent Flowsheet Documentation  Taken 7/14/2022 0405 by Belinda Yang, RN  Safety Promotion/Fall Prevention:   bed alarm on   fall prevention program maintained   lighting adjusted   room door open   patient and family education   room near nurse's station   room organization consistent   sitter at bedside   toileting scheduled   treat reversible contributory factors  Taken 7/14/2022 0000 by Belinda Yang, RN  Safety Promotion/Fall Prevention:   bed alarm on   fall prevention program maintained   lighting adjusted   room door open   patient and family education   room near nurse's station   room organization consistent   sitter at bedside   toileting scheduled   treat reversible contributory factors  Intervention: Prevent Skin Injury  Recent Flowsheet Documentation  Taken 7/14/2022 0117 by Belinda Yang, RN  Body Position:   turned   right   heels elevated  Taken 7/14/2022 0000 by Belinda Yang, RN  Body Position:   turned   left   heels elevated   legs elevated  Intervention: Prevent and " Manage VTE (Venous Thromboembolism) Risk  Recent Flowsheet Documentation  Taken 7/14/2022 0000 by Belinda Yang RN  Activity Management: bedrest  Goal: Optimal Comfort and Wellbeing  Outcome: Ongoing, Progressing  Intervention: Monitor Pain and Promote Comfort  Recent Flowsheet Documentation  Taken 7/14/2022 0117 by Belinda Yang, RN  Pain Management Interventions: medication (see MAR)  Taken 7/14/2022 0000 by Belinda Yang RN  Pain Management Interventions:   cold applied   emotional support   repositioned  Goal: Readiness for Transition of Care  Outcome: Ongoing, Progressing     Problem: Risk for Delirium  Goal: Optimal Coping  Outcome: Ongoing, Progressing  Goal: Improved Behavioral Control  Outcome: Ongoing, Progressing  Goal: Improved Attention and Thought Clarity  Outcome: Ongoing, Progressing  Goal: Improved Sleep  Outcome: Ongoing, Progressing     Problem: Bleeding (Orthopaedic Fracture)  Goal: Absence of Bleeding  Outcome: Ongoing, Progressing     Problem: Embolism (Orthopaedic Fracture)  Goal: Absence of Embolism Signs and Symptoms  Outcome: Ongoing, Progressing     Problem: Fracture Stabilization and Management (Orthopaedic Fracture)  Goal: Fracture Stability  Outcome: Ongoing, Progressing     Problem: Functional Ability Impaired (Orthopaedic Fracture)  Goal: Optimal Functional Ability  Outcome: Ongoing, Progressing  Intervention: Optimize Functional Ability  Recent Flowsheet Documentation  Taken 7/14/2022 0000 by Belinda Yang RN  Activity Management: bedrest  Activity Assistance Provided: assistance, 2 people  Positioning/Transfer Devices: pillows     Problem: Infection (Orthopaedic Fracture)  Goal: Absence of Infection Signs and Symptoms  Outcome: Ongoing, Progressing     Problem: Neurovascular Compromise (Orthopaedic Fracture)  Goal: Effective Tissue Perfusion  Outcome: Ongoing, Progressing     Problem: Pain (Orthopaedic Fracture)  Goal: Acceptable Pain Control  Outcome: Ongoing,  Progressing  Intervention: Manage Acute Orthopaedic-Related Pain  Recent Flowsheet Documentation  Taken 7/14/2022 0117 by Belinda Yang RN  Pain Management Interventions: medication (see MAR)  Taken 7/14/2022 0000 by Belinda Yang RN  Pain Management Interventions:   cold applied   emotional support   repositioned     Problem: Respiratory Compromise (Orthopaedic Fracture)  Goal: Effective Oxygenation and Ventilation  Outcome: Ongoing, Progressing     Problem: Restraint, Nonbehavioral (Nonviolent)  Goal: Absence of Harm or Injury  Outcome: Ongoing, Progressing  Intervention: Protect Skin and Joint Integrity  Recent Flowsheet Documentation  Taken 7/14/2022 0117 by Belinda Yang RN  Body Position:   turned   right   heels elevated  Taken 7/14/2022 0000 by Belinda Yang RN  Body Position:   turned   left   heels elevated   legs elevated     Problem: Restraint, Behavioral (Acute Care)  Goal: Absence of Harm or Injury  Outcome: Ongoing, Progressing  Intervention: Protect Skin and Joint Integrity  Recent Flowsheet Documentation  Taken 7/14/2022 0117 by Belinda Yang RN  Body Position:   turned   right   heels elevated  Taken 7/14/2022 0000 by Belinda Yang RN  Body Position:   turned   left   heels elevated   legs elevated     Problem: Violence Risk or Actual  Goal: Anger and Impulse Control  Outcome: Ongoing, Progressing     Problem: Suicide Risk  Goal: Absence of Self-Harm  Outcome: Ongoing, Progressing   Goal Outcome Evaluation:        Pt alert/ confused with fluctuating mentation, at times knowing he had fallen down the stairs and has fractures in his back and neck. At other times pt attempts to pull at iv, nasal cannula, and [place] J collar. Prn tylenol given for pain. 1:1 in place. Will monitor.

## 2022-07-14 NOTE — PROGRESS NOTES
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 were used. FINDINGS: VERTEBRA: 5 lumbar type vertebral bodies. T12 fracture with fracture through the anterior bridging osteophyte T11-T12 level, detailed in dedicated CT thoracic spine. There is moderate to moderately severe compression fracture deformity of L1 with loss of  vertebral body height 50-60%. 1 mm osseous retropulsion. While this is age-indeterminate, it may be chronic given the degree of anterior marginal osteophytes bridging at T12-L1 level. Additionally, there is no evidence of significant paraspinous hematoma adjacent to the L1 vertebral body. There is mild kyphosis at the thoracolumbar junction. 2 mm retrolisthesis L2 on L3 with otherwise anatomic sagittal and coronal alignment. Other lumbar vertebral bodies are preserved in height. There is a displaced fragment anterior to the inferior endplate of L2, but this demonstrates well-defined, sclerotic margins and is likely chronic. No other convincing evidence of acute lumbar  fracture. There is early osseous bridging across the anterior aspects of  the sacroiliac joints. CANAL/FORAMINA: No high-grade central canal or foraminal stenosis. Mild canal stenosis L4-L5. PARASPINAL: Extensive arterial vascular calcification. Small bilateral pleural effusions. Colonic diverticulosis. Distended urinary bladder incompletely visualized.     IMPRESSION: 1.  T12 fracture detailed on dedicated CT thoracic spine. 2.  Moderate to moderately severe compression fracture deformity of L1. While age-indeterminate, it may be chronic given the degree of bulky anterior marginal osteophytes bridging at T12-L1 level and the lack of significant paraspinous hematoma adjacent to the L1 vertebral body. 3.  Chronic appearing displaced fragment anterior to the inferior endplate of L2. 4.  No convincing evidence of acute lumbar fracture or posttraumatic subluxation.    CT Facial Bones without Contrast    Result Date: 7/9/2022  EXAM: CT FACIAL BONES WITHOUT CONTRAST, CT CERVICAL SPINE W/O CONTRAST LOCATION: [facility] DATE/TIME: 7/9/2022 1:01 PM INDICATION: trauma, injury. COMPARISON: Head CT 06/21/2022 TECHNIQUE: 1) Routine CT Facial Bones without IV contrast. Multiplanar reformats. Dose reduction techniques were used. 2) Routine CT Cervical Spine without IV contrast. Multiplanar reformats. Dose reduction techniques were used. FINDINGS: FACIAL BONE CT: OSSEOUS STRUCTURES/SOFT TISSUES: Small soft tissue swelling over the nose. Mildly displaced nasal bone fractures. Nondisplaced fracture of the anterior nasal septum. The walls of the orbits are intact. No zygomatic arch fractures. The walls of the maxillary sinuses are intact. No zygomatic arch fractures. The pterygoid plates are intact. No mandibular fractures. The temporomandibular joints are intact. Mild degenerative changes of the temporomandibular joints. Poor dentition. Periapical lucencies involving teeth #12, and 26. ORBITAL CONTENTS: Prior  bilateral cataract surgery. Visualized portions of the orbits are otherwise unremarkable. SINUSES: Moderate opacification of the ethmoid air cells. CERVICAL SPINE CT: VERTEBRA: 2 mm retrolisthesis of C4 on C5. 1 mm spondylolisthesis of C7 on T1. 2 mm spondylolisthesis of T1 on T2. Age indeterminate 40-50% compression fracture of T1. 21 degrees of segmental kyphosis from C7 to T2. The rest of the vertebral body heights  are maintained. Nondisplaced fracture involving the left lateral mass of C2 with probable extension into the left C2 transverse foramen. Craniocervical junction is within normal limits. No prevertebral soft tissue edema. CANAL/FORAMINA: Moderate intervertebral disc height loss C4-C5 to C6-C7. No high-grade spinal canal narrowing. Moderate left neuroforaminal narrowing at C3-C4. Moderate right neuroforaminal narrowing at C4-C5. Mild bilateral neuroforaminal narrowing at C5-C6. PARASPINAL: Mild symmetric atrophy of the posterior paraspinal musculature. Multinodular thyroid gland. Moderate atherosclerotic calcification of the carotid bulbs. Small right apical pneumothorax.     IMPRESSION: FACIAL BONE CT: 1.  Small soft tissue swelling over the nose. 2.  Mildly displaced nasal bone fractures. Nondisplaced fracture of the anterior nasal septum. 3.  Poor dentition. Periapical lucencies involving teeth #12, and 26, suspicious for odontogenic disease. CERVICAL SPINE CT: 1.  Nondisplaced fracture involving the left lateral mass of C2 with probable extension into the left C2 transverse foramen. Recommend obtaining CT angiogram to exclude vascular injury. 2.  Age indeterminate 40-50% compression fracture of T1. 21 degrees of segmental kyphosis from C7 to T2. 3.  Small right apical pneumothorax. Discussed the findings with Dr. Burroughs at 1:52 PM, 07/09/2022.    CT Thoracic Spine w/o Contrast    Result Date: 7/9/2022  EXAM: CT THORACIC SPINE W/O CONTRAST LOCATION: [facility] DATE/TIME:  7/9/2022 3:01 PM INDICATION: Trauma, pain. COMPARISON: CT cervical spine and CT lumbar spine from today. TECHNIQUE: Routine CT Thoracic Spine without IV contrast. Multiplanar reformats. Dose reduction techniques were used. FINDINGS:  topogram demonstrates multilead left subclavian pacer and median sternotomy wires. VERTEBRA: 12 rib-bearing thoracic vertebral segments with 5 lumbar type vertebral bodies. As discussed on CT cervical spine, there is age-indeterminate 40-50% compression fracture of T1 with associated 21 degrees segmental kyphosis C7-T2. Additionally, there is evidence of acute fracture through a bridging anterior marginal osteophyte T11-T12 level with fracture extending through the anterosuperior cortex of T12 and fracture lucency paralleling the superior endplate of T12, involving the anterior column and middle column T12 level. No definite extension into the pedicles or posterior elements at the T11 or T12 level. There is slight widening of the posterior facets T11-T12, relative to the adjacent posterior facets suggesting involvement of the posterior column. Mild associated amorphous anterior paraspinous hematoma. No convincing evidence of epidural hematoma, to limits of CT. Sagittal alignment is maintained. There are thin anterior syndesmophytes throughout the majority of the thoracic spine. Other thoracic vertebral bodies are preserved in height. There is lucency present through the anterior bridging osteophyte T5-T6 level, although this demonstrates sclerotic margins and is likely chronic. No other evidence of acute thoracic fracture. Numerous old appearing left posterior rib fractures. There are acute appearing displaced right-sided rib fractures involving right rib 4, 5, 6. CANAL/FORAMINA: No significant central canal stenosis. There is moderate bilateral foraminal narrowing T11-T12 level. PARASPINAL: Mild paraspinous hematoma anterior to T11-T12. Extensive vascular calcification. There is  extensive infiltrate involving the posterior aspects of the left lung that could reflect contusion and/or pneumonitis. Small bilateral pleural effusions.     IMPRESSION: 1.  Unstable fracture pattern involving T11-T12 level. Fracture is present through a bridging anterior marginal osteophyte T11-T12 level with fracture paralleling the superior endplate of T12. There is widening of the T11-T12 posterior facets bilaterally. This suggests involvement of the posterior column as well. Mild amorphous anterior paraspinous hematoma. 2.  Age-indeterminate compression fracture of T1, as described on the cervical spine from today. 3.  Not mentioned above is compression deformity of L1. See dedicated CT lumbar spine. 4.  Small right apical pneumothorax. Findings phoned to Dr. Sarwat Isidro at 7/9/2022 3:59 PM 5.  Contusion or pneumonitis involving the posterior left lung.       Latest radiology report personally reviewed.    Note created using dragon voice recognition software so sounds alike errors may have escaped editing.      07/14/2022   Joe Frye MD  Hospitalist, [facility]  Pager: [phone]

## 2022-07-14 NOTE — PROGRESS NOTES
07/14/22 1310   Signing Clinician's Name / Credentials   Signing clinician's name / credentials Christophe Aguiar SLP   Quick [employer]ds   Rehab Discipline SLP   SLP - Dysphagia/Swallow   Minutes of Treatment 20 minutes   Treatment Detail Patient was fitted with TLSO today, neurosurgery gave okay for patient to sit upright with brace on for reassessment of swallow. Patient also has [place] J collar in place. He is awake, confused about some things but able to answer simple questions and follow directions. Upon arrival vocal quality is clear but he his continually having unproductive coughing, trying to bring up wet sounding mucous. Patient given education on my role and purpose for the visit. Discussed raising the head of the bed in small increment into an upright position. He was agreeable. He was only able to tolerate head of bed at 35 degrees. When raised higher to 40 and 45 degrees he began yelling out, saying it was too painful to his back, continually asking me to put it back down. Pain did not subside so head of bed was placed at 35 degrees. Patient was given moderately thick water in 1/2 tsp amounts. limited bolus control and manipulation observed, unable to fully appreciate when swallows were triggered due to collar. Patient given 3 half tsp trials of pudding with similar results. With both textures patient demonstrated immediate and delayed uproductive cough, some mild wet vocal quality. I provided the oral suction along right then left tongue base and encouraged coughing. Minimal amounts suctioned. When patient given the [employer]auer suction and instructed how to use it he was better able to place it far back to tongue base at a point he was able to tolerate it and suction additional amounts of secretions and what also appeared to be vanilla pudding. Patient was unable to tolerate upright positioning beyond 35 degrees without great pain, he appears at high risk of aspiration with intake greater than a bite or two,  and demonstrates poor cough to expel secretions and possible aspirated  material. He continues on 2L NC oxygen. Rec NPO, palliative consult on goals of care, options for nutrition.   SLP Discharge Planning   SLP Brief overview of current status  High risk of aspiration, unable to tolerate upright positioning even with TLSO today, unproductive weak cough.   Additional Documentation   SLP Plan check goals of care, retry PO if able to tolerate being upright greater than 35 degrees wearing TLSO without significant pain.   Total Session Time   Total Session Time (minutes) 20 minutes

## 2022-07-14 NOTE — PLAN OF CARE
Problem: Pain (Orthopaedic Fracture)  Goal: Acceptable Pain Control  Outcome: Ongoing, Progressing  Intervention: Manage Acute Orthopaedic-Related Pain  Recent Flowsheet Documentation  Taken 7/14/2022 1038 by Karolyn Pena RN  Pain Management Interventions: medication (see MAR)   Goal Outcome Evaluation:      Pain being managed with acetaminophen and positioning. Patient alert and oriented X3 and answers questions appropriately. Patient is upset regarding NPO status. Education provided regarding concerns for aspiration and speech therapy involvement. Patient verbalizes understanding. Fitted for TLSO brace this shift and okayed from neurosurgery to have head above 30 degrees with brace on. Patient does have increase pain with increase of head of bed.

## 2022-07-15 NOTE — CONSULTS
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 kyphosis from C7 to T2. 3.  Small right apical pneumothorax. Discussed the findings with Dr. Burroughs at 1:52 PM, 07/09/2022.    Chest CT w/o contrast    Result Date: 7/9/2022  EXAM: CT CHEST W/O CONTRAST LOCATION: [facility] DATE/TIME: 7/9/2022 3:01 PM INDICATION: Fall.  Pneumothorax seen on CT neck COMPARISON: None. TECHNIQUE: CT chest without IV contrast. Multiplanar reformats were obtained. Dose reduction techniques were used. CONTRAST: None. FINDINGS: LUNGS AND PLEURA: Tiny right apical pneumothorax as seen on the prior C-spine CT. Patchy groundglass and tree-in-bud opacities within both lower lobes and the lingula. Trace bilateral pleural fluid. MEDIASTINUM/AXILLAE: Coarse calcified nodules. No aortic aneurysm. Left-sided pacemaker. CORONARY ARTERY CALCIFICATION: Previous intervention (stents or CABG). UPPER ABDOMEN: Calcified gallstones and heavily calcified distal vertebral arteries. MUSCULOSKELETAL: Osteopenia and degenerative disease in the spine. Median sternotomy  wires. Healed bilateral rib fractures. Mildly displaced acute right fourth-sixth rib fractures posteriorly     IMPRESSION: 1.  Tiny right apical pneumothorax, as seen on C-spine CT. 2.  Multifocal groundglass and tree-in-bud opacities in both lungs, likely infection and/or aspiration. Trace bilateral pleural effusions. 3.  Mildly displaced right fourth-sixth rib fractures.     Head CT w/o contrast    Result Date: 7/9/2022  EXAM: CT HEAD W/O CONTRAST LOCATION: [facility] [place] DATE/TIME: 7/9/2022 1:00 PM INDICATION: trauma anticoagulated COMPARISON: None. TECHNIQUE: Routine CT Head without IV contrast. Multiplanar reformats. Dose reduction techniques were used. FINDINGS: INTRACRANIAL CONTENTS: No evidence of acute intracranial hemorrhage or mass effect. Scattered foci of decreased attenuation within the cerebral hemispheric white matter which are nonspecific, though most commonly ascribed to chronic small vessel ischemic  disease. 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 Scattered foci of decreased attenuation within the cerebral hemispheric white matter which are nonspecific, though most commonly ascribed to chronic small vessel ischemic disease. The basilar cisterns are patent. VISUALIZED ORBITS/SINUSES/MASTOIDS: Lateral cataract surgery. The partially imaged globes are otherwise unremarkable. The partially imaged paranasal sinuses, mastoid air cells and middle ear cavities are unremarkable. BONES/SOFT TISSUES: The visualized skull base and calvarium are unremarkable.     IMPRESSION:  1.  No evidence of acute intracranial hemorrhage or mass effect. 2.  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 3.  Moderate nonspecific white matter changes. 4.  Moderate brain parenchymal volume loss.    CT Head w/o Contrast    Result Date: 6/21/2022  EXAM: CT HEAD W/O CONTRAST LOCATION: [facility] [place] DATE/TIME: 6/21/2022 8:17 PM INDICATION: Syncope, simple, normal neuro  exam. COMPARISON: Head CT 3/1/2022 TECHNIQUE: Routine CT Head without IV contrast. Multiplanar reformats. Dose reduction techniques were used. FINDINGS: INTRACRANIAL CONTENTS: No intracranial hemorrhage, extraaxial collection, or mass effect.  No CT evidence of acute infarct. Severe presumed chronic small vessel ischemic changes. Multiple old periventricular infarcts. Left cerebellar infarcts. Moderate generalized volume loss. No hydrocephalus. VISUALIZED ORBITS/SINUSES/MASTOIDS: Prior bilateral cataract surgery. Visualized portions of the orbits are otherwise unremarkable. No paranasal sinus mucosal disease. No middle ear or mastoid effusion. BONES/SOFT TISSUES: No acute abnormality.     IMPRESSION: 1.  No CT evidence of acute intracranial abnormality. 2.  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    Lumbar spine CT w/o contrast    Result Date: 7/9/2022  EXAM: CT LUMBAR SPINE W/O CONTRAST LOCATION: [facility] DATE/TIME: 7/9/2022 3:01 PM INDICATION: Trauma, pain. COMPARISON: CT thoracic spine from today. TECHNIQUE: Routine CT Lumbar Spine without IV contrast. Multiplanar reformats. Dose reduction techniques were used. FINDINGS: VERTEBRA: 5 lumbar type vertebral bodies. T12 fracture with fracture through the anterior bridging osteophyte T11-T12 level, detailed in dedicated CT thoracic spine. There is moderate to moderately severe compression fracture deformity of L1 with loss of  vertebral body height 50-60%. 1 mm osseous retropulsion. While this is age-indeterminate, it may be chronic given the degree of anterior marginal osteophytes bridging at T12-L1 level. Additionally, there is no evidence of significant paraspinous hematoma adjacent to the L1 vertebral body. There is mild kyphosis at the thoracolumbar junction. 2 mm retrolisthesis L2 on L3 with otherwise anatomic sagittal and coronal alignment. Other lumbar vertebral bodies are preserved in height. There is a displaced fragment anterior to the inferior  endplate of L2, but this demonstrates well-defined, sclerotic margins and is likely chronic. No other convincing evidence of acute lumbar fracture. There is early osseous bridging across the anterior aspects of  the sacroiliac joints. CANAL/FORAMINA: No high-grade central canal or foraminal stenosis. Mild canal stenosis L4-L5. PARASPINAL: Extensive arterial vascular calcification. Small bilateral pleural effusions. Colonic diverticulosis. Distended urinary bladder incompletely visualized.     IMPRESSION: 1.  T12 fracture detailed on dedicated CT thoracic spine. 2.  Moderate to moderately severe compression fracture deformity of L1. While age-indeterminate, it may be chronic given the degree of bulky anterior marginal osteophytes bridging at T12-L1 level and the lack of significant paraspinous hematoma adjacent to the L1 vertebral body. 3.  Chronic appearing displaced fragment anterior to the inferior endplate of L2. 4.  No convincing evidence of acute lumbar fracture or posttraumatic subluxation.    CT Facial Bones without Contrast    Result Date: 7/9/2022  EXAM: CT FACIAL BONES WITHOUT CONTRAST, CT CERVICAL SPINE W/O CONTRAST LOCATION: [facility] DATE/TIME: 7/9/2022 1:01 PM INDICATION: trauma, injury. COMPARISON: Head CT 06/21/2022 TECHNIQUE: 1) Routine CT Facial Bones without IV contrast. Multiplanar reformats. Dose reduction techniques were used. 2) Routine CT Cervical Spine without IV contrast. Multiplanar reformats. Dose reduction techniques were used. FINDINGS: FACIAL BONE CT: OSSEOUS STRUCTURES/SOFT TISSUES: Small soft tissue swelling over the nose. Mildly displaced nasal bone fractures. Nondisplaced fracture of the anterior nasal septum. The walls of the orbits are intact. No zygomatic arch fractures. The walls of the maxillary sinuses are intact. No zygomatic arch fractures. The pterygoid plates are intact. No mandibular fractures. The temporomandibular joints are intact. Mild  degenerative changes of the temporomandibular joints. Poor dentition. Periapical lucencies involving teeth #12, and 26. ORBITAL CONTENTS: Prior bilateral cataract surgery. Visualized portions of the orbits are otherwise unremarkable. SINUSES: Moderate opacification of the ethmoid air cells. CERVICAL SPINE CT: VERTEBRA: 2 mm retrolisthesis of C4 on C5. 1 mm spondylolisthesis of C7 on T1. 2 mm spondylolisthesis of T1 on T2. Age indeterminate 40-50% compression fracture of T1. 21 degrees of segmental kyphosis from C7 to T2. The rest of the vertebral body heights  are maintained. Nondisplaced fracture involving the left lateral mass of C2 with probable extension into the left C2 transverse foramen. Craniocervical junction is within normal limits. No prevertebral soft tissue edema. CANAL/FORAMINA: Moderate intervertebral disc height loss C4-C5 to C6-C7. No high-grade spinal canal narrowing. Moderate left neuroforaminal narrowing at C3-C4. Moderate right neuroforaminal narrowing at C4-C5. Mild bilateral neuroforaminal narrowing at C5-C6. PARASPINAL: Mild symmetric atrophy of the posterior paraspinal musculature. Multinodular thyroid gland. Moderate atherosclerotic calcification of the carotid bulbs. Small right apical pneumothorax.     IMPRESSION: FACIAL BONE CT: 1.  Small soft tissue swelling over the nose. 2.  Mildly displaced nasal bone fractures. Nondisplaced fracture of the anterior nasal septum. 3.  Poor dentition. Periapical lucencies involving teeth #12, and 26, suspicious for odontogenic disease. CERVICAL SPINE CT: 1.  Nondisplaced fracture involving the left lateral mass of C2 with probable extension into the left C2 transverse foramen. Recommend obtaining CT angiogram to exclude vascular injury. 2.  Age indeterminate 40-50% compression fracture of T1. 21 degrees of segmental kyphosis from C7 to T2. 3.  Small right apical pneumothorax. Discussed the findings with Dr. Burroughs at 1:52 PM, 07/09/2022.    CT  Thoracic Spine w/o Contrast    Result Date: 7/9/2022  EXAM: CT THORACIC SPINE W/O CONTRAST LOCATION: [facility] DATE/TIME: 7/9/2022 3:01 PM INDICATION: Trauma, pain. COMPARISON: CT cervical spine and CT lumbar spine from today. TECHNIQUE: Routine CT Thoracic Spine without IV contrast. Multiplanar reformats. Dose reduction techniques were used. FINDINGS:  topogram demonstrates multilead left subclavian pacer and median sternotomy wires. VERTEBRA: 12 rib-bearing thoracic vertebral segments with 5 lumbar type vertebral bodies. As discussed on CT cervical spine, there is age-indeterminate 40-50% compression fracture of T1 with associated 21 degrees segmental kyphosis C7-T2. Additionally, there is evidence of acute fracture through a bridging anterior marginal osteophyte T11-T12 level with fracture extending through the anterosuperior cortex of T12 and fracture lucency paralleling the superior endplate of T12, involving the anterior column and middle column T12 level. No definite extension into the pedicles or posterior elements at the T11 or T12 level. There is slight widening of the posterior facets T11-T12, relative to the adjacent posterior facets suggesting involvement of the posterior column. Mild associated amorphous anterior paraspinous hematoma. No convincing evidence of epidural hematoma, to limits of CT. Sagittal alignment is maintained. There are thin anterior syndesmophytes throughout the majority of the thoracic spine. Other thoracic vertebral bodies are preserved in height. There is lucency present through the anterior bridging osteophyte T5-T6 level, although this demonstrates sclerotic margins and is likely chronic. No other evidence of acute thoracic fracture. Numerous old appearing left posterior rib fractures. There are acute appearing displaced right-sided rib fractures involving right rib 4, 5, 6. CANAL/FORAMINA: No significant central canal stenosis. There is moderate  bilateral foraminal narrowing T11-T12 level. PARASPINAL: Mild paraspinous hematoma anterior to T11-T12. Extensive vascular calcification. There is extensive infiltrate involving the posterior aspects of the left lung that could reflect contusion and/or pneumonitis. Small bilateral pleural effusions.     IMPRESSION: 1.  Unstable fracture pattern involving T11-T12 level. Fracture is present through a bridging anterior marginal osteophyte T11-T12 level with fracture paralleling the superior endplate of T12. There is widening of the T11-T12 posterior facets bilaterally. This suggests involvement of the posterior column as well. Mild amorphous anterior paraspinous hematoma. 2.  Age-indeterminate compression fracture of T1, as described on the cervical spine from today. 3.  Not mentioned above is compression deformity of L1. See dedicated CT lumbar spine. 4.  Small right apical pneumothorax. Findings phoned to Dr. Sarwat Isidro at 7/9/2022 3:59 PM 5.  Contusion or pneumonitis involving the posterior left lung.         Physical Exam:  Temp:  [97.4  F (36.3  C)-98.6  F (37  C)] 98.6  F (37  C)  Pulse:  [] 97  Resp:  [20-22] 20  BP: (123-147)/(58-73) 139/63  SpO2:  [93 %-98 %] 95 %  Wt Readings from Last 3 Encounters:   07/09/22 68 kg (150 lb)   06/25/22 70.3 kg (155 lb)   04/27/22 71.7 kg (158 lb)      Alert, restless, pulling at NC O2, sitter present  Head NC, AT  Eyes anicteric without injection  Face symmetric, eyes conjugate  Mouth grossly normal, no drainage, speech is low and difficult to understand  Neck C collar  Lungs unlabored, no respiratory distress  CV RR, tachy, no edema  Abd soft, ntnd, benign  Extrems no deformities, no edema, wiggles toes when asked  Skin warm, dry  MSK joints of hand normal; muscle bulk and tone in the bilat UE, LE proximally & distally  Neuro face symmetric, disoriented, no tremor  Neuropsych restless, anxious       TTS: I have personally spent a total of 81 minutes today reviewing  patient's medical record, consultation with the medical providers, and assessing patient and syptoms and providing emotional support with than 50% of this time spent in direct and indirect counseling and coordination of care re CODE STATUS discussion, prognosis discussion of options for ongoing care, family meeting note above          CA Bowesr, [facility]  Palliative Care  [phone]

## 2022-07-15 NOTE — PLAN OF CARE
Problem: Risk for Delirium  Goal: Optimal Coping  Outcome: Ongoing, Progressing     Problem: Pain (Orthopaedic Fracture)  Goal: Acceptable Pain Control  Outcome: Ongoing, Progressing   Goal Outcome Evaluation:      Pt was agitated most part BP was running high 147/58, and tachy and short periods of vtach. was given iv metoprolol and IV dilaudid.pt was. Verbalized that pain was in his side and belly

## 2022-07-15 NOTE — PLAN OF CARE
"  Problem: Plan of Care - These are the overarching goals to be used throughout the patient stay.    Goal: Plan of Care Review/Shift Note  Description: The Plan of Care Review/Shift note should be completed every shift.  The Outcome Evaluation is a brief statement about your assessment that the patient is improving, declining, or no change.  This information will be displayed automatically on your shift note.  Outcome: Ongoing, Progressing  Goal: Patient-Specific Goal (Individualized)  Description: You can add care plan individualizations to a care plan. Examples of Individualization might be:  \"Parent requests to be called daily at 9am for status\", \"I have a hard time hearing out of my right ear\", or \"Do not touch me to wake me up as it startles me\".  Outcome: Ongoing, Progressing  Goal: Absence of Hospital-Acquired Illness or Injury  Outcome: Ongoing, Progressing  Intervention: Identify and Manage Fall Risk  Recent Flowsheet Documentation  Taken 7/15/2022 1640 by Ashanti Blanco, RN  Safety Promotion/Fall Prevention:   sitter at bedside   clutter free environment maintained   bedside attendant   lighting adjusted  Intervention: Prevent Skin Injury  Recent Flowsheet Documentation  Taken 7/15/2022 1640 by Ashanti Blanco, RN  Body Position: supine  Goal: Optimal Comfort and Wellbeing  Outcome: Ongoing, Not Progressing  Goal: Readiness for Transition of Care  Outcome: Ongoing, Not Progressing     Problem: Risk for Delirium  Goal: Optimal Coping  Outcome: Ongoing, Progressing  Goal: Improved Behavioral Control  Outcome: Ongoing, Progressing  Goal: Improved Attention and Thought Clarity  Outcome: Ongoing, Not Progressing  Goal: Improved Sleep  Outcome: Ongoing, Progressing     Problem: Pain (Orthopaedic Fracture)  Goal: Acceptable Pain Control  Outcome: Ongoing, Progressing  Pt restless on and off this shift. Pt bladder scanned for 211 as voiding  small amounts urine. Rated pain 10/10, IV dilaudid admin, pt slept " after. Pt resistant to turns, cares explained. Pt stating cats were pooping in the room, redirected. Skin intact with cervical collar in place.     Goal Outcome Evaluation:

## 2022-07-15 NOTE — PROGRESS NOTES
"CLINICAL NUTRITION SERVICES - ASSESSMENT NOTE     Nutrition Prescription    RECOMMENDATIONS FOR MDs/PROVIDERS TO ORDER:  If TPN is to be started recommend clinimix D15AA5 at 35ml/hr (1/2 of goal) with 250 ml 20% lipids daily and Mvi with minerals =840 ml, 126 g cho, 42 g protein, 1096 kcal to meet 65% of estimated kcal and 50% of estimated protein and fluid needs    Pt will be at risk for refeeding  Recommend check K+, phos, mag x 2 days after TPN initiated and replete if low    Malnutrition Status:    Unable to assess at this time    Recommendations already ordered by Registered Dietitian (RD):  New weight    Future/Additional Recommendations:  Goal TPN would be clinimix D15AA5 at 70 lm/hr with daily lipids = 1680 ml, 252 g cho, 84 g protein, 1692 kcal/day  Monitor for GOC, hospital course  Diet hx, new weight, NFPA when able to assess malnutrition     REASON FOR ASSESSMENT  Seng Deshpande is a/an 89 year old male assessed by the dietitian for LOS    Pt presents with spine, rib fx s/p fall, dysphagia, delerium  Hx cva, CAD, HTN, HLD, prostate CA    NUTRITION HISTORY  Pt lives alone at home    CURRENT NUTRITION ORDERS  Diet: NPO day 5   Intake/Tolerance: Ate 75% first 2 days of admission  SLP following for swallow. Limited by pt inability to tolerate sitting up in bed.     Receiving D5 IVF  At 100 ml/hr = 2400 ml, 120 g cho, 408 kcal    LABS  Labs reviewed  BUN 3.9 (H), increased    MEDICATIONS  Medications reviewed  Lipitor, iv abx, levothyroxine, miralax, vit D    ANTHROPOMETRICS  Height: 175.3 cm (5' 9\")  Most Recent Weight: 68 kg (150 lb) 7/9 -likely stated  IBW: 72.7 kg  BMI: Normal BMI  Weight History:   Wt Readings from Last 10 Encounters:   07/09/22 68 kg (150 lb)   06/25/22 70.3 kg (155 lb)   04/27/22 71.7 kg (158 lb)   04/20/22 70.8 kg (156 lb)   02/17/22 75.9 kg (167 lb 4 oz)   11/22/21 76.7 kg (169 lb)   08/24/21 78 kg (172 lb)   05/18/21 77.5 kg (170 lb 12.8 oz)   11/20/20 77.6 kg (171 lb 2 oz) "   09/11/20 79.8 kg (176 lb)   12.7% weight loss x 1 year  10.1% weight loss x 5 months    Dosing Weight: 68 kg    ASSESSED NUTRITION NEEDS  Estimated Energy Needs: 0541-6890 kcals/day (25 - 30 kcals/kg)  Justification: Maintenance  Estimated Protein Needs:  grams protein/day (1.2 - 1.5 grams of pro/kg)  Justification: Increased needs  Estimated Fluid Needs: 7343-5621 mL/day (25 - 30 mL/kg)   Justification: Maintenance    MALNUTRITION:  % Weight Loss:  > 10% in 6 months (severe malnutrition)  % Intake:  </= 50% for >/= 5 days (severe malnutrition)<50% > 5 days  Subcutaneous Fat Loss: did not assess at this time  Muscle Loss: did not assess at this time  Fluid Retention: did not assess at this time    Malnutrition Diagnosis: Unable to determine due to need for new weight  In Context of:  Acute illness or injury    NUTRITION DIAGNOSIS  Inadequate oral intake related to dysphagia as evidenced by inability to tolerate sitting up to eat safely, s/s aspiration per SLP, NPO x 5 days    INTERVENTIONS  Implementation  Family considering short course of TPN.   If TPN is to be started recommend clinimix D15AA5 at 35ml/hr (1/2 of goal) with 250 ml 20% lipids daily and Mvi with minerals =840 ml, 126 g cho, 42 g protein, 1096 kcal to meet 65% of estimated kcal and 50% of estimated protein and fluid needs    Pt will be at risk for refeeding  Recommend check K+, phos, mag x 2 days after TPN initiated and replete if low    Goal TPN would be clinimix D15AA5 at 70 lm/hr with daily lipids = 1680 ml, 252 g cho, 84 g protein, 1692 kcal/day     Goals  Advance to p.o diet vs alternative nutrition support pending GOC in the next 24-48 hr  Meet nutrition needs  Maintain weight     Monitoring/Evaluation  Progress toward goals will be monitored and evaluated per protocol.

## 2022-07-15 NOTE — PROGRESS NOTES
Daily Progress Note        CODE STATUS:  No CPR- Do NOT Intubate    07/12/22  Assessment/Plan:  Seng Deshpande is a 89 YO man with hx of syncope, paroxysmal atrial fibrillation, SSS s/p PPM, NSVT, LBBB, CAD, TIA/CVA, HTN, HLD and prostrate cancer who was BIBEMS after being found down following mechanical fall.      Fall, Mechanical  - Patient very clear on the fact that he tripped going down the stairs of his basement. He was found by EMS.   - Appears based on his history that it was mechanical and he was fully aware.   - PT/OT     Unstable T11-T12 fracture, L1 compression fracture, C2 fracture  - MRI done on 7/13: C2 fracture is occult. No fracture line visible on this MRI. Diastatic fracture extending to the T12 vertebral body with degree of diastasis similar to prior. Moderate prevertebral edema and/or contusion with edema T9-T12. No significant retropulsion T11-T12.   - Neurosurgery consulted  - Optimize pain control  - Cervical and TLSO brace.  - PT/OT when neurosurgery says so. Strict bed rest for now.  - Neurosurgery and the family deciding on conservative treatment vs surgery. If family decides on surgical treatment, will ask for cardiology consult for jann-operative risk evaluation given significant cardiac risk factors.       Delirium  - Continue 1:1 as needed. Optimize pain control.   - U/A looks abnormal. Will treat with antibiotics x 3 days.  - Psych consult appreciated     Mildly Displaced Nasal Bone fracture & septal fracture   - ENT consult is pending.     Acute Hypoxic Respiratory Failure - resolved  Pulmonary infiltrates likely 2/2 aspiration ISO epistaxis  Small pneumothorax  Mildly displaced 4-6 rib fractures on the right  - Off supplemental O2 and saturating ok.  - CT chest 7/9: Multifocal groundglass and tree-in-bud opacities in both lungs, likely infection and/or aspiration. Trace bilateral pleural effusions.  - Seen by trauma for pneumothorax; see note. No specific interventions indicated,  continue IS.     Dysphagia  Aspiration risk  - NPO except meds with crushed in applesauce recommended by speech on 7/10. Will request for speech re-eval  - Cont with maintenance IVF    Paroxysmal Atrial Fibrillation  Hx of Sick Sinus Syndrome s/p PPM  Hx of NSVT  - Rates now  wnl. Episode of rapid afib resolved with hydration.  - Continue sotalol 80 mg q12h & Toprol Xl 25 mg daily. Monitor & replete lytes prn.   - Continue holding eliquis   - Telemetry     Dehydration  Hypernatremia  Hypokalemia  - Sodium was slowly creeping up, was 149 on 7/13/22. Due to volume depletion as he is NPO. Changed IVF to D5NS. Sodium is down to 144 today  - Check labs in am  - Replace potassium     Chronic Anemia  - Anemia does not appear to be acute, he seems to be at his baseline which in the last year is about ~9  - Continue to monitor  - Follow up iron studies ordered on admission      Hx of CAD  Hx of TIA/CVA  HLD  - Continue Lipitor 40 mg daily. Holding ASA 81 mg daily.      Hypothyroidism  - His TSH was just checked 2 weeks ago and wnl   - Continue home synthroid      HTN  - Blood pressure readings acceptable.       Prostate cancer  - Dutasteride 0.5 mg daily      Code status:  - Discussed with Ron today. Confirmed DNR/DNI.     DVT Prophylaxis: SCDs      Subjective:  Interval History: On 1:1. Confused. I called Javon this morning. He didn't  the phone. Then, I called Ron and discussed Seng's situation. We briefly talked about his code status, dysphagia, nutritional options, spine surgery and his advance directives that was signed in 2017. She was already aware that the surgery would be a high risk surgery. In terms of code status, Ron agreed that his wishes were consistent with DNR/DNI. She was not sure about TPN/tube feeding, though she stated he probably wouldn't want a tube feeding if he was able to speak for himself. She will talk to Javon (Nephew), visit the patient in the hospital today and try to make up  the decisions on those things.     Discussed with pall care team.    Review of Systems:   As mentioned in subjective.    Patient Active Problem List   Diagnosis     Prostate cancer (H)     Paroxysmal atrial fibrillation with RVR (H)     SA node dysfunction (H)     LBBB (left bundle branch block)     NSVT (nonsustained ventricular tachycardia) (H)     Coronary artery disease involving native coronary artery of native heart without angina pectoris     Cardiac pacemaker in situ     Paroxysmal atrial fibrillation (H)     Chronic atrial fibrillation (H)     Pneumothorax on right     Anticoagulated by anticoagulation treatment     Fall, initial encounter     Compression fracture of T12 vertebra, initial encounter (H)     Closed nondisplaced fracture of second cervical vertebra, unspecified fracture morphology, initial encounter (H)     Multiple fractures of ribs, right side, initial encounter for closed fracture       Scheduled Meds:    [Held by provider] apixaban ANTICOAGULANT  5 mg Oral BID     aspirin  81 mg Oral Daily     atorvastatin  40 mg Oral At Bedtime     calcium carbonate 500 mg (elemental)  1 tablet Oral Daily     ceFAZolin  1 g Intravenous Q8H     dutasteride  0.5 mg Oral Daily     enoxaparin ANTICOAGULANT  40 mg Subcutaneous BID 09 12     levothyroxine  50 mcg Oral Daily     lidocaine  1 patch Transdermal Q24H     lidocaine   Transdermal Q8H SAURAV     metoprolol succinate ER  25 mg Oral Daily     polyethylene glycol  17 g Oral Daily     sodium chloride (PF)  3 mL Intracatheter Q8H     sotalol  80 mg Oral Q12H SAURAV (08/20)     Vitamin D3  1,000 Units Oral Daily     Continuous Infusions:    D5W 100 mL/hr at 07/15/22 0928     PRN Meds:.acetaminophen, bisacodyl, HYDROmorphone, lidocaine 4%, lidocaine (buffered or not buffered), melatonin, metoprolol, naloxone **OR** naloxone **OR** naloxone **OR** naloxone, OLANZapine, sodium chloride (PF)    Objective:  Vital signs in last 24 hours:  Temp:  [97.3  F (36.3  C)-98.6   F (37  C)] 97.3  F (36.3  C)  Pulse:  [] 86  Resp:  [20-22] 20  BP: (123-147)/(58-73) 146/66  SpO2:  [93 %-98 %] 97 %        Intake/Output Summary (Last 24 hours) at 7/12/2022 1447  Last data filed at 7/12/2022 1040  Gross per 24 hour   Intake --   Output 0 ml   Net 0 ml       Physical Exam:    General: Not in obvious distress.  HEENT: Neck collar in place   Chest: Clear to auscultation bilaterally  Heart: S1S2 normal, irregular. No M/R/G  Abdomen: Soft. NT, ND. Bowel sounds- active.  Extremities: No legs swelling  Neuro: Lethargic. Follows simple commands. Moves all extremities.       Lab Results:(I have personally reviewed the results)    Recent Results (from the past 24 hour(s))   Basic metabolic panel    Collection Time: 07/15/22  5:08 AM   Result Value Ref Range    Sodium 144 136 - 145 mmol/L    Potassium 3.8 3.5 - 5.0 mmol/L    Chloride 115 (H) 98 - 107 mmol/L    Carbon Dioxide (CO2) 22 22 - 31 mmol/L    Anion Gap 7 5 - 18 mmol/L    Urea Nitrogen 39 (H) 8 - 28 mg/dL    Creatinine 0.82 0.70 - 1.30 mg/dL    Calcium 8.5 8.5 - 10.5 mg/dL    Glucose 159 (H) 70 - 125 mg/dL    GFR Estimate 80 >60 mL/min/1.73m2   Magnesium    Collection Time: 07/15/22  5:08 AM   Result Value Ref Range    Magnesium 2.2 1.8 - 2.6 mg/dL   Lactic Acid STAT    Collection Time: 07/15/22  5:08 AM   Result Value Ref Range    Lactic Acid 1.7 0.7 - 2.0 mmol/L   CBC with platelets and differential    Collection Time: 07/15/22  5:08 AM   Result Value Ref Range    WBC Count 10.6 4.0 - 11.0 10e3/uL    RBC Count 2.39 (L) 4.40 - 5.90 10e6/uL    Hemoglobin 8.1 (L) 13.3 - 17.7 g/dL    Hematocrit 26.0 (L) 40.0 - 53.0 %     (H) 78 - 100 fL    MCH 33.9 (H) 26.5 - 33.0 pg    MCHC 31.2 (L) 31.5 - 36.5 g/dL    RDW 16.7 (H) 10.0 - 15.0 %    Platelet Count 139 (L) 150 - 450 10e3/uL   Manual Differential    Collection Time: 07/15/22  5:08 AM   Result Value Ref Range    % Neutrophils 81 %    % Lymphocytes 14 %    % Monocytes 4 %    % Eosinophils 1  %    % Basophils 0 %    NRBCs per 100 WBC 1 (H) <=0 %    Absolute Neutrophils 8.6 (H) 1.6 - 8.3 10e3/uL    Absolute Lymphocytes 1.5 0.8 - 5.3 10e3/uL    Absolute Monocytes 0.4 0.0 - 1.3 10e3/uL    Absolute Eosinophils 0.1 0.0 - 0.7 10e3/uL    Absolute Basophils 0.0 0.0 - 0.2 10e3/uL    Absolute NRBCs 0.1 (H) <=0.0 10e3/uL    RBC Morphology Confirmed RBC Indices     Platelet Assessment  Automated Count Confirmed. Platelet morphology is normal.     Automated Count Confirmed. Platelet morphology is normal.    [place] Cells Slight (A) None Seen    Elliptocytes Slight (A) None Seen    Polychromasia Slight (A) None Seen       All laboratory and imaging data in the past 24 hours reviewed  Serum Glucose range:   Recent Labs   Lab 07/15/22  0508 07/14/22  0409 07/13/22 0213 07/12/22  0550   * 114 125 114     ABG: No lab results found in last 7 days.  CBC:   Recent Labs   Lab 07/15/22  0508 07/14/22 0409 07/13/22 0213   WBC 10.6 9.6 13.9*   HGB 8.1* 8.1* 8.4*   HCT 26.0* 26.2* 27.2*   * 111* 109*   * 106* 106*   NEUTROPHIL 81 69 74   LYMPH 14 6 24   MONOCYTE 4 23 1   EOSINOPHIL 1 2 0     Chemistry:   Recent Labs   Lab 07/15/22  0508 07/14/22 0409 07/13/22 0213 07/11/22  0932 07/10/22  0631    149* 147*   < > 140   POTASSIUM 3.8 3.4* 3.7   < > 3.8   CHLORIDE 115* 121* 117*   < > 110*   CO2 22 21* 21*   < > 23   BUN 39* 35* 29*   < > 29*   CR 0.82 0.71 0.74   < > 0.73   GFRESTIMATED 80 84 83   < > 83   MAURI 8.5 8.4* 8.6   < > 8.7   MAG 2.2 2.2 2.0   < >  --    PROTTOTAL  --   --   --   --  6.5   ALBUMIN  --   --   --   --  2.9*   AST  --   --   --   --  32   ALT  --   --   --   --  15   ALKPHOS  --   --   --   --  63   BILITOTAL  --   --   --   --  0.8    < > = values in this interval not displayed.     Coags:  Recent Labs   Lab 07/10/22  1346   INR 1.20*   PTT 28     Cardiac Markers:  Recent Labs   Lab 07/09/22  1353   TROPONINI 0.03          XR Chest 2 Views    Result Date: 7/12/2022  EXAM: XR  CHEST 2 VW LOCATION: [facility] DATE/TIME: 2022 1:43 PM INDICATION: PRE MRI cardiac device check. COMPARISON: 2022.     IMPRESSION: Left subclavian approach dual-chamber pacer with lead tips over the expected region of the right atrium and right ventricle. Pacer leads appear grossly intact. Increased diffuse bilateral opacities, along with suspicion for septal thickening, suggesting pulmonary edema. Small pleural effusions. Mild-moderate cardiac silhouette enlargement. Sternotomy. Atherosclerosis.     XR Chest 1 View    Result Date: 2022  EXAM: XR CHEST 1 VIEW LOCATION: [facility] DATE/TIME: 2022 7:54 PM INDICATION: Syncope with bibasilar Rales. COMPARISON: 2022, 2016     IMPRESSION: Stable cardiac enlargement with stable pulmonary vascular congestion. Mild interstitial opacities right lung base slightly increased since prior suggestive of increasing component of volume overload. No pulmonary infiltrates. No pneumothorax.  Calcified thoracic aorta. Sternotomy. Left-sided cardiac pacemaker.    Echocardiogram Complete    Result Date: 2022  [id number]^MELE^[place] [address]  Name: LIOR STOVALL MRN: [id number] : 1926 Study Date: 2022 10:51 AM Age: 89 yrs Gender: Male Patient Location: [facility] Reason For Study: Atrial Fibrillation Ordering Physician: MELE ROBLEDO Referring Physician: CAROL WALLER Performed By: ACE  BSA: 1.8 m2 Height: 67 in Weight: 160 lb HR: 72 BP: 132/97 mmHg ______________________________________________________________________________ Procedure Complete Echo Adult. Definity (NDC #41208-912) given intravenously. 3mL given. ______________________________________________________________________________ Interpretation Summary  The visual ejection fraction is 45-50%. Septal wall motion abnormality may  reflect pacemaker activation. Mid to distal septal hypokinesis Mildly decreased right ventricular systolic function The right ventricle is mild to moderately dilated. There is mild (1+) mitral regurgitation. There is moderate (2+) tricuspid regurgitation. ______________________________________________________________________________ Left Ventricle The left ventricle is normal in size. There is normal left ventricular wall thickness. The visual ejection fraction is 45-50%. Septal wall motion abnormality may reflect pacemaker activation. Mid to distal septal hypokinesis.  Right Ventricle The right ventricle is mild to moderately dilated. TAPSE is abnormal, which is consistent with abnormal right ventricular systolic function. Mildly decreased right ventricular systolic function. There is a pacemaker lead in the right ventricle.  Atria The left atrium is severely dilated. Right atrium not well visualized. Intact atrial septum.  Mitral Valve The mitral valve leaflets are mildly thickened. There is mild (1+) mitral regurgitation.  Tricuspid Valve Tricuspid valve leaflets appear normal. There is moderate (2+) tricuspid regurgitation. The right ventricular systolic pressure is approximated at 27mmHg plus the right atrial pressure.  Aortic Valve The aortic valve is trileaflet with aortic valve sclerosis. There is mild (1+) aortic regurgitation. No aortic stenosis is present.  Pulmonic Valve The pulmonic valve is not well seen, but is grossly normal. There is trace pulmonic valvular regurgitation.  Vessels Mild aortic root enlargement. Normal size ascending aorta. Inferior vena cava not well visualized for estimation of right atrial pressure.  Pericardium There is no pericardial effusion.  ______________________________________________________________________________ MMode/2D Measurements & Calculations IVSd: 0.89 cm LVIDd: 5.0 cm LVIDs: 4.1 cm LVPWd: 1.00 cm FS: 18.0 % LV mass(C)d: 167.8 grams LV mass(C)dI: 91.3 grams/m2   Ao root diam: 3.9 cm LA dimension: 5.2 cm LA/Ao: 1.3 LVOT diam: 2.1 cm LVOT area: 3.3 cm2 LA Volume Indexed (AL/bp): 63.5 ml/m2 RWT: 0.40  Time Measurements MM HR: 74.0 BPM  Doppler Measurements & Calculations MV E max willie: 91.7 cm/sec MV A max willie: 72.3 cm/sec MV E/A: 1.3  MV dec slope: 619.9 cm/sec2 MV dec time: 0.15 sec Ao V2 max: 155.5 cm/sec Ao max PG: 10.0 mmHg Ao V2 mean: 108.1 cm/sec Ao mean P.4 mmHg Ao V2 VTI: 32.7 cm LUCIEN(I,D): 2.1 cm2 LUCIEN(V,D): 2.0 cm2 AI P1/2t: 491.5 msec LV V1 max PG: 3.5 mmHg LV V1 max: 93.6 cm/sec LV V1 VTI: 20.8 cm SV(LVOT): 69.0 ml SI(LVOT): 37.5 ml/m2 PA acc time: 0.08 sec TR max willie: 259.4 cm/sec TR max P.9 mmHg AV Willie Ratio (DI): 0.60 LUCIEN Index (cm2/m2): 1.1 E/E': 11.6 E/E' av.4 Lateral E/e': 11.6 Medial E/e': 17.2 Peak E' Willie: 7.9 cm/sec  ______________________________________________________________________________ Report approved by: Frantz Vergara 2022 01:23 PM       XR Chest Port 1 View    Result Date: 2022  EXAM: XR CHEST PORT 1 VIEW LOCATION: [facility] DATE/TIME: 2022 1:11 PM INDICATION: fall COMPARISON: 2022     IMPRESSION: Interval development of displaced fractures of the right posterior fourth, fifth, and sixth ribs, age indeterminate and possibly acute or subacute. Correlate with tenderness. There are additional old healed right posterior rib fractures. No pleural effusion or pneumothorax. Worsening of multifocal patchy opacities in both lungs, either due to pulmonary edema or pneumonia. Stable mild cardiomegaly. Median sternotomy and mediastinal surgical clips. Pacemaker.    Cardiac Device Check - Remote    Result Date: 2022  Type: routine remote pacemaker transmission. Presenting rhythm: normal sinus and AP-VS rate 87 bpm. Frequent PACs and PVCs. Battery/lead status: stable Arrhythmias: since [place] Consult done 22, two new AT/AF, both <1 minute. No ventricular high rate episodes detected.  Anticoagulant: apixaban Comments: normal magnet and pacemaker function.  TIM Black, Device Specialist I have reviewed and interpreted the device interrogation, settings, programming, and encounter summary. The device is functioning within normal device parameters. I agree with the current findings, assessment and plan.    CTA Neck with Contrast    Result Date: 7/9/2022  EXAM: CTA NECK WITH CONTRAST LOCATION: [facility] DATE/TIME: 7/9/2022 2:50 PM INDICATION: C2 fracture, pain recent fall. COMPARISON: Head CT from today. CT facial bones from today. CT cervical spine from today. CONTRAST: Isovue 370 75 ml. TECHNIQUE: Neck CT angiogram with IV contrast. Axial helical CT images of the neck vessels were obtained during the arterial phase of intravenous contrast administration. Axial helical 2D reconstructed images and multiplanar 3D MIP reconstructed images of the neck vessels were performed by the technologist. Dose reduction techniques were used. All stenosis measurements made according to NASCET criteria unless otherwise specified. FINDINGS: Intracranial segments demonstrate moderate diffuse middle cerebral stenoses bilaterally. RIGHT CAROTID: Atherosclerotic plaque results in 50-70% stenosis in the right ICA. No dissection. LEFT CAROTID: Atherosclerotic plaque results in less than 50% stenosis in the left ICA. No dissection. VERTEBRAL ARTERIES: No focal stenosis or dissection. Balanced vertebral arteries. No evidence of vertebral occlusion or dissection related to the nondisplaced left C2 fracture. There are moderate bilateral focal stenoses present in the fourth segment distal vertebral arteries. Basilar artery is unremarkable. Moderate stenoses noted diffusely in the posterior cerebral arteries. AORTIC ARCH: No significant stenosis at the great vessel origins. The left vertebral artery originates from the arch, normal variant. NONVASCULAR STRUCTURES: Small right apical pneumothorax. This was  noted on the CT cervical spine from today. The known nondisplaced left lateral mass fracture at C2 is less well-visualized given differences in technique. There is fracture noted of the right nasal bone, age-indeterminate. Correlate clinically.     IMPRESSION: 1.  No evidence of vertebral occlusion or dissection from the nondisplaced left lateral mass fracture C2 level. No hemodynamically significant extracranial stenosis and no evidence of dissection. 2.  Numerous intracranial stenoses throughout the anterior and posterior circulation. 3.  Small right apical pneumothorax. Findings phoned to referring provider at 7/9/2022 4:00 PM.     Cervical spine CT w/o contrast    Result Date: 7/9/2022  EXAM: CT FACIAL BONES WITHOUT CONTRAST, CT CERVICAL SPINE W/O CONTRAST LOCATION: [facility] DATE/TIME: 7/9/2022 1:01 PM INDICATION: trauma, injury. COMPARISON: Head CT 06/21/2022 TECHNIQUE: 1) Routine CT Facial Bones without IV contrast. Multiplanar reformats. Dose reduction techniques were used. 2) Routine CT Cervical Spine without IV contrast. Multiplanar reformats. Dose reduction techniques were used. FINDINGS: FACIAL BONE CT: OSSEOUS STRUCTURES/SOFT TISSUES: Small soft tissue swelling over the nose. Mildly displaced nasal bone fractures. Nondisplaced fracture of the anterior nasal septum. The walls of the orbits are intact. No zygomatic arch fractures. The walls of the maxillary sinuses are intact. No zygomatic arch fractures. The pterygoid plates are intact. No mandibular fractures. The temporomandibular joints are intact. Mild degenerative changes of the temporomandibular joints. Poor dentition. Periapical lucencies involving teeth #12, and 26. ORBITAL CONTENTS: Prior bilateral cataract surgery. Visualized portions of the orbits are otherwise unremarkable. SINUSES: Moderate opacification of the ethmoid air cells. CERVICAL SPINE CT: VERTEBRA: 2 mm retrolisthesis of C4 on C5. 1 mm spondylolisthesis of  C7 on T1. 2 mm spondylolisthesis of T1 on T2. Age indeterminate 40-50% compression fracture of T1. 21 degrees of segmental kyphosis from C7 to T2. The rest of the vertebral body heights  are maintained. Nondisplaced fracture involving the left lateral mass of C2 with probable extension into the left C2 transverse foramen. Craniocervical junction is within normal limits. No prevertebral soft tissue edema. CANAL/FORAMINA: Moderate intervertebral disc height loss C4-C5 to C6-C7. No high-grade spinal canal narrowing. Moderate left neuroforaminal narrowing at C3-C4. Moderate right neuroforaminal narrowing at C4-C5. Mild bilateral neuroforaminal narrowing at C5-C6. PARASPINAL: Mild symmetric atrophy of the posterior paraspinal musculature. Multinodular thyroid gland. Moderate atherosclerotic calcification of the carotid bulbs. Small right apical pneumothorax.     IMPRESSION: FACIAL BONE CT: 1.  Small soft tissue swelling over the nose. 2.  Mildly displaced nasal bone fractures. Nondisplaced fracture of the anterior nasal septum. 3.  Poor dentition. Periapical lucencies involving teeth #12, and 26, suspicious for odontogenic disease. CERVICAL SPINE CT: 1.  Nondisplaced fracture involving the left lateral mass of C2 with probable extension into the left C2 transverse foramen. Recommend obtaining CT angiogram to exclude vascular injury. 2.  Age indeterminate 40-50% compression fracture of T1. 21 degrees of segmental kyphosis from C7 to T2. 3.  Small right apical pneumothorax. Discussed the findings with Dr. Burroughs at 1:52 PM, 07/09/2022.    Chest CT w/o contrast    Result Date: 7/9/2022  EXAM: CT CHEST W/O CONTRAST LOCATION: [facility] DATE/TIME: 7/9/2022 3:01 PM INDICATION: Fall.  Pneumothorax seen on CT neck COMPARISON: None. TECHNIQUE: CT chest without IV contrast. Multiplanar reformats were obtained. Dose reduction techniques were used. CONTRAST: None. FINDINGS: LUNGS AND PLEURA: Tiny right  apical pneumothorax as seen on the prior C-spine CT. Patchy groundglass and tree-in-bud opacities within both lower lobes and the lingula. Trace bilateral pleural fluid. MEDIASTINUM/AXILLAE: Coarse calcified nodules. No aortic aneurysm. Left-sided pacemaker. CORONARY ARTERY CALCIFICATION: Previous intervention (stents or CABG). UPPER ABDOMEN: Calcified gallstones and heavily calcified distal vertebral arteries. MUSCULOSKELETAL: Osteopenia and degenerative disease in the spine. Median sternotomy wires. Healed bilateral rib fractures. Mildly displaced acute right fourth-sixth rib fractures posteriorly     IMPRESSION: 1.  Tiny right apical pneumothorax, as seen on C-spine CT. 2.  Multifocal groundglass and tree-in-bud opacities in both lungs, likely infection and/or aspiration. Trace bilateral pleural effusions. 3.  Mildly displaced right fourth-sixth rib fractures.     Head CT w/o contrast    Result Date: 7/9/2022  EXAM: CT HEAD W/O CONTRAST LOCATION: [facility] DATE/TIME: 7/9/2022 1:00 PM INDICATION: trauma anticoagulated COMPARISON: None. TECHNIQUE: Routine CT Head without IV contrast. Multiplanar reformats. Dose reduction techniques were used. FINDINGS: INTRACRANIAL CONTENTS: No evidence of acute intracranial hemorrhage or mass effect. Scattered foci of decreased attenuation within the cerebral hemispheric white matter which are nonspecific, though most commonly ascribed to chronic small vessel ischemic  disease. 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 Scattered foci of decreased attenuation within the cerebral hemispheric white matter which are nonspecific, though most commonly ascribed to chronic small vessel ischemic disease. The basilar cisterns are patent. VISUALIZED ORBITS/SINUSES/MASTOIDS: Lateral cataract surgery. The partially imaged globes are otherwise unremarkable. The partially imaged paranasal sinuses, mastoid air cells and middle ear cavities are unremarkable.  BONES/SOFT TISSUES: The visualized skull base and calvarium are unremarkable.     IMPRESSION:  1.  No evidence of acute intracranial hemorrhage or mass effect. 2.  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 3.  Moderate nonspecific white matter changes. 4.  Moderate brain parenchymal volume loss.    CT Head w/o Contrast    Result Date: 6/21/2022  EXAM: CT HEAD W/O CONTRAST LOCATION: [facility] DATE/TIME: 6/21/2022 8:17 PM INDICATION: Syncope, simple, normal neuro exam. COMPARISON: Head CT 3/1/2022 TECHNIQUE: Routine CT Head without IV contrast. Multiplanar reformats. Dose reduction techniques were used. FINDINGS: INTRACRANIAL CONTENTS: No intracranial hemorrhage, extraaxial collection, or mass effect.  No CT evidence of acute infarct. Severe presumed chronic small vessel ischemic changes. Multiple old periventricular infarcts. Left cerebellar infarcts. Moderate generalized volume loss. No hydrocephalus. VISUALIZED ORBITS/SINUSES/MASTOIDS: Prior bilateral cataract surgery. Visualized portions of the orbits are otherwise unremarkable. No paranasal sinus mucosal disease. No middle ear or mastoid effusion. BONES/SOFT TISSUES: No acute abnormality.     IMPRESSION: 1.  No CT evidence of acute intracranial abnormality. 2.  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    Lumbar spine CT w/o contrast    Result Date: 7/9/2022  EXAM: CT LUMBAR SPINE W/O CONTRAST LOCATION: [facility] DATE/TIME: 7/9/2022 3:01 PM INDICATION: Trauma, pain. COMPARISON: CT thoracic spine from today. TECHNIQUE: Routine CT Lumbar Spine without IV contrast. Multiplanar reformats. Dose reduction techniques were used. FINDINGS: VERTEBRA: 5 lumbar type vertebral bodies. T12 fracture with fracture through the anterior bridging osteophyte T11-T12 level, detailed in dedicated CT thoracic spine. There is moderate to moderately severe compression fracture deformity of L1 with loss of  vertebral body height 50-60%. 1 mm  osseous retropulsion. While this is age-indeterminate, it may be chronic given the degree of anterior marginal osteophytes bridging at T12-L1 level. Additionally, there is no evidence of significant paraspinous hematoma adjacent to the L1 vertebral body. There is mild kyphosis at the thoracolumbar junction. 2 mm retrolisthesis L2 on L3 with otherwise anatomic sagittal and coronal alignment. Other lumbar vertebral bodies are preserved in height. There is a displaced fragment anterior to the inferior endplate of L2, but this demonstrates well-defined, sclerotic margins and is likely chronic. No other convincing evidence of acute lumbar fracture. There is early osseous bridging across the anterior aspects of  the sacroiliac joints. CANAL/FORAMINA: No high-grade central canal or foraminal stenosis. Mild canal stenosis L4-L5. PARASPINAL: Extensive arterial vascular calcification. Small bilateral pleural effusions. Colonic diverticulosis. Distended urinary bladder incompletely visualized.     IMPRESSION: 1.  T12 fracture detailed on dedicated CT thoracic spine. 2.  Moderate to moderately severe compression fracture deformity of L1. While age-indeterminate, it may be chronic given the degree of bulky anterior marginal osteophytes bridging at T12-L1 level and the lack of significant paraspinous hematoma adjacent to the L1 vertebral body. 3.  Chronic appearing displaced fragment anterior to the inferior endplate of L2. 4.  No convincing evidence of acute lumbar fracture or posttraumatic subluxation.    CT Facial Bones without Contrast    Result Date: 7/9/2022  EXAM: CT FACIAL BONES WITHOUT CONTRAST, CT CERVICAL SPINE W/O CONTRAST LOCATION: [facility] DATE/TIME: 7/9/2022 1:01 PM INDICATION: trauma, injury. COMPARISON: Head CT 06/21/2022 TECHNIQUE: 1) Routine CT Facial Bones without IV contrast. Multiplanar reformats. Dose reduction techniques were used. 2) Routine CT Cervical Spine without IV  contrast. Multiplanar reformats. Dose reduction techniques were used. FINDINGS: FACIAL BONE CT: OSSEOUS STRUCTURES/SOFT TISSUES: Small soft tissue swelling over the nose. Mildly displaced nasal bone fractures. Nondisplaced fracture of the anterior nasal septum. The walls of the orbits are intact. No zygomatic arch fractures. The walls of the maxillary sinuses are intact. No zygomatic arch fractures. The pterygoid plates are intact. No mandibular fractures. The temporomandibular joints are intact. Mild degenerative changes of the temporomandibular joints. Poor dentition. Periapical lucencies involving teeth #12, and 26. ORBITAL CONTENTS: Prior bilateral cataract surgery. Visualized portions of the orbits are otherwise unremarkable. SINUSES: Moderate opacification of the ethmoid air cells. CERVICAL SPINE CT: VERTEBRA: 2 mm retrolisthesis of C4 on C5. 1 mm spondylolisthesis of C7 on T1. 2 mm spondylolisthesis of T1 on T2. Age indeterminate 40-50% compression fracture of T1. 21 degrees of segmental kyphosis from C7 to T2. The rest of the vertebral body heights  are maintained. Nondisplaced fracture involving the left lateral mass of C2 with probable extension into the left C2 transverse foramen. Craniocervical junction is within normal limits. No prevertebral soft tissue edema. CANAL/FORAMINA: Moderate intervertebral disc height loss C4-C5 to C6-C7. No high-grade spinal canal narrowing. Moderate left neuroforaminal narrowing at C3-C4. Moderate right neuroforaminal narrowing at C4-C5. Mild bilateral neuroforaminal narrowing at C5-C6. PARASPINAL: Mild symmetric atrophy of the posterior paraspinal musculature. Multinodular thyroid gland. Moderate atherosclerotic calcification of the carotid bulbs. Small right apical pneumothorax.     IMPRESSION: FACIAL BONE CT: 1.  Small soft tissue swelling over the nose. 2.  Mildly displaced nasal bone fractures. Nondisplaced fracture of the anterior nasal septum. 3.  Poor dentition.  Periapical lucencies involving teeth #12, and 26, suspicious for odontogenic disease. CERVICAL SPINE CT: 1.  Nondisplaced fracture involving the left lateral mass of C2 with probable extension into the left C2 transverse foramen. Recommend obtaining CT angiogram to exclude vascular injury. 2.  Age indeterminate 40-50% compression fracture of T1. 21 degrees of segmental kyphosis from C7 to T2. 3.  Small right apical pneumothorax. Discussed the findings with Dr. Burroughs at 1:52 PM, 07/09/2022.    CT Thoracic Spine w/o Contrast    Result Date: 7/9/2022  EXAM: CT THORACIC SPINE W/O CONTRAST LOCATION: [facility] DATE/TIME: 7/9/2022 3:01 PM INDICATION: Trauma, pain. COMPARISON: CT cervical spine and CT lumbar spine from today. TECHNIQUE: Routine CT Thoracic Spine without IV contrast. Multiplanar reformats. Dose reduction techniques were used. FINDINGS:  topogram demonstrates multilead left subclavian pacer and median sternotomy wires. VERTEBRA: 12 rib-bearing thoracic vertebral segments with 5 lumbar type vertebral bodies. As discussed on CT cervical spine, there is age-indeterminate 40-50% compression fracture of T1 with associated 21 degrees segmental kyphosis C7-T2. Additionally, there is evidence of acute fracture through a bridging anterior marginal osteophyte T11-T12 level with fracture extending through the anterosuperior cortex of T12 and fracture lucency paralleling the superior endplate of T12, involving the anterior column and middle column T12 level. No definite extension into the pedicles or posterior elements at the T11 or T12 level. There is slight widening of the posterior facets T11-T12, relative to the adjacent posterior facets suggesting involvement of the posterior column. Mild associated amorphous anterior paraspinous hematoma. No convincing evidence of epidural hematoma, to limits of CT. Sagittal alignment is maintained. There are thin anterior syndesmophytes throughout  the majority of the thoracic spine. Other thoracic vertebral bodies are preserved in height. There is lucency present through the anterior bridging osteophyte T5-T6 level, although this demonstrates sclerotic margins and is likely chronic. No other evidence of acute thoracic fracture. Numerous old appearing left posterior rib fractures. There are acute appearing displaced right-sided rib fractures involving right rib 4, 5, 6. CANAL/FORAMINA: No significant central canal stenosis. There is moderate bilateral foraminal narrowing T11-T12 level. PARASPINAL: Mild paraspinous hematoma anterior to T11-T12. Extensive vascular calcification. There is extensive infiltrate involving the posterior aspects of the left lung that could reflect contusion and/or pneumonitis. Small bilateral pleural effusions.     IMPRESSION: 1.  Unstable fracture pattern involving T11-T12 level. Fracture is present through a bridging anterior marginal osteophyte T11-T12 level with fracture paralleling the superior endplate of T12. There is widening of the T11-T12 posterior facets bilaterally. This suggests involvement of the posterior column as well. Mild amorphous anterior paraspinous hematoma. 2.  Age-indeterminate compression fracture of T1, as described on the cervical spine from today. 3.  Not mentioned above is compression deformity of L1. See dedicated CT lumbar spine. 4.  Small right apical pneumothorax. Findings phoned to Dr. Sarwat Isidro at 7/9/2022 3:59 PM 5.  Contusion or pneumonitis involving the posterior left lung.       Latest radiology report personally reviewed.    Note created using dragon voice recognition software so sounds alike errors may have escaped editing.      07/15/2022   Joe Frye MD  Hospitalist, [facility]  Pager: [phone]

## 2022-07-15 NOTE — PROGRESS NOTES
"Neurosurgery Progress Note  07/15/22    A/P: Seng Deshpande is a 89 year old male who is s/p fall resulting in C2 lateral mass fracture, no vertebral artery injury as well as T11-T12 fracture through anterior osteophyte with widening of the anterior body in the setting of DISH. Acute unstable fracture with widening of anterior body and involvement of posterior elements. Seng states he would consider surgery but would like to think about it. Discussed alternative of attempting activity with brace and see how fracture responds. Patient would require T10-L3 fusion to stabilize fracture. This is not without risk considering patient's age. Alternatively patient could trial being upright in brace and see how fracture responds. Patient at high risk for paralysis with this unstable fracture.     Discussions completed with Javon, nephew and family friend Ron yesterday. They were to discuss among themselves and with Seng. They understand this tough situation - risk with surgery, risk without surgery. They are to update my team today with decision. Activity limited until decision made.     Seng confused today. Tells me he went to [facility] and to the pharmacy. It does not appear he could make this difficulty decision on his own.     Maintain strict bedrest til decision is made. Can sit upright in bed if he has cervical collar and TLSO in place. HOB less than 30, flat as is safe without TLSO in place.     Would request hospital team to give opinion of surgical risk.     Neurosurgery Attending: Discussed with Dr. Foster    S: Seng states his back feels fine today. Denies leg pain.     O:  /63 (BP Location: Right arm)   Pulse 97   Temp 98.6  F (37  C) (Axillary)   Resp 20   Ht 1.753 m (5' 9\")   Wt 68 kg (150 lb)   SpO2 97%   BMI 22.15 kg/m       General: Awake, confused, disoriented. Pulling at velcro on C Collar.     Motor: normal bulk and tone for age     Strength: seems to have full strength in legs and arms. "     Musculoskeletal: Negative straight leg raise bl    Skin: scattered bruising, including facial     Labs: personally reviewed     Imaging: personally reviewed MRI, CT cervical and thoracic.     MRI thoracic:                                                              IMPRESSION:  1.  Diastatic fracture is again seen extending to the T12 vertebral body with the degree of diastasis similar to prior. There is moderate prevertebral edema and/or contusion with edema extending from T9 to L2. No significant retropulsion at T11-T12.   Shallow disc herniation in conjunction with ventral displacement of the dorsal epidural fat contributes to at least mild spinal canal stenosis. No associated signal abnormality is seen within the cord at this level.     2.  There is STIR hyperintensity within the dorsal paraspinal soft tissues at the T11 and T12 levels compatible with muscular injury/edema. No abnormal collections identified. As noted on prior CT, T11-T12 facets appear slightly diastatic.    MRI Cervical   IMPRESSION:  1.  Acute C2 fracture is occult on MRI. Fracture line is not visualized. There is trace bone marrow edema in the expected location of the fracture in the left lateral mass of C2. There is slight prevertebral edema. There is no epidural hematoma.   2.  Chronic wedge compression deformities of T1 and T3.  3.  Advanced degenerative changes as above.    Divya Reed, AG-CNP  [facility] Neurosurgery  O: [phone]

## 2022-07-15 NOTE — PLAN OF CARE
"  Problem: Plan of Care - These are the overarching goals to be used throughout the patient stay.    Goal: Plan of Care Review/Shift Note  Description: The Plan of Care Review/Shift note should be completed every shift.  The Outcome Evaluation is a brief statement about your assessment that the patient is improving, declining, or no change.  This information will be displayed automatically on your shift note.  7/15/2022 1853 by Ashanti Blanco RN  Outcome: Ongoing, Progressing  7/15/2022 1843 by Ashanti Blanco RN  Outcome: Ongoing, Progressing  Goal: Patient-Specific Goal (Individualized)  Description: You can add care plan individualizations to a care plan. Examples of Individualization might be:  \"Parent requests to be called daily at 9am for status\", \"I have a hard time hearing out of my right ear\", or \"Do not touch me to wake me up as it startles me\".  7/15/2022 1853 by Ashanti Blanco RN  Outcome: Ongoing, Progressing  7/15/2022 1843 by Ashanti Blanco RN  Outcome: Ongoing, Progressing  Goal: Absence of Hospital-Acquired Illness or Injury  7/15/2022 1853 by Ashanti Blanco RN  Outcome: Ongoing, Progressing  7/15/2022 1843 by Ashanti Blanco RN  Outcome: Ongoing, Progressing  Intervention: Identify and Manage Fall Risk  Recent Flowsheet Documentation  Taken 7/15/2022 1640 by Ashanti Blanco RN  Safety Promotion/Fall Prevention:   sitter at bedside   clutter free environment maintained   bedside attendant   lighting adjusted  Intervention: Prevent Skin Injury  Recent Flowsheet Documentation  Taken 7/15/2022 1640 by Ashanti Blanco RN  Body Position: supine  Goal: Optimal Comfort and Wellbeing  7/15/2022 1853 by Ashanti Blanco RN  Outcome: Ongoing, Progressing  7/15/2022 1843 by Ashanti Blanco RN  Outcome: Ongoing, Not Progressing  Goal: Readiness for Transition of Care  7/15/2022 1853 by Ashanti Blanco RN  Outcome: Ongoing, Progressing  7/15/2022 1843 by Francis" Ashanti CATALAN, RN  Outcome: Ongoing, Not Progressing     Problem: Pain (Orthopaedic Fracture)  Goal: Acceptable Pain Control  7/15/2022 1853 by Ashanti Blanco, RN  Outcome: Ongoing, Progressing  7/15/2022 1843 by Ashanti Blanco, RN  Outcome: Ongoing, Progressing  Pt moans when attempting to clear throat, cough. Slept most of shift thus far, restless and pulling off gown, tele stickers, redirected. Bladder scan 334, will require st cath. Continue to monitor.     Goal Outcome Evaluation:

## 2022-07-15 NOTE — PLAN OF CARE
Pt is disoriented to time, place, and situation. Pt thinks he is in a different location and frequently asks to be taken to [facility], trying to reorient him to the environment is not effective and he is not convinced. Pt becomes agitated when he thinks he lost something in the room like keys or checkbook and needs to get up to look for it. PRN zyprexa was given at 1757 and was effective at reducing agitation. Pt has been calm and cooperative for the rest of the evening. Takes pills whole with applesauce, sometimes will chew pills.

## 2022-07-16 NOTE — PLAN OF CARE
Goal Outcome Evaluation:    Plan of Care Reviewed With: other    Overall Patient Progress: declining    Outcome Evaluation: Pt NPO. Will not initiate TPN today as pt family is leaning towards hospice.

## 2022-07-16 NOTE — PLAN OF CARE
"  Patient alert to self. Denies pain during assessments and continues to be restless throughout shift. Increased coughing with weak ability to expel secretions. Continues to pull at tubing and linen. Beside sitter during duration of shift. Continues to be incontinent of bowel.       Problem: Plan of Care - These are the overarching goals to be used throughout the patient stay.    Goal: Plan of Care Review/Shift Note  Description: The Plan of Care Review/Shift note should be completed every shift.  The Outcome Evaluation is a brief statement about your assessment that the patient is improving, declining, or no change.  This information will be displayed automatically on your shift note.  Outcome: Ongoing, Progressing  Flowsheets (Taken 7/16/2022 0452)  Plan of Care Reviewed With: patient  Overall Patient Progress: no change  Goal: Patient-Specific Goal (Individualized)  Description: You can add care plan individualizations to a care plan. Examples of Individualization might be:  \"Parent requests to be called daily at 9am for status\", \"I have a hard time hearing out of my right ear\", or \"Do not touch me to wake me up as it startles me\".  Outcome: Ongoing, Progressing  Goal: Absence of Hospital-Acquired Illness or Injury  Outcome: Ongoing, Progressing  Intervention: Identify and Manage Fall Risk  Recent Flowsheet Documentation  Taken 7/15/2022 2000 by Viviana Warren RN  Safety Promotion/Fall Prevention:   bed alarm on   clutter free environment maintained   lighting adjusted   patient and family education   room near nurse's station   sitter at bedside  Intervention: Prevent Skin Injury  Recent Flowsheet Documentation  Taken 7/15/2022 2000 by Viviana Warren RN  Body Position:   left   turned  Intervention: Prevent and Manage VTE (Venous Thromboembolism) Risk  Recent Flowsheet Documentation  Taken 7/15/2022 2000 by Viviana Warren, RN  VTE Prevention/Management: compression stockings on  Activity Management: " bedrest  Goal: Optimal Comfort and Wellbeing  Outcome: Ongoing, Progressing  Goal: Readiness for Transition of Care  Outcome: Ongoing, Progressing     Problem: Risk for Delirium  Goal: Optimal Coping  Outcome: Ongoing, Progressing  Goal: Improved Behavioral Control  Outcome: Ongoing, Progressing  Goal: Improved Attention and Thought Clarity  Outcome: Ongoing, Progressing  Goal: Improved Sleep  Outcome: Ongoing, Progressing     Problem: Bleeding (Orthopaedic Fracture)  Goal: Absence of Bleeding  Outcome: Ongoing, Progressing     Problem: Embolism (Orthopaedic Fracture)  Goal: Absence of Embolism Signs and Symptoms  Outcome: Ongoing, Progressing  Intervention: Prevent or Manage Embolism Risk  Recent Flowsheet Documentation  Taken 7/15/2022 2000 by Viviana Warren RN  VTE Prevention/Management: compression stockings on     Problem: Fracture Stabilization and Management (Orthopaedic Fracture)  Goal: Fracture Stability  Outcome: Ongoing, Progressing     Problem: Functional Ability Impaired (Orthopaedic Fracture)  Goal: Optimal Functional Ability  Outcome: Ongoing, Progressing  Intervention: Optimize Functional Ability  Recent Flowsheet Documentation  Taken 7/15/2022 2000 by Viviana Warren RN  Activity Management: bedrest  Activity Assistance Provided: assistance, 2 people  Positioning/Transfer Devices:   pillows   in use     Problem: Infection (Orthopaedic Fracture)  Goal: Absence of Infection Signs and Symptoms  Outcome: Ongoing, Progressing     Problem: Neurovascular Compromise (Orthopaedic Fracture)  Goal: Effective Tissue Perfusion  Outcome: Ongoing, Progressing     Problem: Pain (Orthopaedic Fracture)  Goal: Acceptable Pain Control  Outcome: Ongoing, Progressing     Problem: Respiratory Compromise (Orthopaedic Fracture)  Goal: Effective Oxygenation and Ventilation  Outcome: Ongoing, Progressing  Intervention: Promote Airway Secretion Clearance  Recent Flowsheet Documentation  Taken 7/15/2022 2000 by Viviana Warren  RN  Cough And Deep Breathing: unable to perform     Problem: Restraint, Nonbehavioral (Nonviolent)  Goal: Absence of Harm or Injury  Outcome: Ongoing, Progressing  Intervention: Protect Skin and Joint Integrity  Recent Flowsheet Documentation  Taken 7/15/2022 2000 by Viviana Warren RN  Body Position:   left   turned     Problem: Restraint, Behavioral (Acute Care)  Goal: Absence of Harm or Injury  Outcome: Ongoing, Progressing  Intervention: Protect Skin and Joint Integrity  Recent Flowsheet Documentation  Taken 7/15/2022 2000 by Viviana Warren RN  Body Position:   left   turned     Problem: Violence Risk or Actual  Goal: Anger and Impulse Control  Outcome: Ongoing, Progressing     Problem: Suicide Risk  Goal: Absence of Self-Harm  Outcome: Ongoing, Progressing   Goal Outcome Evaluation:    Plan of Care Reviewed With: patient     Overall Patient Progress: no change

## 2022-07-16 NOTE — PROGRESS NOTES
CLINICAL NUTRITION SERVICES - REASSESSMENT NOTE      RECOMMENDATIONS FOR MD/PROVIDER TO ORDER:   None     Recommendations Ordered by Registered Dietitian (RD):   None     Future/Additional Recommendations:   None at this time as pt's family is leaning towards hospice     Malnutrition:   Moderate in the context of acute on chronic illness (please carry forward if malnutrition diagnosed)         EVALUATION OF PROGRESS TOWARD GOALS   Diet:  NPO, except meds    Nutrition Support:  none    Intake/Tolerance:  Poor      ASSESSED NUTRITION NEEDS:  Dosing Weight:  68 kg     ASSESSED NUTRITION NEEDS  Estimated Energy Needs: 7465-0970 kcals/day (25 - 30 kcals/kg)  Justification: Maintenance  Estimated Protein Needs:  grams protein/day (1.2 - 1.5 grams of pro/kg)  Justification: Increased needs  Estimated Fluid Needs: 0465-6454 mL/day (25 - 30 mL/kg)   Justification: Maintenance      NEW FINDINGS:   Pt family leaning towards hospice    Wt Readings from Last 3 Encounters:   07/15/22 75.5 kg (166 lb 8 oz)   06/25/22 70.3 kg (155 lb)   04/27/22 71.7 kg (158 lb)     Net fluid up 1.9L (4.2lbs) since admit per I/Os    Labs: Un 30, Cr 0.69, hgb 7.8, hematocrit 25.8, rbc 2.37    Meds: oscal, ancef, synthroid/levothroid, cholecalciferol, D5     Previous Goals:   Advance to p.o diet vs alternative nutrition support pending GOC in the next 24-48 hr  Meet nutrition needs  Maintain weight  Evaluation: Not met    Previous Nutrition Diagnosis:   Inadequate oral intake related to dysphagia as evidenced by inability to tolerate sitting up to eat safely, s/s aspiration per SLP, NPO x 5 days  Evaluation: Declining      MALNUTRITION  % Weight Loss:  None noted  % Intake:  </= 50% for >/= 5 days (severe malnutrition)  Subcutaneous Fat Loss:  Upper arm region mild depletion  Muscle Loss:  Temporal region mild depletion, Clavicle bone region moderate depletion, Acromion bone region mild depletion, Anterior thigh region moderate depletion and  Posterior calf region mild depletion  Fluid Retention:  Mild 1+    Malnutrition Diagnosis: Moderate malnutrition  In Context of:  Acute illness or injury  Chronic illness or disease    CURRENT NUTRITION DIAGNOSIS  Malnutrition related to multiple fx and prostate cancer as evidenced by mild subcutaneous fat loss, mild to moderate muscle loss, mild fluid accumulation, and prolonged poor intake of < 50% for > 5 days    INTERVENTIONS  Recommendations / Nutrition Prescription  None at this time as pt's family is leaning towards hospice    Implementation  None at this time as pt's family is leaning towards hospice    Goals  None at this time as pt's family is leaning towards hospice      MONITORING AND EVALUATION:  Progress towards goals will be monitored and evaluated per protocol and Practice Guidelines, Diet Order, Food intake, Fluid/beverage intake, Enteral access, Parenteral access, Vitamin intake, Weight, Biochemical data and Nutrition-focused physical findings

## 2022-07-16 NOTE — PROGRESS NOTES
Daily Progress Note        CODE STATUS:  No CPR- Do NOT Intubate    07/12/22  Assessment/Plan:  Seng Deshpande is a 89 YO man with hx of syncope, paroxysmal atrial fibrillation, SSS s/p PPM, NSVT, LBBB, CAD, TIA/CVA, HTN, HLD and prostrate cancer who was BIBEMS after being found down following mechanical fall.      Fall, Mechanical  - Patient very clear on the fact that he tripped going down the stairs of his basement. He was found by EMS.   - Appears based on his history that it was mechanical and he was fully aware.   - PT/OT     Unstable T11-T12 fracture, L1 compression fracture, C2 fracture  - MRI done on 7/13: C2 fracture is occult. No fracture line visible on this MRI. Diastatic fracture extending to the T12 vertebral body with degree of diastasis similar to prior. Moderate prevertebral edema and/or contusion with edema T9-T12. No significant retropulsion T11-T12.   - Neurosurgery consulted  - Optimize pain control  - Cervical and TLSO brace.  - Family decided not to go ahead with surgery. I discussed with Guadalupe and her ; and separately with Javon (nephew) about the care plan today  They are leaning towards hospice. They are hoping that with the soft neck collar, he would be able to tolerate at least some food/water. No TPN for now. They are hopeful that the patient can go to [facility] with hospice services. That's where his wife lives currently.   - We will stop the meds not aiding with comfort once hospice is finalized.     Acute Hypoxic Respiratory Failure - resolved  Pulmonary infiltrates likely 2/2 aspiration ISO epistaxis  Small pneumothorax  Mildly displaced 4-6 rib fractures on the right  - Off supplemental O2 and saturating ok.  - CT chest 7/9: Multifocal groundglass and tree-in-bud opacities in both lungs, likely infection and/or aspiration. Trace bilateral pleural effusions.  - Seen by trauma for pneumothorax; see note. No specific interventions indicated, continue  IS.     Dysphagia  Aspiration risk  - NPO except meds with crushed in applesauce recommended by speech on 7/10. Failed swallow study 7/13  - Cont with maintenance IVF    Paroxysmal Atrial Fibrillation  Hx of Sick Sinus Syndrome s/p PPM  Hx of NSVT  - Rates now  wnl. Episode of rapid afib resolved with hydration.  - Continue sotalol 80 mg q12h & Toprol Xl 25 mg daily. Monitor & replete lytes prn.   - Continue holding eliquis   - Telemetry stopped.     Delirium  - Continue 1:1 as needed. Optimize pain control.   - U/A looks abnormal. Treated with 3 days of antibiotics.   - Psych consult appreciated     Mildly Displaced Nasal Bone fracture & septal fracture   - ENT consult is pending.     Dehydration  Hypernatremia  Hypokalemia  - Sodium was slowly creeping up, was 149 on 7/13/22. Due to volume depletion as he is NPO. Changed IVF to D5NS. Sodium is down to 144-->140 today  - Replace potassium    Chronic Anemia  - Anemia does not appear to be acute, he seems to be at his baseline which in the last year is about ~9  - Continue to monitor  - Follow up iron studies ordered on admission      Hx of CAD  Hx of TIA/CVA  HLD  - Continue Lipitor 40 mg daily. Holding ASA 81 mg daily.      Hypothyroidism  - His TSH was just checked 2 weeks ago and wnl   - Continue home synthroid      HTN  - Blood pressure readings acceptable.       Prostate cancer  - Dutasteride 0.5 mg daily      Urinary retention:  - Requiring frequent straight caths. Will insert garcia for comfort.     Code status:  - Discussed with Ron. Confirmed DNR/DNI.     DVT Prophylaxis: SCDs      Subjective:  Interval History: On 1:1. Patient remains confused. When asked how he is doing, he answers by telling his home address.     Discussed with pall care team.    Review of Systems:   As mentioned in subjective.    Patient Active Problem List   Diagnosis     Prostate cancer (H)     Paroxysmal atrial fibrillation with RVR (H)     SA node dysfunction (H)     LBBB (left  bundle branch block)     NSVT (nonsustained ventricular tachycardia) (H)     Coronary artery disease involving native coronary artery of native heart without angina pectoris     Cardiac pacemaker in situ     Paroxysmal atrial fibrillation (H)     Chronic atrial fibrillation (H)     Pneumothorax on right     Anticoagulated by anticoagulation treatment     Fall, initial encounter     Compression fracture of T12 vertebra, initial encounter (H)     Closed nondisplaced fracture of second cervical vertebra, unspecified fracture morphology, initial encounter (H)     Multiple fractures of ribs, right side, initial encounter for closed fracture       Scheduled Meds:    [Held by provider] apixaban ANTICOAGULANT  5 mg Oral BID     aspirin  81 mg Oral Daily     atorvastatin  40 mg Oral At Bedtime     calcium carbonate 500 mg (elemental)  1 tablet Oral Daily     ceFAZolin  1 g Intravenous Q8H     dutasteride  0.5 mg Oral Daily     enoxaparin ANTICOAGULANT  40 mg Subcutaneous BID 09 12     levothyroxine  50 mcg Oral Daily     lidocaine  1 patch Transdermal Q24H     lidocaine   Transdermal Q8H SAURAV     metoprolol succinate ER  25 mg Oral Daily     polyethylene glycol  17 g Oral Daily     sodium chloride (PF)  3 mL Intracatheter Q8H     sotalol  80 mg Oral Q12H SAURAV (08/20)     Vitamin D3  1,000 Units Oral Daily     Continuous Infusions:    D5W 100 mL/hr at 07/16/22 1326     PRN Meds:.acetaminophen, bisacodyl, HYDROmorphone, lidocaine 4%, lidocaine (buffered or not buffered), melatonin, metoprolol, naloxone **OR** naloxone **OR** naloxone **OR** naloxone, OLANZapine, sodium chloride (PF)    Objective:  Vital signs in last 24 hours:  Temp:  [97.3  F (36.3  C)-98  F (36.7  C)] 98  F (36.7  C)  Pulse:  [] 122  Resp:  [19-20] 20  BP: (117-146)/(65-71) 117/71  SpO2:  [95 %-98 %] 95 %        Intake/Output Summary (Last 24 hours) at 7/12/2022 1447  Last data filed at 7/12/2022 1040  Gross per 24 hour   Intake --   Output 0 ml   Net 0  ml       Physical Exam:    General: Not in obvious distress.  HEENT: Neck collar in place   Chest: Clear to auscultation bilaterally  Heart: S1S2 normal, irregular. No M/R/G  Abdomen: Soft. NT, ND. Bowel sounds- active.  Extremities: No legs swelling  Neuro: Lethargic. Follows simple commands. Moves all extremities.       Lab Results:(I have personally reviewed the results)    Recent Results (from the past 24 hour(s))   Basic metabolic panel    Collection Time: 07/16/22  4:57 AM   Result Value Ref Range    Sodium 140 136 - 145 mmol/L    Potassium 3.7 3.5 - 5.0 mmol/L    Chloride 112 (H) 98 - 107 mmol/L    Carbon Dioxide (CO2) 23 22 - 31 mmol/L    Anion Gap 5 5 - 18 mmol/L    Urea Nitrogen 30 (H) 8 - 28 mg/dL    Creatinine 0.69 (L) 0.70 - 1.30 mg/dL    Calcium 8.4 (L) 8.5 - 10.5 mg/dL    Glucose 151 (H) 70 - 125 mg/dL    GFR Estimate 85 >60 mL/min/1.73m2   Magnesium    Collection Time: 07/16/22  4:57 AM   Result Value Ref Range    Magnesium 2.1 1.8 - 2.6 mg/dL   CBC with platelets and differential    Collection Time: 07/16/22  4:57 AM   Result Value Ref Range    WBC Count 9.2 4.0 - 11.0 10e3/uL    RBC Count 2.37 (L) 4.40 - 5.90 10e6/uL    Hemoglobin 7.8 (L) 13.3 - 17.7 g/dL    Hematocrit 25.8 (L) 40.0 - 53.0 %     (H) 78 - 100 fL    MCH 32.9 26.5 - 33.0 pg    MCHC 30.2 (L) 31.5 - 36.5 g/dL    RDW 16.7 (H) 10.0 - 15.0 %    Platelet Count 146 (L) 150 - 450 10e3/uL   Manual Differential    Collection Time: 07/16/22  4:57 AM   Result Value Ref Range    % Neutrophils 73 %    % Lymphocytes 8 %    % Monocytes 18 %    % Eosinophils 1 %    % Basophils 0 %    Absolute Neutrophils 6.7 1.6 - 8.3 10e3/uL    Absolute Lymphocytes 0.7 (L) 0.8 - 5.3 10e3/uL    Absolute Monocytes 1.7 (H) 0.0 - 1.3 10e3/uL    Absolute Eosinophils 0.1 0.0 - 0.7 10e3/uL    Absolute Basophils 0.0 0.0 - 0.2 10e3/uL    RBC Morphology Confirmed RBC Indices     Platelet Assessment  Automated Count Confirmed. Platelet morphology is normal.     Automated  Count Confirmed. Platelet morphology is normal.    Elliptocytes Slight (A) None Seen    Polychromasia Slight (A) None Seen       All laboratory and imaging data in the past 24 hours reviewed  Serum Glucose range:   Recent Labs   Lab 07/16/22  0457 07/15/22  0508 07/14/22  0409 07/13/22  0213   * 159* 114 125     ABG: No lab results found in last 7 days.  CBC:   Recent Labs   Lab 07/16/22  0457 07/15/22  0508 07/14/22  0409   WBC 9.2 10.6 9.6   HGB 7.8* 8.1* 8.1*   HCT 25.8* 26.0* 26.2*   * 109* 111*   * 139* 106*   NEUTROPHIL 73 81 69   LYMPH 8 14 6   MONOCYTE 18 4 23   EOSINOPHIL 1 1 2     Chemistry:   Recent Labs   Lab 07/16/22  0457 07/15/22  0508 07/14/22  0409 07/11/22  0932 07/10/22  0631    144 149*   < > 140   POTASSIUM 3.7 3.8 3.4*   < > 3.8   CHLORIDE 112* 115* 121*   < > 110*   CO2 23 22 21*   < > 23   BUN 30* 39* 35*   < > 29*   CR 0.69* 0.82 0.71   < > 0.73   GFRESTIMATED 85 80 84   < > 83   MAURI 8.4* 8.5 8.4*   < > 8.7   MAG 2.1 2.2 2.2   < >  --    PROTTOTAL  --   --   --   --  6.5   ALBUMIN  --   --   --   --  2.9*   AST  --   --   --   --  32   ALT  --   --   --   --  15   ALKPHOS  --   --   --   --  63   BILITOTAL  --   --   --   --  0.8    < > = values in this interval not displayed.     Coags:  Recent Labs   Lab 07/10/22  1346   INR 1.20*   PTT 28     Cardiac Markers:  No results for input(s): CKTOTAL, TROPONINI in the last 168 hours.       XR Chest 2 Views    Result Date: 7/12/2022  EXAM: XR CHEST 2 VW LOCATION: [facility] DATE/TIME: 7/12/2022 1:43 PM INDICATION: PRE MRI cardiac device check. COMPARISON: 07/09/2022.     IMPRESSION: Left subclavian approach dual-chamber pacer with lead tips over the expected region of the right atrium and right ventricle. Pacer leads appear grossly intact. Increased diffuse bilateral opacities, along with suspicion for septal thickening, suggesting pulmonary edema. Small pleural effusions. Mild-moderate cardiac  silhouette enlargement. Sternotomy. Atherosclerosis.     XR Chest 1 View    Result Date: 2022  EXAM: XR CHEST 1 VIEW LOCATION: [facility] DATE/TIME: 2022 7:54 PM INDICATION: Syncope with bibasilar Rales. COMPARISON: 2022, 2016     IMPRESSION: Stable cardiac enlargement with stable pulmonary vascular congestion. Mild interstitial opacities right lung base slightly increased since prior suggestive of increasing component of volume overload. No pulmonary infiltrates. No pneumothorax.  Calcified thoracic aorta. Sternotomy. Left-sided cardiac pacemaker.    Echocardiogram Complete    Result Date: 2022  [id number]^[email or web address]^MELE^[place] [address]  Name: LIOR STOVALL MRN: [id number] : 1926 Study Date: 2022 10:51 AM Age: 89 yrs Gender: Male Patient Location: [facility] Reason For Study: Atrial Fibrillation Ordering Physician: MELE ROBLEDO Referring Physician: CAROL WALLER Performed By: ACE  BSA: 1.8 m2 Height: 67 in Weight: 160 lb HR: 72 BP: 132/97 mmHg ______________________________________________________________________________ Procedure Complete Echo Adult. Definity (NDC #91115-715) given intravenously. 3mL given. ______________________________________________________________________________ Interpretation Summary  The visual ejection fraction is 45-50%. Septal wall motion abnormality may reflect pacemaker activation. Mid to distal septal hypokinesis Mildly decreased right ventricular systolic function The right ventricle is mild to moderately dilated. There is mild (1+) mitral regurgitation. There is moderate (2+) tricuspid regurgitation. ______________________________________________________________________________ Left Ventricle The left ventricle is normal in size. There is normal left ventricular wall thickness. The visual ejection fraction is 45-50%. Septal wall  motion abnormality may reflect pacemaker activation. Mid to distal septal hypokinesis.  Right Ventricle The right ventricle is mild to moderately dilated. TAPSE is abnormal, which is consistent with abnormal right ventricular systolic function. Mildly decreased right ventricular systolic function. There is a pacemaker lead in the right ventricle.  Atria The left atrium is severely dilated. Right atrium not well visualized. Intact atrial septum.  Mitral Valve The mitral valve leaflets are mildly thickened. There is mild (1+) mitral regurgitation.  Tricuspid Valve Tricuspid valve leaflets appear normal. There is moderate (2+) tricuspid regurgitation. The right ventricular systolic pressure is approximated at 27mmHg plus the right atrial pressure.  Aortic Valve The aortic valve is trileaflet with aortic valve sclerosis. There is mild (1+) aortic regurgitation. No aortic stenosis is present.  Pulmonic Valve The pulmonic valve is not well seen, but is grossly normal. There is trace pulmonic valvular regurgitation.  Vessels Mild aortic root enlargement. Normal size ascending aorta. Inferior vena cava not well visualized for estimation of right atrial pressure.  Pericardium There is no pericardial effusion.  ______________________________________________________________________________ MMode/2D Measurements & Calculations IVSd: 0.89 cm LVIDd: 5.0 cm LVIDs: 4.1 cm LVPWd: 1.00 cm FS: 18.0 % LV mass(C)d: 167.8 grams LV mass(C)dI: 91.3 grams/m2  Ao root diam: 3.9 cm LA dimension: 5.2 cm LA/Ao: 1.3 LVOT diam: 2.1 cm LVOT area: 3.3 cm2 LA Volume Indexed (AL/bp): 63.5 ml/m2 RWT: 0.40  Time Measurements MM HR: 74.0 BPM  Doppler Measurements & Calculations MV E max fransisca: 91.7 cm/sec MV A max fransisca: 72.3 cm/sec MV E/A: 1.3  MV dec slope: 619.9 cm/sec2 MV dec time: 0.15 sec Ao V2 max: 155.5 cm/sec Ao max PG: 10.0 mmHg Ao V2 mean: 108.1 cm/sec Ao mean P.4 mmHg Ao V2 VTI: 32.7 cm LUCIEN(I,D): 2.1 cm2 LUCIEN(V,D): 2.0 cm2 AI P1/2t: 491.5  msec LV V1 max PG: 3.5 mmHg LV V1 max: 93.6 cm/sec LV V1 VTI: 20.8 cm SV(LVOT): 69.0 ml SI(LVOT): 37.5 ml/m2 PA acc time: 0.08 sec TR max willie: 259.4 cm/sec TR max P.9 mmHg AV Willie Ratio (DI): 0.60 LUCIEN Index (cm2/m2): 1.1 E/E': 11.6 E/E' av.4 Lateral E/e': 11.6 Medial E/e': 17.2 Peak E' Willie: 7.9 cm/sec  ______________________________________________________________________________ Report approved by: Frantz Vergara 2022 01:23 PM       XR Chest Port 1 View    Result Date: 2022  EXAM: XR CHEST PORT 1 VIEW LOCATION: [facility] DATE/TIME: 2022 1:11 PM INDICATION: fall COMPARISON: 2022     IMPRESSION: Interval development of displaced fractures of the right posterior fourth, fifth, and sixth ribs, age indeterminate and possibly acute or subacute. Correlate with tenderness. There are additional old healed right posterior rib fractures. No pleural effusion or pneumothorax. Worsening of multifocal patchy opacities in both lungs, either due to pulmonary edema or pneumonia. Stable mild cardiomegaly. Median sternotomy and mediastinal surgical clips. Pacemaker.    Cardiac Device Check - Remote    Result Date: 2022  Type: routine remote pacemaker transmission. Presenting rhythm: normal sinus and AP-VS rate 87 bpm. Frequent PACs and PVCs. Battery/lead status: stable Arrhythmias: since [place] Consult done 22, two new AT/AF, both <1 minute. No ventricular high rate episodes detected. Anticoagulant: apixaban Comments: normal magnet and pacemaker function.  TIM Black, Device Specialist I have reviewed and interpreted the device interrogation, settings, programming, and encounter summary. The device is functioning within normal device parameters. I agree with the current findings, assessment and plan.    CTA Neck with Contrast    Result Date: 2022  EXAM: CTA NECK WITH CONTRAST LOCATION: [facility] DATE/TIME: 2022 2:50 PM INDICATION: C2  fracture, pain recent fall. COMPARISON: Head CT from today. CT facial bones from today. CT cervical spine from today. CONTRAST: Isovue 370 75 ml. TECHNIQUE: Neck CT angiogram with IV contrast. Axial helical CT images of the neck vessels were obtained during the arterial phase of intravenous contrast administration. Axial helical 2D reconstructed images and multiplanar 3D MIP reconstructed images of the neck vessels were performed by the technologist. Dose reduction techniques were used. All stenosis measurements made according to NASCET criteria unless otherwise specified. FINDINGS: Intracranial segments demonstrate moderate diffuse middle cerebral stenoses bilaterally. RIGHT CAROTID: Atherosclerotic plaque results in 50-70% stenosis in the right ICA. No dissection. LEFT CAROTID: Atherosclerotic plaque results in less than 50% stenosis in the left ICA. No dissection. VERTEBRAL ARTERIES: No focal stenosis or dissection. Balanced vertebral arteries. No evidence of vertebral occlusion or dissection related to the nondisplaced left C2 fracture. There are moderate bilateral focal stenoses present in the fourth segment distal vertebral arteries. Basilar artery is unremarkable. Moderate stenoses noted diffusely in the posterior cerebral arteries. AORTIC ARCH: No significant stenosis at the great vessel origins. The left vertebral artery originates from the arch, normal variant. NONVASCULAR STRUCTURES: Small right apical pneumothorax. This was noted on the CT cervical spine from today. The known nondisplaced left lateral mass fracture at C2 is less well-visualized given differences in technique. There is fracture noted of the right nasal bone, age-indeterminate. Correlate clinically.     IMPRESSION: 1.  No evidence of vertebral occlusion or dissection from the nondisplaced left lateral mass fracture C2 level. No hemodynamically significant extracranial stenosis and no evidence of dissection. 2.  Numerous intracranial  stenoses throughout the anterior and posterior circulation. 3.  Small right apical pneumothorax. Findings phoned to referring provider at 7/9/2022 4:00 PM.     Cervical spine CT w/o contrast    Result Date: 7/9/2022  EXAM: CT FACIAL BONES WITHOUT CONTRAST, CT CERVICAL SPINE W/O CONTRAST LOCATION: [facility] DATE/TIME: 7/9/2022 1:01 PM INDICATION: trauma, injury. COMPARISON: Head CT 06/21/2022 TECHNIQUE: 1) Routine CT Facial Bones without IV contrast. Multiplanar reformats. Dose reduction techniques were used. 2) Routine CT Cervical Spine without IV contrast. Multiplanar reformats. Dose reduction techniques were used. FINDINGS: FACIAL BONE CT: OSSEOUS STRUCTURES/SOFT TISSUES: Small soft tissue swelling over the nose. Mildly displaced nasal bone fractures. Nondisplaced fracture of the anterior nasal septum. The walls of the orbits are intact. No zygomatic arch fractures. The walls of the maxillary sinuses are intact. No zygomatic arch fractures. The pterygoid plates are intact. No mandibular fractures. The temporomandibular joints are intact. Mild degenerative changes of the temporomandibular joints. Poor dentition. Periapical lucencies involving teeth #12, and 26. ORBITAL CONTENTS: Prior bilateral cataract surgery. Visualized portions of the orbits are otherwise unremarkable. SINUSES: Moderate opacification of the ethmoid air cells. CERVICAL SPINE CT: VERTEBRA: 2 mm retrolisthesis of C4 on C5. 1 mm spondylolisthesis of C7 on T1. 2 mm spondylolisthesis of T1 on T2. Age indeterminate 40-50% compression fracture of T1. 21 degrees of segmental kyphosis from C7 to T2. The rest of the vertebral body heights  are maintained. Nondisplaced fracture involving the left lateral mass of C2 with probable extension into the left C2 transverse foramen. Craniocervical junction is within normal limits. No prevertebral soft tissue edema. CANAL/FORAMINA: Moderate intervertebral disc height loss C4-C5 to C6-C7.  No high-grade spinal canal narrowing. Moderate left neuroforaminal narrowing at C3-C4. Moderate right neuroforaminal narrowing at C4-C5. Mild bilateral neuroforaminal narrowing at C5-C6. PARASPINAL: Mild symmetric atrophy of the posterior paraspinal musculature. Multinodular thyroid gland. Moderate atherosclerotic calcification of the carotid bulbs. Small right apical pneumothorax.     IMPRESSION: FACIAL BONE CT: 1.  Small soft tissue swelling over the nose. 2.  Mildly displaced nasal bone fractures. Nondisplaced fracture of the anterior nasal septum. 3.  Poor dentition. Periapical lucencies involving teeth #12, and 26, suspicious for odontogenic disease. CERVICAL SPINE CT: 1.  Nondisplaced fracture involving the left lateral mass of C2 with probable extension into the left C2 transverse foramen. Recommend obtaining CT angiogram to exclude vascular injury. 2.  Age indeterminate 40-50% compression fracture of T1. 21 degrees of segmental kyphosis from C7 to T2. 3.  Small right apical pneumothorax. Discussed the findings with Dr. Burroughs at 1:52 PM, 07/09/2022.    Chest CT w/o contrast    Result Date: 7/9/2022  EXAM: CT CHEST W/O CONTRAST LOCATION: [facility] DATE/TIME: 7/9/2022 3:01 PM INDICATION: Fall.  Pneumothorax seen on CT neck COMPARISON: None. TECHNIQUE: CT chest without IV contrast. Multiplanar reformats were obtained. Dose reduction techniques were used. CONTRAST: None. FINDINGS: LUNGS AND PLEURA: Tiny right apical pneumothorax as seen on the prior C-spine CT. Patchy groundglass and tree-in-bud opacities within both lower lobes and the lingula. Trace bilateral pleural fluid. MEDIASTINUM/AXILLAE: Coarse calcified nodules. No aortic aneurysm. Left-sided pacemaker. CORONARY ARTERY CALCIFICATION: Previous intervention (stents or CABG). UPPER ABDOMEN: Calcified gallstones and heavily calcified distal vertebral arteries. MUSCULOSKELETAL: Osteopenia and degenerative disease in the spine.  Median sternotomy wires. Healed bilateral rib fractures. Mildly displaced acute right fourth-sixth rib fractures posteriorly     IMPRESSION: 1.  Tiny right apical pneumothorax, as seen on C-spine CT. 2.  Multifocal groundglass and tree-in-bud opacities in both lungs, likely infection and/or aspiration. Trace bilateral pleural effusions. 3.  Mildly displaced right fourth-sixth rib fractures.     Head CT w/o contrast    Result Date: 7/9/2022  EXAM: CT HEAD W/O CONTRAST LOCATION: [facility] DATE/TIME: 7/9/2022 1:00 PM INDICATION: trauma anticoagulated COMPARISON: None. TECHNIQUE: Routine CT Head without IV contrast. Multiplanar reformats. Dose reduction techniques were used. FINDINGS: INTRACRANIAL CONTENTS: No evidence of acute intracranial hemorrhage or mass effect. Scattered foci of decreased attenuation within the cerebral hemispheric white matter which are nonspecific, though most commonly ascribed to chronic small vessel ischemic  disease. 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 Scattered foci of decreased attenuation within the cerebral hemispheric white matter which are nonspecific, though most commonly ascribed to chronic small vessel ischemic disease. The basilar cisterns are patent. VISUALIZED ORBITS/SINUSES/MASTOIDS: Lateral cataract surgery. The partially imaged globes are otherwise unremarkable. The partially imaged paranasal sinuses, mastoid air cells and middle ear cavities are unremarkable. BONES/SOFT TISSUES: The visualized skull base and calvarium are unremarkable.     IMPRESSION:  1.  No evidence of acute intracranial hemorrhage or mass effect. 2.  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 3.  Moderate nonspecific white matter changes. 4.  Moderate brain parenchymal volume loss.    CT Head w/o Contrast    Result Date: 6/21/2022  EXAM: CT HEAD W/O CONTRAST LOCATION: [facility] DATE/TIME: 6/21/2022 8:17 PM INDICATION: Syncope, simple,  normal neuro exam. COMPARISON: Head CT 3/1/2022 TECHNIQUE: Routine CT Head without IV contrast. Multiplanar reformats. Dose reduction techniques were used. FINDINGS: INTRACRANIAL CONTENTS: No intracranial hemorrhage, extraaxial collection, or mass effect.  No CT evidence of acute infarct. Severe presumed chronic small vessel ischemic changes. Multiple old periventricular infarcts. Left cerebellar infarcts. Moderate generalized volume loss. No hydrocephalus. VISUALIZED ORBITS/SINUSES/MASTOIDS: Prior bilateral cataract surgery. Visualized portions of the orbits are otherwise unremarkable. No paranasal sinus mucosal disease. No middle ear or mastoid effusion. BONES/SOFT TISSUES: No acute abnormality.     IMPRESSION: 1.  No CT evidence of acute intracranial abnormality. 2.  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    Lumbar spine CT w/o contrast    Result Date: 7/9/2022  EXAM: CT LUMBAR SPINE W/O CONTRAST LOCATION: [facility] DATE/TIME: 7/9/2022 3:01 PM INDICATION: Trauma, pain. COMPARISON: CT thoracic spine from today. TECHNIQUE: Routine CT Lumbar Spine without IV contrast. Multiplanar reformats. Dose reduction techniques were used. FINDINGS: VERTEBRA: 5 lumbar type vertebral bodies. T12 fracture with fracture through the anterior bridging osteophyte T11-T12 level, detailed in dedicated CT thoracic spine. There is moderate to moderately severe compression fracture deformity of L1 with loss of  vertebral body height 50-60%. 1 mm osseous retropulsion. While this is age-indeterminate, it may be chronic given the degree of anterior marginal osteophytes bridging at T12-L1 level. Additionally, there is no evidence of significant paraspinous hematoma adjacent to the L1 vertebral body. There is mild kyphosis at the thoracolumbar junction. 2 mm retrolisthesis L2 on L3 with otherwise anatomic sagittal and coronal alignment. Other lumbar vertebral bodies are preserved in height. There is a displaced fragment anterior to  the inferior endplate of L2, but this demonstrates well-defined, sclerotic margins and is likely chronic. No other convincing evidence of acute lumbar fracture. There is early osseous bridging across the anterior aspects of  the sacroiliac joints. CANAL/FORAMINA: No high-grade central canal or foraminal stenosis. Mild canal stenosis L4-L5. PARASPINAL: Extensive arterial vascular calcification. Small bilateral pleural effusions. Colonic diverticulosis. Distended urinary bladder incompletely visualized.     IMPRESSION: 1.  T12 fracture detailed on dedicated CT thoracic spine. 2.  Moderate to moderately severe compression fracture deformity of L1. While age-indeterminate, it may be chronic given the degree of bulky anterior marginal osteophytes bridging at T12-L1 level and the lack of significant paraspinous hematoma adjacent to the L1 vertebral body. 3.  Chronic appearing displaced fragment anterior to the inferior endplate of L2. 4.  No convincing evidence of acute lumbar fracture or posttraumatic subluxation.    CT Facial Bones without Contrast    Result Date: 7/9/2022  EXAM: CT FACIAL BONES WITHOUT CONTRAST, CT CERVICAL SPINE W/O CONTRAST LOCATION: [facility] DATE/TIME: 7/9/2022 1:01 PM INDICATION: trauma, injury. COMPARISON: Head CT 06/21/2022 TECHNIQUE: 1) Routine CT Facial Bones without IV contrast. Multiplanar reformats. Dose reduction techniques were used. 2) Routine CT Cervical Spine without IV contrast. Multiplanar reformats. Dose reduction techniques were used. FINDINGS: FACIAL BONE CT: OSSEOUS STRUCTURES/SOFT TISSUES: Small soft tissue swelling over the nose. Mildly displaced nasal bone fractures. Nondisplaced fracture of the anterior nasal septum. The walls of the orbits are intact. No zygomatic arch fractures. The walls of the maxillary sinuses are intact. No zygomatic arch fractures. The pterygoid plates are intact. No mandibular fractures. The temporomandibular joints are  intact. Mild degenerative changes of the temporomandibular joints. Poor dentition. Periapical lucencies involving teeth #12, and 26. ORBITAL CONTENTS: Prior bilateral cataract surgery. Visualized portions of the orbits are otherwise unremarkable. SINUSES: Moderate opacification of the ethmoid air cells. CERVICAL SPINE CT: VERTEBRA: 2 mm retrolisthesis of C4 on C5. 1 mm spondylolisthesis of C7 on T1. 2 mm spondylolisthesis of T1 on T2. Age indeterminate 40-50% compression fracture of T1. 21 degrees of segmental kyphosis from C7 to T2. The rest of the vertebral body heights  are maintained. Nondisplaced fracture involving the left lateral mass of C2 with probable extension into the left C2 transverse foramen. Craniocervical junction is within normal limits. No prevertebral soft tissue edema. CANAL/FORAMINA: Moderate intervertebral disc height loss C4-C5 to C6-C7. No high-grade spinal canal narrowing. Moderate left neuroforaminal narrowing at C3-C4. Moderate right neuroforaminal narrowing at C4-C5. Mild bilateral neuroforaminal narrowing at C5-C6. PARASPINAL: Mild symmetric atrophy of the posterior paraspinal musculature. Multinodular thyroid gland. Moderate atherosclerotic calcification of the carotid bulbs. Small right apical pneumothorax.     IMPRESSION: FACIAL BONE CT: 1.  Small soft tissue swelling over the nose. 2.  Mildly displaced nasal bone fractures. Nondisplaced fracture of the anterior nasal septum. 3.  Poor dentition. Periapical lucencies involving teeth #12, and 26, suspicious for odontogenic disease. CERVICAL SPINE CT: 1.  Nondisplaced fracture involving the left lateral mass of C2 with probable extension into the left C2 transverse foramen. Recommend obtaining CT angiogram to exclude vascular injury. 2.  Age indeterminate 40-50% compression fracture of T1. 21 degrees of segmental kyphosis from C7 to T2. 3.  Small right apical pneumothorax. Discussed the findings with Dr. Burroughs at 1:52 PM,  07/09/2022.    CT Thoracic Spine w/o Contrast    Result Date: 7/9/2022  EXAM: CT THORACIC SPINE W/O CONTRAST LOCATION: [facility] DATE/TIME: 7/9/2022 3:01 PM INDICATION: Trauma, pain. COMPARISON: CT cervical spine and CT lumbar spine from today. TECHNIQUE: Routine CT Thoracic Spine without IV contrast. Multiplanar reformats. Dose reduction techniques were used. FINDINGS:  topogram demonstrates multilead left subclavian pacer and median sternotomy wires. VERTEBRA: 12 rib-bearing thoracic vertebral segments with 5 lumbar type vertebral bodies. As discussed on CT cervical spine, there is age-indeterminate 40-50% compression fracture of T1 with associated 21 degrees segmental kyphosis C7-T2. Additionally, there is evidence of acute fracture through a bridging anterior marginal osteophyte T11-T12 level with fracture extending through the anterosuperior cortex of T12 and fracture lucency paralleling the superior endplate of T12, involving the anterior column and middle column T12 level. No definite extension into the pedicles or posterior elements at the T11 or T12 level. There is slight widening of the posterior facets T11-T12, relative to the adjacent posterior facets suggesting involvement of the posterior column. Mild associated amorphous anterior paraspinous hematoma. No convincing evidence of epidural hematoma, to limits of CT. Sagittal alignment is maintained. There are thin anterior syndesmophytes throughout the majority of the thoracic spine. Other thoracic vertebral bodies are preserved in height. There is lucency present through the anterior bridging osteophyte T5-T6 level, although this demonstrates sclerotic margins and is likely chronic. No other evidence of acute thoracic fracture. Numerous old appearing left posterior rib fractures. There are acute appearing displaced right-sided rib fractures involving right rib 4, 5, 6. CANAL/FORAMINA: No significant central canal stenosis.  There is moderate bilateral foraminal narrowing T11-T12 level. PARASPINAL: Mild paraspinous hematoma anterior to T11-T12. Extensive vascular calcification. There is extensive infiltrate involving the posterior aspects of the left lung that could reflect contusion and/or pneumonitis. Small bilateral pleural effusions.     IMPRESSION: 1.  Unstable fracture pattern involving T11-T12 level. Fracture is present through a bridging anterior marginal osteophyte T11-T12 level with fracture paralleling the superior endplate of T12. There is widening of the T11-T12 posterior facets bilaterally. This suggests involvement of the posterior column as well. Mild amorphous anterior paraspinous hematoma. 2.  Age-indeterminate compression fracture of T1, as described on the cervical spine from today. 3.  Not mentioned above is compression deformity of L1. See dedicated CT lumbar spine. 4.  Small right apical pneumothorax. Findings phoned to Dr. Sarwat Isidro at 7/9/2022 3:59 PM 5.  Contusion or pneumonitis involving the posterior left lung.       Latest radiology report personally reviewed.    Note created using dragon voice recognition software so sounds alike errors may have escaped editing.      07/16/2022   Joe Frye MD  Hospitalist, [facility]  Pager: [phone]

## 2022-07-16 NOTE — PLAN OF CARE
"  Problem: Pain (Orthopaedic Fracture)  Goal: Acceptable Pain Control  Outcome: Ongoing, Not Progressing   Goal Outcome Evaluation:      Pt c/o back pain, \"my back is killing me!\" Lidopatch on, Dilaudid IV Prn. T/R as tolerated.      Problem: Respiratory Compromise (Orthopaedic Fracture)  Goal: Effective Oxygenation and Ventilation  Outcome: Ongoing, Not Progressing  Intervention: Promote Airway Secretion Clearance  Recent Flowsheet Documentation  Taken 7/16/2022 0900 by Karolny Tavera RN  Cough And Deep Breathing: unable to perform   Maintaining on 2L NC. Difficulty handling secretions, coughing after pills, gurgling, suction prn. NPO. IVF.    Awaiting plan with family & palliative team. Swallow eval reassess tomorrow a.m.  Notified Dr. Frye that family is at bedside to discuss.    1:1 sitter for safety- pulling at lines & impulsive.    Per spine, changed to soft cervical collar.  Pt tolerating ok. Lemus placed for comfort & urinary retention.    Karolyn Tavera RN                  "

## 2022-07-16 NOTE — PROGRESS NOTES
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 Acute unstable fracture with widening of anterior body and involvement of posterior elements. Spoke with patient's nephew who reports they are not going to pursue surgery and are moving towards hospice. Given this - they would like to try a soft collar and sitting up with the TLSO to see if it helps with swallowing. They understand the risks associated.     Neurosurgery available for assistance if needed.   Plan:  1. Okay for soft cervical collar and HOB as tolerated to see if this helps Seng effectively swallow - essentially activity as tolerated for comfort   2. Okay to sit up with TLSO in place, could even attempt chair with TLSO in place as family has decided not to pursue surgery.  3. Neurosurgery will follow peripherally, please call for questions or concerns.       Neurosurgery Attending: Dr. Foster    S: Back is sore, not tolerating cervical collar or swallowing     O:  /71 (BP Location: Right arm)   Pulse (!) 122   Temp 98  F (36.7  C) (Oral)   Resp 20   Ht 1.753 m (5' 9\")   Wt 75.5 kg (166 lb 8 oz)   SpO2 95%   BMI 24.59 kg/m       General: Awake, confused, disoriented. Pulling at velcro on C Collar.   MCKNIGHT equally  Sedated somewhat today.       Mulu Romo, CNP  [facility] Neurosurgery  O: [phone]          "

## 2022-07-16 NOTE — PROGRESS NOTES
"Patient yelling out, Ripping off cardiac leads and nasal canula. Denies pain but continues to state \"i'm getting out of here\" and begins talking to people in his hallucinations. Increased work in breath. Appears to be air hungry but continues to yell at nurse and aid to take off 02. Will continue to re approach and updated MD.    Viviana Warren RN on 7/16/2022 at 1:57 AM    Continues to have increased episodes of weak coughing with no ability to expel secretions.  Turned and repositioned for comfort. Continues to refuse NC on face.   Viviana Warren RN on 7/16/2022 at 2:20 AM      Continues to cough with secretions audible but unable to expel. Pushes nurse away with yonker suction. Turned onto right side and encouraged to cough out secretions. Continues to pull at linen and incontinent products.   Viviana Warren RN on 7/16/2022 at 4:07 AM    "

## 2022-07-16 NOTE — CONSULTS
Hospice consult received. Called and spoke to pt's nephew, Javon. Set up a meeting on Monday, July 18th at 3 pm to discuss hospice services. During our call, Javon mentioned that the pt's spouse, Kamala, is currently residing in the memory care at [facility] and would prefer that if the pt is discharged, he goes there so that Kamala can visit him. Will continue that conversation on Monday.    Thank you for the referral!    Jill Schoenecker, RN  [facility]  [phone]

## 2022-07-17 NOTE — PLAN OF CARE
Problem: Plan of Care - These are the overarching goals to be used throughout the patient stay.    Goal: Optimal Comfort and Wellbeing  Outcome: Ongoing, Not Progressing   Goal Outcome Evaluation:    Plan of Care Reviewed With: patient     Overall Patient Progress: declining    Please see previous note. Pt continues to decline. 1:1 during shift  Viviana Warren RN on 7/17/2022 at 4:49 AM

## 2022-07-17 NOTE — PLAN OF CARE
"  Problem: Plan of Care - These are the overarching goals to be used throughout the patient stay.    Goal: Plan of Care Review/Shift Note  Description: Pain Control & Comfort, Therapy to start   Outcome: Ongoing, Progressing   Goal Outcome Evaluation:      Pt appears comfortable when lying still having periods of gurgling, tachypenic & tachycardic. When prompted about pain- pt states, \"my back is killing me.\"Notified MD to change from Dilaudid IV to Morphine IV prn with relief & breathing. Lido patch applied to back. Pt remains tachycardic, Hx Afib, Off Tele now. Pt moans & grimaces with any movement, soft cervical collar on, TLSO when up. Pt to have PT Eval this afternoon. Speech Eval today- Meds crushed in applesauce for now, No liquids as pt unable to swallow. Younker sx prn. Oral cares. LS- coarse, gurgling, unable to unable to have a productive cough.    As day progressed, pt declining. Notified / Uday- new orders to give Ativan IV & scop patch. MD to update Javon about status & if he wants to come visit tonight.    Await Hospice Consult tomorrow with family (nephew Javon)    Karolyn Tavera, RN                  "

## 2022-07-17 NOTE — PROGRESS NOTES
Pt continues to decline during the night. Increased rhonchi in all lobes of lungs. Minimul output in garcia of dark ricky urine. Begning of edema +1 in ankles and heels. Increased aggitation when coughing. Unable to cough fully or expel secretions. Tachy. Updated House MD.   Viviana Warren RN on 7/17/2022 at 4:21 AM

## 2022-07-17 NOTE — PLAN OF CARE
Problem: Plan of Care - These are the overarching goals to be used throughout the patient stay.    Goal: Plan of Care Review/Shift Note  Description: The Plan of Care Review/Shift note should be completed every shift.  The Outcome Evaluation is a brief statement about your assessment that the patient is improving, declining, or no change.  This information will be displayed automatically on your shift note.  Outcome: Ongoing, Not Progressing    Remained 1:1 sitter for confusion and impulsiveness.  Intermittently agitated, PRN dilaudid given x2 and PRN Olanzapine IM injection given once. Frequently coughing, gurgling, scheduled PO meds hold due to confusion, coughing, aspiration of sips of water. Will continue to monitor. Ariane Calhoun RN

## 2022-07-17 NOTE — PROGRESS NOTES
07/17/22 0900   Signing Clinician's Name / Credentials   Signing clinician's name / credentials Rachel Duggan MA CCC-SLP   Quick Adds   Rehab Discipline SLP   Quick Adds Interventions Speech Language Pathology   SLP - Dysphagia/Swallow   Minutes of Treatment 20 minutes   Treatment Detail Nursing called to report Neurosurgery further lifted restrictions to liberalize positioning options for swallowing; positioning continues to be limited by patient's pain/discomfort. Assessed at bedside with soft cervical collar loosened to allow for neutral chin posture. Patient had no direct s/s aspiration with meds crushed in puree but did have occasional throat clear. He had immediate, continous, wet and congested cough with small sips of thin liquid. Trialed one bite of jello following meds in puree to clear residue and this was effective with little coughing. He had delayed cough intermittently with ice chips. Ice chips appeared to moisten dried secretions and allowed for improved oral care. Patient report satisfaction and desire for ice chips. Patient appears to be at high aspiration risk with all intake and high risk of aspiration related complications given current medical status and length of onset. At this time, ice chips appear comfortable for patient; all over oral intake appears to cause significant discomfort given nature and persistence of cough following trials. Noted plan for hospice consult tomorrow. Consider continuing crucial meds crushed in puree with jello 'wash' only as needed to clear bad taste and residue. No water sips. Consider ice chips for patient's quality of life/comfort and for improved oral care. SLP to continue to follow but unfortunately our role is of limited benefit at this time given high risks.   Additional Documentation   SLP Plan retry further p.o. pending medical POC; hospice consult pending; oral intake appears not only unsafe but not comfortable or pleasuable with the exception of ice  chips.   Total Session Time   Total Session Time (minutes) 20 minutes

## 2022-07-17 NOTE — PROGRESS NOTES
07/17/22 1600   Appointment Canceled   Appointment Canceled Awaiting decisions on goals of care;Medical status   Cancel Comments unarousable, no evaluation completed. hospice consult tomorrow.       Will discontinue PT orders at this time as pt appears to be declining in medical status. Has hospice consult tomorrow. Should pt status change, please re-order as appropriate.

## 2022-07-17 NOTE — PROGRESS NOTES
Daily Progress Note        CODE STATUS:  No CPR- Do NOT Intubate    07/12/22  Assessment/Plan:  Seng Deshpande is a 89 YO man with hx of syncope, paroxysmal atrial fibrillation, SSS s/p PPM, NSVT, LBBB, CAD, TIA/CVA, HTN, HLD and prostrate cancer who was BIBEMS after being found down following mechanical fall.      Fall, Mechanical  - Patient very clear on the fact that he tripped going down the stairs of his basement. He was found by EMS.   - Appears based on his history that it was mechanical and he was fully aware.   - PT/OT     Unstable T11-T12 fracture, L1 compression fracture, C2 fracture  - MRI done on 7/13: C2 fracture is occult. No fracture line visible on this MRI. Diastatic fracture extending to the T12 vertebral body with degree of diastasis similar to prior. Moderate prevertebral edema and/or contusion with edema T9-T12. No significant retropulsion T11-T12.   - Neurosurgery consulted  - Optimize pain control  - Cervical and TLSO brace.  - Family decided not to go ahead with surgery. I discussed with Guadalupe and her ; and separately with Javon (nephbe) about the care plan today  They are leaning towards hospice. They are hoping that with the soft neck collar, he would be able to tolerate at least some food/water. No TPN for now. They are hopeful that the patient can go to [facility] with hospice services. That's where his wife lives currently.   - We will stop the meds not aiding with comfort once hospice is finalized. Hospice meeting scheduled for Monday 3pm.    Addendum: 3:14PM: Patient appear to be declining fast. I added scopolamine patch for oropharyngeal secretions and prn ativan for restlessness. I left a voice mail for nephew (Javon), and called patient's friend (Ron) updating them about his worsening status.    Acute Hypoxic Respiratory Failure - resolved  Pulmonary infiltrates likely 2/2 aspiration ISO epistaxis  Small pneumothorax  Mildly displaced 4-6 rib fractures on the  right  - CT chest 7/9: Multifocal groundglass and tree-in-bud opacities in both lungs, likely infection and/or aspiration. Trace bilateral pleural effusions.  - Seen by trauma for pneumothorax; see note. No specific interventions indicated, continue IS.     Dysphagia  Aspiration risk  - NPO except meds with crushed in applesauce recommended by speech on 7/10. Failed swallow study 7/13  - Stopped the maintenance IVF 7/17 as he seem to have some lung crepts    Paroxysmal Atrial Fibrillation  Hx of Sick Sinus Syndrome s/p PPM  Hx of NSVT  - Rates now  wnl. Episode of rapid afib resolved with hydration.  - Continue sotalol 80 mg q12h & Toprol Xl 25 mg daily. Monitor & replete lytes prn.   - Continue holding eliquis   - Telemetry stopped.     Delirium  - Continue 1:1 as needed. Optimize pain control.   - U/A looks abnormal. Treated with 3 days of antibiotics.   - Psych consult appreciated     Mildly Displaced Nasal Bone fracture & septal fracture   - ENT consult is pending.     Dehydration  Hypernatremia  Hypokalemia  - Sodium was slowly creeping up, was 149 on 7/13/22. Due to volume depletion as he is NPO. Corrected with D5NS. Sodium is 138 today  - Replace potassium    Chronic Anemia  - Anemia does not appear to be acute, he seems to be at his baseline which in the last year is about ~9  - Continue to monitor  - Follow up iron studies ordered on admission      Hx of CAD  Hx of TIA/CVA  HLD  - Continue Lipitor 40 mg daily. Holding ASA 81 mg daily.      Hypothyroidism  - His TSH was just checked 2 weeks ago and wnl   - Continue home synthroid      HTN  - Blood pressure readings acceptable.       Prostate cancer  - Dutasteride 0.5 mg daily      Urinary retention:  - Requiring frequent straight caths. Will insert garcia for comfort.     Code status:  - Discussed with Ron. Confirmed DNR/DNI.     DVT Prophylaxis: SCDs      Subjective:  Interval History: On 1:1. Patient remains confused. No acute issues overnight.  Breathing seems ok. Some weak wet cough noted while I was in the room.     Discussed with pall care team.    Review of Systems:   As mentioned in subjective.    Patient Active Problem List   Diagnosis     Prostate cancer (H)     Paroxysmal atrial fibrillation with RVR (H)     SA node dysfunction (H)     LBBB (left bundle branch block)     NSVT (nonsustained ventricular tachycardia) (H)     Coronary artery disease involving native coronary artery of native heart without angina pectoris     Cardiac pacemaker in situ     Paroxysmal atrial fibrillation (H)     Chronic atrial fibrillation (H)     Pneumothorax on right     Anticoagulated by anticoagulation treatment     Fall, initial encounter     Compression fracture of T12 vertebra, initial encounter (H)     Closed nondisplaced fracture of second cervical vertebra, unspecified fracture morphology, initial encounter (H)     Multiple fractures of ribs, right side, initial encounter for closed fracture       Scheduled Meds:    [Held by provider] apixaban ANTICOAGULANT  5 mg Oral BID     aspirin  81 mg Oral Daily     atorvastatin  40 mg Oral At Bedtime     calcium carbonate 500 mg (elemental)  1 tablet Oral Daily     dutasteride  0.5 mg Oral Daily     enoxaparin ANTICOAGULANT  40 mg Subcutaneous BID 09 12     levothyroxine  50 mcg Oral Daily     lidocaine  1 patch Transdermal Q24H     lidocaine   Transdermal Q8H SAURAV     metoprolol succinate ER  25 mg Oral Daily     polyethylene glycol  17 g Oral Daily     sodium chloride (PF)  3 mL Intracatheter Q8H     sotalol  80 mg Oral Q12H SAURAV (08/20)     Vitamin D3  1,000 Units Oral Daily     Continuous Infusions:    PRN Meds:.acetaminophen, bisacodyl, HYDROmorphone, lidocaine 4%, lidocaine (buffered or not buffered), melatonin, metoprolol, morphine, naloxone **OR** naloxone **OR** naloxone **OR** naloxone, OLANZapine, sodium chloride (PF)    Objective:  Vital signs in last 24 hours:  Temp:  [97.7  F (36.5  C)-98  F (36.7  C)] 97.7  F  (36.5  C)  Pulse:  [101-150] 131  Resp:  [22] 22  BP: (110-138)/(74-87) 138/86  SpO2:  [96 %-98 %] 98 %        Intake/Output Summary (Last 24 hours) at 7/12/2022 1447  Last data filed at 7/12/2022 1040  Gross per 24 hour   Intake --   Output 0 ml   Net 0 ml       Physical Exam:    General: Not in obvious distress.  HEENT: Neck collar in place   Chest: Clear to auscultation bilaterally  Heart: S1S2 normal, irregular. No M/R/G  Abdomen: Soft. NT, ND. Bowel sounds- active.  Extremities: No legs swelling  Neuro: Lethargic. Moves all extremities spontaneously.      Lab Results:(I have personally reviewed the results)    Recent Results (from the past 24 hour(s))   Basic metabolic panel    Collection Time: 07/17/22  5:45 AM   Result Value Ref Range    Sodium 138 136 - 145 mmol/L    Potassium 3.7 3.5 - 5.0 mmol/L    Chloride 108 (H) 98 - 107 mmol/L    Carbon Dioxide (CO2) 24 22 - 31 mmol/L    Anion Gap 6 5 - 18 mmol/L    Urea Nitrogen 25 8 - 28 mg/dL    Creatinine 0.72 0.70 - 1.30 mg/dL    Calcium 8.3 (L) 8.5 - 10.5 mg/dL    Glucose 126 (H) 70 - 125 mg/dL    GFR Estimate 84 >60 mL/min/1.73m2   Magnesium    Collection Time: 07/17/22  5:45 AM   Result Value Ref Range    Magnesium 2.1 1.8 - 2.6 mg/dL   CBC with platelets and differential    Collection Time: 07/17/22  5:45 AM   Result Value Ref Range    WBC Count 9.4 4.0 - 11.0 10e3/uL    RBC Count 2.30 (L) 4.40 - 5.90 10e6/uL    Hemoglobin 7.7 (L) 13.3 - 17.7 g/dL    Hematocrit 24.9 (L) 40.0 - 53.0 %     (H) 78 - 100 fL    MCH 33.5 (H) 26.5 - 33.0 pg    MCHC 30.9 (L) 31.5 - 36.5 g/dL    RDW 16.5 (H) 10.0 - 15.0 %    Platelet Count 166 150 - 450 10e3/uL   Manual Differential    Collection Time: 07/17/22  5:45 AM   Result Value Ref Range    % Neutrophils 69 %    % Lymphocytes 6 %    % Monocytes 23 %    % Eosinophils 2 %    % Basophils 0 %    Absolute Neutrophils 6.5 1.6 - 8.3 10e3/uL    Absolute Lymphocytes 0.6 (L) 0.8 - 5.3 10e3/uL    Absolute Monocytes 2.2 (H) 0.0 - 1.3  10e3/uL    Absolute Eosinophils 0.2 0.0 - 0.7 10e3/uL    Absolute Basophils 0.0 0.0 - 0.2 10e3/uL    RBC Morphology Confirmed RBC Indices     Platelet Assessment  Automated Count Confirmed. Platelet morphology is normal.     Automated Count Confirmed. Platelet morphology is normal.    Elliptocytes Slight (A) None Seen    Polychromasia Slight (A) None Seen       All laboratory and imaging data in the past 24 hours reviewed  Serum Glucose range:   Recent Labs   Lab 07/17/22  0545 07/16/22  0457 07/15/22  0508 07/14/22  0409   * 151* 159* 114     ABG: No lab results found in last 7 days.  CBC:   Recent Labs   Lab 07/17/22  0545 07/16/22  0457 07/15/22  0508   WBC 9.4 9.2 10.6   HGB 7.7* 7.8* 8.1*   HCT 24.9* 25.8* 26.0*   * 109* 109*    146* 139*   NEUTROPHIL 69 73 81   LYMPH 6 8 14   MONOCYTE 23 18 4   EOSINOPHIL 2 1 1     Chemistry:   Recent Labs   Lab 07/17/22  0545 07/16/22  0457 07/15/22  0508    140 144   POTASSIUM 3.7 3.7 3.8   CHLORIDE 108* 112* 115*   CO2 24 23 22   BUN 25 30* 39*   CR 0.72 0.69* 0.82   GFRESTIMATED 84 85 80   MAURI 8.3* 8.4* 8.5   MAG 2.1 2.1 2.2     Coags:  No results for input(s): INR, PROTIME, PTT in the last 168 hours.    Invalid input(s): APTT  Cardiac Markers:  No results for input(s): CKTOTAL, TROPONINI in the last 168 hours.       XR Chest 2 Views    Result Date: 7/12/2022  EXAM: XR CHEST 2 VW LOCATION: [facility] DATE/TIME: 7/12/2022 1:43 PM INDICATION: PRE MRI cardiac device check. COMPARISON: 07/09/2022.     IMPRESSION: Left subclavian approach dual-chamber pacer with lead tips over the expected region of the right atrium and right ventricle. Pacer leads appear grossly intact. Increased diffuse bilateral opacities, along with suspicion for septal thickening, suggesting pulmonary edema. Small pleural effusions. Mild-moderate cardiac silhouette enlargement. Sternotomy. Atherosclerosis.     XR Chest 1 View    Result Date:  2022  EXAM: XR CHEST 1 VIEW LOCATION: [facility] DATE/TIME: 2022 7:54 PM INDICATION: Syncope with bibasilar Rales. COMPARISON: 2022, 2016     IMPRESSION: Stable cardiac enlargement with stable pulmonary vascular congestion. Mild interstitial opacities right lung base slightly increased since prior suggestive of increasing component of volume overload. No pulmonary infiltrates. No pneumothorax.  Calcified thoracic aorta. Sternotomy. Left-sided cardiac pacemaker.    Echocardiogram Complete    Result Date: 2022  [id number]^MEENA^MELE^[place] [address]  Name: LIOR STOVALL MRN: [id number] : 1926 Study Date: 2022 10:51 AM Age: 89 yrs Gender: Male Patient Location: [facility] Reason For Study: Atrial Fibrillation Ordering Physician: MELE ROBLEDO Referring Physician: CAROL WALLER Performed By: ACE  BSA: 1.8 m2 Height: 67 in Weight: 160 lb HR: 72 BP: 132/97 mmHg ______________________________________________________________________________ Procedure Complete Echo Adult. Definity (NDC #89016-210) given intravenously. 3mL given. ______________________________________________________________________________ Interpretation Summary  The visual ejection fraction is 45-50%. Septal wall motion abnormality may reflect pacemaker activation. Mid to distal septal hypokinesis Mildly decreased right ventricular systolic function The right ventricle is mild to moderately dilated. There is mild (1+) mitral regurgitation. There is moderate (2+) tricuspid regurgitation. ______________________________________________________________________________ Left Ventricle The left ventricle is normal in size. There is normal left ventricular wall thickness. The visual ejection fraction is 45-50%. Septal wall motion abnormality may reflect pacemaker activation. Mid to distal septal hypokinesis.   Right Ventricle The right ventricle is mild to moderately dilated. TAPSE is abnormal, which is consistent with abnormal right ventricular systolic function. Mildly decreased right ventricular systolic function. There is a pacemaker lead in the right ventricle.  Atria The left atrium is severely dilated. Right atrium not well visualized. Intact atrial septum.  Mitral Valve The mitral valve leaflets are mildly thickened. There is mild (1+) mitral regurgitation.  Tricuspid Valve Tricuspid valve leaflets appear normal. There is moderate (2+) tricuspid regurgitation. The right ventricular systolic pressure is approximated at 27mmHg plus the right atrial pressure.  Aortic Valve The aortic valve is trileaflet with aortic valve sclerosis. There is mild (1+) aortic regurgitation. No aortic stenosis is present.  Pulmonic Valve The pulmonic valve is not well seen, but is grossly normal. There is trace pulmonic valvular regurgitation.  Vessels Mild aortic root enlargement. Normal size ascending aorta. Inferior vena cava not well visualized for estimation of right atrial pressure.  Pericardium There is no pericardial effusion.  ______________________________________________________________________________ MMode/2D Measurements & Calculations IVSd: 0.89 cm LVIDd: 5.0 cm LVIDs: 4.1 cm LVPWd: 1.00 cm FS: 18.0 % LV mass(C)d: 167.8 grams LV mass(C)dI: 91.3 grams/m2  Ao root diam: 3.9 cm LA dimension: 5.2 cm LA/Ao: 1.3 LVOT diam: 2.1 cm LVOT area: 3.3 cm2 LA Volume Indexed (AL/bp): 63.5 ml/m2 RWT: 0.40  Time Measurements MM HR: 74.0 BPM  Doppler Measurements & Calculations MV E max fransisca: 91.7 cm/sec MV A max fransisca: 72.3 cm/sec MV E/A: 1.3  MV dec slope: 619.9 cm/sec2 MV dec time: 0.15 sec Ao V2 max: 155.5 cm/sec Ao max PG: 10.0 mmHg Ao V2 mean: 108.1 cm/sec Ao mean P.4 mmHg Ao V2 VTI: 32.7 cm LUCIEN(I,D): 2.1 cm2 LUCIEN(V,D): 2.0 cm2 AI P1/2t: 491.5 msec LV V1 max PG: 3.5 mmHg LV V1 max: 93.6 cm/sec LV V1 VTI: 20.8 cm SV(LVOT): 69.0 ml  SI(LVOT): 37.5 ml/m2 PA acc time: 0.08 sec TR max willie: 259.4 cm/sec TR max P.9 mmHg AV Willie Ratio (DI): 0.60 LUCIEN Index (cm2/m2): 1.1 E/E': 11.6 E/E' av.4 Lateral E/e': 11.6 Medial E/e': 17.2 Peak E' Willie: 7.9 cm/sec  ______________________________________________________________________________ Report approved by: Frantz Vergara 2022 01:23 PM       XR Chest Port 1 View    Result Date: 2022  EXAM: XR CHEST PORT 1 VIEW LOCATION: [facility] DATE/TIME: 2022 1:11 PM INDICATION: fall COMPARISON: 2022     IMPRESSION: Interval development of displaced fractures of the right posterior fourth, fifth, and sixth ribs, age indeterminate and possibly acute or subacute. Correlate with tenderness. There are additional old healed right posterior rib fractures. No pleural effusion or pneumothorax. Worsening of multifocal patchy opacities in both lungs, either due to pulmonary edema or pneumonia. Stable mild cardiomegaly. Median sternotomy and mediastinal surgical clips. Pacemaker.    Cardiac Device Check - Remote    Result Date: 2022  Type: routine remote pacemaker transmission. Presenting rhythm: normal sinus and AP-VS rate 87 bpm. Frequent PACs and PVCs. Battery/lead status: stable Arrhythmias: since [place] Consult done 22, two new AT/AF, both <1 minute. No ventricular high rate episodes detected. Anticoagulant: apixaban Comments: normal magnet and pacemaker function.  TIM Black, Device Specialist I have reviewed and interpreted the device interrogation, settings, programming, and encounter summary. The device is functioning within normal device parameters. I agree with the current findings, assessment and plan.    CTA Neck with Contrast    Result Date: 2022  EXAM: CTA NECK WITH CONTRAST LOCATION: [facility] DATE/TIME: 2022 2:50 PM INDICATION: C2 fracture, pain recent fall. COMPARISON: Head CT from today. CT facial bones from today.  CT cervical spine from today. CONTRAST: Isovue 370 75 ml. TECHNIQUE: Neck CT angiogram with IV contrast. Axial helical CT images of the neck vessels were obtained during the arterial phase of intravenous contrast administration. Axial helical 2D reconstructed images and multiplanar 3D MIP reconstructed images of the neck vessels were performed by the technologist. Dose reduction techniques were used. All stenosis measurements made according to NASCET criteria unless otherwise specified. FINDINGS: Intracranial segments demonstrate moderate diffuse middle cerebral stenoses bilaterally. RIGHT CAROTID: Atherosclerotic plaque results in 50-70% stenosis in the right ICA. No dissection. LEFT CAROTID: Atherosclerotic plaque results in less than 50% stenosis in the left ICA. No dissection. VERTEBRAL ARTERIES: No focal stenosis or dissection. Balanced vertebral arteries. No evidence of vertebral occlusion or dissection related to the nondisplaced left C2 fracture. There are moderate bilateral focal stenoses present in the fourth segment distal vertebral arteries. Basilar artery is unremarkable. Moderate stenoses noted diffusely in the posterior cerebral arteries. AORTIC ARCH: No significant stenosis at the great vessel origins. The left vertebral artery originates from the arch, normal variant. NONVASCULAR STRUCTURES: Small right apical pneumothorax. This was noted on the CT cervical spine from today. The known nondisplaced left lateral mass fracture at C2 is less well-visualized given differences in technique. There is fracture noted of the right nasal bone, age-indeterminate. Correlate clinically.     IMPRESSION: 1.  No evidence of vertebral occlusion or dissection from the nondisplaced left lateral mass fracture C2 level. No hemodynamically significant extracranial stenosis and no evidence of dissection. 2.  Numerous intracranial stenoses throughout the anterior and posterior circulation. 3.  Small right apical  pneumothorax. Findings phoned to referring provider at 7/9/2022 4:00 PM.     Cervical spine CT w/o contrast    Result Date: 7/9/2022  EXAM: CT FACIAL BONES WITHOUT CONTRAST, CT CERVICAL SPINE W/O CONTRAST LOCATION: [facility] DATE/TIME: 7/9/2022 1:01 PM INDICATION: trauma, injury. COMPARISON: Head CT 06/21/2022 TECHNIQUE: 1) Routine CT Facial Bones without IV contrast. Multiplanar reformats. Dose reduction techniques were used. 2) Routine CT Cervical Spine without IV contrast. Multiplanar reformats. Dose reduction techniques were used. FINDINGS: FACIAL BONE CT: OSSEOUS STRUCTURES/SOFT TISSUES: Small soft tissue swelling over the nose. Mildly displaced nasal bone fractures. Nondisplaced fracture of the anterior nasal septum. The walls of the orbits are intact. No zygomatic arch fractures. The walls of the maxillary sinuses are intact. No zygomatic arch fractures. The pterygoid plates are intact. No mandibular fractures. The temporomandibular joints are intact. Mild degenerative changes of the temporomandibular joints. Poor dentition. Periapical lucencies involving teeth #12, and 26. ORBITAL CONTENTS: Prior bilateral cataract surgery. Visualized portions of the orbits are otherwise unremarkable. SINUSES: Moderate opacification of the ethmoid air cells. CERVICAL SPINE CT: VERTEBRA: 2 mm retrolisthesis of C4 on C5. 1 mm spondylolisthesis of C7 on T1. 2 mm spondylolisthesis of T1 on T2. Age indeterminate 40-50% compression fracture of T1. 21 degrees of segmental kyphosis from C7 to T2. The rest of the vertebral body heights  are maintained. Nondisplaced fracture involving the left lateral mass of C2 with probable extension into the left C2 transverse foramen. Craniocervical junction is within normal limits. No prevertebral soft tissue edema. CANAL/FORAMINA: Moderate intervertebral disc height loss C4-C5 to C6-C7. No high-grade spinal canal narrowing. Moderate left neuroforaminal narrowing at  C3-C4. Moderate right neuroforaminal narrowing at C4-C5. Mild bilateral neuroforaminal narrowing at C5-C6. PARASPINAL: Mild symmetric atrophy of the posterior paraspinal musculature. Multinodular thyroid gland. Moderate atherosclerotic calcification of the carotid bulbs. Small right apical pneumothorax.     IMPRESSION: FACIAL BONE CT: 1.  Small soft tissue swelling over the nose. 2.  Mildly displaced nasal bone fractures. Nondisplaced fracture of the anterior nasal septum. 3.  Poor dentition. Periapical lucencies involving teeth #12, and 26, suspicious for odontogenic disease. CERVICAL SPINE CT: 1.  Nondisplaced fracture involving the left lateral mass of C2 with probable extension into the left C2 transverse foramen. Recommend obtaining CT angiogram to exclude vascular injury. 2.  Age indeterminate 40-50% compression fracture of T1. 21 degrees of segmental kyphosis from C7 to T2. 3.  Small right apical pneumothorax. Discussed the findings with Dr. Burroughs at 1:52 PM, 07/09/2022.    Chest CT w/o contrast    Result Date: 7/9/2022  EXAM: CT CHEST W/O CONTRAST LOCATION: [facility] DATE/TIME: 7/9/2022 3:01 PM INDICATION: Fall.  Pneumothorax seen on CT neck COMPARISON: None. TECHNIQUE: CT chest without IV contrast. Multiplanar reformats were obtained. Dose reduction techniques were used. CONTRAST: None. FINDINGS: LUNGS AND PLEURA: Tiny right apical pneumothorax as seen on the prior C-spine CT. Patchy groundglass and tree-in-bud opacities within both lower lobes and the lingula. Trace bilateral pleural fluid. MEDIASTINUM/AXILLAE: Coarse calcified nodules. No aortic aneurysm. Left-sided pacemaker. CORONARY ARTERY CALCIFICATION: Previous intervention (stents or CABG). UPPER ABDOMEN: Calcified gallstones and heavily calcified distal vertebral arteries. MUSCULOSKELETAL: Osteopenia and degenerative disease in the spine. Median sternotomy wires. Healed bilateral rib fractures. Mildly displaced acute  right fourth-sixth rib fractures posteriorly     IMPRESSION: 1.  Tiny right apical pneumothorax, as seen on C-spine CT. 2.  Multifocal groundglass and tree-in-bud opacities in both lungs, likely infection and/or aspiration. Trace bilateral pleural effusions. 3.  Mildly displaced right fourth-sixth rib fractures.     Head CT w/o contrast    Result Date: 7/9/2022  EXAM: CT HEAD W/O CONTRAST LOCATION: [facility] [place] DATE/TIME: 7/9/2022 1:00 PM INDICATION: trauma anticoagulated COMPARISON: None. TECHNIQUE: Routine CT Head without IV contrast. Multiplanar reformats. Dose reduction techniques were used. FINDINGS: INTRACRANIAL CONTENTS: No evidence of acute intracranial hemorrhage or mass effect. Scattered foci of decreased attenuation within the cerebral hemispheric white matter which are nonspecific, though most commonly ascribed to chronic small vessel ischemic  disease. 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 Scattered foci of decreased attenuation within the cerebral hemispheric white matter which are nonspecific, though most commonly ascribed to chronic small vessel ischemic disease. The basilar cisterns are patent. VISUALIZED ORBITS/SINUSES/MASTOIDS: Lateral cataract surgery. The partially imaged globes are otherwise unremarkable. The partially imaged paranasal sinuses, mastoid air cells and middle ear cavities are unremarkable. BONES/SOFT TISSUES: The visualized skull base and calvarium are unremarkable.     IMPRESSION:  1.  No evidence of acute intracranial hemorrhage or mass effect. 2.  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 3.  Moderate nonspecific white matter changes. 4.  Moderate brain parenchymal volume loss.    CT Head w/o Contrast    Result Date: 6/21/2022  EXAM: CT HEAD W/O CONTRAST LOCATION: [facility] [place] DATE/TIME: 6/21/2022 8:17 PM INDICATION: Syncope, simple, normal neuro exam. COMPARISON: Head CT 3/1/2022 TECHNIQUE: Routine CT Head  without IV contrast. Multiplanar reformats. Dose reduction techniques were used. FINDINGS: INTRACRANIAL CONTENTS: No intracranial hemorrhage, extraaxial collection, or mass effect.  No CT evidence of acute infarct. Severe presumed chronic small vessel ischemic changes. Multiple old periventricular infarcts. Left cerebellar infarcts. Moderate generalized volume loss. No hydrocephalus. VISUALIZED ORBITS/SINUSES/MASTOIDS: Prior bilateral cataract surgery. Visualized portions of the orbits are otherwise unremarkable. No paranasal sinus mucosal disease. No middle ear or mastoid effusion. BONES/SOFT TISSUES: No acute abnormality.     IMPRESSION: 1.  No CT evidence of acute intracranial abnormality. 2.  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    Lumbar spine CT w/o contrast    Result Date: 7/9/2022  EXAM: CT LUMBAR SPINE W/O CONTRAST LOCATION: [facility] DATE/TIME: 7/9/2022 3:01 PM INDICATION: Trauma, pain. COMPARISON: CT thoracic spine from today. TECHNIQUE: Routine CT Lumbar Spine without IV contrast. Multiplanar reformats. Dose reduction techniques were used. FINDINGS: VERTEBRA: 5 lumbar type vertebral bodies. T12 fracture with fracture through the anterior bridging osteophyte T11-T12 level, detailed in dedicated CT thoracic spine. There is moderate to moderately severe compression fracture deformity of L1 with loss of  vertebral body height 50-60%. 1 mm osseous retropulsion. While this is age-indeterminate, it may be chronic given the degree of anterior marginal osteophytes bridging at T12-L1 level. Additionally, there is no evidence of significant paraspinous hematoma adjacent to the L1 vertebral body. There is mild kyphosis at the thoracolumbar junction. 2 mm retrolisthesis L2 on L3 with otherwise anatomic sagittal and coronal alignment. Other lumbar vertebral bodies are preserved in height. There is a displaced fragment anterior to the inferior endplate of L2, but this demonstrates well-defined, sclerotic  margins and is likely chronic. No other convincing evidence of acute lumbar fracture. There is early osseous bridging across the anterior aspects of  the sacroiliac joints. CANAL/FORAMINA: No high-grade central canal or foraminal stenosis. Mild canal stenosis L4-L5. PARASPINAL: Extensive arterial vascular calcification. Small bilateral pleural effusions. Colonic diverticulosis. Distended urinary bladder incompletely visualized.     IMPRESSION: 1.  T12 fracture detailed on dedicated CT thoracic spine. 2.  Moderate to moderately severe compression fracture deformity of L1. While age-indeterminate, it may be chronic given the degree of bulky anterior marginal osteophytes bridging at T12-L1 level and the lack of significant paraspinous hematoma adjacent to the L1 vertebral body. 3.  Chronic appearing displaced fragment anterior to the inferior endplate of L2. 4.  No convincing evidence of acute lumbar fracture or posttraumatic subluxation.    CT Facial Bones without Contrast    Result Date: 7/9/2022  EXAM: CT FACIAL BONES WITHOUT CONTRAST, CT CERVICAL SPINE W/O CONTRAST LOCATION: [facility] DATE/TIME: 7/9/2022 1:01 PM INDICATION: trauma, injury. COMPARISON: Head CT 06/21/2022 TECHNIQUE: 1) Routine CT Facial Bones without IV contrast. Multiplanar reformats. Dose reduction techniques were used. 2) Routine CT Cervical Spine without IV contrast. Multiplanar reformats. Dose reduction techniques were used. FINDINGS: FACIAL BONE CT: OSSEOUS STRUCTURES/SOFT TISSUES: Small soft tissue swelling over the nose. Mildly displaced nasal bone fractures. Nondisplaced fracture of the anterior nasal septum. The walls of the orbits are intact. No zygomatic arch fractures. The walls of the maxillary sinuses are intact. No zygomatic arch fractures. The pterygoid plates are intact. No mandibular fractures. The temporomandibular joints are intact. Mild degenerative changes of the temporomandibular joints. Poor  dentition. Periapical lucencies involving teeth #12, and 26. ORBITAL CONTENTS: Prior bilateral cataract surgery. Visualized portions of the orbits are otherwise unremarkable. SINUSES: Moderate opacification of the ethmoid air cells. CERVICAL SPINE CT: VERTEBRA: 2 mm retrolisthesis of C4 on C5. 1 mm spondylolisthesis of C7 on T1. 2 mm spondylolisthesis of T1 on T2. Age indeterminate 40-50% compression fracture of T1. 21 degrees of segmental kyphosis from C7 to T2. The rest of the vertebral body heights  are maintained. Nondisplaced fracture involving the left lateral mass of C2 with probable extension into the left C2 transverse foramen. Craniocervical junction is within normal limits. No prevertebral soft tissue edema. CANAL/FORAMINA: Moderate intervertebral disc height loss C4-C5 to C6-C7. No high-grade spinal canal narrowing. Moderate left neuroforaminal narrowing at C3-C4. Moderate right neuroforaminal narrowing at C4-C5. Mild bilateral neuroforaminal narrowing at C5-C6. PARASPINAL: Mild symmetric atrophy of the posterior paraspinal musculature. Multinodular thyroid gland. Moderate atherosclerotic calcification of the carotid bulbs. Small right apical pneumothorax.     IMPRESSION: FACIAL BONE CT: 1.  Small soft tissue swelling over the nose. 2.  Mildly displaced nasal bone fractures. Nondisplaced fracture of the anterior nasal septum. 3.  Poor dentition. Periapical lucencies involving teeth #12, and 26, suspicious for odontogenic disease. CERVICAL SPINE CT: 1.  Nondisplaced fracture involving the left lateral mass of C2 with probable extension into the left C2 transverse foramen. Recommend obtaining CT angiogram to exclude vascular injury. 2.  Age indeterminate 40-50% compression fracture of T1. 21 degrees of segmental kyphosis from C7 to T2. 3.  Small right apical pneumothorax. Discussed the findings with Dr. Burroughs at 1:52 PM, 07/09/2022.    CT Thoracic Spine w/o Contrast    Result Date: 7/9/2022  EXAM: CT  THORACIC SPINE W/O CONTRAST LOCATION: [facility] DATE/TIME: 7/9/2022 3:01 PM INDICATION: Trauma, pain. COMPARISON: CT cervical spine and CT lumbar spine from today. TECHNIQUE: Routine CT Thoracic Spine without IV contrast. Multiplanar reformats. Dose reduction techniques were used. FINDINGS:  topogram demonstrates multilead left subclavian pacer and median sternotomy wires. VERTEBRA: 12 rib-bearing thoracic vertebral segments with 5 lumbar type vertebral bodies. As discussed on CT cervical spine, there is age-indeterminate 40-50% compression fracture of T1 with associated 21 degrees segmental kyphosis C7-T2. Additionally, there is evidence of acute fracture through a bridging anterior marginal osteophyte T11-T12 level with fracture extending through the anterosuperior cortex of T12 and fracture lucency paralleling the superior endplate of T12, involving the anterior column and middle column T12 level. No definite extension into the pedicles or posterior elements at the T11 or T12 level. There is slight widening of the posterior facets T11-T12, relative to the adjacent posterior facets suggesting involvement of the posterior column. Mild associated amorphous anterior paraspinous hematoma. No convincing evidence of epidural hematoma, to limits of CT. Sagittal alignment is maintained. There are thin anterior syndesmophytes throughout the majority of the thoracic spine. Other thoracic vertebral bodies are preserved in height. There is lucency present through the anterior bridging osteophyte T5-T6 level, although this demonstrates sclerotic margins and is likely chronic. No other evidence of acute thoracic fracture. Numerous old appearing left posterior rib fractures. There are acute appearing displaced right-sided rib fractures involving right rib 4, 5, 6. CANAL/FORAMINA: No significant central canal stenosis. There is moderate bilateral foraminal narrowing T11-T12 level. PARASPINAL: Mild  paraspinous hematoma anterior to T11-T12. Extensive vascular calcification. There is extensive infiltrate involving the posterior aspects of the left lung that could reflect contusion and/or pneumonitis. Small bilateral pleural effusions.     IMPRESSION: 1.  Unstable fracture pattern involving T11-T12 level. Fracture is present through a bridging anterior marginal osteophyte T11-T12 level with fracture paralleling the superior endplate of T12. There is widening of the T11-T12 posterior facets bilaterally. This suggests involvement of the posterior column as well. Mild amorphous anterior paraspinous hematoma. 2.  Age-indeterminate compression fracture of T1, as described on the cervical spine from today. 3.  Not mentioned above is compression deformity of L1. See dedicated CT lumbar spine. 4.  Small right apical pneumothorax. Findings phoned to Dr. Sarwat Isidro at 7/9/2022 3:59 PM 5.  Contusion or pneumonitis involving the posterior left lung.       Latest radiology report personally reviewed.    Note created using dragon voice recognition software so sounds alike errors may have escaped editing.      07/17/2022   Joe Frye MD  Hospitalist, [facility]  Pager: [phone]

## 2022-07-17 NOTE — PROGRESS NOTES
Care Management Follow Up    Length of Stay (days): 8    Expected Discharge Date: 07/18/2022     Concerns to be Addressed:     Medical Progression. 1:1. Hospice consulted-meeting planned for Monday with family.   Patient plan of care discussed at interdisciplinary rounds: Yes    Anticipated Discharge Disposition:  To be determined     Anticipated Discharge Services:  Therapy evaluations pending  Anticipated Discharge DME:  To be determined     Patient/family educated on Medicare website which has current facility and service quality ratings:  Not at this time  Education Provided on the Discharge Plan:  Per Team  Patient/Family in Agreement with the Plan:  Yes    Referrals Placed by CM/SW:  None at this time  Private pay costs discussed: Not applicable    Additional Information:  Chart Reviewed. From home alone in [facility] home. Spouse is currently at [facility]. Normally independent at baseline with use of cane and walker. Has friend Rosy who assists with housekeeping. [facility]. PT/OT recs pending appropriateness. Hospice consult pending. Final discharge plan pending progression. Care manager to continue to follow.       Clair Maldonado RN

## 2022-07-18 NOTE — PROGRESS NOTES
PT required NT suctioning due to Sp02 88% on 4 lpm N/C and audible coarse crackles. PT was pre-oxygenated with NRBM at 15 lpm, Sp02 increased to 98%. Writer was unable to pass through nares, so PT was suctioned orally to trach. Copious amounts of thick white secretions were suctioned out. PT tolerated suctioning well, Sp02 96% back on N/C.    Jerome Bradford, RT

## 2022-07-18 NOTE — PLAN OF CARE
Speech Language Therapy Discharge Summary    Reason for therapy discharge:    Change in medical status.    Progress towards therapy goal(s). See goals on Care Plan in [facility] electronic health record for goal details.  no progress d/t declining medical status    Therapy recommendation(s):    No further therapy is recommended.    Goal Outcome Evaluation:  Per MD note, pt medical status has been rapidly declining and is now having difficulty handling secretions. It appears that pt is now on comfort care status. Will discontinue our services at this time.

## 2022-07-18 NOTE — PLAN OF CARE
Problem: Pain (Orthopaedic Fracture)  Goal: Acceptable Pain Control  Outcome: Ongoing, Progressing     Problem: Plan of Care - These are the overarching goals to be used throughout the patient stay.    Goal: Absence of Hospital-Acquired Illness or Injury  Intervention: Prevent Skin Injury  Recent Flowsheet Documentation  Taken 7/18/2022 1225 by Alexa Tomlin RN  Body Position:   turned   supine  Taken 7/18/2022 1000 by Alexa Tomlin RN  Body Position:   turned   right   side-lying  Taken 7/18/2022 0800 by Alexa Tomlin RN  Body Position: supine    Patient continuing to decline and has been moved to comfort care. He is minimally responsive, only to noxious stimuli. 1:1 sitter is no longer appropriate. PRN Morphine and Ativan given and patient appears to be comfortable. Nephew [place] and this writer gave him an update at the bedside. Paged Hospitalist non-urgently to bedside per nephew's request.      Alexa Tomlin RN

## 2022-07-18 NOTE — DISCHARGE SUMMARY
Death Summary    Patient was a 89 YO male with PMH significant for paroxysmal atrial fibrillation, SSS s/p PPM, NSVT, LBBB, CAD, hypertension, hyperlipidemia, TIA/CVA and prostate cancer who was brought to our ED via ambulance for evaluation of injuries after a mechanical fall at home. Patient was found to have unstable T11-T12 fracture, L1 compression fracture as well as C2 fracture. Seen by neurosurgery team. Family declined surgical treatment which was very reasonable at his age and with all the co-morbidities. Patient failed swallow study. Family decided against TPN or feeding tube placement. [facility] consulted for GOC discussion. Patient's condition rather declined very rapidly in the last couple of days. With permission from patient's family and friend, he was placed on comfort care. Patient  on 22 at 3:06PM. Javon (Nephew) informed about his demise.     Causes of Death:  Acute hypoxic respiratory failure   Dysphagia  Mechanical fall leading to unstable T11-T12 fracture     Problem lists:  -- Mechanical fall  -- Unstable T11-T12 fracture, L1 compression fracture, C2 fracture  -- Acute Hypoxic Respiratory Failure   -- Pulmonary infiltrates likely 2/2 aspiration pneumonia  -- Small pneumothorax  -- Mildly displaced 4-6 rib fractures on the right  -- Dysphagia  -- Paroxysmal Atrial Fibrillation  -- Hx of Sick Sinus Syndrome s/p PPM  -- Hx of NSVT  -- Acute Delirium  -- Mildly Displaced Nasal Bone fracture & septal fracture   -- Dehydration  -- Hypernatremia  -- Hypokalemia  -- Chronic Anemia  -- Hx of CAD  -- Hx of TIA/CVA  -- Hyperlipidemia  -- Hypothyroidism  -- HTN  -- Prostate cancer  -- Acute urinary retention

## 2022-07-18 NOTE — PROGRESS NOTES
Neurosurgery Note:    Note plan for hospice consult today, general decline in patient status. I spoke with patient's nephew over the weekend.  Please let us know if we can be of further assistance, neurosurgery will sign off.     Mulu Romo CNP  [facility] Neurosurgery  O: [phone]  P: [phone]

## 2022-07-18 NOTE — PROGRESS NOTES
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 Patient would require T10-L3 fusion to stabilize fracture. This is not without risk considering patient's age. Alternatively patient could trial being upright in brace and see how fracture responds. Patient at high risk for paralysis with this unstable fracture. Unfortunately, he is also dysphagic with recommendation for NPO and delirious. Palliative Care consulted to assist with medical decision making due to concern for prognosis with or without surgery and at high risk for negative quality of life.     Recommendations/plan:  1.    7/15: ACP documents reviewed in EMR. Wife Kamala is named primary agent but is in LTC facility related to dementia. Phone conference completed with alternate agents Ron and Javier. We discussed that Seng had a good functional status prior to fall and was generally mentally sharp (some minor concerns for possible very mild cognitive impairment). They relayed their understanding of surgical vs non-surgical options and  artificial nutrition options with both having a good understanding. Given concern for Seng's prognosis, both agree that No CPR and DNI would be appropriate. They would be unlikely to seek tube feeding with NGT (Seng would likely try to pull out and need restraints unless mental status improves and dysphagia not likely to be short term issue) and also unlikely to seek PEG. Javier says he thinks short course of TPN might be tried to see if it helps with mental status---Ron will come to see Seng today and finalize thoughts about TPN after seeing him. We discussed option of allowing  modified diet with risk of aspiration if no artificial nutrition would be in line with Seng's wishes. We also discussed option for hospice.   2. 7/18: Unfortunately Seng's status has been worsening.  On exam he is not able to interact.  RN at bedside states that he is not managing secretions and can appear quite uncomfortable, especially with NT suctioning.  Arrangements for GIP hospice are underway vs transfer to LTC with hospice where Seng's wife is.  Due to loss of IV, hospitalist started morphine concentrate 5 mg every 4 hours with as needed available.  I added Ativan liquid as needed as well.  I talked with Ron (on trip in [place] currently) who has been in close communication with her alt [facility] Javier who is driving for [place] today for meeting with hospice.              Thank you for the opportunity to participate in the care of this patient and family. Our team: will continue to follow.     During regular M-F work hours -- if you are not sure who specifically to contact -- please contact us by calling us directly at the Palliative Care Main Line [phone]    After regular work hours and on weekends/holidays, you can leave a message at [phone]       HPI          Today, the patient was seen for:  Evaluation, symptom management, coordination of care    Prognosis, Goals, or Advance Care Planning was addressed today with: No.  Mood, coping, and/or meaning in the context of serious illness were addressed today: No.  Summary/Comments: Patient is not able to participate today            Interval History:     Chart review/discussion with unit or clinical team members:   RN and hospice nurse    Per patient or family/caregivers today:  Left a message for Ron    [facility] Palliative Symptoms:  We are not helping to manage these symptoms currently in this patient.    Patient is on opioids: assessed and bowels ok/no needed changes to plan of care today.           Review of Systems:     Review of systems not  possible due to patient condition          Medications:     I have reviewed this patient's medication profile and medications during this hospitalization.             Physical Exam:   Temp:  [96.8  F (36  C)-98.7  F (37.1  C)] 96.8  F (36  C)  Pulse:  [131-138] 136  Resp:  [14-22] 20  BP: (110-143)/(65-91) 110/68  Cuff Mean (mmHg):  [83] 83  SpO2:  [95 %-98 %] 97 %    Not responsive, no apparent distress  Head NC, AT  Eyes anicteric without injection  Face symmetric, pupils are pinpoint  Mouth grossly normal, no drainage, speech is low and difficult to understand  Neck C collar  Lungs unlabored, no respiratory distress  CV RR, tachy, no edema  Abd soft, ntnd, benign  Extrems no deformities, no edema  Skin warm, dry  MSK joints of hand normal; muscle bulk and tone in the bilat UE, LE proximally & distally  Neuro  stuporous, no tremor  Neuropsych   not assessable             Data Reviewed:     Pertinent Labs  Lab Results: personally reviewed.   As below  Urea Nitrogen   Date Value Ref Range Status   07/18/2022 26 8 - 28 mg/dL Final   05/13/2010 19 7 - 30 mg/dL Final     BUN/Creatinine Ratio   Date Value Ref Range Status   05/13/2010 18.6 CALC Final     Creatinine   Date Value Ref Range Status   07/18/2022 0.68 (L) 0.70 - 1.30 mg/dL Final   05/13/2010 1.0 0.7 - 1.3 mg/dL Final     AST   Date Value Ref Range Status   07/10/2022 32 0 - 40 U/L Final     ALT   Date Value Ref Range Status   07/10/2022 15 0 - 45 U/L Final   07/23/2010 19 3 - 60 U/L Final     Alkaline Phosphatase   Date Value Ref Range Status   07/10/2022 63 45 - 120 U/L Final     Albumin   Date Value Ref Range Status   07/10/2022 2.9 (L) 3.5 - 5.0 g/dL Final         Radiology Results  XR Chest 2 Views    Result Date: 7/12/2022  EXAM: XR CHEST 2 VW LOCATION: [facility] DATE/TIME: 7/12/2022 1:43 PM INDICATION: PRE MRI cardiac device check. COMPARISON: 07/09/2022.     IMPRESSION: Left subclavian approach dual-chamber pacer with lead  tips over the expected region of the right atrium and right ventricle. Pacer leads appear grossly intact. Increased diffuse bilateral opacities, along with suspicion for septal thickening, suggesting pulmonary edema. Small pleural effusions. Mild-moderate cardiac silhouette enlargement. Sternotomy. Atherosclerosis.     XR Chest 1 View    Result Date: 2022  EXAM: XR CHEST 1 VIEW LOCATION: [facility] DATE/TIME: 2022 7:54 PM INDICATION: Syncope with bibasilar Rales. COMPARISON: 2022, 2016     IMPRESSION: Stable cardiac enlargement with stable pulmonary vascular congestion. Mild interstitial opacities right lung base slightly increased since prior suggestive of increasing component of volume overload. No pulmonary infiltrates. No pneumothorax.  Calcified thoracic aorta. Sternotomy. Left-sided cardiac pacemaker.    Echocardiogram Complete    Result Date: 2022  [id number]^MEENA^MELE^[place] [address]  Name: LIOR STOVALL MRN: [id number] : 1926 Study Date: 2022 10:51 AM Age: 89 yrs Gender: Male Patient [facility] Reason For Study: Atrial Fibrillation Ordering Physician: MELE ROBLEDO Referring Physician: CAROL WALLER Performed By: ACE  BSA: 1.8 m2 Height: 67 in Weight: 160 lb HR: 72 BP: 132/97 mmHg ______________________________________________________________________________ Procedure Complete Echo Adult. Definity (NDC #56795-016) given intravenously. 3mL given. ______________________________________________________________________________ Interpretation Summary  The visual ejection fraction is 45-50%. Septal wall motion abnormality may reflect pacemaker activation. Mid to distal septal hypokinesis Mildly decreased right ventricular systolic function The right ventricle is mild to moderately dilated. There is mild (1+) mitral regurgitation. There is moderate  (2+) tricuspid regurgitation. ______________________________________________________________________________ Left Ventricle The left ventricle is normal in size. There is normal left ventricular wall thickness. The visual ejection fraction is 45-50%. Septal wall motion abnormality may reflect pacemaker activation. Mid to distal septal hypokinesis.  Right Ventricle The right ventricle is mild to moderately dilated. TAPSE is abnormal, which is consistent with abnormal right ventricular systolic function. Mildly decreased right ventricular systolic function. There is a pacemaker lead in the right ventricle.  Atria The left atrium is severely dilated. Right atrium not well visualized. Intact atrial septum.  Mitral Valve The mitral valve leaflets are mildly thickened. There is mild (1+) mitral regurgitation.  Tricuspid Valve Tricuspid valve leaflets appear normal. There is moderate (2+) tricuspid regurgitation. The right ventricular systolic pressure is approximated at 27mmHg plus the right atrial pressure.  Aortic Valve The aortic valve is trileaflet with aortic valve sclerosis. There is mild (1+) aortic regurgitation. No aortic stenosis is present.  Pulmonic Valve The pulmonic valve is not well seen, but is grossly normal. There is trace pulmonic valvular regurgitation.  Vessels Mild aortic root enlargement. Normal size ascending aorta. Inferior vena cava not well visualized for estimation of right atrial pressure.  Pericardium There is no pericardial effusion.  ______________________________________________________________________________ MMode/2D Measurements & Calculations IVSd: 0.89 cm LVIDd: 5.0 cm LVIDs: 4.1 cm LVPWd: 1.00 cm FS: 18.0 % LV mass(C)d: 167.8 grams LV mass(C)dI: 91.3 grams/m2  Ao root diam: 3.9 cm LA dimension: 5.2 cm LA/Ao: 1.3 LVOT diam: 2.1 cm LVOT area: 3.3 cm2 LA Volume Indexed (AL/bp): 63.5 ml/m2 RWT: 0.40  Time Measurements MM HR: 74.0 BPM  Doppler Measurements & Calculations MV E max fransisca:  91.7 cm/sec MV A max willie: 72.3 cm/sec MV E/A: 1.3  MV dec slope: 619.9 cm/sec2 MV dec time: 0.15 sec Ao V2 max: 155.5 cm/sec Ao max PG: 10.0 mmHg Ao V2 mean: 108.1 cm/sec Ao mean P.4 mmHg Ao V2 VTI: 32.7 cm LUCIEN(I,D): 2.1 cm2 LUCIEN(V,D): 2.0 cm2 AI P1/2t: 491.5 msec LV V1 max PG: 3.5 mmHg LV V1 max: 93.6 cm/sec LV V1 VTI: 20.8 cm SV(LVOT): 69.0 ml SI(LVOT): 37.5 ml/m2 PA acc time: 0.08 sec TR max willie: 259.4 cm/sec TR max P.9 mmHg AV Willie Ratio (DI): 0.60 LUCIEN Index (cm2/m2): 1.1 E/E': 11.6 E/E' av.4 Lateral E/e': 11.6 Medial E/e': 17.2 Peak E' Willie: 7.9 cm/sec  ______________________________________________________________________________ Report approved by: Frantz Vergara 2022 01:23 PM       XR Chest Port 1 View    Result Date: 2022  EXAM: XR CHEST PORT 1 VIEW LOCATION: [facility] DATE/TIME: 2022 1:11 PM INDICATION: fall COMPARISON: 2022     IMPRESSION: Interval development of displaced fractures of the right posterior fourth, fifth, and sixth ribs, age indeterminate and possibly acute or subacute. Correlate with tenderness. There are additional old healed right posterior rib fractures. No pleural effusion or pneumothorax. Worsening of multifocal patchy opacities in both lungs, either due to pulmonary edema or pneumonia. Stable mild cardiomegaly. Median sternotomy and mediastinal surgical clips. Pacemaker.    Cardiac Device Check - Remote    Result Date: 2022  Type: routine remote pacemaker transmission. Presenting rhythm: normal sinus and AP-VS rate 87 bpm. Frequent PACs and PVCs. Battery/lead status: stable Arrhythmias: since Latitude Consult done 22, two new AT/AF, both <1 minute. No ventricular high rate episodes detected. Anticoagulant: apixaban Comments: normal magnet and pacemaker function.  TIM Black, Device Specialist I have reviewed and interpreted the device interrogation, settings, programming, and encounter summary. The device is  functioning within normal device parameters. I agree with the current findings, assessment and plan.    MR Cervical Spine w/o Contrast    Result Date: 7/13/2022  EXAM: MR CERVICAL SPINE W/O CONTRAST LOCATION: [facility] DATE/TIME: 7/13/2022 2:57 PM INDICATION: C2 fracture, no contrast COMPARISON: CT 07/09/2022 TECHNIQUE: MRI Cervical Spine without IV contrast. FINDINGS: Alignment: Mild degenerative anterolisthesis at C6-C7, C7-T1, T1-T2 and retrolisthesis at C3-C4, C4-C5, unchanged. Vertebral height: Acute C2 left lateral mass fracture is not being visualized on MRI. Chronic wedge deformity T1 with 50-60% loss of height and slight anterior wedging of T3. Cervical vertebral body heights are maintained. Marrow signal: There is slight bone marrow edema in the left lateral mass secondary to the acute fracture. Fracture line itself is occult on MRI. Spinal cord: No abnormal signal. No epidural hematoma. Extraspinal: Slight prevertebral soft tissue edema. Craniovertebral junction: Normal. C1-2: Normal C2-3: Slight loss of disc height. Desiccated disc. No disc herniation. No uncovertebral hypertrophy. Moderate left facet hypertrophy. No central spinal stenosis, no right foramen stenosis, mild left foramen stenosis. C3-4: Slight loss of disc height. Normal disc signal. No disc herniation. No uncovertebral hypertrophy. Moderate to severe bilateral facet hypertrophy. Small effusion left facet joint. No central spinal stenosis, moderate right foramen stenosis, moderate  to severe left foramen stenosis. C4-5:  Near complete loss of disc height. Mildly degenerated disc. Broad based disc osteophyte complex. Bilateral uncovertebral hypertrophy. Mild to moderate bilateral facet hypertrophy. Mild central spinal stenosis, moderate right foramen stenosis, moderate left foramen stenosis. C5-6: Near complete loss of disc height. Mildly degenerated disc. Shallow disc osteophyte complex. Bilateral uncovertebral  hypertrophy. Moderate bilateral facet hypertrophy. No central spinal stenosis, mild to moderate right foramen stenosis, mild left foramen stenosis. C6-7: Moderate loss of disc height. Moderately degenerated disk. Broad based disc osteophyte complex. Bilateral uncovertebral hypertrophy. Moderate to severe bilateral facet hypertrophy. No central spinal stenosis, mild right foramen stenosis, mild left foramen stenosis. C7-T1: Normal height of disc. Normal disc signal. No disc herniation. No uncovertebral hypertrophy. Mild to moderate bilateral facet hypertrophy. No central spinal stenosis, foramina not well evaluated.     IMPRESSION: 1.  Acute C2 fracture is occult on MRI. Fracture line is not visualized. There is trace bone marrow edema in the expected location of the fracture in the left lateral mass of C2. There is slight prevertebral edema. There is no epidural hematoma. 2.  Chronic wedge compression deformities of T1 and T3. 3.  Advanced degenerative changes as above.    MR Thoracic Spine w/o Contrast    Result Date: 7/13/2022  EXAM: MR THORACIC SPINE W/O CONTRAST LOCATION: [facility] DATE/TIME: 7/13/2022 2:44 PM INDICATION: T12 fracture possible posterior element involvement. COMPARISON: CT thoracic spine 7/9/2022. TECHNIQUE: Routine Thoracic Spine MRI without IV contrast. FINDINGS: Lateral alignment is normal. There is accentuation of the normal thoracic kyphosis. There is stable grade 1 anterolisthesis of C7 on T1 and T1 on T2. Again seen is a diastatic fracture extending through the anterior superior aspect of the T12 vertebral body. Heterogeneous signal/fluid is seen along the fracture cleft. There is moderate prevertebral soft tissue swelling/edema with edema extending cranially to T10 and caudally to L2. Degree of soft tissue edema or contusion appears increased compared to prior CT. There is severe chronic compression deformity of L1 with approximately 60% height loss. More mild  chronic height loss of T8, T3, and T2 with more moderate chronic anterior wedging of T1. Mild chronic height loss of T7. There is flowing ankylosis throughout the thoracic spine from T3 to T11 and T12 to L1. Moderate to severe interspace narrowing T9-T10 through T11-T12. More mild interspace narrowing at the remaining levels. Trace posterior disc bulging T11-T12. There is ventral bulging of the dorsal epidural fat at the T11-T12 level which in conjunction with the minor disc bulge results in at least mild spinal canal stenosis. There are shallow bilateral facet effusions at T11-T12. There is edema within the dorsal paraspinal soft tissues at the T11 and T12 levels. No clear fracture plane was seen within the posterior elements on the 7/9/2022 prior. No other spinal canal stenosis. There is moderate or severe bilateral foraminal stenosis at T11-T12. Spinal cord demonstrates grossly normal signal characteristics and morphology. Conus terminates at the T12-L1 level.     IMPRESSION: 1.  Diastatic fracture is again seen extending to the T12 vertebral body with the degree of diastasis similar to prior. There is moderate prevertebral edema and/or contusion with edema extending from T9 to L2. No significant retropulsion at T11-T12. Shallow disc herniation in conjunction with ventral displacement of the dorsal epidural fat contributes to at least mild spinal canal stenosis. No associated signal abnormality is seen within the cord at this level. 2.  There is STIR hyperintensity within the dorsal paraspinal soft tissues at the T11 and T12 levels compatible with muscular injury/edema. No abnormal collections identified. As noted on prior CT, T11-T12 facets appear slightly diastatic.    CTA Neck with Contrast    Result Date: 7/9/2022  EXAM: CTA NECK WITH CONTRAST LOCATION: [facility] DATE/TIME: 7/9/2022 2:50 PM INDICATION: C2 fracture, pain recent fall. COMPARISON: Head CT from today. CT facial bones  from today. CT cervical spine from today. CONTRAST: Isovue 370 75 ml. TECHNIQUE: Neck CT angiogram with IV contrast. Axial helical CT images of the neck vessels were obtained during the arterial phase of intravenous contrast administration. Axial helical 2D reconstructed images and multiplanar 3D MIP reconstructed images of the neck vessels were performed by the technologist. Dose reduction techniques were used. All stenosis measurements made according to NASCET criteria unless otherwise specified. FINDINGS: Intracranial segments demonstrate moderate diffuse middle cerebral stenoses bilaterally. RIGHT CAROTID: Atherosclerotic plaque results in 50-70% stenosis in the right ICA. No dissection. LEFT CAROTID: Atherosclerotic plaque results in less than 50% stenosis in the left ICA. No dissection. VERTEBRAL ARTERIES: No focal stenosis or dissection. Balanced vertebral arteries. No evidence of vertebral occlusion or dissection related to the nondisplaced left C2 fracture. There are moderate bilateral focal stenoses present in the fourth segment distal vertebral arteries. Basilar artery is unremarkable. Moderate stenoses noted diffusely in the posterior cerebral arteries. AORTIC ARCH: No significant stenosis at the great vessel origins. The left vertebral artery originates from the arch, normal variant. NONVASCULAR STRUCTURES: Small right apical pneumothorax. This was noted on the CT cervical spine from today. The known nondisplaced left lateral mass fracture at C2 is less well-visualized given differences in technique. There is fracture noted of the right nasal bone, age-indeterminate. Correlate clinically.     IMPRESSION: 1.  No evidence of vertebral occlusion or dissection from the nondisplaced left lateral mass fracture C2 level. No hemodynamically significant extracranial stenosis and no evidence of dissection. 2.  Numerous intracranial stenoses throughout the anterior and posterior circulation. 3.  Small right apical  pneumothorax. Findings phoned to referring provider at 7/9/2022 4:00 PM.     Cervical spine CT w/o contrast    Result Date: 7/9/2022  EXAM: CT FACIAL BONES WITHOUT CONTRAST, CT CERVICAL SPINE W/O CONTRAST LOCATION: [facility] DATE/TIME: 7/9/2022 1:01 PM INDICATION: trauma, injury. COMPARISON: Head CT 06/21/2022 TECHNIQUE: 1) Routine CT Facial Bones without IV contrast. Multiplanar reformats. Dose reduction techniques were used. 2) Routine CT Cervical Spine without IV contrast. Multiplanar reformats. Dose reduction techniques were used. FINDINGS: FACIAL BONE CT: OSSEOUS STRUCTURES/SOFT TISSUES: Small soft tissue swelling over the nose. Mildly displaced nasal bone fractures. Nondisplaced fracture of the anterior nasal septum. The walls of the orbits are intact. No zygomatic arch fractures. The walls of the maxillary sinuses are intact. No zygomatic arch fractures. The pterygoid plates are intact. No mandibular fractures. The temporomandibular joints are intact. Mild degenerative changes of the temporomandibular joints. Poor dentition. Periapical lucencies involving teeth #12, and 26. ORBITAL CONTENTS: Prior bilateral cataract surgery. Visualized portions of the orbits are otherwise unremarkable. SINUSES: Moderate opacification of the ethmoid air cells. CERVICAL SPINE CT: VERTEBRA: 2 mm retrolisthesis of C4 on C5. 1 mm spondylolisthesis of C7 on T1. 2 mm spondylolisthesis of T1 on T2. Age indeterminate 40-50% compression fracture of T1. 21 degrees of segmental kyphosis from C7 to T2. The rest of the vertebral body heights  are maintained. Nondisplaced fracture involving the left lateral mass of C2 with probable extension into the left C2 transverse foramen. Craniocervical junction is within normal limits. No prevertebral soft tissue edema. CANAL/FORAMINA: Moderate intervertebral disc height loss C4-C5 to C6-C7. No high-grade spinal canal narrowing. Moderate left neuroforaminal narrowing at  C3-C4. Moderate right neuroforaminal narrowing at C4-C5. Mild bilateral neuroforaminal narrowing at C5-C6. PARASPINAL: Mild symmetric atrophy of the posterior paraspinal musculature. Multinodular thyroid gland. Moderate atherosclerotic calcification of the carotid bulbs. Small right apical pneumothorax.     IMPRESSION: FACIAL BONE CT: 1.  Small soft tissue swelling over the nose. 2.  Mildly displaced nasal bone fractures. Nondisplaced fracture of the anterior nasal septum. 3.  Poor dentition. Periapical lucencies involving teeth #12, and 26, suspicious for odontogenic disease. CERVICAL SPINE CT: 1.  Nondisplaced fracture involving the left lateral mass of C2 with probable extension into the left C2 transverse foramen. Recommend obtaining CT angiogram to exclude vascular injury. 2.  Age indeterminate 40-50% compression fracture of T1. 21 degrees of segmental kyphosis from C7 to T2. 3.  Small right apical pneumothorax. Discussed the findings with Dr. Burroughs at 1:52 PM, 07/09/2022.    Chest CT w/o contrast    Result Date: 7/9/2022  EXAM: CT CHEST W/O CONTRAST LOCATION: [facility] DATE/TIME: 7/9/2022 3:01 PM INDICATION: Fall.  Pneumothorax seen on CT neck COMPARISON: None. TECHNIQUE: CT chest without IV contrast. Multiplanar reformats were obtained. Dose reduction techniques were used. CONTRAST: None. FINDINGS: LUNGS AND PLEURA: Tiny right apical pneumothorax as seen on the prior C-spine CT. Patchy groundglass and tree-in-bud opacities within both lower lobes and the lingula. Trace bilateral pleural fluid. MEDIASTINUM/AXILLAE: Coarse calcified nodules. No aortic aneurysm. Left-sided pacemaker. CORONARY ARTERY CALCIFICATION: Previous intervention (stents or CABG). UPPER ABDOMEN: Calcified gallstones and heavily calcified distal vertebral arteries. MUSCULOSKELETAL: Osteopenia and degenerative disease in the spine. Median sternotomy wires. Healed bilateral rib fractures. Mildly displaced acute  right fourth-sixth rib fractures posteriorly     IMPRESSION: 1.  Tiny right apical pneumothorax, as seen on C-spine CT. 2.  Multifocal groundglass and tree-in-bud opacities in both lungs, likely infection and/or aspiration. Trace bilateral pleural effusions. 3.  Mildly displaced right fourth-sixth rib fractures.     Head CT w/o contrast    Result Date: 7/9/2022  EXAM: CT HEAD W/O CONTRAST LOCATION: [facility] DATE/TIME: 7/9/2022 1:00 PM INDICATION: trauma anticoagulated COMPARISON: None. TECHNIQUE: Routine CT Head without IV contrast. Multiplanar reformats. Dose reduction techniques were used. FINDINGS: INTRACRANIAL CONTENTS: No evidence of acute intracranial hemorrhage or mass effect. Scattered foci of decreased attenuation within the cerebral hemispheric white matter which are nonspecific, though most commonly ascribed to chronic small vessel ischemic  disease. 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 Scattered foci of decreased attenuation within the cerebral hemispheric white matter which are nonspecific, though most commonly ascribed to chronic small vessel ischemic disease. The basilar cisterns are patent. VISUALIZED ORBITS/SINUSES/MASTOIDS: Lateral cataract surgery. The partially imaged globes are otherwise unremarkable. The partially imaged paranasal sinuses, mastoid air cells and middle ear cavities are unremarkable. BONES/SOFT TISSUES: The visualized skull base and calvarium are unremarkable.     IMPRESSION:  1.  No evidence of acute intracranial hemorrhage or mass effect. 2.  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 3.  Moderate nonspecific white matter changes. 4.  Moderate brain parenchymal volume loss.    CT Head w/o Contrast    Result Date: 6/21/2022  EXAM: CT HEAD W/O CONTRAST LOCATION: [facility] DATE/TIME: 6/21/2022 8:17 PM INDICATION: Syncope, simple, normal neuro exam. COMPARISON: Head CT 3/1/2022 TECHNIQUE: Routine CT Head  without IV contrast. Multiplanar reformats. Dose reduction techniques were used. FINDINGS: INTRACRANIAL CONTENTS: No intracranial hemorrhage, extraaxial collection, or mass effect.  No CT evidence of acute infarct. Severe presumed chronic small vessel ischemic changes. Multiple old periventricular infarcts. Left cerebellar infarcts. Moderate generalized volume loss. No hydrocephalus. VISUALIZED ORBITS/SINUSES/MASTOIDS: Prior bilateral cataract surgery. Visualized portions of the orbits are otherwise unremarkable. No paranasal sinus mucosal disease. No middle ear or mastoid effusion. BONES/SOFT TISSUES: No acute abnormality.     IMPRESSION: 1.  No CT evidence of acute intracranial abnormality. 2.  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    Lumbar spine CT w/o contrast    Result Date: 7/9/2022  EXAM: CT LUMBAR SPINE W/O CONTRAST LOCATION: [facility] DATE/TIME: 7/9/2022 3:01 PM INDICATION: Trauma, pain. COMPARISON: CT thoracic spine from today. TECHNIQUE: Routine CT Lumbar Spine without IV contrast. Multiplanar reformats. Dose reduction techniques were used. FINDINGS: VERTEBRA: 5 lumbar type vertebral bodies. T12 fracture with fracture through the anterior bridging osteophyte T11-T12 level, detailed in dedicated CT thoracic spine. There is moderate to moderately severe compression fracture deformity of L1 with loss of  vertebral body height 50-60%. 1 mm osseous retropulsion. While this is age-indeterminate, it may be chronic given the degree of anterior marginal osteophytes bridging at T12-L1 level. Additionally, there is no evidence of significant paraspinous hematoma adjacent to the L1 vertebral body. There is mild kyphosis at the thoracolumbar junction. 2 mm retrolisthesis L2 on L3 with otherwise anatomic sagittal and coronal alignment. Other lumbar vertebral bodies are preserved in height. There is a displaced fragment anterior to the inferior endplate of L2, but this demonstrates well-defined, sclerotic  margins and is likely chronic. No other convincing evidence of acute lumbar fracture. There is early osseous bridging across the anterior aspects of  the sacroiliac joints. CANAL/FORAMINA: No high-grade central canal or foraminal stenosis. Mild canal stenosis L4-L5. PARASPINAL: Extensive arterial vascular calcification. Small bilateral pleural effusions. Colonic diverticulosis. Distended urinary bladder incompletely visualized.     IMPRESSION: 1.  T12 fracture detailed on dedicated CT thoracic spine. 2.  Moderate to moderately severe compression fracture deformity of L1. While age-indeterminate, it may be chronic given the degree of bulky anterior marginal osteophytes bridging at T12-L1 level and the lack of significant paraspinous hematoma adjacent to the L1 vertebral body. 3.  Chronic appearing displaced fragment anterior to the inferior endplate of L2. 4.  No convincing evidence of acute lumbar fracture or posttraumatic subluxation.    CT Facial Bones without Contrast    Result Date: 7/9/2022  EXAM: CT FACIAL BONES WITHOUT CONTRAST, CT CERVICAL SPINE W/O CONTRAST LOCATION: [facility] DATE/TIME: 7/9/2022 1:01 PM INDICATION: trauma, injury. COMPARISON: Head CT 06/21/2022 TECHNIQUE: 1) Routine CT Facial Bones without IV contrast. Multiplanar reformats. Dose reduction techniques were used. 2) Routine CT Cervical Spine without IV contrast. Multiplanar reformats. Dose reduction techniques were used. FINDINGS: FACIAL BONE CT: OSSEOUS STRUCTURES/SOFT TISSUES: Small soft tissue swelling over the nose. Mildly displaced nasal bone fractures. Nondisplaced fracture of the anterior nasal septum. The walls of the orbits are intact. No zygomatic arch fractures. The walls of the maxillary sinuses are intact. No zygomatic arch fractures. The pterygoid plates are intact. No mandibular fractures. The temporomandibular joints are intact. Mild degenerative changes of the temporomandibular joints. Poor  dentition. Periapical lucencies involving teeth #12, and 26. ORBITAL CONTENTS: Prior bilateral cataract surgery. Visualized portions of the orbits are otherwise unremarkable. SINUSES: Moderate opacification of the ethmoid air cells. CERVICAL SPINE CT: VERTEBRA: 2 mm retrolisthesis of C4 on C5. 1 mm spondylolisthesis of C7 on T1. 2 mm spondylolisthesis of T1 on T2. Age indeterminate 40-50% compression fracture of T1. 21 degrees of segmental kyphosis from C7 to T2. The rest of the vertebral body heights  are maintained. Nondisplaced fracture involving the left lateral mass of C2 with probable extension into the left C2 transverse foramen. Craniocervical junction is within normal limits. No prevertebral soft tissue edema. CANAL/FORAMINA: Moderate intervertebral disc height loss C4-C5 to C6-C7. No high-grade spinal canal narrowing. Moderate left neuroforaminal narrowing at C3-C4. Moderate right neuroforaminal narrowing at C4-C5. Mild bilateral neuroforaminal narrowing at C5-C6. PARASPINAL: Mild symmetric atrophy of the posterior paraspinal musculature. Multinodular thyroid gland. Moderate atherosclerotic calcification of the carotid bulbs. Small right apical pneumothorax.     IMPRESSION: FACIAL BONE CT: 1.  Small soft tissue swelling over the nose. 2.  Mildly displaced nasal bone fractures. Nondisplaced fracture of the anterior nasal septum. 3.  Poor dentition. Periapical lucencies involving teeth #12, and 26, suspicious for odontogenic disease. CERVICAL SPINE CT: 1.  Nondisplaced fracture involving the left lateral mass of C2 with probable extension into the left C2 transverse foramen. Recommend obtaining CT angiogram to exclude vascular injury. 2.  Age indeterminate 40-50% compression fracture of T1. 21 degrees of segmental kyphosis from C7 to T2. 3.  Small right apical pneumothorax. Discussed the findings with Dr. Burroughs at 1:52 PM, 07/09/2022.    CT Thoracic Spine w/o Contrast    Result Date: 7/9/2022  EXAM: CT  THORACIC SPINE W/O CONTRAST LOCATION: [facility] DATE/TIME: 7/9/2022 3:01 PM INDICATION: Trauma, pain. COMPARISON: CT cervical spine and CT lumbar spine from today. TECHNIQUE: Routine CT Thoracic Spine without IV contrast. Multiplanar reformats. Dose reduction techniques were used. FINDINGS:  topogram demonstrates multilead left subclavian pacer and median sternotomy wires. VERTEBRA: 12 rib-bearing thoracic vertebral segments with 5 lumbar type vertebral bodies. As discussed on CT cervical spine, there is age-indeterminate 40-50% compression fracture of T1 with associated 21 degrees segmental kyphosis C7-T2. Additionally, there is evidence of acute fracture through a bridging anterior marginal osteophyte T11-T12 level with fracture extending through the anterosuperior cortex of T12 and fracture lucency paralleling the superior endplate of T12, involving the anterior column and middle column T12 level. No definite extension into the pedicles or posterior elements at the T11 or T12 level. There is slight widening of the posterior facets T11-T12, relative to the adjacent posterior facets suggesting involvement of the posterior column. Mild associated amorphous anterior paraspinous hematoma. No convincing evidence of epidural hematoma, to limits of CT. Sagittal alignment is maintained. There are thin anterior syndesmophytes throughout the majority of the thoracic spine. Other thoracic vertebral bodies are preserved in height. There is lucency present through the anterior bridging osteophyte T5-T6 level, although this demonstrates sclerotic margins and is likely chronic. No other evidence of acute thoracic fracture. Numerous old appearing left posterior rib fractures. There are acute appearing displaced right-sided rib fractures involving right rib 4, 5, 6. CANAL/FORAMINA: No significant central canal stenosis. There is moderate bilateral foraminal narrowing T11-T12 level. PARASPINAL: Mild  paraspinous hematoma anterior to T11-T12. Extensive vascular calcification. There is extensive infiltrate involving the posterior aspects of the left lung that could reflect contusion and/or pneumonitis. Small bilateral pleural effusions.     IMPRESSION: 1.  Unstable fracture pattern involving T11-T12 level. Fracture is present through a bridging anterior marginal osteophyte T11-T12 level with fracture paralleling the superior endplate of T12. There is widening of the T11-T12 posterior facets bilaterally. This suggests involvement of the posterior column as well. Mild amorphous anterior paraspinous hematoma. 2.  Age-indeterminate compression fracture of T1, as described on the cervical spine from today. 3.  Not mentioned above is compression deformity of L1. See dedicated CT lumbar spine. 4.  Small right apical pneumothorax. Findings phoned to Dr. Sarwat Isidro at 7/9/2022 3:59 PM 5.  Contusion or pneumonitis involving the posterior left lung.          TTS: I have personally spent a total of 37 minutes today reviewing patient's medical record, consultation with the medical providers, assessing patient and symptoms and providing emotional support with more than 50% of this time spent in counseling, coordination of care  re: discussion of patient's condition, medication adjustments, discussion of hospice.    CA Bowers, [facility]  Palliative Care  Phone: [phone]

## 2022-07-18 NOTE — PLAN OF CARE
Problem: Plan of Care - These are the overarching goals to be used throughout the patient stay.    Goal: Plan of Care Review/Shift Note  Description: The Plan of Care Review/Shift note should be completed every shift.  The Outcome Evaluation is a brief statement about your assessment that the patient is improving, declining, or no change.  This information will be displayed automatically on your shift note.  Outcome: Ongoing, Not Progressing   Goal Outcome Evaluation:    Patient still has confusion, which makes it difficult to increase pt's mobility.

## 2022-07-18 NOTE — PLAN OF CARE
Goal Outcome Evaluation:  Writer discussed with dr. Torres regarding iv metoprolol given x3 but HR stays the same. Cardiac rhythm is A flutter with BBB and PVCs.

## 2022-07-18 NOTE — PROGRESS NOTES
Daily Progress Note        CODE STATUS:  No CPR- Do NOT Intubate    07/12/22  Assessment/Plan:  Seng Deshpande is a 89 YO man with hx of syncope, paroxysmal atrial fibrillation, SSS s/p PPM, NSVT, LBBB, CAD, TIA/CVA, HTN, HLD and prostrate cancer who was BIBEMS after being found down following mechanical fall.      Mechanical fall   Unstable T11-T12 fracture, L1 compression fracture, C2 fracture  - MRI done on 7/13: C2 fracture is occult. No fracture line visible on this MRI. Diastatic fracture extending to the T12 vertebral body with degree of diastasis similar to prior. Moderate prevertebral edema and/or contusion with edema T9-T12. No significant retropulsion T11-T12.   - Neurosurgery consulted  - Family decided not to go ahead with surgery. Code status changed to DNR/DNI. No TPN or feeding tube. They are hopeful that the patient can go to [facility] with hospice services. That's where his wife lives currently. However, the patient has declined rather rapidly. I saw the patient few times today. He is unable to handle his secretions. Becoming more tachypneic, tachycardic and hypoxic. He is too unstable to transfer. Javon has been informed about this decline. Javon approves stopping all unnecessary medications and focus only on comfort care measures. No labs, antibiotics or escalation of care. Comfort medications ordered. Javon meeting with hospice team today.     Acute Hypoxic Respiratory Failure   Pulmonary infiltrates likely 2/2 aspiration ISO epistaxis  Small pneumothorax  Mildly displaced 4-6 rib fractures on the right  - CT chest 7/9: Multifocal groundglass and tree-in-bud opacities in both lungs, likely infection and/or aspiration. Trace bilateral pleural effusions.  - Seen by trauma for pneumothorax; see note. No specific interventions indicated     Dysphagia  Aspiration risk  - NPO. Failed swallow study 7/13  - Stopped the maintenance IVF 7/17 as he seem to have some lung crepts  - Now on comfort  care    Paroxysmal Atrial Fibrillation  Hx of Sick Sinus Syndrome s/p PPM  Hx of NSVT  - Stopped all medications     Delirium  - Continue 1:1 as needed. Optimize pain control.      Mildly Displaced Nasal Bone fracture & septal fracture   - Comfort care now     Dehydration  Hypernatremia  Hypokalemia  - Comfort care now    Chronic Anemia  - Stop checking labs. Comfort care now     Hx of CAD  Hx of TIA/CVA  HLD  - Stopped ASA and lipitor. Comfort care now     Hypothyroidism  - Stopped synthroid     HTN  - Stopped all meds     Prostate cancer  - Stopped all meds     Urinary retention:  - Required frequent straight caths. Lemus inserted    Code status:  - DNR/DNI.     Discussed the care plan with Javon. He is on his way here. He is on board with making Seng comfortable as above. He is aware that the patient might pass away soon.    Subjective:  Interval History: Patient is declining fast. He didn't respond to my verbal commands. Moaning at times. Wet sounding breath sounds. RT able to suction some thick nasopharyngeal secretions.     Review of Systems:   As mentioned in subjective.    Patient Active Problem List   Diagnosis     Prostate cancer (H)     Paroxysmal atrial fibrillation with RVR (H)     SA node dysfunction (H)     LBBB (left bundle branch block)     NSVT (nonsustained ventricular tachycardia) (H)     Coronary artery disease involving native coronary artery of native heart without angina pectoris     Cardiac pacemaker in situ     Paroxysmal atrial fibrillation (H)     Chronic atrial fibrillation (H)     Pneumothorax on right     Anticoagulated by anticoagulation treatment     Fall, initial encounter     Compression fracture of T12 vertebra, initial encounter (H)     Closed nondisplaced fracture of second cervical vertebra, unspecified fracture morphology, initial encounter (H)     Multiple fractures of ribs, right side, initial encounter for closed fracture       Scheduled Meds:    lidocaine  1 patch  Transdermal Q24H     lidocaine   Transdermal Q8H SAURAV     morphine sulfate  5 mg Oral Q4H     scopolamine  1 patch Transdermal Q72H    And     scopolamine   Transdermal Q8H     Continuous Infusions:    PRN Meds:.acetaminophen, atropine, bisacodyl, lidocaine 4%, lidocaine (buffered or not buffered), LORazepam, metoprolol, morphine sulfate, OLANZapine, sodium chloride (PF)    Objective:  Vital signs in last 24 hours:  Temp:  [96.8  F (36  C)-98.7  F (37.1  C)] 96.8  F (36  C)  Pulse:  [133-138] 136  Resp:  [14-22] 20  BP: (110-143)/(65-91) 110/68  Cuff Mean (mmHg):  [83] 83  SpO2:  [85 %-97 %] 85 %        Intake/Output Summary (Last 24 hours) at 7/12/2022 1447  Last data filed at 7/12/2022 1040  Gross per 24 hour   Intake --   Output 0 ml   Net 0 ml       Physical Exam:    General: Not in obvious distress.  HEENT: Neck collar in place   Chest: Clear to auscultation bilaterally  Heart: S1S2 normal, irregular. No M/R/G  Abdomen: Soft. NT, ND. Bowel sounds- active.  Extremities: No legs swelling  Neuro: Lethargic. Moves all extremities spontaneously.      Lab Results:(I have personally reviewed the results)    Recent Results (from the past 24 hour(s))   Glucose by meter    Collection Time: 07/17/22  6:28 PM   Result Value Ref Range    GLUCOSE BY METER POCT 113 (H) 70 - 99 mg/dL   ECG 12-LEAD WITH MUSE (L[facility])    Collection Time: 07/18/22  1:10 AM   Result Value Ref Range    Systolic Blood Pressure  mmHg    Diastolic Blood Pressure  mmHg    Ventricular Rate 134 BPM    Atrial Rate 133 BPM    MA Interval  ms    QRS Duration 134 ms     ms    QTc 525 ms    P Axis  degrees    R AXIS -43 degrees    T Axis 148 degrees    Interpretation ECG       Wide QRS tachycardia with occasional Premature ventricular complexes  Left axis deviation  Left bundle branch block  Abnormal ECG  When compared with ECG of 12-JUL-2022 06:12,  Wide QRS tachycardia has replaced Atrial fibrillation     Basic metabolic panel    Collection Time: 07/18/22   6:04 AM   Result Value Ref Range    Sodium 139 136 - 145 mmol/L    Potassium 4.3 3.5 - 5.0 mmol/L    Chloride 109 (H) 98 - 107 mmol/L    Carbon Dioxide (CO2) 24 22 - 31 mmol/L    Anion Gap 6 5 - 18 mmol/L    Urea Nitrogen 26 8 - 28 mg/dL    Creatinine 0.68 (L) 0.70 - 1.30 mg/dL    Calcium 8.7 8.5 - 10.5 mg/dL    Glucose 113 70 - 125 mg/dL    GFR Estimate 85 >60 mL/min/1.73m2   Magnesium    Collection Time: 07/18/22  6:04 AM   Result Value Ref Range    Magnesium 2.2 1.8 - 2.6 mg/dL   CBC with platelets and differential    Collection Time: 07/18/22  6:04 AM   Result Value Ref Range    WBC Count 10.2 4.0 - 11.0 10e3/uL    RBC Count 2.33 (L) 4.40 - 5.90 10e6/uL    Hemoglobin 7.8 (L) 13.3 - 17.7 g/dL    Hematocrit 25.8 (L) 40.0 - 53.0 %     (H) 78 - 100 fL    MCH 33.5 (H) 26.5 - 33.0 pg    MCHC 30.2 (L) 31.5 - 36.5 g/dL    RDW 16.3 (H) 10.0 - 15.0 %    Platelet Count 210 150 - 450 10e3/uL   Manual Differential    Collection Time: 07/18/22  6:04 AM   Result Value Ref Range    % Neutrophils 74 %    % Lymphocytes 5 %    % Monocytes 21 %    % Eosinophils 0 %    % Basophils 0 %    NRBCs per 100 WBC 2 (H) <=0 %    Absolute Neutrophils 7.5 1.6 - 8.3 10e3/uL    Absolute Lymphocytes 0.5 (L) 0.8 - 5.3 10e3/uL    Absolute Monocytes 2.1 (H) 0.0 - 1.3 10e3/uL    Absolute Eosinophils 0.0 0.0 - 0.7 10e3/uL    Absolute Basophils 0.0 0.0 - 0.2 10e3/uL    Absolute NRBCs 0.2 (H) <=0.0 10e3/uL    RBC Morphology Confirmed RBC Indices     Platelet Assessment  Automated Count Confirmed. Platelet morphology is normal.     Automated Count Confirmed. Platelet morphology is normal.    Elliptocytes Slight (A) None Seen    RBC Fragments Slight (A) None Seen       All laboratory and imaging data in the past 24 hours reviewed  Serum Glucose range:   Recent Labs   Lab 07/18/22  0604 07/17/22  1828 07/17/22  0545 07/16/22  0457    113* 126* 151*     ABG: No lab results found in last 7 days.  CBC:   Recent Labs   Lab 07/18/22  0604  07/17/22  0545 07/16/22  0457   WBC 10.2 9.4 9.2   HGB 7.8* 7.7* 7.8*   HCT 25.8* 24.9* 25.8*   * 108* 109*    166 146*   NEUTROPHIL 74 69 73   LYMPH 5 6 8   MONOCYTE 21 23 18   EOSINOPHIL 0 2 1     Chemistry:   Recent Labs   Lab 07/18/22  0604 07/17/22  0545 07/16/22  0457    138 140   POTASSIUM 4.3 3.7 3.7   CHLORIDE 109* 108* 112*   CO2 24 24 23   BUN 26 25 30*   CR 0.68* 0.72 0.69*   GFRESTIMATED 85 84 85   MAURI 8.7 8.3* 8.4*   MAG 2.2 2.1 2.1     Coags:  No results for input(s): INR, PROTIME, PTT in the last 168 hours.    Invalid input(s): APTT  Cardiac Markers:  No results for input(s): CKTOTAL, TROPONINI in the last 168 hours.       XR Chest 2 Views    Result Date: 7/12/2022  EXAM: XR CHEST 2 VW LOCATION: [facility] DATE/TIME: 7/12/2022 1:43 PM INDICATION: PRE MRI cardiac device check. COMPARISON: 07/09/2022.     IMPRESSION: Left subclavian approach dual-chamber pacer with lead tips over the expected region of the right atrium and right ventricle. Pacer leads appear grossly intact. Increased diffuse bilateral opacities, along with suspicion for septal thickening, suggesting pulmonary edema. Small pleural effusions. Mild-moderate cardiac silhouette enlargement. Sternotomy. Atherosclerosis.     XR Chest 1 View    Result Date: 6/21/2022  EXAM: XR CHEST 1 VIEW LOCATION: [facility] DATE/TIME: 6/21/2022 7:54 PM INDICATION: Syncope with bibasilar Rales. COMPARISON: 03/01/2022, 11/19/2016     IMPRESSION: Stable cardiac enlargement with stable pulmonary vascular congestion. Mild interstitial opacities right lung base slightly increased since prior suggestive of increasing component of volume overload. No pulmonary infiltrates. No pneumothorax.  Calcified thoracic aorta. Sternotomy. Left-sided cardiac pacemaker.    Echocardiogram Complete    Result Date: 6/22/2022  [id number]^RAHAMAN^OLORUNTOBI^M  [facility] [address] [place] [address]  Name: LIOR STOVALL MRN: [id number] : 1926 Study Date: 2022 10:51 AM Age: 89 yrs Gender: Male Patient Location: [facility] Reason For Study: Atrial Fibrillation Ordering Physician: MELE ROBLEDO Referring Physician: CAROL WALLER Performed By: ACE  BSA: 1.8 m2 Height: 67 in Weight: 160 lb HR: 72 BP: 132/97 mmHg ______________________________________________________________________________ Procedure Complete Echo Adult. Definity (NDC #24293-748) given intravenously. 3mL given. ______________________________________________________________________________ Interpretation Summary  The visual ejection fraction is 45-50%. Septal wall motion abnormality may reflect pacemaker activation. Mid to distal septal hypokinesis Mildly decreased right ventricular systolic function The right ventricle is mild to moderately dilated. There is mild (1+) mitral regurgitation. There is moderate (2+) tricuspid regurgitation. ______________________________________________________________________________ Left Ventricle The left ventricle is normal in size. There is normal left ventricular wall thickness. The visual ejection fraction is 45-50%. Septal wall motion abnormality may reflect pacemaker activation. Mid to distal septal hypokinesis.  Right Ventricle The right ventricle is mild to moderately dilated. TAPSE is abnormal, which is consistent with abnormal right ventricular systolic function. Mildly decreased right ventricular systolic function. There is a pacemaker lead in the right ventricle.  Atria The left atrium is severely dilated. Right atrium not well visualized. Intact atrial septum.  Mitral Valve The mitral valve leaflets are mildly thickened. There is mild (1+) mitral regurgitation.  Tricuspid Valve Tricuspid valve leaflets appear normal. There is moderate (2+) tricuspid regurgitation. The right ventricular systolic pressure is approximated at 27mmHg plus the right atrial  pressure.  Aortic Valve The aortic valve is trileaflet with aortic valve sclerosis. There is mild (1+) aortic regurgitation. No aortic stenosis is present.  Pulmonic Valve The pulmonic valve is not well seen, but is grossly normal. There is trace pulmonic valvular regurgitation.  Vessels Mild aortic root enlargement. Normal size ascending aorta. Inferior vena cava not well visualized for estimation of right atrial pressure.  Pericardium There is no pericardial effusion.  ______________________________________________________________________________ MMode/2D Measurements & Calculations IVSd: 0.89 cm LVIDd: 5.0 cm LVIDs: 4.1 cm LVPWd: 1.00 cm FS: 18.0 % LV mass(C)d: 167.8 grams LV mass(C)dI: 91.3 grams/m2  Ao root diam: 3.9 cm LA dimension: 5.2 cm LA/Ao: 1.3 LVOT diam: 2.1 cm LVOT area: 3.3 cm2 LA Volume Indexed (AL/bp): 63.5 ml/m2 RWT: 0.40  Time Measurements MM HR: 74.0 BPM  Doppler Measurements & Calculations MV E max willie: 91.7 cm/sec MV A max willie: 72.3 cm/sec MV E/A: 1.3  MV dec slope: 619.9 cm/sec2 MV dec time: 0.15 sec Ao V2 max: 155.5 cm/sec Ao max PG: 10.0 mmHg Ao V2 mean: 108.1 cm/sec Ao mean P.4 mmHg Ao V2 VTI: 32.7 cm LUCIEN(I,D): 2.1 cm2 LUCIEN(V,D): 2.0 cm2 AI P1/2t: 491.5 msec LV V1 max PG: 3.5 mmHg LV V1 max: 93.6 cm/sec LV V1 VTI: 20.8 cm SV(LVOT): 69.0 ml SI(LVOT): 37.5 ml/m2 PA acc time: 0.08 sec TR max willie: 259.4 cm/sec TR max P.9 mmHg AV Willie Ratio (DI): 0.60 LUCIEN Index (cm2/m2): 1.1 E/E': 11.6 E/E' av.4 Lateral E/e': 11.6 Medial E/e': 17.2 Peak E' Willie: 7.9 cm/sec  ______________________________________________________________________________ Report approved by: Frantz Vergara 2022 01:23 PM       XR Chest Port 1 View    Result Date: 2022  EXAM: XR CHEST PORT 1 VIEW LOCATION: [facility] DATE/TIME: 2022 1:11 PM INDICATION: fall COMPARISON: 2022     IMPRESSION: Interval development of displaced fractures of the right posterior fourth, fifth, and  sixth ribs, age indeterminate and possibly acute or subacute. Correlate with tenderness. There are additional old healed right posterior rib fractures. No pleural effusion or pneumothorax. Worsening of multifocal patchy opacities in both lungs, either due to pulmonary edema or pneumonia. Stable mild cardiomegaly. Median sternotomy and mediastinal surgical clips. Pacemaker.    Cardiac Device Check - Remote    Result Date: 7/7/2022  Type: routine remote pacemaker transmission. Presenting rhythm: normal sinus and AP-VS rate 87 bpm. Frequent PACs and PVCs. Battery/lead status: stable Arrhythmias: since [place] Consult done 6/23/22, two new AT/AF, both <1 minute. No ventricular high rate episodes detected. Anticoagulant: apixaban Comments: normal magnet and pacemaker function.  TIM Black, Device Specialist I have reviewed and interpreted the device interrogation, settings, programming, and encounter summary. The device is functioning within normal device parameters. I agree with the current findings, assessment and plan.    CTA Neck with Contrast    Result Date: 7/9/2022  EXAM: CTA NECK WITH CONTRAST LOCATION: [facility] DATE/TIME: 7/9/2022 2:50 PM INDICATION: C2 fracture, pain recent fall. COMPARISON: Head CT from today. CT facial bones from today. CT cervical spine from today. CONTRAST: Isovue 370 75 ml. TECHNIQUE: Neck CT angiogram with IV contrast. Axial helical CT images of the neck vessels were obtained during the arterial phase of intravenous contrast administration. Axial helical 2D reconstructed images and multiplanar 3D MIP reconstructed images of the neck vessels were performed by the technologist. Dose reduction techniques were used. All stenosis measurements made according to NASCET criteria unless otherwise specified. FINDINGS: Intracranial segments demonstrate moderate diffuse middle cerebral stenoses bilaterally. RIGHT CAROTID: Atherosclerotic plaque results in 50-70% stenosis in  the right ICA. No dissection. LEFT CAROTID: Atherosclerotic plaque results in less than 50% stenosis in the left ICA. No dissection. VERTEBRAL ARTERIES: No focal stenosis or dissection. Balanced vertebral arteries. No evidence of vertebral occlusion or dissection related to the nondisplaced left C2 fracture. There are moderate bilateral focal stenoses present in the fourth segment distal vertebral arteries. Basilar artery is unremarkable. Moderate stenoses noted diffusely in the posterior cerebral arteries. AORTIC ARCH: No significant stenosis at the great vessel origins. The left vertebral artery originates from the arch, normal variant. NONVASCULAR STRUCTURES: Small right apical pneumothorax. This was noted on the CT cervical spine from today. The known nondisplaced left lateral mass fracture at C2 is less well-visualized given differences in technique. There is fracture noted of the right nasal bone, age-indeterminate. Correlate clinically.     IMPRESSION: 1.  No evidence of vertebral occlusion or dissection from the nondisplaced left lateral mass fracture C2 level. No hemodynamically significant extracranial stenosis and no evidence of dissection. 2.  Numerous intracranial stenoses throughout the anterior and posterior circulation. 3.  Small right apical pneumothorax. Findings phoned to referring provider at 7/9/2022 4:00 PM.     Cervical spine CT w/o contrast    Result Date: 7/9/2022  EXAM: CT FACIAL BONES WITHOUT CONTRAST, CT CERVICAL SPINE W/O CONTRAST LOCATION: [facility] DATE/TIME: 7/9/2022 1:01 PM INDICATION: trauma, injury. COMPARISON: Head CT 06/21/2022 TECHNIQUE: 1) Routine CT Facial Bones without IV contrast. Multiplanar reformats. Dose reduction techniques were used. 2) Routine CT Cervical Spine without IV contrast. Multiplanar reformats. Dose reduction techniques were used. FINDINGS: FACIAL BONE CT: OSSEOUS STRUCTURES/SOFT TISSUES: Small soft tissue swelling over the nose.  Mildly displaced nasal bone fractures. Nondisplaced fracture of the anterior nasal septum. The walls of the orbits are intact. No zygomatic arch fractures. The walls of the maxillary sinuses are intact. No zygomatic arch fractures. The pterygoid plates are intact. No mandibular fractures. The temporomandibular joints are intact. Mild degenerative changes of the temporomandibular joints. Poor dentition. Periapical lucencies involving teeth #12, and 26. ORBITAL CONTENTS: Prior bilateral cataract surgery. Visualized portions of the orbits are otherwise unremarkable. SINUSES: Moderate opacification of the ethmoid air cells. CERVICAL SPINE CT: VERTEBRA: 2 mm retrolisthesis of C4 on C5. 1 mm spondylolisthesis of C7 on T1. 2 mm spondylolisthesis of T1 on T2. Age indeterminate 40-50% compression fracture of T1. 21 degrees of segmental kyphosis from C7 to T2. The rest of the vertebral body heights  are maintained. Nondisplaced fracture involving the left lateral mass of C2 with probable extension into the left C2 transverse foramen. Craniocervical junction is within normal limits. No prevertebral soft tissue edema. CANAL/FORAMINA: Moderate intervertebral disc height loss C4-C5 to C6-C7. No high-grade spinal canal narrowing. Moderate left neuroforaminal narrowing at C3-C4. Moderate right neuroforaminal narrowing at C4-C5. Mild bilateral neuroforaminal narrowing at C5-C6. PARASPINAL: Mild symmetric atrophy of the posterior paraspinal musculature. Multinodular thyroid gland. Moderate atherosclerotic calcification of the carotid bulbs. Small right apical pneumothorax.     IMPRESSION: FACIAL BONE CT: 1.  Small soft tissue swelling over the nose. 2.  Mildly displaced nasal bone fractures. Nondisplaced fracture of the anterior nasal septum. 3.  Poor dentition. Periapical lucencies involving teeth #12, and 26, suspicious for odontogenic disease. CERVICAL SPINE CT: 1.  Nondisplaced fracture involving the left lateral mass of C2 with  probable extension into the left C2 transverse foramen. Recommend obtaining CT angiogram to exclude vascular injury. 2.  Age indeterminate 40-50% compression fracture of T1. 21 degrees of segmental kyphosis from C7 to T2. 3.  Small right apical pneumothorax. Discussed the findings with Dr. Burroughs at 1:52 PM, 07/09/2022.    Chest CT w/o contrast    Result Date: 7/9/2022  EXAM: CT CHEST W/O CONTRAST LOCATION: [facility] DATE/TIME: 7/9/2022 3:01 PM INDICATION: Fall.  Pneumothorax seen on CT neck COMPARISON: None. TECHNIQUE: CT chest without IV contrast. Multiplanar reformats were obtained. Dose reduction techniques were used. CONTRAST: None. FINDINGS: LUNGS AND PLEURA: Tiny right apical pneumothorax as seen on the prior C-spine CT. Patchy groundglass and tree-in-bud opacities within both lower lobes and the lingula. Trace bilateral pleural fluid. MEDIASTINUM/AXILLAE: Coarse calcified nodules. No aortic aneurysm. Left-sided pacemaker. CORONARY ARTERY CALCIFICATION: Previous intervention (stents or CABG). UPPER ABDOMEN: Calcified gallstones and heavily calcified distal vertebral arteries. MUSCULOSKELETAL: Osteopenia and degenerative disease in the spine. Median sternotomy wires. Healed bilateral rib fractures. Mildly displaced acute right fourth-sixth rib fractures posteriorly     IMPRESSION: 1.  Tiny right apical pneumothorax, as seen on C-spine CT. 2.  Multifocal groundglass and tree-in-bud opacities in both lungs, likely infection and/or aspiration. Trace bilateral pleural effusions. 3.  Mildly displaced right fourth-sixth rib fractures.     Head CT w/o contrast    Result Date: 7/9/2022  EXAM: CT HEAD W/O CONTRAST LOCATION: [facility] DATE/TIME: 7/9/2022 1:00 PM INDICATION: trauma anticoagulated COMPARISON: None. TECHNIQUE: Routine CT Head without IV contrast. Multiplanar reformats. Dose reduction techniques were used. FINDINGS: INTRACRANIAL CONTENTS: No evidence of  acute intracranial hemorrhage or mass effect. Scattered foci of decreased attenuation within the cerebral hemispheric white matter which are nonspecific, though most commonly ascribed to chronic small vessel ischemic  disease. 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 Scattered foci of decreased attenuation within the cerebral hemispheric white matter which are nonspecific, though most commonly ascribed to chronic small vessel ischemic disease. The basilar cisterns are patent. VISUALIZED ORBITS/SINUSES/MASTOIDS: Lateral cataract surgery. The partially imaged globes are otherwise unremarkable. The partially imaged paranasal sinuses, mastoid air cells and middle ear cavities are unremarkable. BONES/SOFT TISSUES: The visualized skull base and calvarium are unremarkable.     IMPRESSION:  1.  No evidence of acute intracranial hemorrhage or mass effect. 2.  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 3.  Moderate nonspecific white matter changes. 4.  Moderate brain parenchymal volume loss.    CT Head w/o Contrast    Result Date: 6/21/2022  EXAM: CT HEAD W/O CONTRAST LOCATION: [facility] DATE/TIME: 6/21/2022 8:17 PM INDICATION: Syncope, simple, normal neuro exam. COMPARISON: Head CT 3/1/2022 TECHNIQUE: Routine CT Head without IV contrast. Multiplanar reformats. Dose reduction techniques were used. FINDINGS: INTRACRANIAL CONTENTS: No intracranial hemorrhage, extraaxial collection, or mass effect.  No CT evidence of acute infarct. Severe presumed chronic small vessel ischemic changes. Multiple old periventricular infarcts. Left cerebellar infarcts. Moderate generalized volume loss. No hydrocephalus. VISUALIZED ORBITS/SINUSES/MASTOIDS: Prior bilateral cataract surgery. Visualized portions of the orbits are otherwise unremarkable. No paranasal sinus mucosal disease. No middle ear or mastoid effusion. BONES/SOFT TISSUES: No acute abnormality.     IMPRESSION: 1.  No CT evidence of acute  intracranial abnormality. 2.  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    Lumbar spine CT w/o contrast    Result Date: 7/9/2022  EXAM: CT LUMBAR SPINE W/O CONTRAST LOCATION: [facility] DATE/TIME: 7/9/2022 3:01 PM INDICATION: Trauma, pain. COMPARISON: CT thoracic spine from today. TECHNIQUE: Routine CT Lumbar Spine without IV contrast. Multiplanar reformats. Dose reduction techniques were used. FINDINGS: VERTEBRA: 5 lumbar type vertebral bodies. T12 fracture with fracture through the anterior bridging osteophyte T11-T12 level, detailed in dedicated CT thoracic spine. There is moderate to moderately severe compression fracture deformity of L1 with loss of  vertebral body height 50-60%. 1 mm osseous retropulsion. While this is age-indeterminate, it may be chronic given the degree of anterior marginal osteophytes bridging at T12-L1 level. Additionally, there is no evidence of significant paraspinous hematoma adjacent to the L1 vertebral body. There is mild kyphosis at the thoracolumbar junction. 2 mm retrolisthesis L2 on L3 with otherwise anatomic sagittal and coronal alignment. Other lumbar vertebral bodies are preserved in height. There is a displaced fragment anterior to the inferior endplate of L2, but this demonstrates well-defined, sclerotic margins and is likely chronic. No other convincing evidence of acute lumbar fracture. There is early osseous bridging across the anterior aspects of  the sacroiliac joints. CANAL/FORAMINA: No high-grade central canal or foraminal stenosis. Mild canal stenosis L4-L5. PARASPINAL: Extensive arterial vascular calcification. Small bilateral pleural effusions. Colonic diverticulosis. Distended urinary bladder incompletely visualized.     IMPRESSION: 1.  T12 fracture detailed on dedicated CT thoracic spine. 2.  Moderate to moderately severe compression fracture deformity of L1. While age-indeterminate, it may be chronic given the degree of bulky anterior marginal osteophytes  bridging at T12-L1 level and the lack of significant paraspinous hematoma adjacent to the L1 vertebral body. 3.  Chronic appearing displaced fragment anterior to the inferior endplate of L2. 4.  No convincing evidence of acute lumbar fracture or posttraumatic subluxation.    CT Facial Bones without Contrast    Result Date: 7/9/2022  EXAM: CT FACIAL BONES WITHOUT CONTRAST, CT CERVICAL SPINE W/O CONTRAST LOCATION: [facility] DATE/TIME: 7/9/2022 1:01 PM INDICATION: trauma, injury. COMPARISON: Head CT 06/21/2022 TECHNIQUE: 1) Routine CT Facial Bones without IV contrast. Multiplanar reformats. Dose reduction techniques were used. 2) Routine CT Cervical Spine without IV contrast. Multiplanar reformats. Dose reduction techniques were used. FINDINGS: FACIAL BONE CT: OSSEOUS STRUCTURES/SOFT TISSUES: Small soft tissue swelling over the nose. Mildly displaced nasal bone fractures. Nondisplaced fracture of the anterior nasal septum. The walls of the orbits are intact. No zygomatic arch fractures. The walls of the maxillary sinuses are intact. No zygomatic arch fractures. The pterygoid plates are intact. No mandibular fractures. The temporomandibular joints are intact. Mild degenerative changes of the temporomandibular joints. Poor dentition. Periapical lucencies involving teeth #12, and 26. ORBITAL CONTENTS: Prior bilateral cataract surgery. Visualized portions of the orbits are otherwise unremarkable. SINUSES: Moderate opacification of the ethmoid air cells. CERVICAL SPINE CT: VERTEBRA: 2 mm retrolisthesis of C4 on C5. 1 mm spondylolisthesis of C7 on T1. 2 mm spondylolisthesis of T1 on T2. Age indeterminate 40-50% compression fracture of T1. 21 degrees of segmental kyphosis from C7 to T2. The rest of the vertebral body heights  are maintained. Nondisplaced fracture involving the left lateral mass of C2 with probable extension into the left C2 transverse foramen. Craniocervical junction is within normal  limits. No prevertebral soft tissue edema. CANAL/FORAMINA: Moderate intervertebral disc height loss C4-C5 to C6-C7. No high-grade spinal canal narrowing. Moderate left neuroforaminal narrowing at C3-C4. Moderate right neuroforaminal narrowing at C4-C5. Mild bilateral neuroforaminal narrowing at C5-C6. PARASPINAL: Mild symmetric atrophy of the posterior paraspinal musculature. Multinodular thyroid gland. Moderate atherosclerotic calcification of the carotid bulbs. Small right apical pneumothorax.     IMPRESSION: FACIAL BONE CT: 1.  Small soft tissue swelling over the nose. 2.  Mildly displaced nasal bone fractures. Nondisplaced fracture of the anterior nasal septum. 3.  Poor dentition. Periapical lucencies involving teeth #12, and 26, suspicious for odontogenic disease. CERVICAL SPINE CT: 1.  Nondisplaced fracture involving the left lateral mass of C2 with probable extension into the left C2 transverse foramen. Recommend obtaining CT angiogram to exclude vascular injury. 2.  Age indeterminate 40-50% compression fracture of T1. 21 degrees of segmental kyphosis from C7 to T2. 3.  Small right apical pneumothorax. Discussed the findings with Dr. Burroughs at 1:52 PM, 07/09/2022.    CT Thoracic Spine w/o Contrast    Result Date: 7/9/2022  EXAM: CT THORACIC SPINE W/O CONTRAST LOCATION: [facility] DATE/TIME: 7/9/2022 3:01 PM INDICATION: Trauma, pain. COMPARISON: CT cervical spine and CT lumbar spine from today. TECHNIQUE: Routine CT Thoracic Spine without IV contrast. Multiplanar reformats. Dose reduction techniques were used. FINDINGS:  topogram demonstrates multilead left subclavian pacer and median sternotomy wires. VERTEBRA: 12 rib-bearing thoracic vertebral segments with 5 lumbar type vertebral bodies. As discussed on CT cervical spine, there is age-indeterminate 40-50% compression fracture of T1 with associated 21 degrees segmental kyphosis C7-T2. Additionally, there is evidence of acute  fracture through a bridging anterior marginal osteophyte T11-T12 level with fracture extending through the anterosuperior cortex of T12 and fracture lucency paralleling the superior endplate of T12, involving the anterior column and middle column T12 level. No definite extension into the pedicles or posterior elements at the T11 or T12 level. There is slight widening of the posterior facets T11-T12, relative to the adjacent posterior facets suggesting involvement of the posterior column. Mild associated amorphous anterior paraspinous hematoma. No convincing evidence of epidural hematoma, to limits of CT. Sagittal alignment is maintained. There are thin anterior syndesmophytes throughout the majority of the thoracic spine. Other thoracic vertebral bodies are preserved in height. There is lucency present through the anterior bridging osteophyte T5-T6 level, although this demonstrates sclerotic margins and is likely chronic. No other evidence of acute thoracic fracture. Numerous old appearing left posterior rib fractures. There are acute appearing displaced right-sided rib fractures involving right rib 4, 5, 6. CANAL/FORAMINA: No significant central canal stenosis. There is moderate bilateral foraminal narrowing T11-T12 level. PARASPINAL: Mild paraspinous hematoma anterior to T11-T12. Extensive vascular calcification. There is extensive infiltrate involving the posterior aspects of the left lung that could reflect contusion and/or pneumonitis. Small bilateral pleural effusions.     IMPRESSION: 1.  Unstable fracture pattern involving T11-T12 level. Fracture is present through a bridging anterior marginal osteophyte T11-T12 level with fracture paralleling the superior endplate of T12. There is widening of the T11-T12 posterior facets bilaterally. This suggests involvement of the posterior column as well. Mild amorphous anterior paraspinous hematoma. 2.  Age-indeterminate compression fracture of T1, as described on the  cervical spine from today. 3.  Not mentioned above is compression deformity of L1. See dedicated CT lumbar spine. 4.  Small right apical pneumothorax. Findings phoned to Dr. Sarwat Isidro at 7/9/2022 3:59 PM 5.  Contusion or pneumonitis involving the posterior left lung.       Latest radiology report personally reviewed.    Note created using dragon voice recognition software so sounds alike errors may have escaped editing.      07/18/2022   Joe Frye MD  Hospitalist, [facility]  Pager: [phone]

## 2022-07-18 NOTE — PLAN OF CARE
Goal Outcome Evaluation:      Writer gave pt iv metoprolol unaware that pt has to me on cardiac monitor for this medication. House officer paged. Cardiac monitor ordered and STAT ekg. Per dr. Torres, the cardiac monitor can be discontinued in the morning if there is no change.

## 2022-07-18 NOTE — PLAN OF CARE
Problem: Plan of Care - These are the overarching goals to be used throughout the patient stay.    Goal: Plan of Care Review/Shift Note  Description: The Plan of Care Review/Shift note should be completed every shift.  The Outcome Evaluation is a brief statement about your assessment that the patient is improving, declining, or no change.  This information will be displayed automatically on your shift note.  Outcome: Ongoing, Progressing   Put on cardiac monitor as he is getting metoprolol iv for tachycardia.   Problem: Pain (Orthopaedic Fracture)  Goal: Acceptable Pain Control  Outcome: Ongoing, Progressing   Prn iv morphine given.  Problem: Restraint, Behavioral (Acute Care)  Goal: Absence of Harm or Injury  Outcome: Ongoing, Progressing   Goal Outcome Evaluation:      Pt stillon 1:1 sitter. Continues to pull on lines. Disoriented to place, time, and situation. Restless. Prn ativan administered.

## 2022-09-17 NOTE — SIGNIFICANT EVENT
"Significant Event Note    Time of event: 1:47 AM July 12, 2022    Description of event:  Heart rate in 140s  Patient had been due for metoprolol but this was IV and he pulled his IV out    BP (!) 176/88 (BP Location: Right arm)   Pulse 95   Temp 98  F (36.7  C) (Axillary)   Resp 22   Ht 1.753 m (5' 9\")   Wt 68 kg (150 lb)   SpO2 92%   BMI 22.15 kg/m      Plan:  Oral metoprolol x1  Reassess in the morning for ongoing plans    Discussed with: bedside nurse    Enrrique Travis MD        Addendum   4:00 AM  Still delirious  Heart rates had improved, though have increased to 140s again  /79 (BP Location: Right arm)   Pulse 93   Temp 97.8  F (36.6  C) (Axillary)   Resp 24   Ht 1.753 m (5' 9\")   Wt 68 kg (150 lb)   SpO2 98%   BMI 22.15 kg/m    Do not see this documented in the chart  Will order EKG stat to check rhythm    "
Tried to reach patient's nephew by Javier Phillips by phone this morning for update, unsuccessfully.  Two numbers listed (Home & Mobile). Home tried once, Mobile tried twice, no response     Katherine Leonardo MD          
street drug/inhalant/medication abuse

## 2022-09-30 DIAGNOSIS — C61 PROSTATE CANCER (H): ICD-10-CM

## 2022-09-30 RX ORDER — DUTASTERIDE 0.5 MG/1
CAPSULE, LIQUID FILLED ORAL
Qty: 90 CAPSULE | Refills: 4 | OUTPATIENT
Start: 2022-09-30
